# Patient Record
Sex: MALE | Race: WHITE | Employment: OTHER | URBAN - NONMETROPOLITAN AREA
[De-identification: names, ages, dates, MRNs, and addresses within clinical notes are randomized per-mention and may not be internally consistent; named-entity substitution may affect disease eponyms.]

---

## 2017-10-09 ENCOUNTER — HOSPITAL ENCOUNTER (OUTPATIENT)
Age: 62
Discharge: HOME | End: 2017-10-09
Payer: MEDICARE

## 2017-10-09 LAB
ALT SERPL-CCNC: 41 U/L (ref 11–66)
ANION GAP SERPL CALCULATED.3IONS-SCNC: 9 MEQ/L (ref 8–16)
AST SERPL-CCNC: 47 U/L (ref 5–40)
CHLORIDE BLD-SCNC: 97 MEQ/L (ref 98–111)
CO2: 31 MEQ/L (ref 23–33)
CREAT SERPL-MCNC: 0.7 MG/DL (ref 0.4–1.2)
GFR SERPL CREATININE-BSD FRML MDRD: > 90 ML/MIN/1.73M2
POTASSIUM SERPL-SCNC: 4.2 MEQ/L (ref 3.5–5.2)
SODIUM BLD-SCNC: 137 MEQ/L (ref 135–145)

## 2017-10-09 PROCEDURE — 84460 ALANINE AMINO (ALT) (SGPT): CPT

## 2017-10-09 PROCEDURE — 36415 COLL VENOUS BLD VENIPUNCTURE: CPT

## 2017-10-09 PROCEDURE — 82565 ASSAY OF CREATININE: CPT

## 2017-10-09 PROCEDURE — 84450 TRANSFERASE (AST) (SGOT): CPT

## 2017-10-09 PROCEDURE — 80051 ELECTROLYTE PANEL: CPT

## 2018-04-07 ENCOUNTER — HOSPITAL ENCOUNTER (OUTPATIENT)
Age: 63
Discharge: HOME | End: 2018-04-07
Payer: MEDICARE

## 2018-04-07 LAB
ALT SERPL-CCNC: 52 U/L (ref 11–66)
ANION GAP SERPL CALCULATED.3IONS-SCNC: 8 MEQ/L (ref 8–16)
AST SERPL-CCNC: 51 U/L (ref 5–40)
CHLORIDE BLD-SCNC: 97 MEQ/L (ref 98–111)
CO2: 31 MEQ/L (ref 23–33)
CREAT SERPL-MCNC: 0.7 MG/DL (ref 0.4–1.2)
GFR SERPL CREATININE-BSD FRML MDRD: > 90 ML/MIN/1.73M2
POTASSIUM SERPL-SCNC: 5.1 MEQ/L (ref 3.5–5.2)
SODIUM BLD-SCNC: 136 MEQ/L (ref 135–145)

## 2018-04-07 PROCEDURE — 36415 COLL VENOUS BLD VENIPUNCTURE: CPT

## 2018-04-07 PROCEDURE — 82565 ASSAY OF CREATININE: CPT

## 2018-04-07 PROCEDURE — 84450 TRANSFERASE (AST) (SGOT): CPT

## 2018-04-07 PROCEDURE — 80051 ELECTROLYTE PANEL: CPT

## 2018-04-07 PROCEDURE — 84460 ALANINE AMINO (ALT) (SGPT): CPT

## 2018-06-05 ENCOUNTER — HOSPITAL ENCOUNTER (EMERGENCY)
Age: 63
Discharge: HOME | End: 2018-06-06
Payer: MEDICARE

## 2018-06-05 ENCOUNTER — APPOINTMENT (OUTPATIENT)
Dept: GENERAL RADIOLOGY | Age: 63
End: 2018-06-05
Payer: MEDICARE

## 2018-06-05 VITALS
RESPIRATION RATE: 16 BRPM | HEIGHT: 68 IN | WEIGHT: 168 LBS | TEMPERATURE: 98.7 F | BODY MASS INDEX: 25.46 KG/M2 | SYSTOLIC BLOOD PRESSURE: 148 MMHG | HEART RATE: 92 BPM | OXYGEN SATURATION: 95 % | DIASTOLIC BLOOD PRESSURE: 86 MMHG

## 2018-06-05 DIAGNOSIS — S51.811A LACERATION OF RIGHT FOREARM, INITIAL ENCOUNTER: Primary | ICD-10-CM

## 2018-06-05 LAB — INR BLD: 1.95 (ref 0.85–1.13)

## 2018-06-05 PROCEDURE — 36415 COLL VENOUS BLD VENIPUNCTURE: CPT

## 2018-06-05 PROCEDURE — 12001 RPR S/N/AX/GEN/TRNK 2.5CM/<: CPT

## 2018-06-05 PROCEDURE — 85610 PROTHROMBIN TIME: CPT

## 2018-06-05 PROCEDURE — 73110 X-RAY EXAM OF WRIST: CPT

## 2018-06-05 PROCEDURE — 99283 EMERGENCY DEPT VISIT LOW MDM: CPT

## 2018-06-05 RX ORDER — LIDOCAINE HYDROCHLORIDE AND EPINEPHRINE 10; 10 MG/ML; UG/ML
20 INJECTION, SOLUTION INFILTRATION; PERINEURAL ONCE
Status: DISCONTINUED | OUTPATIENT
Start: 2018-06-05 | End: 2018-06-06 | Stop reason: HOSPADM

## 2018-06-05 ASSESSMENT — ENCOUNTER SYMPTOMS
GASTROINTESTINAL NEGATIVE: 1
SHORTNESS OF BREATH: 0
EYES NEGATIVE: 1
COUGH: 0
WHEEZING: 0

## 2018-06-06 RX ORDER — BACITRACIN, NEOMYCIN, POLYMYXIN B 400; 3.5; 5 [USP'U]/G; MG/G; [USP'U]/G
OINTMENT TOPICAL
Status: DISCONTINUED
Start: 2018-06-06 | End: 2018-06-06 | Stop reason: HOSPADM

## 2019-01-14 ENCOUNTER — HOSPITAL ENCOUNTER (OUTPATIENT)
Dept: AUDIOLOGY | Age: 64
Discharge: HOME | End: 2019-01-14
Payer: MEDICARE

## 2019-01-14 PROCEDURE — 92567 TYMPANOMETRY: CPT | Performed by: AUDIOLOGIST

## 2019-01-14 PROCEDURE — 92557 COMPREHENSIVE HEARING TEST: CPT | Performed by: AUDIOLOGIST

## 2021-12-25 ENCOUNTER — HOSPITAL ENCOUNTER (EMERGENCY)
Age: 66
Discharge: HOME | End: 2021-12-25
Payer: MEDICARE

## 2021-12-25 VITALS
SYSTOLIC BLOOD PRESSURE: 120 MMHG | DIASTOLIC BLOOD PRESSURE: 78 MMHG | HEART RATE: 88 BPM | OXYGEN SATURATION: 99 % | RESPIRATION RATE: 20 BRPM | TEMPERATURE: 97.9 F

## 2021-12-25 DIAGNOSIS — S51.812A LACERATION OF LEFT FOREARM, INITIAL ENCOUNTER: Primary | ICD-10-CM

## 2021-12-25 PROCEDURE — 99281 EMR DPT VST MAYX REQ PHY/QHP: CPT

## 2021-12-25 PROCEDURE — 12001 RPR S/N/AX/GEN/TRNK 2.5CM/<: CPT

## 2021-12-25 ASSESSMENT — ENCOUNTER SYMPTOMS
PHOTOPHOBIA: 0
VOMITING: 0
NAUSEA: 0
RHINORRHEA: 0
SHORTNESS OF BREATH: 0

## 2021-12-25 ASSESSMENT — PAIN SCALES - GENERAL: PAINLEVEL_OUTOF10: 3

## 2021-12-25 ASSESSMENT — PAIN DESCRIPTION - PAIN TYPE: TYPE: ACUTE PAIN

## 2021-12-25 ASSESSMENT — PAIN DESCRIPTION - LOCATION: LOCATION: ARM

## 2021-12-25 ASSESSMENT — PAIN DESCRIPTION - DESCRIPTORS: DESCRIPTORS: ACHING

## 2021-12-25 ASSESSMENT — PAIN DESCRIPTION - ORIENTATION: ORIENTATION: LEFT

## 2021-12-25 NOTE — ED NOTES
Patient accidentally stabbed left forearm with pocket knife. Patient states he takes coumadin.  Wound still oozing blood on arrival     Rhodia Lesches, RN  12/25/21 8002

## 2021-12-25 NOTE — ED PROVIDER NOTES
[facility] EMERGENCY DEPT      CHIEF COMPLAINT       Chief Complaint   Patient presents with    Laceration     left arm       Nurses Notes reviewed and I agree except as noted in the HPI. HISTORY OF PRESENT ILLNESS    Gilford Loft is a 73 y.o. male who presents for laceration. Patient was cutting a plastic strap off are present for his grandson when the knife poked his left forearm \"a little. \"  Patient's daughter was insistent that he come to the ER for evaluation due to his Coumadin use. Patient gets his INR checked every 8 weeks at it is well controlled. Patient declines having his INR checked today. Patient admits to pain in the area but denies other complaint. Patient is right-hand dominant. Tetanus was last updated in 2020. REVIEW OF SYSTEMS     Review of Systems   Constitutional: Negative for diaphoresis and fever. HENT: Negative for rhinorrhea. Eyes: Negative for photophobia. Respiratory: Negative for shortness of breath. Cardiovascular: Negative for chest pain. Gastrointestinal: Negative for nausea and vomiting. Musculoskeletal: Positive for myalgias. Negative for gait problem. Skin: Positive for wound. Neurological: Negative for weakness and numbness. Psychiatric/Behavioral: Negative for confusion. PAST MEDICAL HISTORY    has no past medical history on file. SURGICAL HISTORY      has no past surgical history on file. CURRENT MEDICATIONS       Previous Medications    No medications on file       ALLERGIES     has No Known Allergies. FAMILY HISTORY     has no family status information on file. family history is not on file. SOCIAL HISTORY        PHYSICAL EXAM     INITIAL VITALS:  oral temperature is 97.9 °F (36.6 °C). His blood pressure is 120/78 and his pulse is 88. His respiration is 20 and oxygen saturation is 99%. Physical Exam  Vitals and nursing note reviewed. Constitutional:       General: He is not in acute distress.      Appearance: He is well-developed. He is not toxic-appearing or diaphoretic. HENT:      Head: Normocephalic and atraumatic. Right Ear: Hearing normal.      Left Ear: Hearing normal.      Nose: Nose normal.   Eyes:      General: Lids are normal.      Conjunctiva/sclera: Conjunctivae normal.   Neck:      Trachea: No tracheal deviation. Cardiovascular:      Rate and Rhythm: Normal rate and regular rhythm. Pulses:           Radial pulses are 2+ on the right side and 2+ on the left side. Pulmonary:      Effort: Pulmonary effort is normal. No tachypnea or respiratory distress. Breath sounds: No stridor. Abdominal:      General: There is no distension. Palpations: Abdomen is soft. Musculoskeletal:      Left forearm: Laceration (1 cm, superficial) present. No tenderness or bony tenderness. Left wrist: Normal.      Left hand: Normal.        Arms:       Cervical back: No rigidity. Skin:     General: Skin is warm and dry. Coloration: Skin is not pale. Findings: No abrasion, ecchymosis or rash. Neurological:      Mental Status: He is alert and oriented to person, place, and time. GCS: GCS eye subscore is 4. GCS verbal subscore is 5. GCS motor subscore is 6. Sensory: No sensory deficit. Gait: Gait normal.   Psychiatric:         Speech: Speech normal.         Behavior: Behavior normal.         Thought Content: Thought content normal.           DIFFERENTIAL DIAGNOSIS:   Including but not limited to: Laceration, coagulopathy, no evidence for foreign body    DIAGNOSTIC RESULTS     EKG: All E[facility]'s are [facility] who either signs or Co-signs this chart in the absence of a cardiologist.  None    RADIOLOGY: non-plain film images(s) such as CT,Ultrasound and MRI are read by the radiologist.  Plain radiographic images are visualized and preliminarily interpreted by the emergency physician unless otherwise stated below.   No orders to display       LABS: Labs Reviewed - No data to display    EMERGENCY DEPARTMENT COURSE:   Vitals:    Vitals:    12/25/21 1455   BP: 120/78   Pulse: 88   Resp: 20   Temp: 97.9 °F (36.6 °C)   TempSrc: Oral   SpO2: 99%       Patient was seen in the emergency department during the global pandemic, when there was surge capacity and regional healthcare crisis. MDM:  The patient was seen and evaluated by me in the intake area. Vital signs were reviewed and noted stable. Physical exam revealed 1 cm nearly linear superficial laceration to the dorsum of the left forearm. There was minimal oozing. The patient was neurovascularly intact. No labs or imaging were deemed indicated at this time. Laceration was cleansed by me and closed using Dermabond. Patient tolerated procedure well. Patient declined having his INR checked. Discharge plan was discussed, and patient was comfortable with plan of care. I have given the patient strict written and verbal instructions about care at home, follow-up, and signs and symptoms of worsening of condition and they did verbalize understanding. CRITICAL CARE:   None    CONSULTS:  None    PROCEDURES:  Lac Repair    Date/Time: 12/25/2021 4:15 PM  Performed by: Alberto Lucio PA-C  Authorized by: Alberto Lucio PA-C     Consent:     Consent obtained:  Verbal    Consent given by:  Patient    Risks discussed:  Poor cosmetic result    Alternatives discussed:  No treatment  Anesthesia (see MAR for exact dosages):      Anesthesia method:  None  Laceration details:     Location:  Shoulder/arm    Shoulder/arm location:  L lower arm    Length (cm):  1  Repair type:     Repair type:  Simple  Exploration:     Hemostasis achieved with:  Direct pressure    Wound exploration: wound explored through full range of motion and entire depth of wound probed and visualized      Wound extent: no fascia violation noted, no foreign bodies/material noted, no muscle damage noted, no nerve damage noted, no tendon damage

## 2023-05-31 ENCOUNTER — HOSPITAL ENCOUNTER (OUTPATIENT)
Age: 68
Discharge: HOME | End: 2023-05-31
Payer: MEDICARE

## 2023-05-31 ENCOUNTER — HOSPITAL ENCOUNTER (OUTPATIENT)
Dept: GENERAL RADIOLOGY | Age: 68
Discharge: HOME | End: 2023-05-31
Payer: MEDICARE

## 2023-05-31 DIAGNOSIS — R10.9 ABDOMINAL PAIN, UNSPECIFIED ABDOMINAL LOCATION: ICD-10-CM

## 2023-05-31 PROCEDURE — 74018 RADEX ABDOMEN 1 VIEW: CPT

## 2023-06-09 ENCOUNTER — HOSPITAL ENCOUNTER (OUTPATIENT)
Dept: CT IMAGING | Age: 68
Discharge: HOME | End: 2023-06-09
Payer: MEDICARE

## 2023-06-09 DIAGNOSIS — R10.9 ABDOMINAL PAIN, UNSPECIFIED ABDOMINAL LOCATION: ICD-10-CM

## 2023-06-09 LAB
CREAT BLD-MCNC: 0.9 MG/DL (ref 0.5–1.2)
GFR SERPL CREATININE-BSD FRML MDRD: > 60 ML/MIN/1.73M2

## 2023-06-09 PROCEDURE — 74177 CT ABD & PELVIS W/CONTRAST: CPT

## 2023-06-09 PROCEDURE — 6360000004 HC RX CONTRAST MEDICATION: Performed by: INTERNAL MEDICINE

## 2023-06-09 PROCEDURE — 82565 ASSAY OF CREATININE: CPT

## 2023-06-09 RX ADMIN — IOPAMIDOL 85 ML: 755 INJECTION, SOLUTION INTRAVENOUS at 15:00

## 2023-07-11 DIAGNOSIS — E78.2 MIXED HYPERLIPIDEMIA: ICD-10-CM

## 2023-07-11 DIAGNOSIS — I10 ESSENTIAL HYPERTENSION: ICD-10-CM

## 2023-07-11 DIAGNOSIS — I48.91 ATRIAL FIBRILLATION, UNSPECIFIED TYPE (HCC): ICD-10-CM

## 2023-07-11 DIAGNOSIS — D37.4 NEOPLASM OF UNCERTAIN BEHAVIOR OF COLON: Primary | ICD-10-CM

## 2023-07-11 DIAGNOSIS — E79.0 HYPERURICEMIA: ICD-10-CM

## 2023-07-11 RX ORDER — ALLOPURINOL 100 MG/1
TABLET ORAL
Status: ON HOLD | COMMUNITY
Start: 2023-06-02

## 2023-07-11 RX ORDER — FAMOTIDINE 20 MG/1
TABLET, FILM COATED ORAL
Status: ON HOLD | COMMUNITY
Start: 2023-06-02

## 2023-07-11 RX ORDER — OMEPRAZOLE 40 MG/1
40 CAPSULE, DELAYED RELEASE ORAL DAILY
Status: ON HOLD | COMMUNITY
Start: 2023-05-23

## 2023-07-11 RX ORDER — ATORVASTATIN CALCIUM 20 MG/1
20 TABLET, FILM COATED ORAL DAILY
COMMUNITY

## 2023-07-11 RX ORDER — LISINOPRIL 20 MG/1
TABLET ORAL
Status: ON HOLD | COMMUNITY
Start: 2023-06-07

## 2023-07-11 RX ORDER — WARFARIN SODIUM 5 MG/1
5 TABLET ORAL DAILY
Status: ON HOLD | COMMUNITY
Start: 2023-06-07

## 2023-07-11 RX ORDER — METOPROLOL TARTRATE 50 MG/1
TABLET, FILM COATED ORAL
Status: ON HOLD | COMMUNITY

## 2023-07-11 RX ORDER — HYDROCHLOROTHIAZIDE 12.5 MG/1
12.5 CAPSULE, GELATIN COATED ORAL DAILY
Status: ON HOLD | COMMUNITY
Start: 2023-05-27

## 2023-07-12 ENCOUNTER — TELEPHONE (OUTPATIENT)
Dept: SURGERY | Age: 68
End: 2023-07-12

## 2023-07-12 ENCOUNTER — OFFICE VISIT (OUTPATIENT)
Dept: SURGERY | Age: 68
End: 2023-07-12
Payer: MEDICARE

## 2023-07-12 VITALS
OXYGEN SATURATION: 99 % | BODY MASS INDEX: 21.67 KG/M2 | WEIGHT: 143 LBS | SYSTOLIC BLOOD PRESSURE: 80 MMHG | RESPIRATION RATE: 18 BRPM | HEART RATE: 91 BPM | TEMPERATURE: 97 F | DIASTOLIC BLOOD PRESSURE: 50 MMHG | HEIGHT: 68 IN

## 2023-07-12 DIAGNOSIS — Z72.0 TOBACCO ABUSE: ICD-10-CM

## 2023-07-12 DIAGNOSIS — C18.9 ADENOCARCINOMA OF COLON (HCC): Primary | ICD-10-CM

## 2023-07-12 DIAGNOSIS — K76.9 LIVER LESION: ICD-10-CM

## 2023-07-12 PROCEDURE — 3078F DIAST BP <80 MM HG: CPT | Performed by: SURGERY

## 2023-07-12 PROCEDURE — 1123F ACP DISCUSS/DSCN MKR DOCD: CPT | Performed by: SURGERY

## 2023-07-12 PROCEDURE — 3074F SYST BP LT 130 MM HG: CPT | Performed by: SURGERY

## 2023-07-12 PROCEDURE — 99204 OFFICE O/P NEW MOD 45 MIN: CPT | Performed by: SURGERY

## 2023-07-12 RX ORDER — METRONIDAZOLE 500 MG/1
500 TABLET ORAL 3 TIMES DAILY
Qty: 3 TABLET | Refills: 0 | Status: CANCELLED | OUTPATIENT
Start: 2023-07-12 | End: 2023-07-13

## 2023-07-12 RX ORDER — SODIUM, POTASSIUM,MAG SULFATES 17.5-3.13G
1 SOLUTION, RECONSTITUTED, ORAL ORAL ONCE
Qty: 1 EACH | Refills: 0 | Status: CANCELLED | OUTPATIENT
Start: 2023-07-12 | End: 2023-07-12

## 2023-07-12 NOTE — TELEPHONE ENCOUNTER
Patient scheduled for surgery with Dr. Michelle Rice on 07- at 1:30pm with an arrival of 12:00pm.  Preop orders (CEA, CMP, Chest CT) and surgery instructions given to patient. Surgery consent signed. Antibacterial soap given. [facility]in clinic contacted. Cardiac clearance per Dr. Isaac Conte. Patient instructed to hold his Coumadin 5-days.

## 2023-07-12 NOTE — TELEPHONE ENCOUNTER
Robotic Surgery Scheduling Form   [address] [place], [address] [facility]    Phone * [phone]   Surgical Scheduling Direct line Phone * [phone]  Fax * [phone]      Davidson Liter      1955    male    [place]  [address] [address]   Marital Status:    Single     Home Phone: [phone]   Cell Phone:   Telephone Information:   Mobile [phone]              Surgeon: Dr. Kuldip Amezcua  Surgery Date:07-   Time:  Nura     Procedure: Robotic assisted extended right colon resection possible open  Inpatient       Diagnosis: Uncertain neoplasm colon    Important Medical History: In Epic    Special Inst/Equip:     CPT Codes: 30971    Latex Allergy:   yes,   Cardiac Device:  no    Anesthesia Type: General    Case Location:  Main OR     Preadmission Testing: Phone Call      PAT Date and Time: ________________________________    PAT Confirmation #: _________________________________    Post Op Visit:  ______________________________________    Need Preop Cardiac Clearance:   yes,     Does patient have Cardiologist/physician? Dr. Rola Goel #:  ______________________________________________    Magdalene Fitch: __________________________________ Date:____________________      RNFA (colon resection only):       Insurance Company Name:  [employer]

## 2023-07-14 ENCOUNTER — TELEPHONE (OUTPATIENT)
Dept: SURGERY | Age: 68
End: 2023-07-14

## 2023-07-14 DIAGNOSIS — C18.9 ADENOCARCINOMA OF COLON (HCC): Primary | ICD-10-CM

## 2023-07-17 ENCOUNTER — PREP FOR PROCEDURE (OUTPATIENT)
Dept: SURGERY | Age: 68
End: 2023-07-17

## 2023-07-17 RX ORDER — SODIUM CHLORIDE 9 MG/ML
INJECTION, SOLUTION INTRAVENOUS CONTINUOUS
Status: CANCELLED | OUTPATIENT
Start: 2023-07-17

## 2023-07-17 RX ORDER — SODIUM, POTASSIUM,MAG SULFATES 17.5-3.13G
1 SOLUTION, RECONSTITUTED, ORAL ORAL ONCE
Qty: 1 EACH | Refills: 0 | Status: SHIPPED | OUTPATIENT
Start: 2023-07-17 | End: 2023-07-17

## 2023-07-17 RX ORDER — METRONIDAZOLE 500 MG/1
500 TABLET ORAL 3 TIMES DAILY
Qty: 3 TABLET | Refills: 0 | Status: SHIPPED | OUTPATIENT
Start: 2023-07-17 | End: 2023-07-18

## 2023-07-17 RX ORDER — ALVIMOPAN 12 MG/1
12 CAPSULE ORAL ONCE
Status: CANCELLED | OUTPATIENT
Start: 2023-07-17 | End: 2023-07-17

## 2023-07-17 ASSESSMENT — ENCOUNTER SYMPTOMS
COLOR CHANGE: 0
ABDOMINAL DISTENTION: 0
EYE ITCHING: 0
EYE REDNESS: 0
SHORTNESS OF BREATH: 0
ANAL BLEEDING: 0
EYE PAIN: 0
BACK PAIN: 0
SINUS PRESSURE: 0
RECTAL PAIN: 0
CHEST TIGHTNESS: 0
BLOOD IN STOOL: 0
DIARRHEA: 0
STRIDOR: 0
RHINORRHEA: 0
ALLERGIC/IMMUNOLOGIC NEGATIVE: 1
EYE DISCHARGE: 0
TROUBLE SWALLOWING: 0
COUGH: 1
SORE THROAT: 0
NAUSEA: 0
CHOKING: 0
FACIAL SWELLING: 0
VOICE CHANGE: 0
VOMITING: 0
PHOTOPHOBIA: 0
ABDOMINAL PAIN: 1
WHEEZING: 0
APNEA: 0
CONSTIPATION: 0

## 2023-07-17 NOTE — PROGRESS NOTES
hypertension    Mixed hyperlipidemia    Hyperuricemia     CT ABDOMEN PELVIS W IV CONTRAST Additional Contrast? Radiologist Recommendation  Order: [id number]  Status: Final result     Visible to patient: No (not released)     Next appt: 07/18/2023 at 11:00 AM in Radiology ([facility] G)     Dx: Abdominal pain, unspecified abdominal...     0 Result Notes    1  Topic  Details    Reading Physician Reading Date Result Priority   Tristen Anderson MD  [phone] 6/9/2023      Narrative & Impression  PROCEDURE: CT ABDOMEN PELVIS W IV CONTRAST     CLINICAL INFORMATION: Abdominal pain, unspecified abdominal location     COMPARISON: No prior study. TECHNIQUE:   Helical CT acquisition of the abdomen and pelvis was performed with administration of intravenous contrast. Multiplanar reformats are provided. All CT scans at this facility use dose modulation, iterative reconstruction, and/or weight based dosing when appropriate to reduce the radiation dose to as low as reasonably achievable. CONTRAST: 80  cc of Isovue-370  intravenously        FINDINGS:               No significant lower thoracic findings. There is a 5.2 x 5.1 cm area of colonic dilatation and marked masslike colonic wall thickening in the proximal transverse colon at the hepatic flexure. This is consistent with a colonic malignancy. Colonic diverticulosis. A 2.2 x 1.5 cm hyperenhancing focus in segment 2 of the liver is an indeterminate abnormality at this time. A 3.1 cm cyst of the inferior pole of the left kidney is noted. No hydronephrosis. No obstructing ureteral calculus. Aortoiliac calcifications. The liver, gallbladder, biliary tree, pancreas, adrenal glands, spleen, and kidneys are unremarkable. No bowel obstruction or acute inflammatory bowel process. The appendix is unremarkable. The abdominal aorta is not aneurysmal.     No significantly enlarged lymph nodes are seen. The bladder is under distended.  The

## 2023-07-17 NOTE — H&P
Ben Garibay (:  1955)      ASSESSMENT:  1. Adenocarcinoma of colon (proximal transverse colon)  2. Liver lesion  3. Tobacco abuse  4. Hypotension     PLAN:  1. Schedule Chris for robotic possible open extended right colectomy. 2. He will undergo pre-operative clearance per anesthesia guidelines with risk factors listed under the past medical history diagnosis & problem list.  3. The risks, benefits and alternatives were discussed with Tabby Vance including non-operative management. The pros and cons of robotic, laparoscopic and open techniques were discussed. All questions answered. He understands and wishes to proceed with surgical intervention. 4. Restrictions discussed with Tabby Vance and he expresses understanding. 5. He is advised to call back directly if there are further questions/concerns, or if his symptoms worsen prior to surgery. 6.  CT chest prior to surgical intervention  7. Laboratory values including CEA level prior surgery  8. Await final results of pathology as well as colonoscopy report from GI service  9. Discussed with patient about MRI of liver. He states he is not able to proceed with MRI even if it is an open MRI. He wishes to forego this at this time and see direct visualization during surgery and then go from there. 10.  Discussed with patient about hypotension. Discussed with option of going to emergency department for hydration and further assessment. He is refusing at this time. Asymptomatic in the office. 11.  Encouraged tobacco cessation  12. Stool softeners as needed     SUBJECTIVE/OBJECTIVE:          Chief Complaint   Patient presents with    Surgical Consult       NP Dr. Trena Lamar is a 41-year-old male who presents for initial evaluation with family secondary to newly diagnosed colonic mass that is near obstructing. Discussed case with Dr. Sorin Rubio of the GI service. Not able to get a scope through the area of concern. Proximal transverse colon close to the hepatic flexure. Patient admits to abdominal discomfort in the right upper quadrant. Mild to moderate. No severe sharp pain. Admits to weight loss and fatigue. Decreased appetite. No significant nausea or vomiting. Still passing flatus and having bowel movements. No hematochezia. No change in urination. Headaches. No lightheadedness. Low blood pressure in office however patient seems to be asymptomatic. CT abdomen/pelvis demonstrated colonic mass proximal transverse colon close to the hepatic flexure. Currently do not have final results of pathology from biopsy with colonoscopy nor the colonoscopy report. Patient denies any family or personal history of colorectal disease that he is aware of. Small liver lesion identified on CT abdomen/pelvis. Patient states he cannot proceed with an MRI even if it is a open MRI due to anxiety. He is refusing to proceed with any further testing in regards to MRI. Admits to smoking cigarettes but has been decreasing in number. Less than half a pack a day. Review of Systems   Constitutional:  Positive for activity change, appetite change, fatigue and unexpected weight change. Negative for chills, diaphoresis and fever. HENT:  Positive for hearing loss and postnasal drip. Negative for congestion, dental problem, drooling, ear discharge, ear pain, facial swelling, mouth sores, nosebleeds, rhinorrhea, sinus pressure, sneezing, sore throat, tinnitus, trouble swallowing and voice change. Eyes:  Positive for visual disturbance. Negative for photophobia, pain, discharge, redness and itching. Respiratory:  Positive for cough. Negative for apnea, choking, chest tightness, shortness of breath, wheezing and stridor. Cardiovascular:  Negative for chest pain, palpitations and leg swelling. Gastrointestinal:  Positive for abdominal pain.  Negative for abdominal distention, anal bleeding, blood in stool, constipation,

## 2023-07-18 ENCOUNTER — HOSPITAL ENCOUNTER (OUTPATIENT)
Dept: CT IMAGING | Age: 68
Discharge: HOME | End: 2023-07-18
Attending: SURGERY
Payer: MEDICARE

## 2023-07-18 DIAGNOSIS — D37.4 NEOPLASM OF UNCERTAIN BEHAVIOR OF COLON: ICD-10-CM

## 2023-07-18 LAB
CREAT BLD-MCNC: 0.8 MG/DL (ref 0.5–1.2)
GFR SERPL CREATININE-BSD FRML MDRD: > 60 ML/MIN/1.73M2

## 2023-07-18 PROCEDURE — 6360000004 HC RX CONTRAST MEDICATION: Performed by: SURGERY

## 2023-07-18 PROCEDURE — 82565 ASSAY OF CREATININE: CPT

## 2023-07-18 PROCEDURE — 71260 CT THORAX DX C+: CPT

## 2023-07-18 RX ADMIN — IOPAMIDOL 100 ML: 755 INJECTION, SOLUTION INTRAVENOUS at 10:32

## 2023-07-19 ENCOUNTER — TELEPHONE (OUTPATIENT)
Dept: SURGERY | Age: 68
End: 2023-07-19

## 2023-07-20 ENCOUNTER — ANESTHESIA (OUTPATIENT)
Dept: OPERATING ROOM | Age: 68
End: 2023-07-20
Payer: MEDICARE

## 2023-07-20 ENCOUNTER — HOSPITAL ENCOUNTER (INPATIENT)
Age: 68
LOS: 7 days | Discharge: HOME | DRG: 827 | End: 2023-07-27
Attending: SURGERY | Admitting: SURGERY
Payer: MEDICARE

## 2023-07-20 ENCOUNTER — ANESTHESIA EVENT (OUTPATIENT)
Dept: OPERATING ROOM | Age: 68
End: 2023-07-20
Payer: MEDICARE

## 2023-07-20 DIAGNOSIS — C18.2 MALIGNANT NEOPLASM OF ASCENDING COLON (HCC): Primary | ICD-10-CM

## 2023-07-20 DIAGNOSIS — D49.0 NEOPLASM OF SIGMOID COLON: ICD-10-CM

## 2023-07-20 DIAGNOSIS — C18.9 ADENOCARCINOMA OF COLON (HCC): ICD-10-CM

## 2023-07-20 LAB
ABO GROUP BLD: NORMAL
ANTIBODY SCREEN: NORMAL
APTT PPP: 35.5 SECONDS (ref 22–38)
INR PPP: 1.37 (ref 0.85–1.13)
POTASSIUM SERPL-SCNC: 5.3 MEQ/L (ref 3.5–5.2)
RH BLD: NORMAL

## 2023-07-20 PROCEDURE — C1889 IMPLANT/INSERT DEVICE, NOC: HCPCS | Performed by: SURGERY

## 2023-07-20 PROCEDURE — 6370000000 HC RX 637 (ALT 250 FOR IP): Performed by: SURGERY

## 2023-07-20 PROCEDURE — 8E0W4CZ ROBOTIC ASSISTED PROCEDURE OF TRUNK REGION, PERCUTANEOUS ENDOSCOPIC APPROACH: ICD-10-PCS | Performed by: SURGERY

## 2023-07-20 PROCEDURE — 2500000003 HC RX 250 WO HCPCS: Performed by: STUDENT IN AN ORGANIZED HEALTH CARE EDUCATION/TRAINING PROGRAM

## 2023-07-20 PROCEDURE — 47562 LAPAROSCOPIC CHOLECYSTECTOMY: CPT | Performed by: SURGERY

## 2023-07-20 PROCEDURE — 86850 RBC ANTIBODY SCREEN: CPT

## 2023-07-20 PROCEDURE — 86901 BLOOD TYPING SEROLOGIC RH(D): CPT

## 2023-07-20 PROCEDURE — 7100000001 HC PACU RECOVERY - ADDTL 15 MIN: Performed by: SURGERY

## 2023-07-20 PROCEDURE — 85610 PROTHROMBIN TIME: CPT

## 2023-07-20 PROCEDURE — 36415 COLL VENOUS BLD VENIPUNCTURE: CPT

## 2023-07-20 PROCEDURE — 94761 N-INVAS EAR/PLS OXIMETRY MLT: CPT

## 2023-07-20 PROCEDURE — 6360000002 HC RX W HCPCS: Performed by: NURSE ANESTHETIST, CERTIFIED REGISTERED

## 2023-07-20 PROCEDURE — 88309 TISSUE EXAM BY PATHOLOGIST: CPT

## 2023-07-20 PROCEDURE — 0DTF0ZZ RESECTION OF RIGHT LARGE INTESTINE, OPEN APPROACH: ICD-10-PCS | Performed by: SURGERY

## 2023-07-20 PROCEDURE — 88341 IMHCHEM/IMCYTCHM EA ADD ANTB: CPT

## 2023-07-20 PROCEDURE — 2709999900 HC NON-CHARGEABLE SUPPLY: Performed by: SURGERY

## 2023-07-20 PROCEDURE — 88304 TISSUE EXAM BY PATHOLOGIST: CPT

## 2023-07-20 PROCEDURE — 2720000010 HC SURG SUPPLY STERILE: Performed by: SURGERY

## 2023-07-20 PROCEDURE — 2500000003 HC RX 250 WO HCPCS

## 2023-07-20 PROCEDURE — 3600000019 HC SURGERY ROBOT ADDTL 15MIN: Performed by: SURGERY

## 2023-07-20 PROCEDURE — 84132 ASSAY OF SERUM POTASSIUM: CPT

## 2023-07-20 PROCEDURE — 86900 BLOOD TYPING SEROLOGIC ABO: CPT

## 2023-07-20 PROCEDURE — 3700000001 HC ADD 15 MINUTES (ANESTHESIA): Performed by: SURGERY

## 2023-07-20 PROCEDURE — 6370000000 HC RX 637 (ALT 250 FOR IP): Performed by: ANESTHESIOLOGY

## 2023-07-20 PROCEDURE — 6360000002 HC RX W HCPCS: Performed by: SURGERY

## 2023-07-20 PROCEDURE — 2580000003 HC RX 258: Performed by: NURSE PRACTITIONER

## 2023-07-20 PROCEDURE — 6370000000 HC RX 637 (ALT 250 FOR IP): Performed by: NURSE PRACTITIONER

## 2023-07-20 PROCEDURE — 2500000003 HC RX 250 WO HCPCS: Performed by: NURSE ANESTHETIST, CERTIFIED REGISTERED

## 2023-07-20 PROCEDURE — 3700000000 HC ANESTHESIA ATTENDED CARE: Performed by: SURGERY

## 2023-07-20 PROCEDURE — 7100000000 HC PACU RECOVERY - FIRST 15 MIN: Performed by: SURGERY

## 2023-07-20 PROCEDURE — 6360000002 HC RX W HCPCS

## 2023-07-20 PROCEDURE — 6360000002 HC RX W HCPCS: Performed by: NURSE PRACTITIONER

## 2023-07-20 PROCEDURE — 6360000002 HC RX W HCPCS: Performed by: ANESTHESIOLOGY

## 2023-07-20 PROCEDURE — 85730 THROMBOPLASTIN TIME PARTIAL: CPT

## 2023-07-20 PROCEDURE — 0FT44ZZ RESECTION OF GALLBLADDER, PERCUTANEOUS ENDOSCOPIC APPROACH: ICD-10-PCS | Performed by: SURGERY

## 2023-07-20 PROCEDURE — 88342 IMHCHEM/IMCYTCHM 1ST ANTB: CPT

## 2023-07-20 PROCEDURE — 94640 AIRWAY INHALATION TREATMENT: CPT

## 2023-07-20 PROCEDURE — 44205 LAP COLECTOMY PART W/ILEUM: CPT | Performed by: SURGERY

## 2023-07-20 PROCEDURE — S2900 ROBOTIC SURGICAL SYSTEM: HCPCS | Performed by: SURGERY

## 2023-07-20 PROCEDURE — C9399 UNCLASSIFIED DRUGS OR BIOLOG: HCPCS | Performed by: NURSE ANESTHETIST, CERTIFIED REGISTERED

## 2023-07-20 PROCEDURE — 3600000009 HC SURGERY ROBOT BASE: Performed by: SURGERY

## 2023-07-20 PROCEDURE — 1200000000 HC SEMI PRIVATE

## 2023-07-20 DEVICE — CLIP INT L POLYMER LOK LIG HEM O LOK: Type: IMPLANTABLE DEVICE | Site: ABDOMEN | Status: FUNCTIONAL

## 2023-07-20 RX ORDER — SODIUM CHLORIDE 0.9 % (FLUSH) 0.9 %
5-40 SYRINGE (ML) INJECTION EVERY 12 HOURS SCHEDULED
Status: DISCONTINUED | OUTPATIENT
Start: 2023-07-20 | End: 2023-07-27 | Stop reason: HOSPADM

## 2023-07-20 RX ORDER — ONDANSETRON 4 MG/1
4 TABLET, ORALLY DISINTEGRATING ORAL EVERY 8 HOURS PRN
Status: DISCONTINUED | OUTPATIENT
Start: 2023-07-20 | End: 2023-07-27 | Stop reason: HOSPADM

## 2023-07-20 RX ORDER — FENTANYL CITRATE 50 UG/ML
INJECTION, SOLUTION INTRAMUSCULAR; INTRAVENOUS
Status: COMPLETED
Start: 2023-07-20 | End: 2023-07-20

## 2023-07-20 RX ORDER — DEXAMETHASONE SODIUM PHOSPHATE 10 MG/ML
INJECTION, EMULSION INTRAMUSCULAR; INTRAVENOUS PRN
Status: DISCONTINUED | OUTPATIENT
Start: 2023-07-20 | End: 2023-07-20 | Stop reason: SDUPTHER

## 2023-07-20 RX ORDER — ONDANSETRON 2 MG/ML
4 INJECTION INTRAMUSCULAR; INTRAVENOUS EVERY 6 HOURS PRN
Status: DISCONTINUED | OUTPATIENT
Start: 2023-07-20 | End: 2023-07-27 | Stop reason: HOSPADM

## 2023-07-20 RX ORDER — HYOSCYAMINE SULFATE 0.125 MG
125 TABLET,DISINTEGRATING ORAL EVERY 4 HOURS PRN
Status: DISCONTINUED | OUTPATIENT
Start: 2023-07-20 | End: 2023-07-27 | Stop reason: HOSPADM

## 2023-07-20 RX ORDER — LIDOCAINE HCL/PF 100 MG/5ML
SYRINGE (ML) INJECTION PRN
Status: DISCONTINUED | OUTPATIENT
Start: 2023-07-20 | End: 2023-07-20 | Stop reason: SDUPTHER

## 2023-07-20 RX ORDER — FENTANYL CITRATE 50 UG/ML
50 INJECTION, SOLUTION INTRAMUSCULAR; INTRAVENOUS EVERY 5 MIN PRN
Status: COMPLETED | OUTPATIENT
Start: 2023-07-20 | End: 2023-07-20

## 2023-07-20 RX ORDER — FAMOTIDINE 20 MG/1
20 TABLET, FILM COATED ORAL 2 TIMES DAILY
Status: DISCONTINUED | OUTPATIENT
Start: 2023-07-20 | End: 2023-07-27 | Stop reason: HOSPADM

## 2023-07-20 RX ORDER — IPRATROPIUM BROMIDE AND ALBUTEROL SULFATE 2.5; .5 MG/3ML; MG/3ML
1 SOLUTION RESPIRATORY (INHALATION)
Status: DISCONTINUED | OUTPATIENT
Start: 2023-07-20 | End: 2023-07-27 | Stop reason: HOSPADM

## 2023-07-20 RX ORDER — SODIUM CHLORIDE 9 MG/ML
INJECTION, SOLUTION INTRAVENOUS CONTINUOUS
Status: DISCONTINUED | OUTPATIENT
Start: 2023-07-20 | End: 2023-07-23

## 2023-07-20 RX ORDER — FENTANYL CITRATE 50 UG/ML
INJECTION, SOLUTION INTRAMUSCULAR; INTRAVENOUS PRN
Status: DISCONTINUED | OUTPATIENT
Start: 2023-07-20 | End: 2023-07-20 | Stop reason: SDUPTHER

## 2023-07-20 RX ORDER — BUPIVACAINE HYDROCHLORIDE 5 MG/ML
INJECTION, SOLUTION PERINEURAL PRN
Status: DISCONTINUED | OUTPATIENT
Start: 2023-07-20 | End: 2023-07-20 | Stop reason: ALTCHOICE

## 2023-07-20 RX ORDER — SODIUM CHLORIDE 0.9 % (FLUSH) 0.9 %
5-40 SYRINGE (ML) INJECTION EVERY 12 HOURS SCHEDULED
Status: DISCONTINUED | OUTPATIENT
Start: 2023-07-20 | End: 2023-07-20 | Stop reason: HOSPADM

## 2023-07-20 RX ORDER — SODIUM CHLORIDE 0.9 % (FLUSH) 0.9 %
5-40 SYRINGE (ML) INJECTION PRN
Status: DISCONTINUED | OUTPATIENT
Start: 2023-07-20 | End: 2023-07-27 | Stop reason: HOSPADM

## 2023-07-20 RX ORDER — ONDANSETRON 2 MG/ML
INJECTION INTRAMUSCULAR; INTRAVENOUS PRN
Status: DISCONTINUED | OUTPATIENT
Start: 2023-07-20 | End: 2023-07-20 | Stop reason: SDUPTHER

## 2023-07-20 RX ORDER — OXYCODONE HYDROCHLORIDE 5 MG/1
5 TABLET ORAL EVERY 4 HOURS PRN
Status: DISCONTINUED | OUTPATIENT
Start: 2023-07-20 | End: 2023-07-24

## 2023-07-20 RX ORDER — SODIUM CHLORIDE 9 MG/ML
INJECTION, SOLUTION INTRAVENOUS CONTINUOUS
Status: DISCONTINUED | OUTPATIENT
Start: 2023-07-20 | End: 2023-07-20 | Stop reason: HOSPADM

## 2023-07-20 RX ORDER — ROCURONIUM BROMIDE 10 MG/ML
INJECTION, SOLUTION INTRAVENOUS PRN
Status: DISCONTINUED | OUTPATIENT
Start: 2023-07-20 | End: 2023-07-20 | Stop reason: SDUPTHER

## 2023-07-20 RX ORDER — SODIUM CHLORIDE 9 MG/ML
INJECTION, SOLUTION INTRAVENOUS PRN
Status: DISCONTINUED | OUTPATIENT
Start: 2023-07-20 | End: 2023-07-27 | Stop reason: HOSPADM

## 2023-07-20 RX ORDER — PROPOFOL 10 MG/ML
INJECTION, EMULSION INTRAVENOUS PRN
Status: DISCONTINUED | OUTPATIENT
Start: 2023-07-20 | End: 2023-07-20 | Stop reason: SDUPTHER

## 2023-07-20 RX ORDER — ALVIMOPAN 12 MG/1
12 CAPSULE ORAL ONCE
Status: DISCONTINUED | OUTPATIENT
Start: 2023-07-20 | End: 2023-07-20 | Stop reason: SDUPTHER

## 2023-07-20 RX ORDER — HYDRALAZINE HYDROCHLORIDE 20 MG/ML
10 INJECTION INTRAMUSCULAR; INTRAVENOUS
Status: DISCONTINUED | OUTPATIENT
Start: 2023-07-20 | End: 2023-07-20 | Stop reason: HOSPADM

## 2023-07-20 RX ORDER — FENTANYL CITRATE 50 UG/ML
50 INJECTION, SOLUTION INTRAMUSCULAR; INTRAVENOUS EVERY 5 MIN PRN
Status: DISCONTINUED | OUTPATIENT
Start: 2023-07-20 | End: 2023-07-20 | Stop reason: HOSPADM

## 2023-07-20 RX ORDER — OXYCODONE HYDROCHLORIDE 5 MG/1
10 TABLET ORAL EVERY 4 HOURS PRN
Status: DISCONTINUED | OUTPATIENT
Start: 2023-07-20 | End: 2023-07-24

## 2023-07-20 RX ORDER — ENOXAPARIN SODIUM 100 MG/ML
40 INJECTION SUBCUTANEOUS DAILY
Status: DISCONTINUED | OUTPATIENT
Start: 2023-07-21 | End: 2023-07-23

## 2023-07-20 RX ORDER — SODIUM CHLORIDE 9 MG/ML
INJECTION, SOLUTION INTRAVENOUS PRN
Status: DISCONTINUED | OUTPATIENT
Start: 2023-07-20 | End: 2023-07-20 | Stop reason: HOSPADM

## 2023-07-20 RX ORDER — MORPHINE SULFATE 2 MG/ML
2 INJECTION, SOLUTION INTRAMUSCULAR; INTRAVENOUS EVERY 5 MIN PRN
Status: DISCONTINUED | OUTPATIENT
Start: 2023-07-20 | End: 2023-07-20 | Stop reason: HOSPADM

## 2023-07-20 RX ORDER — INDOCYANINE GREEN AND WATER 25 MG
KIT INJECTION PRN
Status: DISCONTINUED | OUTPATIENT
Start: 2023-07-20 | End: 2023-07-20 | Stop reason: SDUPTHER

## 2023-07-20 RX ORDER — SODIUM CHLORIDE 0.9 % (FLUSH) 0.9 %
5-40 SYRINGE (ML) INJECTION PRN
Status: DISCONTINUED | OUTPATIENT
Start: 2023-07-20 | End: 2023-07-20 | Stop reason: HOSPADM

## 2023-07-20 RX ORDER — METOPROLOL TARTRATE 50 MG/1
50 TABLET, FILM COATED ORAL 2 TIMES DAILY
Status: DISCONTINUED | OUTPATIENT
Start: 2023-07-20 | End: 2023-07-27 | Stop reason: HOSPADM

## 2023-07-20 RX ADMIN — HYOSCYAMINE SULFATE 125 MCG: 0.12 TABLET, ORALLY DISINTEGRATING ORAL at 21:25

## 2023-07-20 RX ADMIN — IPRATROPIUM BROMIDE AND ALBUTEROL SULFATE 1 DOSE: .5; 3 SOLUTION RESPIRATORY (INHALATION) at 21:37

## 2023-07-20 RX ADMIN — DEXAMETHASONE SODIUM PHOSPHATE 10 MG: 10 INJECTION, EMULSION INTRAMUSCULAR; INTRAVENOUS at 15:15

## 2023-07-20 RX ADMIN — OXYCODONE 10 MG: 5 TABLET ORAL at 18:54

## 2023-07-20 RX ADMIN — SODIUM CHLORIDE: 9 INJECTION, SOLUTION INTRAVENOUS at 17:24

## 2023-07-20 RX ADMIN — Medication 100 MG: at 15:08

## 2023-07-20 RX ADMIN — FENTANYL CITRATE 50 MCG: 50 INJECTION, SOLUTION INTRAMUSCULAR; INTRAVENOUS at 18:00

## 2023-07-20 RX ADMIN — IPRATROPIUM BROMIDE AND ALBUTEROL SULFATE 1 DOSE: .5; 3 SOLUTION RESPIRATORY (INHALATION) at 13:09

## 2023-07-20 RX ADMIN — HYDROMORPHONE HYDROCHLORIDE 0.5 MG: 1 INJECTION, SOLUTION INTRAMUSCULAR; INTRAVENOUS; SUBCUTANEOUS at 19:53

## 2023-07-20 RX ADMIN — NALOXEGOL OXALATE 25 MG: 25 TABLET, FILM COATED ORAL at 13:29

## 2023-07-20 RX ADMIN — SUGAMMADEX 200 MG: 100 INJECTION, SOLUTION INTRAVENOUS at 17:39

## 2023-07-20 RX ADMIN — INDOCYANINE GREEN 10 MG: 25 INJECTION, POWDER, LYOPHILIZED, FOR SOLUTION INTRAVENOUS at 17:10

## 2023-07-20 RX ADMIN — PROPOFOL 100 MG: 10 INJECTION, EMULSION INTRAVENOUS at 15:08

## 2023-07-20 RX ADMIN — HYDROMORPHONE HYDROCHLORIDE 0.5 MG: 1 INJECTION, SOLUTION INTRAMUSCULAR; INTRAVENOUS; SUBCUTANEOUS at 18:10

## 2023-07-20 RX ADMIN — OXYCODONE 10 MG: 5 TABLET ORAL at 23:00

## 2023-07-20 RX ADMIN — FENTANYL CITRATE 25 MCG: 50 INJECTION, SOLUTION INTRAMUSCULAR; INTRAVENOUS at 16:49

## 2023-07-20 RX ADMIN — FENTANYL CITRATE 50 MCG: 50 INJECTION, SOLUTION INTRAMUSCULAR; INTRAVENOUS at 18:05

## 2023-07-20 RX ADMIN — HYDROMORPHONE HYDROCHLORIDE 0.5 MG: 1 INJECTION, SOLUTION INTRAMUSCULAR; INTRAVENOUS; SUBCUTANEOUS at 18:15

## 2023-07-20 RX ADMIN — ROCURONIUM BROMIDE 20 MG: 10 INJECTION INTRAVENOUS at 16:49

## 2023-07-20 RX ADMIN — FENTANYL CITRATE 100 MCG: 50 INJECTION, SOLUTION INTRAMUSCULAR; INTRAVENOUS at 17:43

## 2023-07-20 RX ADMIN — FENTANYL CITRATE 25 MCG: 50 INJECTION, SOLUTION INTRAMUSCULAR; INTRAVENOUS at 16:29

## 2023-07-20 RX ADMIN — FENTANYL CITRATE 50 MCG: 50 INJECTION, SOLUTION INTRAMUSCULAR; INTRAVENOUS at 15:32

## 2023-07-20 RX ADMIN — CEFOXITIN 2000 MG: 2 INJECTION, POWDER, FOR SOLUTION INTRAVENOUS at 17:05

## 2023-07-20 RX ADMIN — ONDANSETRON 4 MG: 2 INJECTION INTRAMUSCULAR; INTRAVENOUS at 15:15

## 2023-07-20 RX ADMIN — ROCURONIUM BROMIDE 50 MG: 10 INJECTION INTRAVENOUS at 15:08

## 2023-07-20 RX ADMIN — FAMOTIDINE 20 MG: 20 TABLET, FILM COATED ORAL at 21:25

## 2023-07-20 RX ADMIN — SODIUM CHLORIDE: 9 INJECTION, SOLUTION INTRAVENOUS at 13:27

## 2023-07-20 RX ADMIN — CEFOXITIN 2000 MG: 2 INJECTION, POWDER, FOR SOLUTION INTRAVENOUS at 15:15

## 2023-07-20 ASSESSMENT — PAIN SCALES - GENERAL
PAINLEVEL_OUTOF10: 6
PAINLEVEL_OUTOF10: 0
PAINLEVEL_OUTOF10: 0
PAINLEVEL_OUTOF10: 10
PAINLEVEL_OUTOF10: 6
PAINLEVEL_OUTOF10: 9
PAINLEVEL_OUTOF10: 9
PAINLEVEL_OUTOF10: 7

## 2023-07-20 ASSESSMENT — PAIN DESCRIPTION - LOCATION
LOCATION: ABDOMEN

## 2023-07-20 ASSESSMENT — PAIN - FUNCTIONAL ASSESSMENT
PAIN_FUNCTIONAL_ASSESSMENT: PREVENTS OR INTERFERES SOME ACTIVE ACTIVITIES AND ADLS
PAIN_FUNCTIONAL_ASSESSMENT: NONE - DENIES PAIN

## 2023-07-20 ASSESSMENT — LIFESTYLE VARIABLES: SMOKING_STATUS: 1

## 2023-07-20 ASSESSMENT — PAIN DESCRIPTION - PAIN TYPE
TYPE: SURGICAL PAIN
TYPE: SURGICAL PAIN

## 2023-07-20 ASSESSMENT — PAIN DESCRIPTION - ORIENTATION: ORIENTATION: MID

## 2023-07-20 ASSESSMENT — PAIN DESCRIPTION - DESCRIPTORS
DESCRIPTORS: SHARP
DESCRIPTORS: ACHING;SHARP
DESCRIPTORS: ACHING

## 2023-07-20 NOTE — ANESTHESIA POSTPROCEDURE EVALUATION
Department of Anesthesiology  Postprocedure Note    Patient: Radha Kan  MRN: [id number]  YOB: 1955  Date of evaluation: 7/20/2023      Procedure Summary     Date: 07/20/23 Room / Location: [facility] Senait Ramosetelvina Argueta    Anesthesia Start: 1504 Anesthesia Stop: 1800    Procedure: ROBOTIC EXTENDED RIGHT COLON RESECTION, CHOLECYSTECTOMY (Right: Abdomen) Diagnosis: Neoplasm of sigmoid colon    Surgeons: Claire Mohan MD Responsible Provider: Suly Cleaning MD    Anesthesia Type: general ASA Status: 4          Anesthesia Type: No value filed.     Ria Phase I: Ria Score: 9    Ria Phase II:        Anesthesia Post Evaluation    Patient location during evaluation: PACU  Patient participation: complete - patient participated  Level of consciousness: awake and alert  Airway patency: patent  Nausea & Vomiting: no nausea  Complications: no  Cardiovascular status: blood pressure returned to baseline and hemodynamically stable  Respiratory status: acceptable and spontaneous ventilation  Hydration status: euvolemic

## 2023-07-20 NOTE — BRIEF OP NOTE
Brief Postoperative Note      Patient: Dontae Daomn  YOB: 1955  MRN: [id number]    Date of Procedure: 7/20/2023    Preoperative diagnosis: Proximal transverse colon/hepatic flexure adenocarcinoma    Post-Op Diagnosis: Same       Procedure(s):  ROBOTIC EXTENDED RIGHT COLON RESECTION, CHOLECYSTECTOMY    Surgeon(s):  Vinny Velasco MD    Assistant:  First Assistant: Juvenal Crigler, RN    Anesthesia: General/local    Estimated Blood Loss (mL): 56OL    Complications: None    Specimens:   ID Type Source Tests Collected by Time Destination   A : Gallbladder Tissue Gallbladder SURGICAL PATHOLOGY Vinny Velasco MD 7/20/2023 1732    B : Right Colon Tissue Colon SURGICAL PATHOLOGY Vinny Velasco MD 7/20/2023 1732        Implants:  Implant Name Type Inv.  Item Serial No.  Lot No. LRB No. Used Action   CLIP INT L POLYMER TANYA LIG HEM O TANYA - [id number]  CLIP INT L POLYMER TANYA LIG HEM O TANYA  [employer] [id number] 1 Implanted         Drains:   Urinary Catheter 07/20/23 Bonilla-Temperature (Active)       Findings: as above - see op note for details    Colon Resection  Operation performed with curative intent Yes   Tumor Location (select all that apply) Hepatic flexure   Extent of colon and vascular resection (select all that apply) Extended right hemicolectomy - ileocolic, right colic (if present), middle colic     Electronically signed by Vinny Velasco MD on 7/20/2023 at 5:40 PM

## 2023-07-20 NOTE — INTERVAL H&P NOTE
Update History & Physical    The patient's History and Physical was reviewed with the patient and I examined the patient. There was no change. The surgical site was confirmed by the patient and me. Plan: The risks, benefits, expected outcome, and alternative to the recommended procedure have been discussed with the patient. Patient understands and wants to proceed with the procedure.      Electronically signed by Norma Villalobos MD on 7/20/2023 at 2:52 PM

## 2023-07-20 NOTE — PROGRESS NOTES
1758  Awake and oriented on arrival to PACU , HOB elevated , pt moan and is restless c/o # 8- 9 ABD pain and benedict cath   1800 medicate with Fentanyl 50 mcg IV   1805 pt unchanged , medicated with Fentanyl 50 mcg IV   1810 pt less restless states no change in pain , medicated with Dilaudid 0.5 mg IV  1815 pain unchanged , medicated with Dilaudid 0.5 mg IV   1825 starting to rest on and off   1830 meets criteria for discharge , transported to [facility]

## 2023-07-21 LAB
ANION GAP SERPL CALC-SCNC: 7 MEQ/L (ref 8–16)
BUN SERPL-MCNC: 14 MG/DL (ref 7–22)
CALCIUM SERPL-MCNC: 8.1 MG/DL (ref 8.5–10.5)
CHLORIDE SERPL-SCNC: 105 MEQ/L (ref 98–111)
CO2 SERPL-SCNC: 24 MEQ/L (ref 23–33)
CREAT SERPL-MCNC: 1 MG/DL (ref 0.4–1.2)
DEPRECATED MEAN GLUCOSE BLD GHB EST-ACNC: 126 MG/DL (ref 70–126)
DEPRECATED RDW RBC AUTO: 49 FL (ref 35–45)
ERYTHROCYTE [DISTWIDTH] IN BLOOD BY AUTOMATED COUNT: 16 % (ref 11.5–14.5)
GFR SERPL CREATININE-BSD FRML MDRD: > 60 ML/MIN/1.73M2
GLUCOSE SERPL-MCNC: 240 MG/DL (ref 70–108)
HBA1C MFR BLD HPLC: 6.2 % (ref 4.4–6.4)
HCT VFR BLD AUTO: 29 % (ref 42–52)
HCT VFR BLD AUTO: 30.1 % (ref 42–52)
HGB BLD-MCNC: 8.9 GM/DL (ref 14–18)
HGB BLD-MCNC: 9.1 GM/DL (ref 14–18)
MCH RBC QN AUTO: 25.9 PG (ref 26–33)
MCHC RBC AUTO-ENTMCNC: 30.7 GM/DL (ref 32.2–35.5)
MCV RBC AUTO: 84.5 FL (ref 80–94)
PLATELET # BLD AUTO: 482 THOU/MM3 (ref 130–400)
PMV BLD AUTO: 10.3 FL (ref 9.4–12.4)
POTASSIUM SERPL-SCNC: 5.3 MEQ/L (ref 3.5–5.2)
POTASSIUM SERPL-SCNC: 5.4 MEQ/L (ref 3.5–5.2)
RBC # BLD AUTO: 3.43 MILL/MM3 (ref 4.7–6.1)
SODIUM SERPL-SCNC: 136 MEQ/L (ref 135–145)
WBC # BLD AUTO: 16.1 THOU/MM3 (ref 4.8–10.8)

## 2023-07-21 PROCEDURE — 6370000000 HC RX 637 (ALT 250 FOR IP): Performed by: SURGERY

## 2023-07-21 PROCEDURE — 83036 HEMOGLOBIN GLYCOSYLATED A1C: CPT

## 2023-07-21 PROCEDURE — 80048 BASIC METABOLIC PNL TOTAL CA: CPT

## 2023-07-21 PROCEDURE — 36415 COLL VENOUS BLD VENIPUNCTURE: CPT

## 2023-07-21 PROCEDURE — 84132 ASSAY OF SERUM POTASSIUM: CPT

## 2023-07-21 PROCEDURE — 94640 AIRWAY INHALATION TREATMENT: CPT

## 2023-07-21 PROCEDURE — 1200000003 HC TELEMETRY R&B

## 2023-07-21 PROCEDURE — 2580000003 HC RX 258: Performed by: SURGERY

## 2023-07-21 PROCEDURE — 6370000000 HC RX 637 (ALT 250 FOR IP): Performed by: NURSE PRACTITIONER

## 2023-07-21 PROCEDURE — 94760 N-INVAS EAR/PLS OXIMETRY 1: CPT

## 2023-07-21 PROCEDURE — 85018 HEMOGLOBIN: CPT

## 2023-07-21 PROCEDURE — 99024 POSTOP FOLLOW-UP VISIT: CPT | Performed by: NURSE PRACTITIONER

## 2023-07-21 PROCEDURE — 94761 N-INVAS EAR/PLS OXIMETRY MLT: CPT

## 2023-07-21 PROCEDURE — APPSS45 APP SPLIT SHARED TIME 31-45 MINUTES: Performed by: NURSE PRACTITIONER

## 2023-07-21 PROCEDURE — 85027 COMPLETE CBC AUTOMATED: CPT

## 2023-07-21 PROCEDURE — 85014 HEMATOCRIT: CPT

## 2023-07-21 PROCEDURE — 6360000002 HC RX W HCPCS: Performed by: SURGERY

## 2023-07-21 RX ORDER — NICOTINE 21 MG/24HR
1 PATCH, TRANSDERMAL 24 HOURS TRANSDERMAL DAILY
Status: DISCONTINUED | OUTPATIENT
Start: 2023-07-21 | End: 2023-07-23

## 2023-07-21 RX ORDER — HYDROCHLOROTHIAZIDE 12.5 MG/1
12.5 CAPSULE, GELATIN COATED ORAL DAILY
Status: DISCONTINUED | OUTPATIENT
Start: 2023-07-21 | End: 2023-07-27 | Stop reason: HOSPADM

## 2023-07-21 RX ORDER — ALLOPURINOL 100 MG/1
100 TABLET ORAL 3 TIMES DAILY
Status: DISCONTINUED | OUTPATIENT
Start: 2023-07-21 | End: 2023-07-27 | Stop reason: HOSPADM

## 2023-07-21 RX ADMIN — HYDROCHLOROTHIAZIDE 12.5 MG: 12.5 CAPSULE ORAL at 16:14

## 2023-07-21 RX ADMIN — HYDROMORPHONE HYDROCHLORIDE 0.5 MG: 1 INJECTION, SOLUTION INTRAMUSCULAR; INTRAVENOUS; SUBCUTANEOUS at 04:52

## 2023-07-21 RX ADMIN — METOPROLOL TARTRATE 50 MG: 50 TABLET, FILM COATED ORAL at 22:28

## 2023-07-21 RX ADMIN — IPRATROPIUM BROMIDE AND ALBUTEROL SULFATE 1 DOSE: .5; 3 SOLUTION RESPIRATORY (INHALATION) at 08:57

## 2023-07-21 RX ADMIN — NALOXEGOL OXALATE 12.5 MG: 12.5 TABLET, FILM COATED ORAL at 09:21

## 2023-07-21 RX ADMIN — SODIUM CHLORIDE: 9 INJECTION, SOLUTION INTRAVENOUS at 09:33

## 2023-07-21 RX ADMIN — ENOXAPARIN SODIUM 40 MG: 100 INJECTION SUBCUTANEOUS at 09:21

## 2023-07-21 RX ADMIN — IPRATROPIUM BROMIDE AND ALBUTEROL SULFATE 1 DOSE: .5; 3 SOLUTION RESPIRATORY (INHALATION) at 17:29

## 2023-07-21 RX ADMIN — OXYCODONE 10 MG: 5 TABLET ORAL at 16:14

## 2023-07-21 RX ADMIN — OXYCODONE 5 MG: 5 TABLET ORAL at 11:27

## 2023-07-21 RX ADMIN — OXYCODONE 10 MG: 5 TABLET ORAL at 06:51

## 2023-07-21 RX ADMIN — OXYCODONE 10 MG: 5 TABLET ORAL at 22:44

## 2023-07-21 RX ADMIN — ALLOPURINOL 100 MG: 100 TABLET ORAL at 22:28

## 2023-07-21 RX ADMIN — METOPROLOL TARTRATE 50 MG: 50 TABLET, FILM COATED ORAL at 09:41

## 2023-07-21 RX ADMIN — IPRATROPIUM BROMIDE AND ALBUTEROL SULFATE 1 DOSE: .5; 3 SOLUTION RESPIRATORY (INHALATION) at 21:49

## 2023-07-21 RX ADMIN — ALLOPURINOL 100 MG: 100 TABLET ORAL at 16:14

## 2023-07-21 RX ADMIN — FAMOTIDINE 20 MG: 20 TABLET, FILM COATED ORAL at 22:28

## 2023-07-21 RX ADMIN — FAMOTIDINE 20 MG: 20 TABLET, FILM COATED ORAL at 09:21

## 2023-07-21 RX ADMIN — IPRATROPIUM BROMIDE AND ALBUTEROL SULFATE 1 DOSE: .5; 3 SOLUTION RESPIRATORY (INHALATION) at 13:19

## 2023-07-21 ASSESSMENT — PAIN DESCRIPTION - LOCATION
LOCATION: ABDOMEN

## 2023-07-21 ASSESSMENT — PAIN SCALES - GENERAL
PAINLEVEL_OUTOF10: 7
PAINLEVEL_OUTOF10: 0
PAINLEVEL_OUTOF10: 2
PAINLEVEL_OUTOF10: 6
PAINLEVEL_OUTOF10: 9
PAINLEVEL_OUTOF10: 7
PAINLEVEL_OUTOF10: 6

## 2023-07-21 ASSESSMENT — PAIN DESCRIPTION - DESCRIPTORS
DESCRIPTORS: ACHING
DESCRIPTORS: ACHING

## 2023-07-21 ASSESSMENT — PAIN DESCRIPTION - ORIENTATION
ORIENTATION: MID
ORIENTATION: MID

## 2023-07-21 NOTE — PROGRESS NOTES
Patient weaned to RA. Patient educated on IS and instructed to use 10x an hour while awake. Patient demonstrates understanding. Goal is 1250 and patient is achieving. Patient verbalizes understanding to bathe with CHG soap daily around incision. CHG soap placed in bathroom for morning. Set goal with patient to ambulate around room in morning then remove catheter. Patient educated on importance of ambulating post op.

## 2023-07-21 NOTE — PROGRESS NOTES
Physician Progress Note      PATIENT:               Denise Castellanos  CSN #:                  [id number]  :                       1955  ADMIT DATE:       2023 12:19 PM  [address] [facility] DATE:  RESPONDING  PROVIDER #:        Lesly Montoya MD          QUERY TEXT:    Patient admitted with Adenocarcinoma of colon, noted to have atrial   fibrillation. If possible, please document in progress notes and discharge   summary further specificity regarding the type of atrial fibrillation: The medical record reflects the following:  Risk Factors: open extended right colectomy. Clinical Indicators: H&P notes -Past Medical History:-Atrial fibrillation,   Lab values PROTHROMBIN TIME, INR 1.37. Treatment: Warfarin. Chronic: nonspecific term that could be referring to paroxysmal, persistent,   or permanent  Longstanding persistent: persistent and continuous, lasting > 1 year. Paroxysmal - self-terminating or intermittent; resolves with or without   intervention within 7 days of onset; may recur with various frequency. Persistent - Fails to terminate within 7 days; Often requires meds or   cardioversion to restore to NSR. Permanent - longstanding & persistent; Medication has been ineffective in   restoring NSR &/or cardioversion is contraindicated    Definitions per MS-DRG Training Guide and Quick Reference Guide, [facility] 5   Diseases and Disorders of the Circulatory System; 2019; . Software content   from the [employer]?  Advanced CDI Transformation Program    Thank You,  Lisa Hoyt RN, CDS  Options provided:  -- Paroxysmal Atrial Fibrillation  -- Longstanding Persistent Atrial Fibrillation  -- Permanent Atrial Fibrillation  -- Persistent Atrial Fibrillation  -- Chronic Atrial Fibrillation, unspecified  -- Other - I will add my own diagnosis  -- Disagree - Not applicable / Not valid  -- Disagree - Clinically unable to determine / Unknown  -- Refer to Clinical Documentation Reviewer    PROVIDER RESPONSE TEXT:    This

## 2023-07-21 NOTE — PLAN OF CARE
Problem: Discharge Planning  Goal: Discharge to home or other facility with appropriate resources  Outcome: Progressing  Flowsheets (Taken 7/21/2023 1541)  Discharge to home or other facility with appropriate resources:   Identify barriers to discharge with patient and caregiver   Arrange for needed discharge resources and transportation as appropriate   Identify discharge learning needs (meds, wound care, etc)   Refer to discharge planning if patient needs post-hospital services based on physician order or complex needs related to functional status, cognitive ability or social support system  Note: Patient planning to go home tomorrow with wife. Problem: Safety - Adult  Goal: Free from fall injury  Outcome: Progressing  Flowsheets (Taken 7/21/2023 1541)  Free From Fall Injury:   Instruct family/caregiver on patient safety   Based on caregiver fall risk screen, instruct family/caregiver to ask for assistance with transferring infant if caregiver noted to have fall risk factors  Note: Patient has remained free from falls at this time. Problem: Pain  Goal: Verbalizes/displays adequate comfort level or baseline comfort level  Outcome: Progressing  Flowsheets (Taken 7/21/2023 1541)  Verbalizes/displays adequate comfort level or baseline comfort level:   Encourage patient to monitor pain and request assistance   Assess pain using appropriate pain scale   Administer analgesics based on type and severity of pain and evaluate response   Notify Licensed Independent Practitioner if interventions unsuccessful or patient reports new pain  Note: Patients pain is controlled with the current pain regimen.      Problem: ABCDS Injury Assessment  Goal: Absence of physical injury  Outcome: Progressing  Flowsheets (Taken 7/21/2023 1541)  Absence of Physical Injury: Implement safety measures based on patient assessment     Problem: Respiratory - Adult  Goal: Achieves optimal ventilation and oxygenation  7/21/2023 1541 by Nitin Montano

## 2023-07-21 NOTE — CARE COORDINATION
Case Management Assessment  Initial Evaluation    Date/Time of Evaluation: 7/21/2023 10:08 AM  Assessment Completed by: Hannah Chowdhury RN    If patient is discharged prior to next notation, then this note serves as note for discharge by case management. Patient Name: Merlin Guaman                   YOB: 1955  Diagnosis: Neoplasm of sigmoid colon [D49.0]  Colon cancer [facility]) [C18.9]                   Date / Time: 7/20/2023 12:19 PM  Location: [facility]21/021     Patient Admission Status: Inpatient   Readmission Risk Low 0-14, Mod 15-19), High > 20: Readmission Risk Score: 11.7    Current PCP: Dexter Zurita MD  PCP verified by CM? Yes    Chart Reviewed: Yes      History Provided by: Patient  Patient Orientation: Alert and Oriented    Patient Cognition: Alert    Hospitalization in the last 30 days (Readmission):  No    If yes, Readmission Assessment in CM Navigator will be completed. Advance Directives:      Code Status: Full Code   Patient's Primary Decision Maker is: Patient Declined (Legal Next of Kin Remains as Decision Maker)      Discharge Planning:    Patient lives with: Spouse/Significant Other Type of Home: House  Primary Care Giver: Self  Patient Support Systems include: Spouse/Significant Other   Current Financial resources: Medicare  Current community resources: None  Current services prior to admission: None            Current DME:              Type of Home Care services:  None    ADLS  Prior functional level: Independent in ADLs/IADLs  Current functional level: Independent in ADLs/IADLs    Family can provide assistance at DC: Yes  Would you like Case Management to discuss the discharge plan with any other family members/significant others, and if so, who?  No  Plans to Return to Present Housing: Yes  Other Identified Issues/Barriers to RETURNING to current housing: n/a  Potential Assistance needed at discharge: N/A            Potential DME:    Patient expects to

## 2023-07-21 NOTE — PROGRESS NOTES
Patient's daughter, Janae Foreman, called and provided HIPPA code. Updated on patient condition and care plan. All questions answered at this time.

## 2023-07-21 NOTE — OP NOTE
cartridge. Duodenum identified  and this was protected throughout the operation. The superior aspect of  the duodenum, however, was fairly challenging to get off, due to the  mass more superior. However, there was never any obvious spread into  the duodenum. Lateral hepatic flexure region was then mobilized. At  that point, I then went proximal and middle colic was taken along with  the transverse colon then in that region with an [place] MARY-60 green  load. Omentum was divided. I did take the right colic and all of its  mesentery along with viviane tissue, heading then back towards the right. Again the duodenum was then identified and spared. Lot of inflammatory  changes and induration in this region as we got closer to the mass. Once we got up to the mass itself on the superior margin, it was really  difficult to separate this from the gallbladder. At first, I felt this  was just indurated and inflamed tissue, but once this was actually  , I did feel like there was some direct extension of the tumor  at the fundus of the gallbladder. I finished the mobilization and  overall I think the duodenum was spared. There was still concerns for  some gross extension into the gallbladder. Specimen was left in its  natural position and then the gallbladder was removed in a standard  fashion. Infundibulum was grasped as well as the fundus and this was  elevated. Blunt dissection around the infundibulum with all the chronic  inflamed tissues to where the cystic duct and artery were each  identified. They were each then circumferentially dissected out until  the critical view was obtained. At that time, they were each then  doubly clipped and divided close to the gallbladder. Gallbladder was  then taken off the liver bed using electrocautery. This was then placed  into an endoscopic retrieval bag.   I did feel it was appropriate to  leave the gallbladder in as I was a little concerned with some

## 2023-07-21 NOTE — PLAN OF CARE
Problem: Respiratory - Adult  Goal: Achieves optimal ventilation and oxygenation  7/21/2023 1052 by Lauren Hernandez RCP  Outcome: Progressing   Patient is receiving ipratropium-albuterol to promote and preserve a clear, open airway. Patient breath sounds improved post-treatment and will continue to improve with further therapy. Patient mutually agreed on goals.

## 2023-07-22 LAB
ANION GAP SERPL CALC-SCNC: 7 MEQ/L (ref 8–16)
BUN SERPL-MCNC: 12 MG/DL (ref 7–22)
CA-I BLD ISE-SCNC: 1.16 MMOL/L (ref 1.12–1.32)
CALCIUM SERPL-MCNC: 8.2 MG/DL (ref 8.5–10.5)
CHLORIDE SERPL-SCNC: 105 MEQ/L (ref 98–111)
CO2 SERPL-SCNC: 26 MEQ/L (ref 23–33)
CREAT SERPL-MCNC: 0.8 MG/DL (ref 0.4–1.2)
DEPRECATED RDW RBC AUTO: 49 FL (ref 35–45)
ERYTHROCYTE [DISTWIDTH] IN BLOOD BY AUTOMATED COUNT: 16 % (ref 11.5–14.5)
GFR SERPL CREATININE-BSD FRML MDRD: > 60 ML/MIN/1.73M2
GLUCOSE BLD STRIP.AUTO-MCNC: 148 MG/DL (ref 70–108)
GLUCOSE BLD STRIP.AUTO-MCNC: 151 MG/DL (ref 70–108)
GLUCOSE BLD STRIP.AUTO-MCNC: 151 MG/DL (ref 70–108)
GLUCOSE SERPL-MCNC: 135 MG/DL (ref 70–108)
HCT VFR BLD AUTO: 26.7 % (ref 42–52)
HCT VFR BLD AUTO: 29.5 % (ref 42–52)
HGB BLD-MCNC: 8.2 GM/DL (ref 14–18)
HGB BLD-MCNC: 9 GM/DL (ref 14–18)
INR PPP: 1.57 (ref 0.85–1.13)
MAGNESIUM SERPL-MCNC: 2.1 MG/DL (ref 1.6–2.4)
MCH RBC QN AUTO: 26 PG (ref 26–33)
MCHC RBC AUTO-ENTMCNC: 30.7 GM/DL (ref 32.2–35.5)
MCV RBC AUTO: 84.8 FL (ref 80–94)
PHOSPHATE SERPL-MCNC: 2.4 MG/DL (ref 2.4–4.7)
PLATELET # BLD AUTO: 425 THOU/MM3 (ref 130–400)
PMV BLD AUTO: 10.4 FL (ref 9.4–12.4)
POTASSIUM SERPL-SCNC: 4.7 MEQ/L (ref 3.5–5.2)
POTASSIUM SERPL-SCNC: 5.5 MEQ/L (ref 3.5–5.2)
RBC # BLD AUTO: 3.15 MILL/MM3 (ref 4.7–6.1)
SODIUM SERPL-SCNC: 138 MEQ/L (ref 135–145)
WBC # BLD AUTO: 14.6 THOU/MM3 (ref 4.8–10.8)

## 2023-07-22 PROCEDURE — 80048 BASIC METABOLIC PNL TOTAL CA: CPT

## 2023-07-22 PROCEDURE — 85014 HEMATOCRIT: CPT

## 2023-07-22 PROCEDURE — 6370000000 HC RX 637 (ALT 250 FOR IP): Performed by: SURGERY

## 2023-07-22 PROCEDURE — 85027 COMPLETE CBC AUTOMATED: CPT

## 2023-07-22 PROCEDURE — 6360000002 HC RX W HCPCS: Performed by: SURGERY

## 2023-07-22 PROCEDURE — 99024 POSTOP FOLLOW-UP VISIT: CPT | Performed by: NURSE PRACTITIONER

## 2023-07-22 PROCEDURE — 82948 REAGENT STRIP/BLOOD GLUCOSE: CPT

## 2023-07-22 PROCEDURE — 85018 HEMOGLOBIN: CPT

## 2023-07-22 PROCEDURE — 83735 ASSAY OF MAGNESIUM: CPT

## 2023-07-22 PROCEDURE — 6370000000 HC RX 637 (ALT 250 FOR IP): Performed by: NURSE PRACTITIONER

## 2023-07-22 PROCEDURE — 84100 ASSAY OF PHOSPHORUS: CPT

## 2023-07-22 PROCEDURE — APPSS45 APP SPLIT SHARED TIME 31-45 MINUTES: Performed by: NURSE PRACTITIONER

## 2023-07-22 PROCEDURE — 1200000003 HC TELEMETRY R&B

## 2023-07-22 PROCEDURE — 93005 ELECTROCARDIOGRAM TRACING: CPT | Performed by: NURSE PRACTITIONER

## 2023-07-22 PROCEDURE — 94640 AIRWAY INHALATION TREATMENT: CPT

## 2023-07-22 PROCEDURE — 84132 ASSAY OF SERUM POTASSIUM: CPT

## 2023-07-22 PROCEDURE — 82330 ASSAY OF CALCIUM: CPT

## 2023-07-22 PROCEDURE — 94760 N-INVAS EAR/PLS OXIMETRY 1: CPT

## 2023-07-22 PROCEDURE — 85610 PROTHROMBIN TIME: CPT

## 2023-07-22 PROCEDURE — 36415 COLL VENOUS BLD VENIPUNCTURE: CPT

## 2023-07-22 RX ORDER — DEXTROSE MONOHYDRATE 100 MG/ML
INJECTION, SOLUTION INTRAVENOUS CONTINUOUS PRN
Status: DISCONTINUED | OUTPATIENT
Start: 2023-07-22 | End: 2023-07-27 | Stop reason: HOSPADM

## 2023-07-22 RX ORDER — INSULIN LISPRO 100 [IU]/ML
0-4 INJECTION, SOLUTION INTRAVENOUS; SUBCUTANEOUS
Status: DISCONTINUED | OUTPATIENT
Start: 2023-07-22 | End: 2023-07-27 | Stop reason: HOSPADM

## 2023-07-22 RX ORDER — INSULIN LISPRO 100 [IU]/ML
0-4 INJECTION, SOLUTION INTRAVENOUS; SUBCUTANEOUS NIGHTLY
Status: DISCONTINUED | OUTPATIENT
Start: 2023-07-22 | End: 2023-07-27 | Stop reason: HOSPADM

## 2023-07-22 RX ADMIN — ALLOPURINOL 100 MG: 100 TABLET ORAL at 15:35

## 2023-07-22 RX ADMIN — IPRATROPIUM BROMIDE AND ALBUTEROL SULFATE 1 DOSE: .5; 3 SOLUTION RESPIRATORY (INHALATION) at 07:53

## 2023-07-22 RX ADMIN — METOPROLOL TARTRATE 50 MG: 50 TABLET, FILM COATED ORAL at 19:45

## 2023-07-22 RX ADMIN — FAMOTIDINE 20 MG: 20 TABLET, FILM COATED ORAL at 09:19

## 2023-07-22 RX ADMIN — ALLOPURINOL 100 MG: 100 TABLET ORAL at 09:19

## 2023-07-22 RX ADMIN — IPRATROPIUM BROMIDE AND ALBUTEROL SULFATE 1 DOSE: .5; 3 SOLUTION RESPIRATORY (INHALATION) at 15:41

## 2023-07-22 RX ADMIN — HYDROCHLOROTHIAZIDE 12.5 MG: 12.5 CAPSULE ORAL at 09:19

## 2023-07-22 RX ADMIN — IPRATROPIUM BROMIDE AND ALBUTEROL SULFATE 1 DOSE: .5; 3 SOLUTION RESPIRATORY (INHALATION) at 21:15

## 2023-07-22 RX ADMIN — OXYCODONE 5 MG: 5 TABLET ORAL at 06:06

## 2023-07-22 RX ADMIN — ALLOPURINOL 100 MG: 100 TABLET ORAL at 19:45

## 2023-07-22 RX ADMIN — ENOXAPARIN SODIUM 40 MG: 100 INJECTION SUBCUTANEOUS at 09:19

## 2023-07-22 RX ADMIN — FAMOTIDINE 20 MG: 20 TABLET, FILM COATED ORAL at 19:45

## 2023-07-22 RX ADMIN — METOPROLOL TARTRATE 50 MG: 50 TABLET, FILM COATED ORAL at 09:19

## 2023-07-22 RX ADMIN — OXYCODONE 5 MG: 5 TABLET ORAL at 11:53

## 2023-07-22 RX ADMIN — NALOXEGOL OXALATE 12.5 MG: 12.5 TABLET, FILM COATED ORAL at 06:06

## 2023-07-22 RX ADMIN — IPRATROPIUM BROMIDE AND ALBUTEROL SULFATE 1 DOSE: .5; 3 SOLUTION RESPIRATORY (INHALATION) at 12:54

## 2023-07-22 RX ADMIN — OXYCODONE 10 MG: 5 TABLET ORAL at 19:45

## 2023-07-22 ASSESSMENT — PAIN SCALES - GENERAL
PAINLEVEL_OUTOF10: 6
PAINLEVEL_OUTOF10: 6
PAINLEVEL_OUTOF10: 7
PAINLEVEL_OUTOF10: 3
PAINLEVEL_OUTOF10: 4
PAINLEVEL_OUTOF10: 6

## 2023-07-22 ASSESSMENT — PAIN DESCRIPTION - LOCATION
LOCATION: ABDOMEN

## 2023-07-22 ASSESSMENT — PAIN - FUNCTIONAL ASSESSMENT: PAIN_FUNCTIONAL_ASSESSMENT: ACTIVITIES ARE NOT PREVENTED

## 2023-07-22 ASSESSMENT — PAIN DESCRIPTION - DESCRIPTORS
DESCRIPTORS: ACHING;SHARP
DESCRIPTORS: DULL

## 2023-07-22 ASSESSMENT — PAIN DESCRIPTION - ORIENTATION
ORIENTATION: MID
ORIENTATION: RIGHT;LEFT;MID

## 2023-07-22 NOTE — PLAN OF CARE
Problem: Respiratory - Adult  Goal: Achieves optimal ventilation and oxygenation  7/21/2023 2154 by Irais Rojas RCP  Outcome: Progressing     Continue breathing treatments to improve aeration of lungs. Patient mutually agreed on goals.

## 2023-07-22 NOTE — CONSULTS
Hospitalist Consult Note        Patient:  Yeyo Alves  YOB: 1955  Date of Service: 2023  MRN: [id number]   Acct:  [de-identified]   Primary Care Physician: Rene Morel MD    Chief Complaint:  POD # 2 Status post robotic extended right colectomy and robotic cholecystectomy secondary to adenocarcinoma   Reason for consult:  hyperkalemia, borderline glucose    Date of Service: Pt seen/examined in consultation on 2023     History Of Present Illness:    West[place] Damaris y.o. male who we are asked to see/evaluate by Yoav Chow MD for medical management of hyperkalemia. Patient has a PMHx that includes: CAD, A-fib on Coumadin, carotid artery stenosis, hypertension, hyperlipidemia, pulmonary nodules, Proximal transverse colon/hepatic flexure adenocarcinoma, liver lesion. Patient is alert, oriented x 3. Denies c/o chest pain, cough, shortness of breath, palpitations, nausea, dysuria, or diarrhea. Denies muscle weakness or numbness/tingling of extremities. Denies h/o of issue with potassium or blood sugar. Reports incisional abdominal pain well controlled. Denies BM post operative but has had urge. Assessment and Plan:  Status post robotic extended right colectomy and robotic cholecystectomy 23 secondary to adenocarcinoma, Dr Kuldip Amezcua   -hemal operative management per surgery team including pain control, anticoagulation, therapy restrictions, and antibiotics    Hyperkalemia  -sCr at baseline  -no exogenous sources of potassium noted  -no lokelma per surgery  -check EKG  -recheck K+ this afternoon, if still climbing, will plan to increase IVF and given Lasix IV x one. ADDENDUM: 4:52 PM repeat potassium 4.7 without intervention. Continue current IVF, repeat BMP in am    Leukocytosis  -downtrending, likely reactive    Normocytic anemia  -Hgb 8.9>9.1>8.2  - procedural EBL 30mL  -suspect due to IVF, phlebotomy, surgery. Will trend.  Transfuse for Hgb < 7 or

## 2023-07-22 NOTE — PLAN OF CARE
Problem: Discharge Planning  Goal: Discharge to home or other facility with appropriate resources  Outcome: Progressing  Flowsheets (Taken 7/22/2023 1611)  Discharge to home or other facility with appropriate resources:   Arrange for needed discharge resources and transportation as appropriate   Identify barriers to discharge with patient and caregiver   Identify discharge learning needs (meds, wound care, etc)   Arrange for interpreters to assist at discharge as needed   Refer to discharge planning if patient needs post-hospital services based on physician order or complex needs related to functional status, cognitive ability or social support system     Problem: Safety - Adult  Goal: Free from fall injury  Outcome: Progressing  Flowsheets (Taken 7/22/2023 1611)  Free From Fall Injury:   Instruct family/caregiver on patient safety   Based on caregiver fall risk screen, instruct family/caregiver to ask for assistance with transferring infant if caregiver noted to have fall risk factors     Problem: Pain  Goal: Verbalizes/displays adequate comfort level or baseline comfort level  Outcome: Progressing  Flowsheets (Taken 7/22/2023 1611)  Verbalizes/displays adequate comfort level or baseline comfort level:   Encourage patient to monitor pain and request assistance   Assess pain using appropriate pain scale   Administer analgesics based on type and severity of pain and evaluate response   Implement non-pharmacological measures as appropriate and evaluate response   Consider cultural and social influences on pain and pain management     Problem: ABCDS Injury Assessment  Goal: Absence of physical injury  Outcome: Progressing  Flowsheets (Taken 7/22/2023 1611)  Absence of Physical Injury: Implement safety measures based on patient assessment     Problem: Respiratory - Adult  Goal: Achieves optimal ventilation and oxygenation  7/22/2023 1611 by Chai Prado RN  Outcome: Progressing  Flowsheets (Taken 7/22/2023

## 2023-07-22 NOTE — PLAN OF CARE
Problem: Respiratory - Adult  Goal: Clear lung sounds  Outcome: Progressing     Problem: Respiratory - Adult  Goal: Achieves optimal ventilation and oxygenation  7/22/2023 0756 by Radha Villegas RCP  Outcome: Progressing

## 2023-07-23 LAB
ANION GAP SERPL CALC-SCNC: 7 MEQ/L (ref 8–16)
BUN SERPL-MCNC: 9 MG/DL (ref 7–22)
CALCIUM SERPL-MCNC: 8.2 MG/DL (ref 8.5–10.5)
CHLORIDE SERPL-SCNC: 102 MEQ/L (ref 98–111)
CO2 SERPL-SCNC: 26 MEQ/L (ref 23–33)
CREAT SERPL-MCNC: 0.7 MG/DL (ref 0.4–1.2)
DEPRECATED RDW RBC AUTO: 49.8 FL (ref 35–45)
ERYTHROCYTE [DISTWIDTH] IN BLOOD BY AUTOMATED COUNT: 16.2 % (ref 11.5–14.5)
GFR SERPL CREATININE-BSD FRML MDRD: > 60 ML/MIN/1.73M2
GLUCOSE BLD STRIP.AUTO-MCNC: 119 MG/DL (ref 70–108)
GLUCOSE BLD STRIP.AUTO-MCNC: 137 MG/DL (ref 70–108)
GLUCOSE BLD STRIP.AUTO-MCNC: 143 MG/DL (ref 70–108)
GLUCOSE BLD STRIP.AUTO-MCNC: 144 MG/DL (ref 70–108)
GLUCOSE SERPL-MCNC: 111 MG/DL (ref 70–108)
HCT VFR BLD AUTO: 30 % (ref 42–52)
HGB BLD-MCNC: 9 GM/DL (ref 14–18)
INR PPP: 1.31 (ref 0.85–1.13)
MCH RBC QN AUTO: 25.6 PG (ref 26–33)
MCHC RBC AUTO-ENTMCNC: 30 GM/DL (ref 32.2–35.5)
MCV RBC AUTO: 85.2 FL (ref 80–94)
PLATELET # BLD AUTO: 471 THOU/MM3 (ref 130–400)
PMV BLD AUTO: 10.4 FL (ref 9.4–12.4)
POTASSIUM SERPL-SCNC: 5 MEQ/L (ref 3.5–5.2)
RBC # BLD AUTO: 3.52 MILL/MM3 (ref 4.7–6.1)
SODIUM SERPL-SCNC: 135 MEQ/L (ref 135–145)
WBC # BLD AUTO: 13.6 THOU/MM3 (ref 4.8–10.8)

## 2023-07-23 PROCEDURE — 1200000003 HC TELEMETRY R&B

## 2023-07-23 PROCEDURE — 6370000000 HC RX 637 (ALT 250 FOR IP): Performed by: NURSE PRACTITIONER

## 2023-07-23 PROCEDURE — 6370000000 HC RX 637 (ALT 250 FOR IP): Performed by: SURGERY

## 2023-07-23 PROCEDURE — 99024 POSTOP FOLLOW-UP VISIT: CPT | Performed by: NURSE PRACTITIONER

## 2023-07-23 PROCEDURE — 94760 N-INVAS EAR/PLS OXIMETRY 1: CPT

## 2023-07-23 PROCEDURE — A4216 STERILE WATER/SALINE, 10 ML: HCPCS | Performed by: SURGERY

## 2023-07-23 PROCEDURE — 2580000003 HC RX 258: Performed by: SURGERY

## 2023-07-23 PROCEDURE — APPSS45 APP SPLIT SHARED TIME 31-45 MINUTES: Performed by: NURSE PRACTITIONER

## 2023-07-23 PROCEDURE — 85610 PROTHROMBIN TIME: CPT

## 2023-07-23 PROCEDURE — 6360000002 HC RX W HCPCS: Performed by: NURSE PRACTITIONER

## 2023-07-23 PROCEDURE — 82948 REAGENT STRIP/BLOOD GLUCOSE: CPT

## 2023-07-23 PROCEDURE — 80048 BASIC METABOLIC PNL TOTAL CA: CPT

## 2023-07-23 PROCEDURE — 93010 ELECTROCARDIOGRAM REPORT: CPT | Performed by: INTERNAL MEDICINE

## 2023-07-23 PROCEDURE — 6360000002 HC RX W HCPCS: Performed by: SURGERY

## 2023-07-23 PROCEDURE — 36415 COLL VENOUS BLD VENIPUNCTURE: CPT

## 2023-07-23 PROCEDURE — 2580000003 HC RX 258: Performed by: NURSE PRACTITIONER

## 2023-07-23 PROCEDURE — 2500000003 HC RX 250 WO HCPCS: Performed by: SURGERY

## 2023-07-23 PROCEDURE — 85027 COMPLETE CBC AUTOMATED: CPT

## 2023-07-23 PROCEDURE — 94640 AIRWAY INHALATION TREATMENT: CPT

## 2023-07-23 RX ORDER — DOCUSATE SODIUM 100 MG/1
100 CAPSULE, LIQUID FILLED ORAL 2 TIMES DAILY
Status: DISCONTINUED | OUTPATIENT
Start: 2023-07-23 | End: 2023-07-27 | Stop reason: HOSPADM

## 2023-07-23 RX ORDER — NICOTINE 21 MG/24HR
1 PATCH, TRANSDERMAL 24 HOURS TRANSDERMAL EVERY 24 HOURS
Status: DISCONTINUED | OUTPATIENT
Start: 2023-07-23 | End: 2023-07-27 | Stop reason: HOSPADM

## 2023-07-23 RX ORDER — ATORVASTATIN CALCIUM 20 MG/1
20 TABLET, FILM COATED ORAL DAILY
Status: DISCONTINUED | OUTPATIENT
Start: 2023-07-23 | End: 2023-07-27 | Stop reason: HOSPADM

## 2023-07-23 RX ORDER — POLYETHYLENE GLYCOL 3350 17 G/17G
17 POWDER, FOR SOLUTION ORAL DAILY PRN
Status: DISCONTINUED | OUTPATIENT
Start: 2023-07-23 | End: 2023-07-27 | Stop reason: HOSPADM

## 2023-07-23 RX ORDER — WARFARIN SODIUM 7.5 MG/1
7.5 TABLET ORAL
Status: COMPLETED | OUTPATIENT
Start: 2023-07-23 | End: 2023-07-23

## 2023-07-23 RX ORDER — ENOXAPARIN SODIUM 100 MG/ML
1 INJECTION SUBCUTANEOUS 2 TIMES DAILY
Status: DISCONTINUED | OUTPATIENT
Start: 2023-07-23 | End: 2023-07-25

## 2023-07-23 RX ORDER — LISINOPRIL 20 MG/1
20 TABLET ORAL DAILY
Status: DISCONTINUED | OUTPATIENT
Start: 2023-07-23 | End: 2023-07-27

## 2023-07-23 RX ADMIN — POLYETHYLENE GLYCOL 3350 17 G: 17 POWDER, FOR SOLUTION ORAL at 19:46

## 2023-07-23 RX ADMIN — OXYCODONE 10 MG: 5 TABLET ORAL at 08:03

## 2023-07-23 RX ADMIN — OXYCODONE 5 MG: 5 TABLET ORAL at 14:11

## 2023-07-23 RX ADMIN — ALLOPURINOL 100 MG: 100 TABLET ORAL at 14:13

## 2023-07-23 RX ADMIN — SODIUM CHLORIDE: 9 INJECTION, SOLUTION INTRAVENOUS at 02:27

## 2023-07-23 RX ADMIN — SODIUM CHLORIDE, PRESERVATIVE FREE 10 ML: 5 INJECTION INTRAVENOUS at 19:46

## 2023-07-23 RX ADMIN — DOCUSATE SODIUM 100 MG: 100 CAPSULE, LIQUID FILLED ORAL at 19:46

## 2023-07-23 RX ADMIN — OXYCODONE 10 MG: 5 TABLET ORAL at 19:45

## 2023-07-23 RX ADMIN — IPRATROPIUM BROMIDE AND ALBUTEROL SULFATE 1 DOSE: .5; 3 SOLUTION RESPIRATORY (INHALATION) at 07:36

## 2023-07-23 RX ADMIN — ALLOPURINOL 100 MG: 100 TABLET ORAL at 19:46

## 2023-07-23 RX ADMIN — OXYCODONE 10 MG: 5 TABLET ORAL at 23:54

## 2023-07-23 RX ADMIN — HYDROCHLOROTHIAZIDE 12.5 MG: 12.5 CAPSULE ORAL at 09:15

## 2023-07-23 RX ADMIN — IPRATROPIUM BROMIDE AND ALBUTEROL SULFATE 1 DOSE: .5; 3 SOLUTION RESPIRATORY (INHALATION) at 20:12

## 2023-07-23 RX ADMIN — METOPROLOL TARTRATE 50 MG: 50 TABLET, FILM COATED ORAL at 09:15

## 2023-07-23 RX ADMIN — ENOXAPARIN SODIUM 60 MG: 100 INJECTION SUBCUTANEOUS at 19:46

## 2023-07-23 RX ADMIN — ATORVASTATIN CALCIUM 20 MG: 20 TABLET, FILM COATED ORAL at 19:46

## 2023-07-23 RX ADMIN — OXYCODONE 5 MG: 5 TABLET ORAL at 02:28

## 2023-07-23 RX ADMIN — FAMOTIDINE 20 MG: 10 INJECTION, SOLUTION INTRAVENOUS at 09:16

## 2023-07-23 RX ADMIN — WARFARIN SODIUM 7.5 MG: 7.5 TABLET ORAL at 17:27

## 2023-07-23 RX ADMIN — DOCUSATE SODIUM 100 MG: 100 CAPSULE, LIQUID FILLED ORAL at 10:36

## 2023-07-23 RX ADMIN — ALLOPURINOL 100 MG: 100 TABLET ORAL at 09:15

## 2023-07-23 RX ADMIN — IPRATROPIUM BROMIDE AND ALBUTEROL SULFATE 1 DOSE: .5; 3 SOLUTION RESPIRATORY (INHALATION) at 12:13

## 2023-07-23 RX ADMIN — LISINOPRIL 20 MG: 20 TABLET ORAL at 10:36

## 2023-07-23 RX ADMIN — FAMOTIDINE 20 MG: 20 TABLET, FILM COATED ORAL at 19:46

## 2023-07-23 RX ADMIN — ENOXAPARIN SODIUM 60 MG: 100 INJECTION SUBCUTANEOUS at 10:36

## 2023-07-23 RX ADMIN — NALOXEGOL OXALATE 12.5 MG: 12.5 TABLET, FILM COATED ORAL at 05:11

## 2023-07-23 RX ADMIN — HYDROMORPHONE HYDROCHLORIDE 0.5 MG: 1 INJECTION, SOLUTION INTRAMUSCULAR; INTRAVENOUS; SUBCUTANEOUS at 17:30

## 2023-07-23 ASSESSMENT — PAIN - FUNCTIONAL ASSESSMENT
PAIN_FUNCTIONAL_ASSESSMENT: ACTIVITIES ARE NOT PREVENTED
PAIN_FUNCTIONAL_ASSESSMENT: PREVENTS OR INTERFERES SOME ACTIVE ACTIVITIES AND ADLS
PAIN_FUNCTIONAL_ASSESSMENT: ACTIVITIES ARE NOT PREVENTED
PAIN_FUNCTIONAL_ASSESSMENT: PREVENTS OR INTERFERES SOME ACTIVE ACTIVITIES AND ADLS

## 2023-07-23 ASSESSMENT — PAIN DESCRIPTION - DESCRIPTORS
DESCRIPTORS: SHARP
DESCRIPTORS: ACHING
DESCRIPTORS: SHARP
DESCRIPTORS: SHARP

## 2023-07-23 ASSESSMENT — PAIN DESCRIPTION - LOCATION
LOCATION: ABDOMEN

## 2023-07-23 ASSESSMENT — PAIN DESCRIPTION - ORIENTATION
ORIENTATION: MID

## 2023-07-23 ASSESSMENT — PAIN SCALES - GENERAL
PAINLEVEL_OUTOF10: 7
PAINLEVEL_OUTOF10: 0
PAINLEVEL_OUTOF10: 8
PAINLEVEL_OUTOF10: 7
PAINLEVEL_OUTOF10: 3
PAINLEVEL_OUTOF10: 6
PAINLEVEL_OUTOF10: 6
PAINLEVEL_OUTOF10: 8
PAINLEVEL_OUTOF10: 5

## 2023-07-23 NOTE — PLAN OF CARE
Patient doing well postoperatively per general surgery note. Hospitalist following for hypokalemia and hyperglycemia. Hypokalemia is better, noted at 5.0 today. We will repeat BMP in the morning. Hgb A1c 6.2 %, sugars have been stable in 120-150 range. We will continue to follow.     Electronically signed by Daina Markham PA-C on 7/23/2023 at 4:01 PM

## 2023-07-23 NOTE — PLAN OF CARE
Problem: Respiratory - Adult  Goal: Achieves optimal ventilation and oxygenation  7/22/2023 2118 by Matt Harrison RCP  Outcome: Progressing     Problem: Respiratory - Adult  Goal: Clear lung sounds  7/22/2023 2118 by Matt Harrison RCP  Outcome: Progressing     Continue breathing treatments to improve aeration of lungs. Patient mutually agreed on goals.

## 2023-07-23 NOTE — PLAN OF CARE
Problem: Discharge Planning  Goal: Discharge to home or other facility with appropriate resources  Outcome: Progressing  Discharge plan is in process. Plan discharge home with family. Problem: Safety - Adult  Goal: Free from fall injury  Outcome: Progressing  Fall assessment completed. Patient using call light appropriately to call for assistance with ambulation to bathroom. Personal items within reach. Patient is also compliant with use of non-skid slippers. Problem: Pain  Goal: Verbalizes/displays adequate comfort level or baseline comfort level  Outcome: Progressing  Patient states pain relief from PRN pain medications. Pain reassessed one hour post PRN pain medication given. Patient rates pain 6-9 on CHAYO 0-10 scale. Problem: ABCDS Injury Assessment  Goal: Absence of physical injury  Outcome: Progressing     No falls noted this shift. Patient ambulates with x1 staff assistance without difficulty. Family member at bedside, spent the day. Bed kept in low position. Safe environment maintained. Bedside table & call light in reach. Uses call light appropriately when needing assistance. Problem: Skin/Tissue Integrity - Adult  Goal: Incisions, wounds, or drain sites healing without S/S of infection  Outcome: Progressing     No skin breakdown this shift. Patient being assisted with turning. Patients states understanding of repositioning every two hours. Problem: Gastrointestinal - Adult  Goal: Maintains or returns to baseline bowel function  Outcome: Progressing  Patient bowel sounds active. Passing flatus. Pt taking prescribed medication to assist with BM. Problem: Gastrointestinal - Adult  Goal: Maintains adequate nutritional intake  Outcome: Progressing  Patients appetite good. Continuing to encourage fluids. Problem: Infection - Adult  Goal: Absence of infection at discharge  Outcome: Progressing  No s/s infection for shift. VS-WNL.      Problem: Hematologic - Adult  Goal:

## 2023-07-23 NOTE — PROGRESS NOTES
Patient educated and instructed to use incentive spirometry every hour, CHG soap daily, ambulating in hallways to prevent post-op complications. Patient verbalized understanding.

## 2023-07-23 NOTE — PROGRESS NOTES
[facility], [facility] for Dr. Malini Hays Surgery Postoperative Progress Note    Pt Name: Darrick Lucio [address] Record Number: [id number]  Date of Birth 1955   Today's Date: 7/23/2023    ASSESSMENT   POD # 3 Status post robotic extended right colectomy and robotic cholecystectomy secondary to adenocarcinoma   Paroxysmal atrial fibrillation  HTN  Tobacco abuse  Anemia   Hyperkalemia - improved  Thrombocytosis   Leukocytosis - improving  Borderline diabetes    has a past medical history of Atrial fibrillation ([address]), Carotid artery stenosis, GERD (gastroesophageal reflux disease), Hyperlipidemia, Hypertension, and Hyperuricemia. PLAN   Tolerating fulls liquids. Okay for soft for lunch/supper. Start colace  IV to INT  Pain & nausea control  Up as tolerated  Hospitalist consulted for medical management. Appreciate assistance. Incisional care  Resume remaining BP meds from home  Duonebs and pulmonary toileting  Pharm to dose coumadin. Therapeutic Lovenox until INR 2-3. Pathology pending  Labs reviewed. K+ better. Hem stable. No signs of infection. Continue to monitor platelets 509. Looks pretty good. Appreciate assistance hospitalist assistance. SUBJECTIVE   Patient was stable overnight. Vs stable. Chart reviewed. No fevers. Feels pretty good. Slept well. Denies chest discomfort or dyspnea. No N/V; (+) belching, Some flatus, but no BM. Tolerating full liquids. Abdomen soft. Pain controlled with analgesia. Up with assistance. Incisions look good.    CURRENT MEDICATIONS   Scheduled Meds:   enoxaparin  1 mg/kg SubCUTAneous BID    docusate sodium  100 mg Oral BID    atorvastatin  20 mg Oral Daily    lisinopril  20 mg Oral Daily    warfarin placeholder: dosing by pharmacy   Other RX Placeholder    warfarin  7.5 mg Oral Once    insulin lispro  0-4 Units SubCUTAneous TID     insulin lispro  0-4 Units SubCUTAneous Nightly    naloxegol  12.5 mg Oral QAM AC 07/22/23  1357 07/22/23  1613 07/23/23  0422   WBC 16.1*  --  14.6*  --   --  13.6*   HGB 8.9*   < > 8.2* 9.0*  --  9.0*   HCT 29.0*   < > 26.7* 29.5*  --  30.0*   *  --  425*  --   --  471*     --  138  --   --  135   K 5.3*   < > 5.5*  --  4.7 5.0     --  105  --   --  102   CO2 24  --  26  --   --  26   BUN 14  --  12  --   --  9   CREATININE 1.0  --  0.8  --   --  0.7   MG  --   --  2.1  --   --   --    PHOS  --   --  2.4  --   --   --    CALCIUM 8.1*  --  8.2*  --   --  8.2*    < > = values in this interval not displayed. Recent Labs     07/20/23  1252 07/22/23  0434 07/23/23  0422   INR 1.37* 1.57* 1.31*       No results for input(s): AST, ALT, BILITOT, BILIDIR, AMYLASE, LIPASE, LDH, LACTA in the last 72 hours. No results for input(s): TROPONINT in the last 72 hours.     Surgical pathology pending  RADIOLOGY   No new imaging    Electronically signed by RICHELLE Hernández CNP on 7/23/2023 at 10:17 AM

## 2023-07-23 NOTE — PLAN OF CARE
Problem: Discharge Planning  Goal: Discharge to home or other facility with appropriate resources  7/22/2023 2205 by Home Londono RN  Outcome: Progressing  Flowsheets (Taken 7/22/2023 2205)  Discharge to home or other facility with appropriate resources:   Identify barriers to discharge with patient and caregiver   Arrange for needed discharge resources and transportation as appropriate  7/22/2023 1611 by Triston Santiago RN  Outcome: Progressing  Flowsheets (Taken 7/22/2023 1611)  Discharge to home or other facility with appropriate resources:   Arrange for needed discharge resources and transportation as appropriate   Identify barriers to discharge with patient and caregiver   Identify discharge learning needs (meds, wound care, etc)   Arrange for interpreters to assist at discharge as needed   Refer to discharge planning if patient needs post-hospital services based on physician order or complex needs related to functional status, cognitive ability or social support system     Problem: Safety - Adult  Goal: Free from fall injury  7/22/2023 2205 by Home Londono RN  Outcome: Progressing  Flowsheets (Taken 7/22/2023 2205)  Free From Fall Injury: Instruct family/caregiver on patient safety  7/22/2023 1611 by Triston Santiago RN  Outcome: Progressing  Flowsheets (Taken 7/22/2023 1611)  Free From Fall Injury:   Instruct family/caregiver on patient safety   Based on caregiver fall risk screen, instruct family/caregiver to ask for assistance with transferring infant if caregiver noted to have fall risk factors     Problem: Pain  Goal: Verbalizes/displays adequate comfort level or baseline comfort level  7/22/2023 2205 by Home Londono RN  Outcome: Progressing  Flowsheets (Taken 7/22/2023 2205)  Verbalizes/displays adequate comfort level or baseline comfort level:   Encourage patient to monitor pain and request assistance   Assess pain using appropriate pain scale   Administer analgesics based on type and severity of pain RN  Outcome: Progressing  Flowsheets  Taken 7/22/2023 2205  Incisions, Wounds, or Drain Sites Healing Without Sign and Symptoms of Infection: TWICE DAILY: Assess and document skin integrity  Taken 7/22/2023 2204  Incisions, Wounds, or Drain Sites Healing Without Sign and Symptoms of Infection: TWICE DAILY: Assess and document skin integrity  7/22/2023 1611 by Samy Haas RN  Outcome: Progressing  Flowsheets (Taken 7/22/2023 1611)  Incisions, Wounds, or Drain Sites Healing Without Sign and Symptoms of Infection:   ADMISSION and DAILY: Assess and document risk factors for pressure ulcer development   TWICE DAILY: Assess and document skin integrity   TWICE DAILY: Assess and document dressing/incision, wound bed, drain sites and surrounding tissue     Problem: Gastrointestinal - Adult  Goal: Maintains or returns to baseline bowel function  7/22/2023 2205 by Amie Greene RN  Outcome: Progressing  Flowsheets (Taken 7/22/2023 2205)  Maintains or returns to baseline bowel function:   Assess bowel function   Encourage oral fluids to ensure adequate hydration   Administer IV fluids as ordered to ensure adequate hydration   Administer ordered medications as needed   Encourage mobilization and activity  7/22/2023 1611 by Samy Haas RN  Outcome: Progressing  Flowsheets (Taken 7/22/2023 1611)  Maintains or returns to baseline bowel function:   Assess bowel function   Encourage oral fluids to ensure adequate hydration   Administer IV fluids as ordered to ensure adequate hydration   Administer ordered medications as needed  Goal: Maintains adequate nutritional intake  7/22/2023 2205 by Amie Greene RN  Outcome: Progressing  Flowsheets (Taken 7/22/2023 2205)  Maintains adequate nutritional intake:   Monitor percentage of each meal consumed   Monitor intake and output, weight and lab values  7/22/2023 1611 by Samy Haas RN  Outcome: Progressing  Flowsheets (Taken 7/22/2023 1611)  Maintains adequate nutritional intake:

## 2023-07-23 NOTE — PROGRESS NOTES
Warfarin Pharmacy Consult Note    Dontae Damon is a 80 y.o. male for whom pharmacy has been asked to manage warfarin therapy. Consulting Provider: Eloisa Bagley CNP  Warfarin Indication: A fib/flutter  Target INR range: 2.0-3.0   Warfarin dose prior to admission: 5 mg SuTh, 2.5 mg all other days  Outpatient warfarin provider: [id number]     07/20/23  1252 07/22/23  0434 07/23/23  0422   INR 1.37* 1.57* 1.31*     Recent Labs     07/21/23  0440 07/21/23  1221 07/22/23  0434 07/22/23  1357 07/23/23  0422   HGB 8.9*   < > 8.2* 9.0* 9.0*   *  --  425*  --  471*    < > = values in this interval not displayed. Concurrent anticoagulants/antiplatelets: Lovenox 1 mg/kg twice daily  Significant warfarin drug-drug interactions: none    Date INR Warfarin Dose   7/23/23 1.31 7.5 mg                                   INR will be monitored routinely until therapeutic INR is achieved. Pharmacy will continue to follow.  Thank you for the consult,   Dayna NdiayeD, BCPS 7/23/2023 9:15 AM

## 2023-07-23 NOTE — PLAN OF CARE
Problem: Respiratory - Adult  Goal: Achieves optimal ventilation and oxygenation  7/23/2023 0737 by Pao Johnson RCP  Outcome: Progressing     Problem: Respiratory - Adult  Goal: Clear lung sounds  7/23/2023 0737 by Pao Johnson RCP  Outcome: Progressing

## 2023-07-24 LAB
ANION GAP SERPL CALC-SCNC: 10 MEQ/L (ref 8–16)
BUN SERPL-MCNC: 7 MG/DL (ref 7–22)
CALCIUM SERPL-MCNC: 8.2 MG/DL (ref 8.5–10.5)
CHLORIDE SERPL-SCNC: 99 MEQ/L (ref 98–111)
CO2 SERPL-SCNC: 27 MEQ/L (ref 23–33)
CREAT SERPL-MCNC: 0.6 MG/DL (ref 0.4–1.2)
DEPRECATED RDW RBC AUTO: 48.2 FL (ref 35–45)
EKG Q-T INTERVAL: 360 MS
EKG QRS DURATION: 78 MS
EKG QTC CALCULATION (BAZETT): 435 MS
EKG R AXIS: 27 DEGREES
EKG T AXIS: -3 DEGREES
EKG VENTRICULAR RATE: 88 BPM
ERYTHROCYTE [DISTWIDTH] IN BLOOD BY AUTOMATED COUNT: 16 % (ref 11.5–14.5)
GFR SERPL CREATININE-BSD FRML MDRD: > 60 ML/MIN/1.73M2
GLUCOSE BLD STRIP.AUTO-MCNC: 150 MG/DL (ref 70–108)
GLUCOSE BLD STRIP.AUTO-MCNC: 161 MG/DL (ref 70–108)
GLUCOSE BLD STRIP.AUTO-MCNC: 168 MG/DL (ref 70–108)
GLUCOSE BLD STRIP.AUTO-MCNC: 173 MG/DL (ref 70–108)
GLUCOSE SERPL-MCNC: 116 MG/DL (ref 70–108)
HCT VFR BLD AUTO: 29.5 % (ref 42–52)
HGB BLD-MCNC: 9.1 GM/DL (ref 14–18)
INR PPP: 1.27 (ref 0.85–1.13)
MCH RBC QN AUTO: 25.6 PG (ref 26–33)
MCHC RBC AUTO-ENTMCNC: 30.8 GM/DL (ref 32.2–35.5)
MCV RBC AUTO: 83.1 FL (ref 80–94)
PLATELET # BLD AUTO: 477 THOU/MM3 (ref 130–400)
PMV BLD AUTO: 10.5 FL (ref 9.4–12.4)
POTASSIUM SERPL-SCNC: 4.4 MEQ/L (ref 3.5–5.2)
RBC # BLD AUTO: 3.55 MILL/MM3 (ref 4.7–6.1)
SODIUM SERPL-SCNC: 136 MEQ/L (ref 135–145)
WBC # BLD AUTO: 10.4 THOU/MM3 (ref 4.8–10.8)

## 2023-07-24 PROCEDURE — 6360000002 HC RX W HCPCS: Performed by: NURSE PRACTITIONER

## 2023-07-24 PROCEDURE — 99024 POSTOP FOLLOW-UP VISIT: CPT | Performed by: NURSE PRACTITIONER

## 2023-07-24 PROCEDURE — 6370000000 HC RX 637 (ALT 250 FOR IP): Performed by: SURGERY

## 2023-07-24 PROCEDURE — 80048 BASIC METABOLIC PNL TOTAL CA: CPT

## 2023-07-24 PROCEDURE — 94640 AIRWAY INHALATION TREATMENT: CPT

## 2023-07-24 PROCEDURE — 1200000003 HC TELEMETRY R&B

## 2023-07-24 PROCEDURE — 36415 COLL VENOUS BLD VENIPUNCTURE: CPT

## 2023-07-24 PROCEDURE — 2580000003 HC RX 258: Performed by: SURGERY

## 2023-07-24 PROCEDURE — 82948 REAGENT STRIP/BLOOD GLUCOSE: CPT

## 2023-07-24 PROCEDURE — 85610 PROTHROMBIN TIME: CPT

## 2023-07-24 PROCEDURE — 6370000000 HC RX 637 (ALT 250 FOR IP): Performed by: NURSE PRACTITIONER

## 2023-07-24 PROCEDURE — 85027 COMPLETE CBC AUTOMATED: CPT

## 2023-07-24 PROCEDURE — APPSS30 APP SPLIT SHARED TIME 16-30 MINUTES: Performed by: NURSE PRACTITIONER

## 2023-07-24 RX ORDER — DIAZEPAM 5 MG/1
5 TABLET ORAL EVERY 8 HOURS PRN
Status: DISCONTINUED | OUTPATIENT
Start: 2023-07-24 | End: 2023-07-27 | Stop reason: HOSPADM

## 2023-07-24 RX ORDER — BISACODYL 10 MG
10 SUPPOSITORY, RECTAL RECTAL DAILY PRN
Status: DISCONTINUED | OUTPATIENT
Start: 2023-07-24 | End: 2023-07-25

## 2023-07-24 RX ORDER — POLYETHYLENE GLYCOL 3350 17 G/17G
17 POWDER, FOR SOLUTION ORAL DAILY
Status: DISCONTINUED | OUTPATIENT
Start: 2023-07-24 | End: 2023-07-27 | Stop reason: HOSPADM

## 2023-07-24 RX ORDER — OXYCODONE HYDROCHLORIDE AND ACETAMINOPHEN 5; 325 MG/1; MG/1
2 TABLET ORAL EVERY 4 HOURS PRN
Status: DISCONTINUED | OUTPATIENT
Start: 2023-07-24 | End: 2023-07-27 | Stop reason: HOSPADM

## 2023-07-24 RX ORDER — OXYCODONE HYDROCHLORIDE AND ACETAMINOPHEN 5; 325 MG/1; MG/1
1 TABLET ORAL EVERY 4 HOURS PRN
Status: DISCONTINUED | OUTPATIENT
Start: 2023-07-24 | End: 2023-07-27 | Stop reason: HOSPADM

## 2023-07-24 RX ORDER — WARFARIN SODIUM 7.5 MG/1
7.5 TABLET ORAL
Status: COMPLETED | OUTPATIENT
Start: 2023-07-24 | End: 2023-07-24

## 2023-07-24 RX ADMIN — DIAZEPAM 5 MG: 5 TABLET ORAL at 20:31

## 2023-07-24 RX ADMIN — ALLOPURINOL 100 MG: 100 TABLET ORAL at 15:29

## 2023-07-24 RX ADMIN — DIAZEPAM 5 MG: 5 TABLET ORAL at 11:47

## 2023-07-24 RX ADMIN — IPRATROPIUM BROMIDE AND ALBUTEROL SULFATE 1 DOSE: .5; 3 SOLUTION RESPIRATORY (INHALATION) at 17:39

## 2023-07-24 RX ADMIN — FAMOTIDINE 20 MG: 20 TABLET, FILM COATED ORAL at 21:11

## 2023-07-24 RX ADMIN — OXYCODONE AND ACETAMINOPHEN 2 TABLET: 5; 325 TABLET ORAL at 20:31

## 2023-07-24 RX ADMIN — POLYETHYLENE GLYCOL 3350 17 G: 17 POWDER, FOR SOLUTION ORAL at 08:58

## 2023-07-24 RX ADMIN — NALOXEGOL OXALATE 12.5 MG: 12.5 TABLET, FILM COATED ORAL at 06:07

## 2023-07-24 RX ADMIN — ATORVASTATIN CALCIUM 20 MG: 20 TABLET, FILM COATED ORAL at 21:11

## 2023-07-24 RX ADMIN — ENOXAPARIN SODIUM 60 MG: 100 INJECTION SUBCUTANEOUS at 08:59

## 2023-07-24 RX ADMIN — ENOXAPARIN SODIUM 60 MG: 100 INJECTION SUBCUTANEOUS at 21:11

## 2023-07-24 RX ADMIN — IPRATROPIUM BROMIDE AND ALBUTEROL SULFATE 1 DOSE: .5; 3 SOLUTION RESPIRATORY (INHALATION) at 10:05

## 2023-07-24 RX ADMIN — DOCUSATE SODIUM 100 MG: 100 CAPSULE, LIQUID FILLED ORAL at 21:11

## 2023-07-24 RX ADMIN — METOPROLOL TARTRATE 50 MG: 50 TABLET, FILM COATED ORAL at 21:11

## 2023-07-24 RX ADMIN — IPRATROPIUM BROMIDE AND ALBUTEROL SULFATE 1 DOSE: .5; 3 SOLUTION RESPIRATORY (INHALATION) at 22:05

## 2023-07-24 RX ADMIN — HYDROCHLOROTHIAZIDE 12.5 MG: 12.5 CAPSULE ORAL at 08:58

## 2023-07-24 RX ADMIN — SODIUM CHLORIDE, PRESERVATIVE FREE 10 ML: 5 INJECTION INTRAVENOUS at 08:59

## 2023-07-24 RX ADMIN — METOPROLOL TARTRATE 50 MG: 50 TABLET, FILM COATED ORAL at 08:58

## 2023-07-24 RX ADMIN — FAMOTIDINE 20 MG: 20 TABLET, FILM COATED ORAL at 08:58

## 2023-07-24 RX ADMIN — ALLOPURINOL 100 MG: 100 TABLET ORAL at 21:11

## 2023-07-24 RX ADMIN — LISINOPRIL 20 MG: 20 TABLET ORAL at 08:58

## 2023-07-24 RX ADMIN — IPRATROPIUM BROMIDE AND ALBUTEROL SULFATE 1 DOSE: .5; 3 SOLUTION RESPIRATORY (INHALATION) at 14:03

## 2023-07-24 RX ADMIN — SODIUM CHLORIDE, PRESERVATIVE FREE 10 ML: 5 INJECTION INTRAVENOUS at 21:12

## 2023-07-24 RX ADMIN — ALLOPURINOL 100 MG: 100 TABLET ORAL at 08:58

## 2023-07-24 RX ADMIN — OXYCODONE AND ACETAMINOPHEN 1 TABLET: 5; 325 TABLET ORAL at 15:29

## 2023-07-24 RX ADMIN — WARFARIN SODIUM 7.5 MG: 7.5 TABLET ORAL at 17:04

## 2023-07-24 RX ADMIN — OXYCODONE 10 MG: 5 TABLET ORAL at 06:06

## 2023-07-24 RX ADMIN — DOCUSATE SODIUM 100 MG: 100 CAPSULE, LIQUID FILLED ORAL at 08:58

## 2023-07-24 ASSESSMENT — PAIN SCALES - GENERAL
PAINLEVEL_OUTOF10: 8
PAINLEVEL_OUTOF10: 9
PAINLEVEL_OUTOF10: 5
PAINLEVEL_OUTOF10: 6
PAINLEVEL_OUTOF10: 7

## 2023-07-24 ASSESSMENT — PAIN DESCRIPTION - DESCRIPTORS
DESCRIPTORS: ACHING
DESCRIPTORS: ACHING

## 2023-07-24 ASSESSMENT — PAIN DESCRIPTION - LOCATION
LOCATION: ABDOMEN
LOCATION: ABDOMEN

## 2023-07-24 ASSESSMENT — PAIN DESCRIPTION - ORIENTATION: ORIENTATION: MID

## 2023-07-24 ASSESSMENT — PAIN - FUNCTIONAL ASSESSMENT: PAIN_FUNCTIONAL_ASSESSMENT: ACTIVITIES ARE NOT PREVENTED

## 2023-07-24 NOTE — PLAN OF CARE
Problem: Discharge Planning  Goal: Discharge to home or other facility with appropriate resources  7/24/2023 0507 by Liz Bowles RN  Outcome: Progressing  Flowsheets (Taken 7/24/2023 0507)  Discharge to home or other facility with appropriate resources:   Identify barriers to discharge with patient and caregiver   Arrange for needed discharge resources and transportation as appropriate   Identify discharge learning needs (meds, wound care, etc)     Problem: Safety - Adult  Goal: Free from fall injury  7/24/2023 0507 by Liz Bowles RN  Outcome: Progressing  Flowsheets (Taken 7/24/2023 0507)  Free From Fall Injury:   Instruct family/caregiver on patient safety   Based on caregiver fall risk screen, instruct family/caregiver to ask for assistance with transferring infant if caregiver noted to have fall risk factors     Problem: Pain  Goal: Verbalizes/displays adequate comfort level or baseline comfort level  7/24/2023 0507 by Liz Bowles RN  Outcome: Progressing  Flowsheets (Taken 7/24/2023 0507)  Verbalizes/displays adequate comfort level or baseline comfort level:   Encourage patient to monitor pain and request assistance   Assess pain using appropriate pain scale   Administer analgesics based on type and severity of pain and evaluate response     Problem: ABCDS Injury Assessment  Goal: Absence of physical injury  7/24/2023 0507 by Liz Bowles RN  Outcome: Progressing  Flowsheets (Taken 7/24/2023 0507)  Absence of Physical Injury: Implement safety measures based on patient assessment     Problem: Skin/Tissue Integrity - Adult  Goal: Incisions, wounds, or drain sites healing without S/S of infection  7/24/2023 0507 by Liz Bowles RN  Outcome: Progressing  Flowsheets (Taken 7/24/2023 0507)  Incisions, Wounds, or Drain Sites Healing Without Sign and Symptoms of Infection:   ADMISSION and DAILY: Assess and document risk factors for pressure ulcer development   Implement wound care per orders

## 2023-07-24 NOTE — PROGRESS NOTES
[facility], [facility] for Dr. Mis Mena Surgery Postoperative Progress Note    Pt Name: Darrick Lucio [address] Record Number: [id number]  Date of Birth 1955   Today's Date: 7/24/2023    ASSESSMENT   POD # 4 Status post robotic extended right colectomy and robotic cholecystectomy secondary to adenocarcinoma   Paroxysmal atrial fibrillation  HTN  Tobacco abuse  Anemia   Hyperkalemia - improved  Thrombocytosis   Leukocytosis - improved  Borderline diabetes    has a past medical history of Atrial fibrillation ([address]), Carotid artery stenosis, GERD (gastroesophageal reflux disease), Hyperlipidemia, Hypertension, and Hyperuricemia. PLAN   Tolerating soft diet. No bowel function yet. Colace BID. Start Miralax today. Valium for pain control   Pain & nausea control  Up as tolerated  Hospitalist consulted for medical management. Appreciate assistance. Incisional care  Duonebs and pulmonary toileting  Pharm to dose coumadin. Therapeutic Lovenox until INR 2-3. INR 1.27. Pathology pending  Labs reviewed. WBC and electrolytes normalized. Platelets 690. Clinically, patient still looks good. Still waiting on bowel function. SUBJECTIVE   Patient was stable overnight. VS reviewed. Patient in A fib. Tachycardia intermittently. Chart reviewed. No fevers. Still feels pretty good but no bowel function. Denies chest discomfort or dyspnea. No N/V; (+) belching, no flatus or BM. Tolerating soft diet. Abdomen soft. Pain marginally controlled with analgesia. Up with assistance. Incisions look good.    CURRENT MEDICATIONS   Scheduled Meds:   warfarin  7.5 mg Oral Once    polyethylene glycol  17 g Oral Daily    enoxaparin  1 mg/kg SubCUTAneous BID    docusate sodium  100 mg Oral BID    atorvastatin  20 mg Oral Daily    lisinopril  20 mg Oral Daily    warfarin placeholder: dosing by pharmacy   Other RX Placeholder    nicotine  1 patch TransDERmal Q24H    insulin lispro  0-4 Units

## 2023-07-24 NOTE — PLAN OF CARE
Problem: Respiratory - Adult  Goal: Clear lung sounds  7/23/2023 2014 by Mariya Morley RCP  Outcome: Progressing  Note:  Patient lung sounds are considered normal for their current lung condition. No signs of distress noted. Current treatment regimen appropriate   Patient mutually agreed on goals.

## 2023-07-24 NOTE — PROGRESS NOTES
Warfarin Pharmacy Consult Note    Warfarin Indication: A fib/flutter  Target INR: 2.0-3.0  Dose prior to admission:  5 mg SuTh, 2.5 mg all other days    Recent Labs     07/22/23  0434 07/23/23  0422 07/24/23  0407   INR 1.57* 1.31* 1.27*     Recent Labs     07/22/23  0434 07/22/23  1357 07/23/23  0422 07/24/23  0407   HGB 8.2* 9.0* 9.0* 9.1*   *  --  471* 477*       Concurrent anticoagulants/antiplatelets: Lovenox 1 mg/kg twice daily  Significant warfarin drug-drug interactions: none    Date INR Warfarin Dose   7/23/23 1.31 7.5 mg   7/24/23 1.27 7.5 mg                              INR will be monitored routinely until therapeutic INR is achieved. Pharmacy will continue to follow. Thank you for the consult,   David Julian [facility] 7/24/2023 8:12 AM

## 2023-07-24 NOTE — PROGRESS NOTES
"Requested Prescriptions   Pending Prescriptions Disp Refills     losartan-hydrochlorothiazide (HYZAAR) 50-12.5 MG per tablet [Pharmacy Med Name: LOSARTAN POTASSIUM-HCT 50-12.5 TABS] 90 tablet 1        Last Written Prescription Date:  1.29.18  Last Fill Quantity: 90,  # refills: 1   Last Office Visit: 9/8/2017   Future Office Visit:      Sig: TAKE ONE TABLET BY MOUTH EVERY DAY    Angiotensin-II Receptors Passed    8/12/2018  2:05 PM       Passed - Blood pressure under 140/90 in past 12 months    BP Readings from Last 3 Encounters:   09/08/17 132/76   08/25/17 132/86   08/17/17 (!) 177/98                Passed - Recent (12 mo) or future (30 days) visit within the authorizing provider's specialty    Patient had office visit in the last 12 months or has a visit in the next 30 days with authorizing provider or within the authorizing provider's specialty.  See \"Patient Info\" tab in inbasket, or \"Choose Columns\" in Meds & Orders section of the refill encounter.           Passed - Patient is age 18 or older       Passed - Normal serum creatinine on file in past 12 months    Recent Labs   Lab Test  08/16/17   1320   CR  0.93            Passed - Normal serum potassium on file in past 12 months    Recent Labs   Lab Test  08/16/17   1320   POTASSIUM  3.8                      " Hospitalist Progress Note      Patient:  Joyce Simeon    Unit/Bed:5K-21/021-A  YOB: 1955  MRN: [id number]   Acct: [de-identified]   PCP: Kashmir Logan MD  Date of Admission: 7/20/2023    Assessment/Plan:    New colonic mass (adenocarcinoma) s/p robotic extended R colectomy and robotic cholecystectomy 7/20/23: Gen Surg primary and managing. [place] [facility] [place] controlled with Metoprolol. On coumadin therapy, daily INR. Currently on Lovenox 1 mg/kg for bridge therapy. Add Tele x 24 hours. Hyperkalemia, resolved  Hyperglycemia, no hx DM II: A1c 6.2%. continue with ACCU, SSI. Hypoglycemia protocol. Have been in 120-150s range and stable, f/u with PCP as outpatient recommended. Normocytic anemia: unable to determine chronicity, last value was from 2014. Stable in the 9 range. No gross bleeding, continue to monitor and transfuse for Hgb < 7  Essential HTN: continue with home meds and monitor BP closely     Chief Complaint: hyperkalemia, borderline glucose    Initial H and P:-    \"Chris Hansen.[age] y.o. male who we are asked to see/evaluate by Syed Ramon MD for medical management of hyperkalemia. Patient has a PMHx that includes: CAD, A-fib on Coumadin, carotid artery stenosis, hypertension, hyperlipidemia, pulmonary nodules, Proximal transverse colon/hepatic flexure adenocarcinoma, liver lesion. Patient is alert, oriented x 3. Denies c/o chest pain, cough, shortness of breath, palpitations, nausea, dysuria, or diarrhea. Denies muscle weakness or numbness/tingling of extremities. Denies h/o of issue with potassium or blood sugar. Reports incisional abdominal pain well controlled. Denies BM post operative but has had urge. \"    Subjective (past 24 hours):   7/24: pt doing alright, reports some nausea without emesis and incisional tenderness today. Denies a BM, is passing minimal flatus. No fever/chills.  No none

## 2023-07-24 NOTE — PLAN OF CARE
Problem: Respiratory - Adult  Goal: Clear lung sounds  7/24/2023 1008 by Funmilayo Cordoba RCP  Outcome: Progressing   Continue aerosols as ordered to improve breath sounds.   Pt agrees with plan of care

## 2023-07-24 NOTE — PLAN OF CARE
Problem: Discharge Planning  Goal: Discharge to home or other facility with appropriate resources  7/24/2023 1255 by Ketan Thrasher RN  Outcome: Progressing  Flowsheets (Taken 7/24/2023 1255)  Discharge to home or other facility with appropriate resources: Identify barriers to discharge with patient and caregiver     Problem: Safety - Adult  Goal: Free from fall injury  7/24/2023 1255 by Ketan Thrasher RN  Outcome: Progressing  Flowsheets (Taken 7/24/2023 1255)  Free From Fall Injury: Instruct family/caregiver on patient safety     Problem: Pain  Goal: Verbalizes/displays adequate comfort level or baseline comfort level  7/24/2023 1255 by Ketan Thrasher RN  Outcome: Progressing  Flowsheets (Taken 7/24/2023 1255)  Verbalizes/displays adequate comfort level or baseline comfort level:   Encourage patient to monitor pain and request assistance   Implement non-pharmacological measures as appropriate and evaluate response     Problem: ABCDS Injury Assessment  Goal: Absence of physical injury  7/24/2023 1255 by Ketan Thrasher RN  Outcome: Progressing  [address] (Taken 7/24/2023 0507 by Marco Antonio Freeman RN)  Absence of Physical Injury: Implement safety measures based on patient assessment     Problem: Skin/Tissue Integrity - Adult  Goal: Incisions, wounds, or drain sites healing without S/S of infection  7/24/2023 1255 by Ketan Thrasher RN  Outcome: Progressing  Flowsheets (Taken 7/24/2023 1255)  Incisions, Wounds, or Drain Sites Healing Without Sign and Symptoms of Infection: Implement wound care per orders     Problem: Gastrointestinal - Adult  Goal: Maintains or returns to baseline bowel function  7/24/2023 1255 by Ketan Thrasher RN  Outcome: Progressing  Flowsheets (Taken 7/24/2023 1255)  Maintains or returns to baseline bowel function: Encourage oral fluids to ensure adequate hydration     Problem: Gastrointestinal - Adult  Goal: Maintains adequate nutritional intake  7/24/2023 1255 by

## 2023-07-25 ENCOUNTER — APPOINTMENT (OUTPATIENT)
Dept: GENERAL RADIOLOGY | Age: 68
DRG: 827 | End: 2023-07-25
Attending: SURGERY
Payer: MEDICARE

## 2023-07-25 DIAGNOSIS — C7A.8 NEUROENDOCRINE CARCINOMA (HCC): Primary | ICD-10-CM

## 2023-07-25 LAB
ANION GAP SERPL CALC-SCNC: 8 MEQ/L (ref 8–16)
BUN SERPL-MCNC: 13 MG/DL (ref 7–22)
CALCIUM SERPL-MCNC: 8.2 MG/DL (ref 8.5–10.5)
CHLORIDE SERPL-SCNC: 99 MEQ/L (ref 98–111)
CO2 SERPL-SCNC: 29 MEQ/L (ref 23–33)
CREAT SERPL-MCNC: 0.6 MG/DL (ref 0.4–1.2)
DEPRECATED RDW RBC AUTO: 48.3 FL (ref 35–45)
EKG Q-T INTERVAL: 302 MS
EKG Q-T INTERVAL: 334 MS
EKG QRS DURATION: 72 MS
EKG QRS DURATION: 72 MS
EKG QTC CALCULATION (BAZETT): 424 MS
EKG QTC CALCULATION (BAZETT): 443 MS
EKG R AXIS: 42 DEGREES
EKG R AXIS: 77 DEGREES
EKG T AXIS: -139 DEGREES
EKG T AXIS: -142 DEGREES
EKG VENTRICULAR RATE: 106 BPM
EKG VENTRICULAR RATE: 119 BPM
ERYTHROCYTE [DISTWIDTH] IN BLOOD BY AUTOMATED COUNT: 16.2 % (ref 11.5–14.5)
GFR SERPL CREATININE-BSD FRML MDRD: > 60 ML/MIN/1.73M2
GLUCOSE BLD STRIP.AUTO-MCNC: 126 MG/DL (ref 70–108)
GLUCOSE BLD STRIP.AUTO-MCNC: 131 MG/DL (ref 70–108)
GLUCOSE BLD STRIP.AUTO-MCNC: 135 MG/DL (ref 70–108)
GLUCOSE BLD STRIP.AUTO-MCNC: 137 MG/DL (ref 70–108)
GLUCOSE BLD STRIP.AUTO-MCNC: 137 MG/DL (ref 70–108)
GLUCOSE SERPL-MCNC: 115 MG/DL (ref 70–108)
HCT VFR BLD AUTO: 28.2 % (ref 42–52)
HCT VFR BLD AUTO: 28.5 % (ref 42–52)
HCT VFR BLD AUTO: 28.7 % (ref 42–52)
HGB BLD-MCNC: 8.8 GM/DL (ref 14–18)
HGB BLD-MCNC: 8.9 GM/DL (ref 14–18)
HGB BLD-MCNC: 8.9 GM/DL (ref 14–18)
INR PPP: 2.24 (ref 0.85–1.13)
MCH RBC QN AUTO: 25.6 PG (ref 26–33)
MCHC RBC AUTO-ENTMCNC: 31 GM/DL (ref 32.2–35.5)
MCV RBC AUTO: 82.7 FL (ref 80–94)
PLATELET # BLD AUTO: 457 THOU/MM3 (ref 130–400)
PMV BLD AUTO: 10.5 FL (ref 9.4–12.4)
POTASSIUM SERPL-SCNC: 4 MEQ/L (ref 3.5–5.2)
RBC # BLD AUTO: 3.47 MILL/MM3 (ref 4.7–6.1)
SODIUM SERPL-SCNC: 136 MEQ/L (ref 135–145)
WBC # BLD AUTO: 7.7 THOU/MM3 (ref 4.8–10.8)

## 2023-07-25 PROCEDURE — 99024 POSTOP FOLLOW-UP VISIT: CPT | Performed by: NURSE PRACTITIONER

## 2023-07-25 PROCEDURE — 74018 RADEX ABDOMEN 1 VIEW: CPT

## 2023-07-25 PROCEDURE — 85014 HEMATOCRIT: CPT

## 2023-07-25 PROCEDURE — 6370000000 HC RX 637 (ALT 250 FOR IP): Performed by: SURGERY

## 2023-07-25 PROCEDURE — 2580000003 HC RX 258: Performed by: SURGERY

## 2023-07-25 PROCEDURE — 85610 PROTHROMBIN TIME: CPT

## 2023-07-25 PROCEDURE — 2500000003 HC RX 250 WO HCPCS: Performed by: PHYSICIAN ASSISTANT

## 2023-07-25 PROCEDURE — 85027 COMPLETE CBC AUTOMATED: CPT

## 2023-07-25 PROCEDURE — 1200000003 HC TELEMETRY R&B

## 2023-07-25 PROCEDURE — 36415 COLL VENOUS BLD VENIPUNCTURE: CPT

## 2023-07-25 PROCEDURE — 82948 REAGENT STRIP/BLOOD GLUCOSE: CPT

## 2023-07-25 PROCEDURE — 2580000003 HC RX 258: Performed by: PHYSICIAN ASSISTANT

## 2023-07-25 PROCEDURE — APPSS45 APP SPLIT SHARED TIME 31-45 MINUTES: Performed by: NURSE PRACTITIONER

## 2023-07-25 PROCEDURE — 2580000003 HC RX 258: Performed by: NURSE PRACTITIONER

## 2023-07-25 PROCEDURE — 93010 ELECTROCARDIOGRAM REPORT: CPT | Performed by: INTERNAL MEDICINE

## 2023-07-25 PROCEDURE — 80048 BASIC METABOLIC PNL TOTAL CA: CPT

## 2023-07-25 PROCEDURE — 93005 ELECTROCARDIOGRAM TRACING: CPT | Performed by: PHYSICIAN ASSISTANT

## 2023-07-25 PROCEDURE — 94640 AIRWAY INHALATION TREATMENT: CPT

## 2023-07-25 PROCEDURE — 94760 N-INVAS EAR/PLS OXIMETRY 1: CPT

## 2023-07-25 PROCEDURE — 85018 HEMOGLOBIN: CPT

## 2023-07-25 PROCEDURE — 6370000000 HC RX 637 (ALT 250 FOR IP): Performed by: NURSE PRACTITIONER

## 2023-07-25 PROCEDURE — 2700000000 HC OXYGEN THERAPY PER DAY

## 2023-07-25 RX ORDER — 0.9 % SODIUM CHLORIDE 0.9 %
1000 INTRAVENOUS SOLUTION INTRAVENOUS ONCE
Status: COMPLETED | OUTPATIENT
Start: 2023-07-25 | End: 2023-07-25

## 2023-07-25 RX ORDER — SODIUM CHLORIDE 9 MG/ML
INJECTION, SOLUTION INTRAVENOUS CONTINUOUS
Status: DISCONTINUED | OUTPATIENT
Start: 2023-07-25 | End: 2023-07-27 | Stop reason: HOSPADM

## 2023-07-25 RX ORDER — SODIUM CHLORIDE, SODIUM LACTATE, POTASSIUM CHLORIDE, AND CALCIUM CHLORIDE .6; .31; .03; .02 G/100ML; G/100ML; G/100ML; G/100ML
500 INJECTION, SOLUTION INTRAVENOUS ONCE
Status: COMPLETED | OUTPATIENT
Start: 2023-07-25 | End: 2023-07-25

## 2023-07-25 RX ORDER — 0.9 % SODIUM CHLORIDE 0.9 %
500 INTRAVENOUS SOLUTION INTRAVENOUS ONCE
Status: COMPLETED | OUTPATIENT
Start: 2023-07-25 | End: 2023-07-25

## 2023-07-25 RX ORDER — BISACODYL 10 MG
10 SUPPOSITORY, RECTAL RECTAL DAILY
Status: DISCONTINUED | OUTPATIENT
Start: 2023-07-25 | End: 2023-07-27 | Stop reason: HOSPADM

## 2023-07-25 RX ORDER — METOPROLOL TARTRATE 5 MG/5ML
2.5 INJECTION INTRAVENOUS ONCE
Status: COMPLETED | OUTPATIENT
Start: 2023-07-25 | End: 2023-07-25

## 2023-07-25 RX ORDER — WARFARIN SODIUM 1 MG/1
0.5 TABLET ORAL ONCE
Status: COMPLETED | OUTPATIENT
Start: 2023-07-25 | End: 2023-07-25

## 2023-07-25 RX ADMIN — METOPROLOL TARTRATE 2.5 MG: 5 INJECTION, SOLUTION INTRAVENOUS at 15:08

## 2023-07-25 RX ADMIN — IPRATROPIUM BROMIDE AND ALBUTEROL SULFATE 1 DOSE: .5; 3 SOLUTION RESPIRATORY (INHALATION) at 16:28

## 2023-07-25 RX ADMIN — DOCUSATE SODIUM 100 MG: 100 CAPSULE, LIQUID FILLED ORAL at 09:22

## 2023-07-25 RX ADMIN — IPRATROPIUM BROMIDE AND ALBUTEROL SULFATE 1 DOSE: .5; 3 SOLUTION RESPIRATORY (INHALATION) at 20:41

## 2023-07-25 RX ADMIN — SODIUM CHLORIDE 500 ML: 9 INJECTION, SOLUTION INTRAVENOUS at 10:35

## 2023-07-25 RX ADMIN — ALLOPURINOL 100 MG: 100 TABLET ORAL at 09:22

## 2023-07-25 RX ADMIN — NALOXEGOL OXALATE 12.5 MG: 12.5 TABLET, FILM COATED ORAL at 05:02

## 2023-07-25 RX ADMIN — FAMOTIDINE 20 MG: 20 TABLET, FILM COATED ORAL at 09:22

## 2023-07-25 RX ADMIN — POLYETHYLENE GLYCOL 3350 17 G: 17 POWDER, FOR SOLUTION ORAL at 09:22

## 2023-07-25 RX ADMIN — ATORVASTATIN CALCIUM 20 MG: 20 TABLET, FILM COATED ORAL at 22:00

## 2023-07-25 RX ADMIN — SODIUM CHLORIDE: 9 INJECTION, SOLUTION INTRAVENOUS at 19:55

## 2023-07-25 RX ADMIN — SODIUM CHLORIDE, POTASSIUM CHLORIDE, SODIUM LACTATE AND CALCIUM CHLORIDE 500 ML: 600; 310; 30; 20 INJECTION, SOLUTION INTRAVENOUS at 21:28

## 2023-07-25 RX ADMIN — SODIUM CHLORIDE 1000 ML: 9 INJECTION, SOLUTION INTRAVENOUS at 17:47

## 2023-07-25 RX ADMIN — DOCUSATE SODIUM 100 MG: 100 CAPSULE, LIQUID FILLED ORAL at 21:56

## 2023-07-25 RX ADMIN — OXYCODONE AND ACETAMINOPHEN 2 TABLET: 5; 325 TABLET ORAL at 15:07

## 2023-07-25 RX ADMIN — ALLOPURINOL 100 MG: 100 TABLET ORAL at 15:13

## 2023-07-25 RX ADMIN — ALLOPURINOL 100 MG: 100 TABLET ORAL at 21:56

## 2023-07-25 RX ADMIN — SODIUM CHLORIDE, PRESERVATIVE FREE 10 ML: 5 INJECTION INTRAVENOUS at 09:22

## 2023-07-25 RX ADMIN — WARFARIN SODIUM 0.5 MG: 1 TABLET ORAL at 17:49

## 2023-07-25 RX ADMIN — SODIUM CHLORIDE, PRESERVATIVE FREE 10 ML: 5 INJECTION INTRAVENOUS at 21:55

## 2023-07-25 RX ADMIN — FAMOTIDINE 20 MG: 20 TABLET, FILM COATED ORAL at 21:56

## 2023-07-25 RX ADMIN — IPRATROPIUM BROMIDE AND ALBUTEROL SULFATE 1 DOSE: .5; 3 SOLUTION RESPIRATORY (INHALATION) at 09:20

## 2023-07-25 RX ADMIN — IPRATROPIUM BROMIDE AND ALBUTEROL SULFATE 1 DOSE: .5; 3 SOLUTION RESPIRATORY (INHALATION) at 13:09

## 2023-07-25 RX ADMIN — METOPROLOL TARTRATE 50 MG: 50 TABLET, FILM COATED ORAL at 09:22

## 2023-07-25 RX ADMIN — BISACODYL 10 MG: 10 SUPPOSITORY RECTAL at 15:08

## 2023-07-25 RX ADMIN — HYDROCHLOROTHIAZIDE 12.5 MG: 12.5 CAPSULE ORAL at 09:22

## 2023-07-25 ASSESSMENT — PAIN DESCRIPTION - DESCRIPTORS
DESCRIPTORS: DISCOMFORT
DESCRIPTORS: ACHING

## 2023-07-25 ASSESSMENT — PAIN SCALES - GENERAL
PAINLEVEL_OUTOF10: 9
PAINLEVEL_OUTOF10: 5

## 2023-07-25 ASSESSMENT — PAIN DESCRIPTION - LOCATION
LOCATION: ABDOMEN
LOCATION: ABDOMEN

## 2023-07-25 ASSESSMENT — PAIN DESCRIPTION - ORIENTATION
ORIENTATION: LOWER
ORIENTATION: LOWER

## 2023-07-25 NOTE — PROGRESS NOTES
Warfarin Pharmacy Consult Note    Warfarin Indication: A fib/flutter  Target INR: 2.0-3.0  Dose prior to admission:  5 mg SuTh, 2.5 mg all other days    Recent Labs     07/23/23  0422 07/24/23  0407 07/25/23  0411   INR 1.31* 1.27* 2.24*     Recent Labs     07/23/23  0422 07/24/23  0407 07/25/23  0411   HGB 9.0* 9.1* 8.9*   * 477* 457*     Concurrent anticoagulants/antiplatelets: none  Significant warfarin drug-drug interactions: none    Date INR Warfarin Dose   7/23/2023 1.31 7.5 mg   7/24/2023 1.27 7.5 mg   7/25/2023 2.24 0.5 mg                         INR will be monitored routinely until therapeutic INR is achieved.   Ramesh Broderick, PharmD 7/25/2023 11:05 AM

## 2023-07-25 NOTE — PLAN OF CARE
Problem: Respiratory - Adult  Goal: Clear lung sounds  7/25/2023 0923 by Poa Johnson, JOE  Outcome: Progressing

## 2023-07-25 NOTE — PLAN OF CARE
Problem: Pain  Goal: Verbalizes/displays adequate comfort level or baseline comfort level  Outcome: Progressing  Flowsheets (Taken 7/24/2023 2031)  Verbalizes/displays adequate comfort level or baseline comfort level:   Encourage patient to monitor pain and request assistance   Assess pain using appropriate pain scale   Administer analgesics based on type and severity of pain and evaluate response   Implement non-pharmacological measures as appropriate and evaluate response     Problem: Safety - Adult  Goal: Free from fall injury  Fall assessment completed. Patient using call light appropriately to call for assistance with ambulation to bathroom. Personal items within reach. Patient is also compliant with use of non-skid slippers. Outcome: Progressing     Problem: Skin/Tissue Integrity - Adult  Goal: Incisions, wounds, or drain sites healing without S/S of infection  Outcome: Progressing  Flowsheets (Taken 7/24/2023 2031)  Incisions, Wounds, or Drain Sites Healing Without Sign and Symptoms of Infection:   ADMISSION and DAILY: Assess and document risk factors for pressure ulcer development   TWICE DAILY: Assess and document skin integrity   TWICE DAILY: Assess and document dressing/incision, wound bed, drain sites and surrounding tissue     Problem: Gastrointestinal - Adult  Goal: Maintains or returns to baseline bowel function  Outcome: Progressing  Flowsheets (Taken 7/24/2023 2031)  Maintains or returns to baseline bowel function:   Assess bowel function   Encourage oral fluids to ensure adequate hydration   Administer ordered medications as needed     Problem: Discharge Planning  Goal: Discharge to home or other facility with appropriate resources  Outcome: Progressing  Flowsheets (Taken 7/24/2023 2031)  Discharge to home or other facility with appropriate resources: Identify barriers to discharge with patient and caregiver    Care plan reviewed with patient and family.   Patient and family verbalize

## 2023-07-25 NOTE — PLAN OF CARE
Problem: Discharge Planning  Goal: Discharge to home or other facility with appropriate resources  Outcome: Progressing  Flowsheets (Taken 7/25/2023 1852)  Discharge to home or other facility with appropriate resources: Identify barriers to discharge with patient and caregiver     Problem: Safety - Adult  Goal: Free from fall injury  Outcome: Progressing  Flowsheets (Taken 7/25/2023 1852)  Free From Fall Injury: Instruct family/caregiver on patient safety     Problem: Pain  Goal: Verbalizes/displays adequate comfort level or baseline comfort level  Outcome: Progressing  Flowsheets (Taken 7/25/2023 1852)  Verbalizes/displays adequate comfort level or baseline comfort level: Encourage patient to monitor pain and request assistance     Problem: ABCDS Injury Assessment  Goal: Absence of physical injury  Outcome: Progressing  Flowsheets (Taken 7/25/2023 1852)  Absence of Physical Injury: Implement safety measures based on patient assessment     Problem: Skin/Tissue Integrity - Adult  Goal: Incisions, wounds, or drain sites healing without S/S of infection  Outcome: Progressing     Problem: Gastrointestinal - Adult  Goal: Maintains or returns to baseline bowel function  Outcome: Progressing  Flowsheets (Taken 7/25/2023 1852)  Maintains or returns to baseline bowel function: Assess bowel function     Problem: Gastrointestinal - Adult  Goal: Maintains adequate nutritional intake  Outcome: Progressing     Problem: Infection - Adult  Goal: Absence of infection at discharge  Outcome: Progressing  Flowsheets (Taken 7/25/2023 1852)  Absence of infection at discharge: Assess and monitor for signs and symptoms of infection     Problem: Hematologic - Adult  Goal: Maintains hematologic stability  Outcome: Progressing  Flowsheets (Taken 7/25/2023 1852)  Maintains hematologic stability: Assess for signs and symptoms of bleeding or hemorrhage    Care plan reviewed with patient.   Patient verbalized understanding of the plan of care and contribute to goal setting.

## 2023-07-25 NOTE — PLAN OF CARE
Problem: Respiratory - Adult  Goal: Clear lung sounds  7/24/2023 2206 by Lino Mott RCP  Outcome: Progressing   Continue breathing txz in improve aeration and lung sounds.

## 2023-07-25 NOTE — PLAN OF CARE
Noted EKG showed afib with RVR. 500 mL bolus and followed by Lopressor 2.5 mg IV, with initial improvement however did not convert to NSR. Repeat EKG still with Afib with RVR. Will need to consider repeat Lopressor dosing vs Cardizem gtt vs cardioversion pending hemodynamic stability. Additional 1 L bolus and Lopressor IV 5 mg - if no response will initiate Cardizem gtt and transfer.

## 2023-07-25 NOTE — CARE COORDINATION
7/25/23, 8:54 AM EDT    DISCHARGE ON GOING EVALUATION    Geni Edmonds day: 5  Location: -21/021-A Reason for admit: Neoplasm of sigmoid colon [D49.0]  Colon cancer [facility]) [C18.9]   Procedure:   7/20 ROBOTIC EXTENDED RIGHT COLON RESECTION, CHOLECYSTECTOMY  Barriers to Discharge: Hospitalist following, KUB pending, awaiting return of bowel function. DM management, Colace, Movantik, and Glycolax scheduled, Nicotine patch, Coumadin per pharmacy dosing, daily INR, soft diet, up as tolerated. PCP: Lawyer Benja MD  Readmission Risk Score: 11.5%  Patient Goals/Plan/Treatment Preferences: Urszula Cabrera is from home with his S/O. He denies needs at discharge.

## 2023-07-25 NOTE — PROGRESS NOTES
[facility], APRN-CNP for Dr. Yohana Tovar Surgery Postoperative Progress Note    Pt Name: Darrick Lucio [address] Record Number: [id number]  Date of Birth 1955   Today's Date: 7/25/2023    ASSESSMENT   POD # 5 Status post robotic extended right colectomy and robotic cholecystectomy secondary to adenocarcinoma   Poorly differentiated large cell neuroendocrine carcinoma  Postoperative adynamic ileus  Paroxysmal atrial fibrillation  HTN  Tobacco abuse  Anemia   Thrombocytosis   Leukocytosis - improved  Borderline diabetes    has a past medical history of Atrial fibrillation ([address]), Carotid artery stenosis, GERD (gastroesophageal reflux disease), Hyperlipidemia, Hypertension, and Hyperuricemia. PLAN   KUB this morning for nausea/no bowel function. Demonstrates several large and small bowel loops seen overlying the abdomen and pelvis. Ileus vs partial obstruction. Suppository today. Continue stool softeners/laxatives as tolerated. Back to full liquids and await bowel function   Pain & nausea control  Up as tolerated  Hospitalist consulted for medical management. Appreciate assistance. Incisional care  Duonebs and pulmonary toileting  Pharm to dose coumadin. INR 2.13 today. Stop Lovenox. Pathology reviewed. Discussed results with patient and daughter. Questions answered. Referral to oncology in process. Labs reviewed. All labs still look good. Continue to monitor closely. Patient still with no bowel function. Labs are reassuring. Appreciate hospitalist assistance with A fib and tachycardia. Continue to monitor KUB and labs. Supportive care. Abdomen fairly benign today. SUBJECTIVE   Patient was stable overnight. VS reviewed. Patient in A fib. Tachycardic today. Chart reviewed. No fevers. Feels more \"Blah\" today. Frustrated with no bowel function. No flatus or BM. More nausea and slightly bloating today. Denies chest discomfort or dyspnea. Appetite decreased.

## 2023-07-26 ENCOUNTER — APPOINTMENT (OUTPATIENT)
Dept: GENERAL RADIOLOGY | Age: 68
DRG: 827 | End: 2023-07-26
Attending: SURGERY
Payer: MEDICARE

## 2023-07-26 LAB
ANION GAP SERPL CALC-SCNC: 6 MEQ/L (ref 8–16)
ANION GAP SERPL CALC-SCNC: 8 MEQ/L (ref 8–16)
BUN SERPL-MCNC: 11 MG/DL (ref 7–22)
BUN SERPL-MCNC: 12 MG/DL (ref 7–22)
CALCIUM SERPL-MCNC: 7.2 MG/DL (ref 8.5–10.5)
CALCIUM SERPL-MCNC: 7.6 MG/DL (ref 8.5–10.5)
CHLORIDE SERPL-SCNC: 101 MEQ/L (ref 98–111)
CHLORIDE SERPL-SCNC: 102 MEQ/L (ref 98–111)
CO2 SERPL-SCNC: 25 MEQ/L (ref 23–33)
CO2 SERPL-SCNC: 27 MEQ/L (ref 23–33)
CREAT SERPL-MCNC: 0.4 MG/DL (ref 0.4–1.2)
CREAT SERPL-MCNC: 0.4 MG/DL (ref 0.4–1.2)
DEPRECATED RDW RBC AUTO: 47.8 FL (ref 35–45)
ERYTHROCYTE [DISTWIDTH] IN BLOOD BY AUTOMATED COUNT: 16.1 % (ref 11.5–14.5)
GFR SERPL CREATININE-BSD FRML MDRD: > 60 ML/MIN/1.73M2
GFR SERPL CREATININE-BSD FRML MDRD: > 60 ML/MIN/1.73M2
GLUCOSE BLD STRIP.AUTO-MCNC: 113 MG/DL (ref 70–108)
GLUCOSE BLD STRIP.AUTO-MCNC: 119 MG/DL (ref 70–108)
GLUCOSE BLD STRIP.AUTO-MCNC: 121 MG/DL (ref 70–108)
GLUCOSE BLD STRIP.AUTO-MCNC: 127 MG/DL (ref 70–108)
GLUCOSE SERPL-MCNC: 105 MG/DL (ref 70–108)
GLUCOSE SERPL-MCNC: 106 MG/DL (ref 70–108)
HCT VFR BLD AUTO: 26.9 % (ref 42–52)
HCT VFR BLD AUTO: 27.2 % (ref 42–52)
HGB BLD-MCNC: 8.2 GM/DL (ref 14–18)
HGB BLD-MCNC: 8.5 GM/DL (ref 14–18)
INR PPP: 4.42 (ref 0.85–1.13)
LACTATE SERPL-SCNC: 0.9 MMOL/L (ref 0.5–2)
MAGNESIUM SERPL-MCNC: 1.8 MG/DL (ref 1.6–2.4)
MCH RBC QN AUTO: 25.7 PG (ref 26–33)
MCHC RBC AUTO-ENTMCNC: 31.3 GM/DL (ref 32.2–35.5)
MCV RBC AUTO: 82.2 FL (ref 80–94)
PHOSPHATE SERPL-MCNC: 2.7 MG/DL (ref 2.4–4.7)
PLATELET # BLD AUTO: 452 THOU/MM3 (ref 130–400)
PMV BLD AUTO: 10.1 FL (ref 9.4–12.4)
POTASSIUM SERPL-SCNC: 4 MEQ/L (ref 3.5–5.2)
POTASSIUM SERPL-SCNC: 4.1 MEQ/L (ref 3.5–5.2)
RBC # BLD AUTO: 3.31 MILL/MM3 (ref 4.7–6.1)
SODIUM SERPL-SCNC: 133 MEQ/L (ref 135–145)
SODIUM SERPL-SCNC: 136 MEQ/L (ref 135–145)
WBC # BLD AUTO: 6.3 THOU/MM3 (ref 4.8–10.8)

## 2023-07-26 PROCEDURE — 85014 HEMATOCRIT: CPT

## 2023-07-26 PROCEDURE — 99233 SBSQ HOSP IP/OBS HIGH 50: CPT | Performed by: PHYSICIAN ASSISTANT

## 2023-07-26 PROCEDURE — 80048 BASIC METABOLIC PNL TOTAL CA: CPT

## 2023-07-26 PROCEDURE — 6370000000 HC RX 637 (ALT 250 FOR IP): Performed by: PHYSICIAN ASSISTANT

## 2023-07-26 PROCEDURE — 6370000000 HC RX 637 (ALT 250 FOR IP): Performed by: SURGERY

## 2023-07-26 PROCEDURE — 2580000003 HC RX 258: Performed by: SURGERY

## 2023-07-26 PROCEDURE — 2580000003 HC RX 258: Performed by: PHYSICIAN ASSISTANT

## 2023-07-26 PROCEDURE — 82948 REAGENT STRIP/BLOOD GLUCOSE: CPT

## 2023-07-26 PROCEDURE — 2580000003 HC RX 258: Performed by: NURSE PRACTITIONER

## 2023-07-26 PROCEDURE — 84100 ASSAY OF PHOSPHORUS: CPT

## 2023-07-26 PROCEDURE — 85027 COMPLETE CBC AUTOMATED: CPT

## 2023-07-26 PROCEDURE — 94760 N-INVAS EAR/PLS OXIMETRY 1: CPT

## 2023-07-26 PROCEDURE — A4216 STERILE WATER/SALINE, 10 ML: HCPCS | Performed by: SURGERY

## 2023-07-26 PROCEDURE — 83735 ASSAY OF MAGNESIUM: CPT

## 2023-07-26 PROCEDURE — 85018 HEMOGLOBIN: CPT

## 2023-07-26 PROCEDURE — 94640 AIRWAY INHALATION TREATMENT: CPT

## 2023-07-26 PROCEDURE — 74018 RADEX ABDOMEN 1 VIEW: CPT

## 2023-07-26 PROCEDURE — 6370000000 HC RX 637 (ALT 250 FOR IP): Performed by: NURSE PRACTITIONER

## 2023-07-26 PROCEDURE — 36415 COLL VENOUS BLD VENIPUNCTURE: CPT

## 2023-07-26 PROCEDURE — 2500000003 HC RX 250 WO HCPCS: Performed by: SURGERY

## 2023-07-26 PROCEDURE — 83605 ASSAY OF LACTIC ACID: CPT

## 2023-07-26 PROCEDURE — 85610 PROTHROMBIN TIME: CPT

## 2023-07-26 PROCEDURE — 99024 POSTOP FOLLOW-UP VISIT: CPT | Performed by: NURSE PRACTITIONER

## 2023-07-26 PROCEDURE — APPSS30 APP SPLIT SHARED TIME 16-30 MINUTES: Performed by: NURSE PRACTITIONER

## 2023-07-26 PROCEDURE — 1200000003 HC TELEMETRY R&B

## 2023-07-26 RX ORDER — 0.9 % SODIUM CHLORIDE 0.9 %
1000 INTRAVENOUS SOLUTION INTRAVENOUS ONCE
Status: COMPLETED | OUTPATIENT
Start: 2023-07-26 | End: 2023-07-26

## 2023-07-26 RX ORDER — ACETAMINOPHEN 325 MG/1
650 TABLET ORAL EVERY 4 HOURS PRN
Status: DISCONTINUED | OUTPATIENT
Start: 2023-07-26 | End: 2023-07-27 | Stop reason: HOSPADM

## 2023-07-26 RX ADMIN — SODIUM CHLORIDE, PRESERVATIVE FREE 10 ML: 5 INJECTION INTRAVENOUS at 10:02

## 2023-07-26 RX ADMIN — SODIUM CHLORIDE 1000 ML: 9 INJECTION, SOLUTION INTRAVENOUS at 16:29

## 2023-07-26 RX ADMIN — IPRATROPIUM BROMIDE AND ALBUTEROL SULFATE 1 DOSE: .5; 3 SOLUTION RESPIRATORY (INHALATION) at 21:05

## 2023-07-26 RX ADMIN — NALOXEGOL OXALATE 12.5 MG: 12.5 TABLET, FILM COATED ORAL at 05:00

## 2023-07-26 RX ADMIN — SODIUM CHLORIDE, PRESERVATIVE FREE 10 ML: 5 INJECTION INTRAVENOUS at 21:34

## 2023-07-26 RX ADMIN — IPRATROPIUM BROMIDE AND ALBUTEROL SULFATE 1 DOSE: .5; 3 SOLUTION RESPIRATORY (INHALATION) at 07:36

## 2023-07-26 RX ADMIN — ALLOPURINOL 100 MG: 100 TABLET ORAL at 10:00

## 2023-07-26 RX ADMIN — ONDANSETRON 4 MG: 4 TABLET, ORALLY DISINTEGRATING ORAL at 12:30

## 2023-07-26 RX ADMIN — FAMOTIDINE 20 MG: 10 INJECTION, SOLUTION INTRAVENOUS at 10:07

## 2023-07-26 RX ADMIN — ACETAMINOPHEN 650 MG: 325 TABLET ORAL at 19:17

## 2023-07-26 RX ADMIN — ATORVASTATIN CALCIUM 20 MG: 20 TABLET, FILM COATED ORAL at 21:34

## 2023-07-26 RX ADMIN — METOPROLOL TARTRATE 50 MG: 50 TABLET, FILM COATED ORAL at 11:35

## 2023-07-26 RX ADMIN — FAMOTIDINE 20 MG: 20 TABLET, FILM COATED ORAL at 21:34

## 2023-07-26 RX ADMIN — OXYCODONE AND ACETAMINOPHEN 2 TABLET: 5; 325 TABLET ORAL at 08:12

## 2023-07-26 RX ADMIN — SODIUM CHLORIDE: 9 INJECTION, SOLUTION INTRAVENOUS at 23:33

## 2023-07-26 RX ADMIN — BISACODYL 10 MG: 10 SUPPOSITORY RECTAL at 10:00

## 2023-07-26 RX ADMIN — DOCUSATE SODIUM 100 MG: 100 CAPSULE, LIQUID FILLED ORAL at 10:00

## 2023-07-26 RX ADMIN — POLYETHYLENE GLYCOL 3350 17 G: 17 POWDER, FOR SOLUTION ORAL at 10:00

## 2023-07-26 RX ADMIN — SODIUM CHLORIDE: 9 INJECTION, SOLUTION INTRAVENOUS at 06:10

## 2023-07-26 RX ADMIN — ALLOPURINOL 100 MG: 100 TABLET ORAL at 16:31

## 2023-07-26 RX ADMIN — IPRATROPIUM BROMIDE AND ALBUTEROL SULFATE 1 DOSE: .5; 3 SOLUTION RESPIRATORY (INHALATION) at 12:19

## 2023-07-26 RX ADMIN — ALLOPURINOL 100 MG: 100 TABLET ORAL at 21:34

## 2023-07-26 RX ADMIN — IPRATROPIUM BROMIDE AND ALBUTEROL SULFATE 1 DOSE: .5; 3 SOLUTION RESPIRATORY (INHALATION) at 15:37

## 2023-07-26 ASSESSMENT — PAIN DESCRIPTION - PAIN TYPE
TYPE: ACUTE PAIN
TYPE: ACUTE PAIN

## 2023-07-26 ASSESSMENT — PAIN - FUNCTIONAL ASSESSMENT: PAIN_FUNCTIONAL_ASSESSMENT: ACTIVITIES ARE NOT PREVENTED

## 2023-07-26 ASSESSMENT — PAIN DESCRIPTION - LOCATION
LOCATION: ABDOMEN

## 2023-07-26 ASSESSMENT — PAIN DESCRIPTION - DESCRIPTORS
DESCRIPTORS: ACHING;DISCOMFORT
DESCRIPTORS: ACHING
DESCRIPTORS: ACHING;DISCOMFORT
DESCRIPTORS: ACHING;DISCOMFORT

## 2023-07-26 ASSESSMENT — PAIN DESCRIPTION - ORIENTATION
ORIENTATION: MID

## 2023-07-26 ASSESSMENT — PAIN SCALES - GENERAL
PAINLEVEL_OUTOF10: 3
PAINLEVEL_OUTOF10: 6
PAINLEVEL_OUTOF10: 7
PAINLEVEL_OUTOF10: 6
PAINLEVEL_OUTOF10: 7
PAINLEVEL_OUTOF10: 6
PAINLEVEL_OUTOF10: 0

## 2023-07-26 ASSESSMENT — PAIN DESCRIPTION - FREQUENCY
FREQUENCY: CONTINUOUS
FREQUENCY: INTERMITTENT

## 2023-07-26 ASSESSMENT — PAIN DESCRIPTION - ONSET
ONSET: ON-GOING
ONSET: ON-GOING

## 2023-07-26 NOTE — PROGRESS NOTES
Warfarin Pharmacy Consult Note    Warfarin Indication: A fib/flutter  [facility] INR: 2.0-3.0  Dose prior to admission: 5 mg SuTh and 2.5 mg [id number]    Recent Labs     07/24/23  0407 07/25/23  0411 07/26/23  0347   INR 1.27* 2.24* 4.42*     Recent Labs     07/24/23  0407 07/25/23  0411 07/25/23  1110 07/25/23  2121 07/26/23  0347   HGB 9.1* 8.9* 8.9* 8.8* 8.5*   * 457*  --   --  452*     Concurrent anticoagulants/antiplatelets: none  Significant warfarin drug-drug interactions: allopurinol     Date INR Warfarin Dose   7/23/2023 1.31 7.5 mg   7/24/2023 1.27 7.5 mg   7/25/2023 2.24 0.5 mg    7/26/2023 4.42   No Coumadin                              Monitoring:                   INR will be monitored daily until therapeutic INR is achieved.     **Please contact pharmacy for discharge instructions when indicated**    Servando Vigil, PharmD, BCPS  7/26/2023  12:20 PM

## 2023-07-26 NOTE — PLAN OF CARE
Problem: Pain  Goal: Verbalizes/displays adequate comfort level or baseline comfort level  7/26/2023 0154 by Peyton Barboza RN  Outcome: Progressing  Flowsheets (Taken 7/25/2023 1852)  Verbalizes/displays adequate comfort level or baseline comfort level: Encourage patient to monitor pain and request assistance    Problem: Skin/Tissue Integrity - Adult  Goal: Incisions, wounds, or drain sites healing without S/S of infection  7/26/2023 0154 by Peyton Barboza RN  Outcome: Progressing  Flowsheets (Taken 7/25/2023 2000)  Incisions, Wounds, or Drain Sites Healing Without Sign and Symptoms of Infection:   ADMISSION and DAILY: Assess and document risk factors for pressure ulcer development   TWICE DAILY: Assess and document skin integrity    Problem: Gastrointestinal - Adult  Goal: Maintains or returns to baseline bowel function  7/26/2023 0154 by Peyton Barboza RN  Outcome: Progressing  Flowsheets (Taken 7/25/2023 2000)  Maintains or returns to baseline bowel function:   Assess bowel function   Encourage oral fluids to ensure adequate hydration   Administer ordered medications as needed      Problem: Discharge Planning  Goal: Discharge to home or other facility with appropriate resources  7/26/2023 0154 by Peyton Barboza RN  Outcome: Progressing  Flowsheets (Taken 7/25/2023 2000)  Discharge to home or other facility with appropriate resources: Identify barriers to discharge with patient and caregiver    Problem: Safety - Adult  Goal: Free from fall injury  7/26/2023 0154 by Peyton Barboza RN  Outcome: Progressing  Free From Fall Injury: Instruct family/caregiver on patient safety  Fall assessment completed. Patient using call light appropriately to call for assistance with ambulation to bathroom. Personal items within reach. Patient is also compliant with use of non-skid slippers. Care plan reviewed with patient and family.   Patient and family verbalize understanding of the plan of care and contribute to goal

## 2023-07-26 NOTE — PROGRESS NOTES
Perfect Serve to [facility] regarding patient's low BP after bolus. Patient asymptomatic but requesting something for pain that won't drop BP. Tylenol ordered and lab work. IV fluids increased.

## 2023-07-26 NOTE — PROGRESS NOTES
is an intact gallbladder specimen with a pale, slightly   thickened serosa. The specimen measures 6.7 x 4 x 2.5 cm. The cystic   duct area is clipped. The specimen is opened longitudinally revealing   green-black bile fluid. The wall at the fundal tip is thickened   measuring 1 cm in greatest thickness. The wall has a velvety green   mucosa and no calculi are appreciated. Representative sections taken   through the wall and duct are submitted in one cassette. [id number]     B - The container is labeled Andres Romero, right colon. Received   in formalin is a segmental resection of large bowel including terminal   ileum, cecum, and right colon. The specimen measures 32 cm in length   including 4 cm of terminal ileum. There is a thin appendix measuring 6   cm in length x 0.2 cm in average diameter. There is [place] Ink   tattooing on the surface. There is an attached sheet of omental fat. The specimen is opened longitudinally revealing pink-tan mucosal   surface. There is a large ulcerated mass involving the entire luminal   circumference measuring about 6 x 5 cm. The mass is 3 cm from the   distal resection line and about 12 cm from the mesenteric margin. The   omental fat measures about 17 x 13 cm. The omentum is adherent to the   bowel. There is a perforation at the mass site. The surface is inked   blue. Sections through the lesion reveal extension of neoplasm through   the muscularis into the surrounding soft tissues. The lesion is   perforated. The surface of the serosa is necrotic. No additional   lesions are identified. Sections through the surrounding adipose   tissue reveal several tentatively identified lymph nodes. Representative sections are submitted.   Cassette #1 - proximal and   distal resection line; cassette #2 - mesenteric margin; cassette #3 -   appendix; cassettes #4 and #5 - lesion with perforation; cassettes #6   through #8 - lesion; cassette #9 - uninvolved right 7/26/2023 at 4:16 PM

## 2023-07-26 NOTE — PLAN OF CARE
Problem: Discharge Planning  Goal: Discharge to home or other facility with appropriate resources  7/26/2023 0811 by Jocelyne Benitez RN  Outcome: Progressing  7/26/2023 0154 by Mathilda Saint, RN  Outcome: Progressing  Flowsheets (Taken 7/25/2023 2000)  Discharge to home or other facility with appropriate resources: Identify barriers to discharge with patient and caregiver  7/25/2023 1852 by Du Mederos RN  Outcome: Progressing  Flowsheets (Taken 7/25/2023 1852)  Discharge to home or other facility with appropriate resources: Identify barriers to discharge with patient and caregiver     Problem: Safety - Adult  Goal: Free from fall injury  7/26/2023 0811 by Jocelyne Benitez RN  Outcome: Progressing  7/26/2023 0154 by Mathilda Saint, RN  Outcome: Progressing  7/25/2023 1852 by Du Mederos RN  Outcome: Progressing  Flowsheets (Taken 7/25/2023 1852)  Free From Fall Injury: Instruct family/caregiver on patient safety     Problem: Pain  Goal: Verbalizes/displays adequate comfort level or baseline comfort level  7/26/2023 0811 by Jocelyne Benitez RN  Outcome: Progressing  7/26/2023 0154 by Mathilda Saint, RN  Outcome: Progressing  7/25/2023 1852 by Du Mederos RN  Outcome: Progressing  Flowsheets (Taken 7/25/2023 1852)  Verbalizes/displays adequate comfort level or baseline comfort level: Encourage patient to monitor pain and request assistance     Problem: ABCDS Injury Assessment  Goal: Absence of physical injury  7/26/2023 0811 by Jocelyne Benitez RN  Outcome: Progressing  7/26/2023 0154 by Mathilda Saint, RN  Outcome: Progressing  7/25/2023 1852 by Du Mederos RN  Outcome: Progressing  Flowsheets (Taken 7/25/2023 1852)  Absence of Physical Injury: Implement safety measures based on patient assessment     Problem: Respiratory - Adult  Goal: Clear lung sounds  7/26/2023 0737 by Lewis Phelan RCP  Outcome: Progressing     Problem: Skin/Tissue Integrity - Adult  Goal: Incisions, wounds, or

## 2023-07-26 NOTE — PROGRESS NOTES
Hospitalist Progress Note    Patient:  Nate Officer    YOB: 1955  Unit/Bed:5K-21/021-A  Date of Admission: 7/20/2023        Assessment/Plan:    Adenocarcinoma of colon: S/p robotic extended right colectomy and robotic cholecystectomy 7/20/2023 with Dr. Curly Batista. Management per primary. Chronic AFib: rate controlled on metoprolol. Anticoagulated on coumadin, pharm to dose. - INR supra therapeutic, no signs of acute bleeding. Monitor. Essential HTN: on metoprolol, lisinopril, hctz. Now hypotensive. Parameters added to medications. IVF bolus ordered. Monitor closely. Hyperkalemia, resolved    Hyperglycemia: A1c 6.2%. continue with ACCU, SSI. Hypoglycemia protocol. Have been in 120-150s range and stable, f/u with PCP as outpatient recommended. Improving. Normocytic anemia: unable to determine chronicity, last value was from 2014. Stable in the 9 range. No gross bleeding, continue to monitor and transfuse for Hgb < 7    Thrombocytosis: likely reactive       Chief Complaint: hyperkalemia, borderline glucose    HPI / Hospital Course: Initial consult note: Shelbie Wills y.o. male who we are asked to see/evaluate by Luis Carlos Bernal MD for medical management of hyperkalemia. Patient has a PMHx that includes: CAD, A-fib on Coumadin, carotid artery stenosis, hypertension, hyperlipidemia, pulmonary nodules, Proximal transverse colon/hepatic flexure adenocarcinoma, liver lesion. Patient is alert, oriented x 3. Denies c/o chest pain, cough, shortness of breath, palpitations, nausea, dysuria, or diarrhea. Denies muscle weakness or numbness/tingling of extremities. Denies h/o of issue with potassium or blood sugar. Reports incisional abdominal pain well controlled. Denies BM post operative but has had urge. \"    Subjective (past 24 hours): Patient is doing well. He reports nausea when he stands, states he has no vomiting. Reports 2 loose bowel movements this morning.

## 2023-07-27 VITALS
OXYGEN SATURATION: 98 % | RESPIRATION RATE: 16 BRPM | SYSTOLIC BLOOD PRESSURE: 106 MMHG | TEMPERATURE: 99.2 F | HEIGHT: 68 IN | WEIGHT: 137 LBS | DIASTOLIC BLOOD PRESSURE: 66 MMHG | BODY MASS INDEX: 20.76 KG/M2 | HEART RATE: 88 BPM

## 2023-07-27 LAB
ANION GAP SERPL CALC-SCNC: 8 MEQ/L (ref 8–16)
BUN SERPL-MCNC: 11 MG/DL (ref 7–22)
CA-I BLD ISE-SCNC: 1.16 MMOL/L (ref 1.12–1.32)
CALCIUM SERPL-MCNC: 7.6 MG/DL (ref 8.5–10.5)
CHLORIDE SERPL-SCNC: 103 MEQ/L (ref 98–111)
CO2 SERPL-SCNC: 24 MEQ/L (ref 23–33)
CREAT SERPL-MCNC: 0.6 MG/DL (ref 0.4–1.2)
DEPRECATED RDW RBC AUTO: 49.1 FL (ref 35–45)
ERYTHROCYTE [DISTWIDTH] IN BLOOD BY AUTOMATED COUNT: 16.4 % (ref 11.5–14.5)
GFR SERPL CREATININE-BSD FRML MDRD: > 60 ML/MIN/1.73M2
GLUCOSE BLD STRIP.AUTO-MCNC: 133 MG/DL (ref 70–108)
GLUCOSE BLD STRIP.AUTO-MCNC: 151 MG/DL (ref 70–108)
GLUCOSE BLD STRIP.AUTO-MCNC: 87 MG/DL (ref 70–108)
GLUCOSE SERPL-MCNC: 93 MG/DL (ref 70–108)
HCT VFR BLD AUTO: 28 % (ref 42–52)
HGB BLD-MCNC: 8.6 GM/DL (ref 14–18)
INR PPP: 5.48 (ref 0.85–1.13)
MCH RBC QN AUTO: 25.8 PG (ref 26–33)
MCHC RBC AUTO-ENTMCNC: 30.7 GM/DL (ref 32.2–35.5)
MCV RBC AUTO: 84.1 FL (ref 80–94)
PLATELET # BLD AUTO: 418 THOU/MM3 (ref 130–400)
PMV BLD AUTO: 10 FL (ref 9.4–12.4)
POTASSIUM SERPL-SCNC: 4.3 MEQ/L (ref 3.5–5.2)
RBC # BLD AUTO: 3.33 MILL/MM3 (ref 4.7–6.1)
SODIUM SERPL-SCNC: 135 MEQ/L (ref 135–145)
WBC # BLD AUTO: 5.4 THOU/MM3 (ref 4.8–10.8)

## 2023-07-27 PROCEDURE — 36415 COLL VENOUS BLD VENIPUNCTURE: CPT

## 2023-07-27 PROCEDURE — 2580000003 HC RX 258: Performed by: SURGERY

## 2023-07-27 PROCEDURE — 2580000003 HC RX 258: Performed by: PHYSICIAN ASSISTANT

## 2023-07-27 PROCEDURE — 85027 COMPLETE CBC AUTOMATED: CPT

## 2023-07-27 PROCEDURE — 99232 SBSQ HOSP IP/OBS MODERATE 35: CPT | Performed by: PHYSICIAN ASSISTANT

## 2023-07-27 PROCEDURE — 82330 ASSAY OF CALCIUM: CPT

## 2023-07-27 PROCEDURE — 85610 PROTHROMBIN TIME: CPT

## 2023-07-27 PROCEDURE — 6370000000 HC RX 637 (ALT 250 FOR IP): Performed by: SURGERY

## 2023-07-27 PROCEDURE — 80048 BASIC METABOLIC PNL TOTAL CA: CPT

## 2023-07-27 PROCEDURE — 6370000000 HC RX 637 (ALT 250 FOR IP): Performed by: PHYSICIAN ASSISTANT

## 2023-07-27 PROCEDURE — 82948 REAGENT STRIP/BLOOD GLUCOSE: CPT

## 2023-07-27 PROCEDURE — 94640 AIRWAY INHALATION TREATMENT: CPT

## 2023-07-27 PROCEDURE — 6370000000 HC RX 637 (ALT 250 FOR IP): Performed by: NURSE PRACTITIONER

## 2023-07-27 RX ORDER — LISINOPRIL 5 MG/1
5 TABLET ORAL DAILY
Qty: 30 TABLET | Refills: 0 | Status: SHIPPED | OUTPATIENT
Start: 2023-07-27

## 2023-07-27 RX ORDER — LISINOPRIL 5 MG/1
5 TABLET ORAL DAILY
Status: DISCONTINUED | OUTPATIENT
Start: 2023-07-27 | End: 2023-07-27 | Stop reason: HOSPADM

## 2023-07-27 RX ORDER — OXYCODONE HYDROCHLORIDE AND ACETAMINOPHEN 5; 325 MG/1; MG/1
1 TABLET ORAL EVERY 4 HOURS PRN
Qty: 18 TABLET | Refills: 0 | Status: SHIPPED | OUTPATIENT
Start: 2023-07-27 | End: 2023-07-31 | Stop reason: SDUPTHER

## 2023-07-27 RX ADMIN — OXYCODONE AND ACETAMINOPHEN 1 TABLET: 5; 325 TABLET ORAL at 12:50

## 2023-07-27 RX ADMIN — METOPROLOL TARTRATE 50 MG: 50 TABLET, FILM COATED ORAL at 10:51

## 2023-07-27 RX ADMIN — SODIUM CHLORIDE, PRESERVATIVE FREE 10 ML: 5 INJECTION INTRAVENOUS at 10:50

## 2023-07-27 RX ADMIN — ALLOPURINOL 100 MG: 100 TABLET ORAL at 10:51

## 2023-07-27 RX ADMIN — SODIUM CHLORIDE: 9 INJECTION, SOLUTION INTRAVENOUS at 14:32

## 2023-07-27 RX ADMIN — LISINOPRIL 5 MG: 5 TABLET ORAL at 11:14

## 2023-07-27 RX ADMIN — FAMOTIDINE 20 MG: 20 TABLET, FILM COATED ORAL at 12:57

## 2023-07-27 RX ADMIN — NALOXEGOL OXALATE 12.5 MG: 12.5 TABLET, FILM COATED ORAL at 06:02

## 2023-07-27 RX ADMIN — ACETAMINOPHEN 650 MG: 325 TABLET ORAL at 17:27

## 2023-07-27 RX ADMIN — SODIUM CHLORIDE: 9 INJECTION, SOLUTION INTRAVENOUS at 06:14

## 2023-07-27 RX ADMIN — IPRATROPIUM BROMIDE AND ALBUTEROL SULFATE 1 DOSE: .5; 3 SOLUTION RESPIRATORY (INHALATION) at 12:07

## 2023-07-27 RX ADMIN — IPRATROPIUM BROMIDE AND ALBUTEROL SULFATE 1 DOSE: .5; 3 SOLUTION RESPIRATORY (INHALATION) at 16:45

## 2023-07-27 RX ADMIN — IPRATROPIUM BROMIDE AND ALBUTEROL SULFATE 1 DOSE: .5; 3 SOLUTION RESPIRATORY (INHALATION) at 07:58

## 2023-07-27 RX ADMIN — ALLOPURINOL 100 MG: 100 TABLET ORAL at 14:35

## 2023-07-27 ASSESSMENT — PAIN DESCRIPTION - ONSET: ONSET: GRADUAL

## 2023-07-27 ASSESSMENT — PAIN SCALES - GENERAL
PAINLEVEL_OUTOF10: 6
PAINLEVEL_OUTOF10: 5
PAINLEVEL_OUTOF10: 3
PAINLEVEL_OUTOF10: 0

## 2023-07-27 ASSESSMENT — PAIN DESCRIPTION - DESCRIPTORS
DESCRIPTORS: ACHING;DISCOMFORT
DESCRIPTORS: ACHING;DISCOMFORT

## 2023-07-27 ASSESSMENT — PAIN DESCRIPTION - ORIENTATION
ORIENTATION: MID
ORIENTATION: MID

## 2023-07-27 ASSESSMENT — PAIN DESCRIPTION - FREQUENCY: FREQUENCY: INTERMITTENT

## 2023-07-27 ASSESSMENT — PAIN DESCRIPTION - PAIN TYPE: TYPE: SURGICAL PAIN

## 2023-07-27 ASSESSMENT — PAIN DESCRIPTION - LOCATION
LOCATION: ABDOMEN
LOCATION: ABDOMEN

## 2023-07-27 NOTE — PLAN OF CARE
Problem: Pain  Goal: Verbalizes/displays adequate comfort level or baseline comfort level  Outcome: Progressing  Flowsheets (Taken 7/26/2023 1930)  Verbalizes/displays adequate comfort level or baseline comfort level:   Encourage patient to monitor pain and request assistance   Assess pain using appropriate pain scale   Administer analgesics based on type and severity of pain and evaluate response   Implement non-pharmacological measures as appropriate and evaluate response     Problem: Safety - Adult  Goal: Free from fall injury  Fall assessment completed. Patient using call light appropriately to call for assistance with ambulation to bathroom. Personal items within reach. Patient is also compliant with use of non-skid slippers. Outcome: Progressing     Problem: Skin/Tissue Integrity - Adult  Goal: Incisions, wounds, or drain sites healing without S/S of infection  Outcome: Progressing  Incisions, Wounds, or Drain Sites Healing Without Sign and Symptoms of Infection:   ADMISSION and DAILY: Assess and document risk factors for pressure ulcer development   TWICE DAILY: Assess and document skin integrity     Problem: Gastrointestinal - Adult  Goal: Maintains or returns to baseline bowel function  Outcome: Progressing  Maintains or returns to baseline bowel function:   Assess bowel function   Encourage oral fluids to ensure adequate hydration   Administer IV fluids as ordered to ensure adequate hydration   Administer ordered medications as needed      Problem: Discharge Planning  Goal: Discharge to home or other facility with appropriate resources  Outcome: Progressing  Discharge to home or other facility with appropriate resources: Identify barriers to discharge with patient and caregiver    Care plan reviewed with patient and family. Patient and family verbalize understanding of the plan of care and contribute to goal setting.

## 2023-07-27 NOTE — FLOWSHEET NOTE
07/27/23 0448   Treatment Team Notification   Reason for Communication Critical results   Type of Critical Result Laboratory   Critical Lab Information INR 5.48   Person Result Received From [address] [place] [address] Lab Result Type Coagulation  (INR)   Name of Team Member Notified [address] [facility] [place] Team Role Physician Assistant   Method of Communication Secure Message   Response Waiting for response   Notification Time 8060 1896- Rn notified LOYD Salmeron regarding lab calling critical INR 5.48    Electronically signed by Hamida Suarez RN on 7/27/2023 at 6:49 AM

## 2023-07-27 NOTE — PROGRESS NOTES
Warfarin Discharge Pharmacy Note    Warfarin Indication: A fib/flutter  [facility] INR: 2.0-3.0  Dose prior to admission: 5mg SuTh, 2.5mg all other days    Plan: No Coumadin until seen by Carroll Lucio         Please follow up with Carroll Lucio 7/28/23

## 2023-07-27 NOTE — PLAN OF CARE
Problem: Respiratory - Adult  Goal: Clear lung sounds  7/26/2023 2109 by Hua Fishman RCP  Outcome: Progressing     Continue DuoNeb txs to improve aeration of lungs. Patient mutually agreed on goals.

## 2023-07-27 NOTE — DISCHARGE INSTRUCTIONS
DR. Hany Pedraza DISCHARGE INSTRUCTIONS    Pt Name: Darrick Lucio [address] Record Number: [id number]  Today's Date: 7/27/2023  GENERAL ANESTHESIA OR SEDATION   1. Do not drive or operate hazardous machinery for 24 hours. 2. Do not make important business or personal decisions for 24 hours. 3. Do not drink alcoholic beverages or use tobacco for 24 hours. ACTIVITY INSTRUCTIONS   Rest today. Resume light to normal activity tomorrow. You may resume normal activity tomorrow. Do not engage in strenuous activity that may place stress on your incision. Do not drive for 3-5 days and avoid heavy lifting, tugging, pullings greater than 10-20 lbs until seen in the office. DIET INSTRUCTIONS   Begin with clear liquids. If not nauseated, may increase to a low-fat diet when you desire. Greasy and spicy foods are not advised. Regular diet as tolerated. MEDICATIONS   Prescription sent with you for Percocet, Take as directed. Do not drink alcohol or drive while taking pain medications. You may experience dizziness or drowsiness with these medications. You may also experience constipation which can be relieved with stool softners or laxatives. You may resume your daily prescription medication schedule unless otherwise specified. Do not take 325mg Aspirin or other blood thinners such as Coumadin or Plavix for 5 days. WOUND & DRESSING INSTRUCTIONS   Always ensure you and your care giver clean hands before and after caring for the wound. Keep dressing clean and dry for 48 hours. Change when soiled or wet. Allow steri-strips to fall off on their own. Ice operative site for 20 minutes 4 times a day. May wash over incision in shower in 48 hours, but do not soak in a bath. Keep the abdominal binder in place during the day. May remove to shower and at night. Remove packing from wound in 24 hours and replace with AMD dressings daily. ABDOMINAL & LAPAROSCOPIC PROCEDURES   1.  You are encouraged to get up and move

## 2023-07-27 NOTE — PLAN OF CARE
Problem: Respiratory - Adult  Goal: Clear lung sounds  7/27/2023 0800 by Ana Forbes RCP  Outcome: Progressing   Patient agreed on goals

## 2023-07-27 NOTE — PROGRESS NOTES
Discharge paperwork reviewed with patient and wife at bedside. No questions verbalized. Discharge summary page signed by patient. IV was removed. All belongings packed and taken with wife. Transport to assist to family vehicle. No other concerns.

## 2023-07-27 NOTE — PROGRESS NOTES
Warfarin Pharmacy Consult Note    Warfarin Indication: A fib/flutter  [facility] INR: 2.0-3.0  Dose prior to admission: 5mg SuTh, 2.5mg all other days    Recent Labs     07/25/23  0411 07/26/23  0347 07/27/23  0357   INR 2.24* 4.42* 5.48*     Recent Labs     07/25/23  0411 07/25/23  1110 07/26/23  0347 07/26/23  1942 07/27/23  0357   HGB 8.9*   < > 8.5* 8.2* 8.6*   *  --  452*  --  418*    < > = values in this interval not displayed. Concurrent anticoagulants/antiplatelets: none  Significant warfarin drug-drug interactions: allopurinol     Date INR Warfarin Dose   7/23/2023 1.31 7.5 mg   7/24/2023 1.27 7.5 mg   7/25/2023 2.24 0.5 mg    7/26/2023 4.42   No Coumadin   7/27/2023  5.48  No Coumadin                           Monitoring:                   INR will be monitored daily until therapeutic INR is achieved.     **Please contact pharmacy for discharge instructions when indicated**    Al [address]  7/27/2023  9:56 AM

## 2023-07-28 NOTE — PROGRESS NOTES
[place] [address]  Dept: [phone]  Loc: [phone]   Hematology/Oncology Consult (Clinic)        07/28/23       Nate Officer   1955     Jola Cooks, MD       Reason:   Chief Complaint   Patient presents with    New Patient     Neuroendocrine carcinoma           HPI:   26-year-old male who initially presented to GI and surgery for evaluation  secondary to newly diagnosed colonic mass that is near obstructing. He had colonoscopy with Dr. Marcie Rosas and they were Not able to get a scope through the area of concern. Proximal transverse colon close to the hepatic flexure. Patient admits to abdominal discomfort in the right upper quadrant. Mild to moderate. No severe sharp pain. Admits to weight loss and fatigue. Decreased appetite. No significant nausea or vomiting. Still passing flatus and having bowel movements. No hematochezia. No change in urination. Headaches. No lightheadedness. CT abdomen/pelvis demonstrated colonic mass proximal transverse colon close to the hepatic flexure. Patient denies any family or personal history of colorectal disease that he is aware of. Small liver lesion identified on CT abdomen/pelvis. Patient states he cannot proceed with an MRI even if it is a open MRI due to anxiety. He is refusing to proceed with any further testing in regards to MRI. Admits to smoking cigarettes but has been decreasing in number. Less than half a pack a day. Patient ended up having surgery right hemicolectomy with Dr. 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 Patient is here today with his brother and significant other. He is in a wheelchair today but said usually walks.  Has been tired after the surgery, he did have some blood draining from the

## 2023-07-28 NOTE — CARE COORDINATION
7/28/23, 8:29 AM EDT    Patient goals/plan/ treatment preferences discussed by  and . Patient goals/plan/ treatment preferences reviewed with patient/ family. Patient/ family verbalize understanding of discharge plan and are in agreement with goal/plan/treatment preferences. Understanding was demonstrated using the teach back method. AVS provided by RN at time of discharge, which includes all necessary medical information pertaining to the patients current course of illness, treatment, post-discharge goals of care, and treatment preferences.      Services At/After Discharge: None       IMM Letter  IMM Letter given to Patient/Family/Significant other/Guardian/POA/by[de-identified] Richard Gan RN CM  IMM Letter date given[de-identified] 07/27/23  IMM Letter time given[de-identified] 6996

## 2023-07-29 ENCOUNTER — HOSPITAL ENCOUNTER (EMERGENCY)
Age: 68
Discharge: HOME | End: 2023-07-29
Attending: EMERGENCY MEDICINE
Payer: MEDICARE

## 2023-07-29 ENCOUNTER — APPOINTMENT (OUTPATIENT)
Dept: CT IMAGING | Age: 68
End: 2023-07-29
Payer: MEDICARE

## 2023-07-29 VITALS
OXYGEN SATURATION: 100 % | HEART RATE: 104 BPM | RESPIRATION RATE: 16 BRPM | BODY MASS INDEX: 21.98 KG/M2 | WEIGHT: 145 LBS | SYSTOLIC BLOOD PRESSURE: 129 MMHG | HEIGHT: 68 IN | TEMPERATURE: 97.6 F | DIASTOLIC BLOOD PRESSURE: 69 MMHG

## 2023-07-29 DIAGNOSIS — Z48.89 ENCOUNTER FOR POST SURGICAL WOUND CHECK: Primary | ICD-10-CM

## 2023-07-29 LAB
ANION GAP SERPL CALC-SCNC: 9 MEQ/L (ref 8–16)
BASOPHILS ABSOLUTE: 0 THOU/MM3 (ref 0–0.1)
BASOPHILS NFR BLD AUTO: 0.4 %
BUN SERPL-MCNC: 9 MG/DL (ref 7–22)
CALCIUM SERPL-MCNC: 7.8 MG/DL (ref 8.5–10.5)
CHLORIDE SERPL-SCNC: 99 MEQ/L (ref 98–111)
CO2 SERPL-SCNC: 26 MEQ/L (ref 23–33)
CREAT SERPL-MCNC: 0.5 MG/DL (ref 0.4–1.2)
D DIMER PPP IA.FEU-MCNC: 3846 NG/ML FEU (ref 0–500)
DEPRECATED RDW RBC AUTO: 49.5 FL (ref 35–45)
EKG Q-T INTERVAL: 318 MS
EKG QRS DURATION: 76 MS
EKG QTC CALCULATION (BAZETT): 439 MS
EKG R AXIS: 51 DEGREES
EKG T AXIS: -126 DEGREES
EKG VENTRICULAR RATE: 115 BPM
EOSINOPHIL NFR BLD AUTO: 2.1 %
EOSINOPHILS ABSOLUTE: 0.2 THOU/MM3 (ref 0–0.4)
ERYTHROCYTE [DISTWIDTH] IN BLOOD BY AUTOMATED COUNT: 16.8 % (ref 11.5–14.5)
GFR SERPL CREATININE-BSD FRML MDRD: > 60 ML/MIN/1.73M2
GLUCOSE SERPL-MCNC: 124 MG/DL (ref 70–108)
HCT VFR BLD AUTO: 32.5 % (ref 42–52)
HGB BLD-MCNC: 9.9 GM/DL (ref 14–18)
IMM GRANULOCYTES # BLD AUTO: 0.02 THOU/MM3 (ref 0–0.07)
IMM GRANULOCYTES NFR BLD AUTO: 0.2 %
INR PPP: 4.54 (ref 0.85–1.13)
LYMPHOCYTES ABSOLUTE: 1.6 THOU/MM3 (ref 1–4.8)
LYMPHOCYTES NFR BLD AUTO: 17.4 %
MCH RBC QN AUTO: 25.3 PG (ref 26–33)
MCHC RBC AUTO-ENTMCNC: 30.5 GM/DL (ref 32.2–35.5)
MCV RBC AUTO: 83.1 FL (ref 80–94)
MONOCYTES ABSOLUTE: 0.7 THOU/MM3 (ref 0.4–1.3)
MONOCYTES NFR BLD AUTO: 7.9 %
NEUTROPHILS NFR BLD AUTO: 72 %
NRBC BLD AUTO-RTO: 0 /100 WBC
OSMOLALITY SERPL CALC.SUM OF ELEC: 268.3 MOSMOL/KG (ref 275–300)
PLATELET # BLD AUTO: 500 THOU/MM3 (ref 130–400)
PMV BLD AUTO: 10.2 FL (ref 9.4–12.4)
POTASSIUM SERPL-SCNC: 3.3 MEQ/L (ref 3.5–5.2)
RBC # BLD AUTO: 3.91 MILL/MM3 (ref 4.7–6.1)
SEGMENTED NEUTROPHILS ABSOLUTE COUNT: 6.6 THOU/MM3 (ref 1.8–7.7)
SODIUM SERPL-SCNC: 134 MEQ/L (ref 135–145)
WBC # BLD AUTO: 9.2 THOU/MM3 (ref 4.8–10.8)

## 2023-07-29 PROCEDURE — 6360000004 HC RX CONTRAST MEDICATION: Performed by: STUDENT IN AN ORGANIZED HEALTH CARE EDUCATION/TRAINING PROGRAM

## 2023-07-29 PROCEDURE — 85379 FIBRIN DEGRADATION QUANT: CPT

## 2023-07-29 PROCEDURE — 93010 ELECTROCARDIOGRAM REPORT: CPT | Performed by: INTERNAL MEDICINE

## 2023-07-29 PROCEDURE — 2580000003 HC RX 258: Performed by: STUDENT IN AN ORGANIZED HEALTH CARE EDUCATION/TRAINING PROGRAM

## 2023-07-29 PROCEDURE — 96360 HYDRATION IV INFUSION INIT: CPT

## 2023-07-29 PROCEDURE — 85610 PROTHROMBIN TIME: CPT

## 2023-07-29 PROCEDURE — 85025 COMPLETE CBC W/AUTO DIFF WBC: CPT

## 2023-07-29 PROCEDURE — 36415 COLL VENOUS BLD VENIPUNCTURE: CPT

## 2023-07-29 PROCEDURE — 6370000000 HC RX 637 (ALT 250 FOR IP): Performed by: STUDENT IN AN ORGANIZED HEALTH CARE EDUCATION/TRAINING PROGRAM

## 2023-07-29 PROCEDURE — 96361 HYDRATE IV INFUSION ADD-ON: CPT

## 2023-07-29 PROCEDURE — 99285 EMERGENCY DEPT VISIT HI MDM: CPT

## 2023-07-29 PROCEDURE — 71275 CT ANGIOGRAPHY CHEST: CPT

## 2023-07-29 PROCEDURE — 80048 BASIC METABOLIC PNL TOTAL CA: CPT

## 2023-07-29 PROCEDURE — 93005 ELECTROCARDIOGRAM TRACING: CPT | Performed by: STUDENT IN AN ORGANIZED HEALTH CARE EDUCATION/TRAINING PROGRAM

## 2023-07-29 RX ORDER — METOPROLOL TARTRATE 50 MG/1
50 TABLET, FILM COATED ORAL ONCE
Status: COMPLETED | OUTPATIENT
Start: 2023-07-29 | End: 2023-07-29

## 2023-07-29 RX ORDER — 0.9 % SODIUM CHLORIDE 0.9 %
500 INTRAVENOUS SOLUTION INTRAVENOUS ONCE
Status: COMPLETED | OUTPATIENT
Start: 2023-07-29 | End: 2023-07-29

## 2023-07-29 RX ADMIN — IOPAMIDOL 80 ML: 755 INJECTION, SOLUTION INTRAVENOUS at 10:09

## 2023-07-29 RX ADMIN — METOPROLOL TARTRATE 50 MG: 50 TABLET, FILM COATED ORAL at 11:56

## 2023-07-29 RX ADMIN — SODIUM CHLORIDE 500 ML: 9 INJECTION, SOLUTION INTRAVENOUS at 09:24

## 2023-07-29 ASSESSMENT — ENCOUNTER SYMPTOMS
COUGH: 0
ABDOMINAL PAIN: 0
ALLERGIC/IMMUNOLOGIC NEGATIVE: 1
EYES NEGATIVE: 1
SHORTNESS OF BREATH: 0
SORE THROAT: 0
RHINORRHEA: 0

## 2023-07-29 ASSESSMENT — PAIN - FUNCTIONAL ASSESSMENT
PAIN_FUNCTIONAL_ASSESSMENT: NONE - DENIES PAIN

## 2023-07-29 NOTE — DISCHARGE INSTRUCTIONS
Thank you for visiting [facility] emergency department. You can make a follow-up appointment with your general surgeon as needed    Should you have any questions regarding your care or further treatment, please call us at [facility] emergency department. Please return to the emergency department if bleeding worsens or if you begin experiencing shortness of breath or severe chest pain lightheadedness or dizziness.

## 2023-07-29 NOTE — ED PROVIDER NOTES

## 2023-07-29 NOTE — ED TRIAGE NOTES
Pt presents to the ED for bleeding from his abdominal incision site that started about 15mins prior to arrival. Pt had his gallbladder and part of his colon removed on 7/20 for colon cancer. Pt had his INR checked yesterday and it was 5 and was told to hold his coumadin until Sunday. Pt denies pain.

## 2023-07-29 NOTE — ED PROVIDER NOTES
[facility] ENCOUNTER          Pt Name: Arielle Eddy  MRN: [id number]  [address] [facility] 1955  Date of evaluation: 7/29/2023  Emergency Physician: Arjun Arzate MD    CHIEF COMPLAINT       Chief Complaint   Patient presents with    Post-op Problem     Incision bleeding      History obtained from the patient. HISTORY OF PRESENT ILLNESS    HPI  Arielle Eddy is a 80 y.o. male with past medical history of colon cancer s/p R colon resection and cholecystectomy on 7/20, Afib last dose coumadin 10 days ago, HTN, and HLD who presents to the emergency department for evaluation of postoperative bleeding. Patient noticed minor bleeding through his shirt 20 minutes prior to admission. He has not had to change his steri-strips as of yet and they have not completely saturated through with blood. He had been getting up and down from seated position at the time. Bleeding was not associated with any pain. He had visited coumadin clinic yesterday where he was told his \"coumadin level\" was high and therefore was concerned and came to ED. He has had no changes in bowel movements and continues to eat/drink well. Patient denies any fever/chills, lightheadedness, dizziness, chest pain, cough, shortness of breath, abdominal pain, numbness or weakness. Patient has an appointment with his surgeon Dr. Moose Field scheduled on the 31st.    The patient has no other acute complaints at this time. REVIEW OF SYSTEMS   Review of Systems   Constitutional:  Negative for chills and fever. HENT:  Negative for rhinorrhea and sore throat. Eyes: Negative. Respiratory:  Negative for cough and shortness of breath. Cardiovascular:  Negative for chest pain. Gastrointestinal:  Negative for abdominal pain. Endocrine: Negative. Genitourinary: Negative. Musculoskeletal: Negative. Allergic/Immunologic: Negative.     Neurological:  Negative for dizziness,

## 2023-07-31 ENCOUNTER — CLINICAL DOCUMENTATION (OUTPATIENT)
Dept: CASE MANAGEMENT | Age: 68
End: 2023-07-31

## 2023-07-31 ENCOUNTER — HOSPITAL ENCOUNTER (OUTPATIENT)
Dept: INFUSION THERAPY | Age: 68
Discharge: HOME | End: 2023-07-31
Payer: MEDICARE

## 2023-07-31 ENCOUNTER — TELEPHONE (OUTPATIENT)
Dept: SURGERY | Age: 68
End: 2023-07-31

## 2023-07-31 ENCOUNTER — OFFICE VISIT (OUTPATIENT)
Dept: ONCOLOGY | Age: 68
End: 2023-07-31
Payer: MEDICARE

## 2023-07-31 VITALS
TEMPERATURE: 98 F | RESPIRATION RATE: 16 BRPM | HEART RATE: 104 BPM | OXYGEN SATURATION: 97 % | SYSTOLIC BLOOD PRESSURE: 113 MMHG | DIASTOLIC BLOOD PRESSURE: 61 MMHG

## 2023-07-31 VITALS
RESPIRATION RATE: 16 BRPM | WEIGHT: 150 LBS | HEART RATE: 104 BPM | DIASTOLIC BLOOD PRESSURE: 61 MMHG | BODY MASS INDEX: 22.73 KG/M2 | SYSTOLIC BLOOD PRESSURE: 113 MMHG | OXYGEN SATURATION: 97 % | TEMPERATURE: 98 F | HEIGHT: 68 IN

## 2023-07-31 DIAGNOSIS — D3A.8 NEUROENDOCRINE TUMOR: ICD-10-CM

## 2023-07-31 DIAGNOSIS — C7A.1 HIGH GRADE NEUROENDOCRINE CARCINOMA (HCC): ICD-10-CM

## 2023-07-31 DIAGNOSIS — C18.2 MALIGNANT NEOPLASM OF ASCENDING COLON (HCC): ICD-10-CM

## 2023-07-31 DIAGNOSIS — D3A.8 NEUROENDOCRINE TUMOR: Primary | ICD-10-CM

## 2023-07-31 LAB
ALBUMIN SERPL BCG-MCNC: 2.5 G/DL (ref 3.5–5.1)
ALP SERPL-CCNC: 143 U/L (ref 38–126)
ALT SERPL W/O P-5'-P-CCNC: 9 U/L (ref 11–66)
ANION GAP SERPL CALC-SCNC: 11 MEQ/L (ref 8–16)
AST SERPL-CCNC: 21 U/L (ref 5–40)
BILIRUB SERPL-MCNC: 0.3 MG/DL (ref 0.3–1.2)
BUN SERPL-MCNC: 10 MG/DL (ref 7–22)
CALCIUM SERPL-MCNC: 8.3 MG/DL (ref 8.5–10.5)
CHLORIDE SERPL-SCNC: 103 MEQ/L (ref 98–111)
CO2 SERPL-SCNC: 27 MEQ/L (ref 23–33)
CREAT SERPL-MCNC: 0.5 MG/DL (ref 0.4–1.2)
FERRITIN SERPL IA-MCNC: 103 NG/ML (ref 22–322)
FOLATE SERPL-MCNC: 4.6 NG/ML (ref 4.8–24.2)
GFR SERPL CREATININE-BSD FRML MDRD: > 60 ML/MIN/1.73M2
GLUCOSE SERPL-MCNC: 132 MG/DL (ref 70–108)
HGB RETIC QN AUTO: 23.9 PG (ref 28.2–35.7)
IMM RETICS NFR: 20.4 % (ref 2.3–13.4)
IRON SATN MFR SERPL: 7 % (ref 20–50)
IRON SERPL-MCNC: 14 UG/DL (ref 65–195)
POTASSIUM SERPL-SCNC: 3.6 MEQ/L (ref 3.5–5.2)
PROT SERPL-MCNC: 6.4 G/DL (ref 6.1–8)
RETICS # AUTO: 65 THOU/MM3 (ref 20–115)
RETICS/RBC NFR AUTO: 1.8 % (ref 0.5–2)
SODIUM SERPL-SCNC: 141 MEQ/L (ref 135–145)
TIBC SERPL-MCNC: 195 UG/DL (ref 171–450)
VIT B12 SERPL-MCNC: 815 PG/ML (ref 211–911)

## 2023-07-31 PROCEDURE — 1124F ACP DISCUSS-NO DSCNMKR DOCD: CPT | Performed by: INTERNAL MEDICINE

## 2023-07-31 PROCEDURE — 83540 ASSAY OF IRON: CPT

## 2023-07-31 PROCEDURE — 82607 VITAMIN B-12: CPT

## 2023-07-31 PROCEDURE — 85046 RETICYTE/HGB CONCENTRATE: CPT

## 2023-07-31 PROCEDURE — 82728 ASSAY OF FERRITIN: CPT

## 2023-07-31 PROCEDURE — 3078F DIAST BP <80 MM HG: CPT | Performed by: INTERNAL MEDICINE

## 2023-07-31 PROCEDURE — 80053 COMPREHEN METABOLIC PANEL: CPT

## 2023-07-31 PROCEDURE — 83550 IRON BINDING TEST: CPT

## 2023-07-31 PROCEDURE — 3074F SYST BP LT 130 MM HG: CPT | Performed by: INTERNAL MEDICINE

## 2023-07-31 PROCEDURE — 36415 COLL VENOUS BLD VENIPUNCTURE: CPT

## 2023-07-31 PROCEDURE — 82746 ASSAY OF FOLIC ACID SERUM: CPT

## 2023-07-31 PROCEDURE — 99211 OFF/OP EST MAY X REQ PHY/QHP: CPT

## 2023-07-31 PROCEDURE — 99203 OFFICE O/P NEW LOW 30 MIN: CPT | Performed by: INTERNAL MEDICINE

## 2023-07-31 RX ORDER — OXYCODONE HYDROCHLORIDE AND ACETAMINOPHEN 5; 325 MG/1; MG/1
1 TABLET ORAL EVERY 6 HOURS PRN
Qty: 20 TABLET | Refills: 0 | Status: SHIPPED | OUTPATIENT
Start: 2023-07-31 | End: 2023-08-04 | Stop reason: SDUPTHER

## 2023-07-31 ASSESSMENT — ENCOUNTER SYMPTOMS
NAUSEA: 0
APNEA: 0
BLOOD IN STOOL: 0
CHEST TIGHTNESS: 0
EYE DISCHARGE: 0

## 2023-07-31 NOTE — PATIENT INSTRUCTIONS
Please do the PET scan of the whole body as soon as possible. Please do the CT scan of the head ordered. Do the blood tests ordered. Check foundation one test on the colon mass from surgical pathology on 7/20/2023. Check ki67 on the colon mass from surgical pathology on 7/20/2023. Follow with general surgery for port placement.    Follow up with our clinic in 2 weeks  Follow up with your heart doctor

## 2023-07-31 NOTE — PROGRESS NOTES
Name: Lilian Zayas  : 1955  MRN: [id number]    Oncology Navigation- Initial Note:    Intake-  Contact Type: Medical Oncology    Diagnosis:  Neuroendocrine Colon    Home Disposition: Lives with other who is able to assist    Patient needs and barriers to care: Coordination of Care, Knowledge deficit, and Symptom Management     Referral Source: Outpatient    Receptive to Advanced Care Planning/ Palliative Care:  deferred    Interventions-   General Interventions: Navigation program discussed; welcome folder reviewed, including contact information. ONC POC:  -Discussed pathology and need for further testing  -Pet scan   Ct Brain - unable to have MRI due to claustrophobia  -[facility] and Ki67 on colon mass  - labs ordered for today  -Surgical consult for mediport placement  -follow up with heart MD regarding leg swelling/diuretic  - dietary referral palced   -Return  for review and discuss plan            Continuum of Care: Diagnosis/Active Treatment    Notes: Met with Susan Duran and life partner Rosa Hauser and brother Destini Palacios during consultation with Dr. Henrry Gamez lives with brother along with life partner. Patti Bloch his life partner assists with daily ADL's. Destini Palacios his brother assists with transportation and meals. Uses a cane for ambulation- today wheelchair needed due to swelling. Currently smokes 1/4 pack of cigarettes daily- refuse counseling. Wants to wean per self. Navigation program  discussed; welcome folder reviewed, including contact information- know they can call with any questions or concerns.        Electronically signed by Alexander Daniel RN on 2023 at 4:07 PM

## 2023-07-31 NOTE — TELEPHONE ENCOUNTER
I called and spoke w/daughter. She states pt just saw [facility] today and his INR was 1.9. They have pt back on coumadin and pt will follow w/them again next week.

## 2023-08-01 ENCOUNTER — SOCIAL WORK (OUTPATIENT)
Dept: INFUSION THERAPY | Age: 68
End: 2023-08-01

## 2023-08-01 NOTE — PROGRESS NOTES
Oncology Social Work    Date: 8/1/2023  Time: 2:15 PM  Name: Carlos Perez  MRN: [id number]     Contact Type: Follow-up    Note:   Situation: This staff called Carlos Perez via phone support to introduce myself as his Oncology Social Worker. Background:  Jesse Bell had completed a Distress Thermometer at his initial appointment at the [facility]. This staff was calling to review the results of his distress levels. Assessment: Although there were no reflected elevated concerns indicated on the Distress Thermometer, there was also no opportunity to discuss directly with Jesse Bell since the attempt to contact him went to a voicemail identified as [employer]. No message was left at this time. - Additionally, there was no education regarding the services provided by the SW relayed to him at this time. Recommendation: Follow-up will be initiated by Jesse Bell should he choose to return dial the number that would have shown up on his missed calls history.  will remain available for support.         MATTHEW Lemos, BISHOP, ROMANA  Oncology Social Worker      Electronically signed by MATTHEW Lemos LSW, ACHP-SW on 8/1/2023 at 2:15 PM

## 2023-08-02 NOTE — TELEPHONE ENCOUNTER
I called and informed daughter of this. She states his bowel function is back to normal.  She will make sure he starts drinking it in small sips and also make sure he is also drinking adequate water.

## 2023-08-03 ENCOUNTER — HOSPITAL ENCOUNTER (OUTPATIENT)
Dept: PET IMAGING | Age: 68
Discharge: HOME | End: 2023-08-03
Payer: MEDICARE

## 2023-08-03 ENCOUNTER — HOSPITAL ENCOUNTER (OUTPATIENT)
Age: 68
Discharge: HOME | End: 2023-08-03
Payer: MEDICARE

## 2023-08-03 ENCOUNTER — HOSPITAL ENCOUNTER (OUTPATIENT)
Dept: GENERAL RADIOLOGY | Age: 68
Discharge: HOME | End: 2023-08-03
Payer: MEDICARE

## 2023-08-03 DIAGNOSIS — I50.9 HEART FAILURE, UNSPECIFIED HF CHRONICITY, UNSPECIFIED HEART FAILURE TYPE (HCC): ICD-10-CM

## 2023-08-03 DIAGNOSIS — C7A.1 HIGH GRADE NEUROENDOCRINE CARCINOMA (HCC): ICD-10-CM

## 2023-08-03 LAB
ANION GAP SERPL CALC-SCNC: 7 MEQ/L (ref 8–16)
CHLORIDE SERPL-SCNC: 102 MEQ/L (ref 98–111)
CO2 SERPL-SCNC: 29 MEQ/L (ref 23–33)
CREAT SERPL-MCNC: 0.5 MG/DL (ref 0.4–1.2)
GFR SERPL CREATININE-BSD FRML MDRD: > 60 ML/MIN/1.73M2
NT-PROBNP SERPL IA-MCNC: 3143 PG/ML (ref 0–124)
POTASSIUM SERPL-SCNC: 4.3 MEQ/L (ref 3.5–5.2)
SODIUM SERPL-SCNC: 138 MEQ/L (ref 135–145)

## 2023-08-03 PROCEDURE — 71046 X-RAY EXAM CHEST 2 VIEWS: CPT

## 2023-08-03 PROCEDURE — A9552 F18 FDG: HCPCS | Performed by: INTERNAL MEDICINE

## 2023-08-03 PROCEDURE — 80051 ELECTROLYTE PANEL: CPT

## 2023-08-03 PROCEDURE — 82565 ASSAY OF CREATININE: CPT

## 2023-08-03 PROCEDURE — 36415 COLL VENOUS BLD VENIPUNCTURE: CPT

## 2023-08-03 PROCEDURE — 78815 PET IMAGE W/CT SKULL-THIGH: CPT

## 2023-08-03 PROCEDURE — 3430000000 HC RX DIAGNOSTIC RADIOPHARMACEUTICAL: Performed by: INTERNAL MEDICINE

## 2023-08-03 PROCEDURE — 83880 ASSAY OF NATRIURETIC PEPTIDE: CPT

## 2023-08-03 RX ORDER — FLUDEOXYGLUCOSE F 18 200 MCI/ML
13 INJECTION, SOLUTION INTRAVENOUS
Status: COMPLETED | OUTPATIENT
Start: 2023-08-03 | End: 2023-08-03

## 2023-08-03 RX ADMIN — FLUDEOXYGLUCOSE F 18 13 MILLICURIE: 200 INJECTION, SOLUTION INTRAVENOUS at 10:51

## 2023-08-04 ENCOUNTER — CLINICAL DOCUMENTATION (OUTPATIENT)
Dept: NUTRITION | Age: 68
End: 2023-08-04

## 2023-08-04 ENCOUNTER — TELEPHONE (OUTPATIENT)
Dept: SURGERY | Age: 68
End: 2023-08-04

## 2023-08-04 DIAGNOSIS — C18.2 MALIGNANT NEOPLASM OF ASCENDING COLON (HCC): ICD-10-CM

## 2023-08-04 LAB
EKG Q-T INTERVAL: 318 MS
EKG QRS DURATION: 76 MS
EKG QTC CALCULATION (BAZETT): 439 MS
EKG R AXIS: 51 DEGREES
EKG T AXIS: -126 DEGREES
EKG VENTRICULAR RATE: 115 BPM

## 2023-08-04 RX ORDER — OXYCODONE HYDROCHLORIDE AND ACETAMINOPHEN 5; 325 MG/1; MG/1
1 TABLET ORAL EVERY 6 HOURS PRN
Qty: 28 TABLET | Refills: 0 | Status: SHIPPED | OUTPATIENT
Start: 2023-08-04 | End: 2023-08-11

## 2023-08-04 NOTE — PROGRESS NOTES
Oncology Nutrition    Contact Type: phone  Reached patient today via phone. Call answered by patient's life partner (Karen Shaver). Kiarra Putnam reports that they have a very busy schedule so would prefer to talk over the phone and she requests a time next week. Telephone visit set up for 8/9/23 at 0900. Verified date and time with Sherlyn.      Contact Reason: referral from Dr. Fabián Nair regarding \"help with nutrition\"    Follow-up: call will be placed as scheduled on 8/9/23 at 0900    Electronically signed by Remigio Jc RD, LD on 8/4/2023 at 1:11 PM  [phone]

## 2023-08-04 NOTE — TELEPHONE ENCOUNTER
I called and informed Soco of the medication refill. I also informed her that we were waiting for oncology appt before scheduling Mediport placement. She understood.

## 2023-08-04 NOTE — TELEPHONE ENCOUNTER
Francis Urbina called and states pt needs refill on his pain medications. He is taking it about every 6 hours. He is trying to back off and extend the time in between taking it. He has started chemo also. Will you fill this to [facility]?

## 2023-08-05 ENCOUNTER — HOSPITAL ENCOUNTER (OUTPATIENT)
Dept: CT IMAGING | Age: 68
Discharge: HOME | End: 2023-08-05
Attending: INTERNAL MEDICINE
Payer: MEDICARE

## 2023-08-05 DIAGNOSIS — C7A.1 HIGH GRADE NEUROENDOCRINE CARCINOMA (HCC): ICD-10-CM

## 2023-08-05 PROCEDURE — 6360000004 HC RX CONTRAST MEDICATION: Performed by: INTERNAL MEDICINE

## 2023-08-05 PROCEDURE — 70470 CT HEAD/BRAIN W/O & W/DYE: CPT

## 2023-08-05 RX ADMIN — IOPAMIDOL 80 ML: 755 INJECTION, SOLUTION INTRAVENOUS at 08:54

## 2023-08-07 NOTE — PROGRESS NOTES
[facility]  [address] [place] [address]  Dept: [phone]  Loc: [phone]   Hematology/Oncology Consult (Clinic)        08/07/23       Lois Morelos   1955     No ref. provider found   Lucas Cole MD       Reason:   Chief Complaint   Patient presents with    Follow-up     Neuroendocrine tumor          Interval hx:   Today 8/8/2023:  He is feeling well today, his breathing is better, he restarted lasix. The swelling is improving. Is accompanied by his brother and significant other. He had the PET scan done. HPI:   72-year-old male who initially presented to GI and surgery for evaluation  secondary to newly diagnosed colonic mass that is near obstructing. He had colonoscopy with Dr. Vanessa Valentine and they were Not able to get a scope through the area of concern. Proximal transverse colon close to the hepatic flexure. Patient admits to abdominal discomfort in the right upper quadrant. Mild to moderate. No severe sharp pain. Admits to weight loss and fatigue. Decreased appetite. No significant nausea or vomiting. Still passing flatus and having bowel movements. No hematochezia. No change in urination. Headaches. No lightheadedness. CT abdomen/pelvis demonstrated colonic mass proximal transverse colon close to the hepatic flexure. Patient denies any family or personal history of colorectal disease that he is aware of. Small liver lesion identified on CT abdomen/pelvis. Patient states he cannot proceed with an MRI even if it is a open MRI due to anxiety. He is refusing to proceed with any further testing in regards to MRI. Admits to smoking cigarettes but has been decreasing in number. Less than half a pack a day.     Patient ended up having surgery right hemicolectomy with Dr. 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

## 2023-08-08 ENCOUNTER — HOSPITAL ENCOUNTER (OUTPATIENT)
Dept: INFUSION THERAPY | Age: 68
Discharge: HOME | End: 2023-08-08
Payer: MEDICARE

## 2023-08-08 ENCOUNTER — OFFICE VISIT (OUTPATIENT)
Dept: ONCOLOGY | Age: 68
End: 2023-08-08
Payer: MEDICARE

## 2023-08-08 ENCOUNTER — CLINICAL DOCUMENTATION (OUTPATIENT)
Dept: CASE MANAGEMENT | Age: 68
End: 2023-08-08

## 2023-08-08 VITALS
OXYGEN SATURATION: 99 % | HEART RATE: 82 BPM | SYSTOLIC BLOOD PRESSURE: 103 MMHG | TEMPERATURE: 97.7 F | DIASTOLIC BLOOD PRESSURE: 57 MMHG | BODY MASS INDEX: 21.86 KG/M2 | WEIGHT: 143.8 LBS

## 2023-08-08 VITALS
OXYGEN SATURATION: 99 % | SYSTOLIC BLOOD PRESSURE: 103 MMHG | HEART RATE: 82 BPM | DIASTOLIC BLOOD PRESSURE: 57 MMHG | TEMPERATURE: 97.7 F

## 2023-08-08 DIAGNOSIS — C7A.022 MALIGNANT CARCINOID TUMOR OF THE ASCENDING COLON (HCC): ICD-10-CM

## 2023-08-08 DIAGNOSIS — D50.0 IRON DEFICIENCY ANEMIA DUE TO CHRONIC BLOOD LOSS: ICD-10-CM

## 2023-08-08 DIAGNOSIS — C79.9 METASTATIC MALIGNANT NEOPLASM, UNSPECIFIED SITE (HCC): Primary | ICD-10-CM

## 2023-08-08 DIAGNOSIS — C7A.8 LARGE CELL NEUROENDOCRINE CARCINOMA (HCC): ICD-10-CM

## 2023-08-08 PROBLEM — C7A.1 LARGE CELL NEUROENDOCRINE CARCINOMA (HCC): Status: ACTIVE | Noted: 2023-08-08

## 2023-08-08 PROBLEM — D50.9 IRON DEFICIENCY ANEMIA: Status: ACTIVE | Noted: 2023-08-08

## 2023-08-08 PROCEDURE — 99211 OFF/OP EST MAY X REQ PHY/QHP: CPT

## 2023-08-08 PROCEDURE — 3074F SYST BP LT 130 MM HG: CPT | Performed by: INTERNAL MEDICINE

## 2023-08-08 PROCEDURE — 1124F ACP DISCUSS-NO DSCNMKR DOCD: CPT | Performed by: INTERNAL MEDICINE

## 2023-08-08 PROCEDURE — 3078F DIAST BP <80 MM HG: CPT | Performed by: INTERNAL MEDICINE

## 2023-08-08 PROCEDURE — 99213 OFFICE O/P EST LOW 20 MIN: CPT | Performed by: INTERNAL MEDICINE

## 2023-08-08 RX ORDER — DIPHENHYDRAMINE HYDROCHLORIDE 50 MG/ML
50 INJECTION INTRAMUSCULAR; INTRAVENOUS
OUTPATIENT
Start: 2023-08-30

## 2023-08-08 RX ORDER — SODIUM CHLORIDE 9 MG/ML
5-250 INJECTION, SOLUTION INTRAVENOUS PRN
OUTPATIENT
Start: 2023-08-30

## 2023-08-08 RX ORDER — ONDANSETRON 2 MG/ML
8 INJECTION INTRAMUSCULAR; INTRAVENOUS
OUTPATIENT
Start: 2023-08-29

## 2023-08-08 RX ORDER — HEPARIN SODIUM (PORCINE) LOCK FLUSH IV SOLN 100 UNIT/ML 100 UNIT/ML
500 SOLUTION INTRAVENOUS PRN
OUTPATIENT
Start: 2023-08-28

## 2023-08-08 RX ORDER — EPINEPHRINE 1 MG/ML
0.3 INJECTION, SOLUTION, CONCENTRATE INTRAVENOUS PRN
OUTPATIENT
Start: 2023-08-30

## 2023-08-08 RX ORDER — FAMOTIDINE 10 MG/ML
20 INJECTION, SOLUTION INTRAVENOUS
OUTPATIENT
Start: 2023-08-30

## 2023-08-08 RX ORDER — SODIUM CHLORIDE 9 MG/ML
INJECTION, SOLUTION INTRAVENOUS CONTINUOUS
OUTPATIENT
Start: 2023-08-08

## 2023-08-08 RX ORDER — SODIUM CHLORIDE 9 MG/ML
5-250 INJECTION, SOLUTION INTRAVENOUS PRN
OUTPATIENT
Start: 2023-08-28

## 2023-08-08 RX ORDER — HEPARIN SODIUM (PORCINE) LOCK FLUSH IV SOLN 100 UNIT/ML 100 UNIT/ML
500 SOLUTION INTRAVENOUS PRN
OUTPATIENT
Start: 2023-08-29

## 2023-08-08 RX ORDER — SODIUM CHLORIDE 0.9 % (FLUSH) 0.9 %
5-40 SYRINGE (ML) INJECTION PRN
OUTPATIENT
Start: 2023-08-08

## 2023-08-08 RX ORDER — SODIUM CHLORIDE 9 MG/ML
INJECTION, SOLUTION INTRAVENOUS CONTINUOUS
OUTPATIENT
Start: 2023-08-30

## 2023-08-08 RX ORDER — DIPHENHYDRAMINE HYDROCHLORIDE 50 MG/ML
50 INJECTION INTRAMUSCULAR; INTRAVENOUS
OUTPATIENT
Start: 2023-08-08

## 2023-08-08 RX ORDER — PALONOSETRON 0.05 MG/ML
0.25 INJECTION, SOLUTION INTRAVENOUS ONCE
OUTPATIENT
Start: 2023-08-28 | End: 2023-08-28

## 2023-08-08 RX ORDER — SODIUM CHLORIDE 0.9 % (FLUSH) 0.9 %
5-40 SYRINGE (ML) INJECTION PRN
OUTPATIENT
Start: 2023-08-30

## 2023-08-08 RX ORDER — EPINEPHRINE 1 MG/ML
0.3 INJECTION, SOLUTION, CONCENTRATE INTRAVENOUS PRN
OUTPATIENT
Start: 2023-08-28

## 2023-08-08 RX ORDER — ALBUTEROL SULFATE 90 UG/1
4 AEROSOL, METERED RESPIRATORY (INHALATION) PRN
OUTPATIENT
Start: 2023-08-28

## 2023-08-08 RX ORDER — SODIUM CHLORIDE 0.9 % (FLUSH) 0.9 %
5-40 SYRINGE (ML) INJECTION PRN
OUTPATIENT
Start: 2023-08-29

## 2023-08-08 RX ORDER — FAMOTIDINE 10 MG/ML
20 INJECTION, SOLUTION INTRAVENOUS
OUTPATIENT
Start: 2023-08-28

## 2023-08-08 RX ORDER — HEPARIN SODIUM (PORCINE) LOCK FLUSH IV SOLN 100 UNIT/ML 100 UNIT/ML
500 SOLUTION INTRAVENOUS PRN
OUTPATIENT
Start: 2023-08-08

## 2023-08-08 RX ORDER — ACETAMINOPHEN 325 MG/1
650 TABLET ORAL
OUTPATIENT
Start: 2023-08-08

## 2023-08-08 RX ORDER — MEPERIDINE HYDROCHLORIDE 50 MG/ML
12.5 INJECTION INTRAMUSCULAR; INTRAVENOUS; SUBCUTANEOUS PRN
OUTPATIENT
Start: 2023-08-30

## 2023-08-08 RX ORDER — SODIUM CHLORIDE 0.9 % (FLUSH) 0.9 %
5-40 SYRINGE (ML) INJECTION PRN
OUTPATIENT
Start: 2023-08-28

## 2023-08-08 RX ORDER — SODIUM CHLORIDE 9 MG/ML
INJECTION, SOLUTION INTRAVENOUS CONTINUOUS
OUTPATIENT
Start: 2023-08-29

## 2023-08-08 RX ORDER — ACETAMINOPHEN 325 MG/1
650 TABLET ORAL
OUTPATIENT
Start: 2023-08-28

## 2023-08-08 RX ORDER — ONDANSETRON 4 MG/1
4 TABLET, FILM COATED ORAL EVERY 8 HOURS PRN
Qty: 30 TABLET | Refills: 1 | Status: SHIPPED | OUTPATIENT
Start: 2023-08-08

## 2023-08-08 RX ORDER — DIPHENHYDRAMINE HYDROCHLORIDE 50 MG/ML
50 INJECTION INTRAMUSCULAR; INTRAVENOUS
OUTPATIENT
Start: 2023-08-28

## 2023-08-08 RX ORDER — ALBUTEROL SULFATE 90 UG/1
4 AEROSOL, METERED RESPIRATORY (INHALATION) PRN
OUTPATIENT
Start: 2023-08-29

## 2023-08-08 RX ORDER — FAMOTIDINE 10 MG/ML
20 INJECTION, SOLUTION INTRAVENOUS
OUTPATIENT
Start: 2023-08-29

## 2023-08-08 RX ORDER — ALBUTEROL SULFATE 90 UG/1
4 AEROSOL, METERED RESPIRATORY (INHALATION) PRN
OUTPATIENT
Start: 2023-08-08

## 2023-08-08 RX ORDER — ACETAMINOPHEN 325 MG/1
650 TABLET ORAL
OUTPATIENT
Start: 2023-08-30

## 2023-08-08 RX ORDER — SODIUM CHLORIDE 9 MG/ML
5-250 INJECTION, SOLUTION INTRAVENOUS PRN
OUTPATIENT
Start: 2023-08-08

## 2023-08-08 RX ORDER — MEPERIDINE HYDROCHLORIDE 50 MG/ML
12.5 INJECTION INTRAMUSCULAR; INTRAVENOUS; SUBCUTANEOUS PRN
OUTPATIENT
Start: 2023-08-29

## 2023-08-08 RX ORDER — ONDANSETRON 2 MG/ML
8 INJECTION INTRAMUSCULAR; INTRAVENOUS
OUTPATIENT
Start: 2023-08-08

## 2023-08-08 RX ORDER — SODIUM CHLORIDE 9 MG/ML
INJECTION, SOLUTION INTRAVENOUS CONTINUOUS
OUTPATIENT
Start: 2023-08-28

## 2023-08-08 RX ORDER — SODIUM CHLORIDE 9 MG/ML
5-250 INJECTION, SOLUTION INTRAVENOUS PRN
OUTPATIENT
Start: 2023-08-29

## 2023-08-08 RX ORDER — MEPERIDINE HYDROCHLORIDE 50 MG/ML
12.5 INJECTION INTRAMUSCULAR; INTRAVENOUS; SUBCUTANEOUS PRN
OUTPATIENT
Start: 2023-08-28

## 2023-08-08 RX ORDER — FAMOTIDINE 10 MG/ML
20 INJECTION, SOLUTION INTRAVENOUS
OUTPATIENT
Start: 2023-08-08

## 2023-08-08 RX ORDER — HEPARIN SODIUM (PORCINE) LOCK FLUSH IV SOLN 100 UNIT/ML 100 UNIT/ML
500 SOLUTION INTRAVENOUS PRN
OUTPATIENT
Start: 2023-08-30

## 2023-08-08 RX ORDER — EPINEPHRINE 1 MG/ML
0.3 INJECTION, SOLUTION, CONCENTRATE INTRAVENOUS PRN
OUTPATIENT
Start: 2023-08-08

## 2023-08-08 RX ORDER — ONDANSETRON 2 MG/ML
8 INJECTION INTRAMUSCULAR; INTRAVENOUS
OUTPATIENT
Start: 2023-08-28

## 2023-08-08 RX ORDER — EPINEPHRINE 1 MG/ML
0.3 INJECTION, SOLUTION, CONCENTRATE INTRAVENOUS PRN
OUTPATIENT
Start: 2023-08-29

## 2023-08-08 RX ORDER — ONDANSETRON 2 MG/ML
8 INJECTION INTRAMUSCULAR; INTRAVENOUS
OUTPATIENT
Start: 2023-08-30

## 2023-08-08 RX ORDER — ACETAMINOPHEN 325 MG/1
650 TABLET ORAL
OUTPATIENT
Start: 2023-08-29

## 2023-08-08 RX ORDER — DIPHENHYDRAMINE HYDROCHLORIDE 50 MG/ML
50 INJECTION INTRAMUSCULAR; INTRAVENOUS
OUTPATIENT
Start: 2023-08-29

## 2023-08-08 RX ORDER — ALBUTEROL SULFATE 90 UG/1
4 AEROSOL, METERED RESPIRATORY (INHALATION) PRN
OUTPATIENT
Start: 2023-08-30

## 2023-08-08 NOTE — PROGRESS NOTES
Name: Thang Cochran  : 1955  MRN: [id number]    Oncology Navigation-   Contact Type: Medical Oncology    Diagnosis:  neuroendocrine  Surgery: Rt hemicolectomy 23- Dr Amarilys Catherine    Pathology reports:  Poorly differentiated large cell neuroendocrine carcinoma. Lymph nodes (12): Negative for malignancy. Margins negative for carcinoma. Pathologic stage: pT4a, pN0. Appendix: fibrous obliteration of lumen. See synoptic report below. Addendum Comment (23): At the request of the patient's oncologist, a Ki-67 immunostain was performed. This stain shows the tumor cells have a ki-67 proliferative index of 90%. PET 8/3/2023:  IMPRESSION:  1. There are FDG avid pulmonary nodules in the left upper and right lower lobes concerning for metastatic disease. 2. There is a hypermetabolic lymph node in the anterior abdomen adjacent to the surgical changes from right hemicolectomy concerning for metastasis. 3. Small bilateral pleural effusions. ONC POC:  -ONC reviewed imaging studies & path KI 67 stain results  -Onc discussed chemotherapy: Carbo/-16 every 21 days  -Plan chemo start on   -Notify Dr Radha Uriarte of chemo start for port placement  -Chemo teaching  -RX; zofran  -FE infusion weekly in preparation of chemo start.  - [facility] to continue coumadin therapy, with [facility] coumadin clinic managing for now; may need alternate anticoagulant pending pt response to chemotherapy.   [facility]  [facility] & chemo start via port. Notes: Pt & family require repeat of information. Pt quiet in appt today. Will likely need much supportive care throughout cancer tx.     Electronically signed by Joni Botello RN on 2023 at 2:07 PM

## 2023-08-08 NOTE — TELEPHONE ENCOUNTER
Spoke w/ pt's partner Trisha Quinones - Reminded them of his appointment on 8/16 with Juan Zavala at 10:30 am, informed her I would schedule the Mediport insertion on 8/22 and we would go over preop instructions and have him sign consent on 8/16 when he comes into the office. Called Coumadin Clinic @ [facility] and spoke w/ Haile Gonzales her Pratima Carpenter is scheduled for Mediport on 8/22 and would need to hold Coumadin 5 days prior.   She states she will contact Dr. Trent Delgado to get approval to hold Coumadin

## 2023-08-08 NOTE — PATIENT INSTRUCTIONS
Please follow up for iron infusion as soon as it is approved. Nursing education for chemotherapy ( Carboplatin/etoposide) , tentative start date of 8/28/2023  Follow with surgery for port placement.    Please add to tumor board discussion on 8/22  Follow up back with our clinic in 3 weeks

## 2023-08-09 ENCOUNTER — CLINICAL DOCUMENTATION (OUTPATIENT)
Dept: NUTRITION | Age: 68
End: 2023-08-09

## 2023-08-09 NOTE — PROGRESS NOTES
Nutrition Assessment    Reason for Call:  8/9/23 - referral from Dr. Mis Reeder, patient with decreased intake and weight loss   (Metastatic stage IV disease of poorly differentiated, large cell neuroendocrine tumor (colon))    Nutrition Recommendations:   ONS daily: choose \"plus\" version  Eat often: meals and snacks as tolerated  Adequate hydration    Malnutrition Assessment: (8/9/23)  Malnutrition Status: unable to fully assess due to telephone visit  Context:  Findings of the 6 clinical characteristics of malnutrition (minimum of 2 out of 6 clinical characteristics is required to make the dx of moderate or severe Protein Calorie Malnutrition based on AND/ASPEN Guidelines):   1. Energy Intake: decreased from normal per conversation   2. Weight Loss: per EHR patient lost 4.6% of body weight in 1 week   3. Fat Loss: unable to assess (phone conversation)   4. Muscle Loss: unable to assess (phone conversation)   5. Fluid Accumulation: not assessed   6.  Strength: not measured    Nutrition Diagnosis:   Problem: suspect malnutrition  Etiology: catabolic illness, questionable adequate intake  Signs and Symptoms: significant weight loss, decreased intake/appetite    Nutrition Assessment:   Subjective:   8/9/23 - Spoke to patient's significant other on phone (Juan F Hylton). Lul Navarro reports that patient has somewhat of an appetite but not eating as well. Patient is able to tolerance most types of foods but in smaller amounts. Lul Navarro feels that patient is drinking adequate water. Patient does drink ensure orginal (1/day) which she buys at [employer] is typically 2 eggs and meat (baez) and toast with butter and jelly. Snack: peanut butter and jelly sandwich. Lunch: can of soup with crackers. PM snack: peanut butter and jelly sandwich. Dinner chili. Evening snack: peanut butter and jelly sandwich. Drinks 1 bottle of Big Red per day. Lul Navarro reports that patient has a daily BM.  Lul Navarro also mentions

## 2023-08-10 NOTE — PROGRESS NOTES
New chemotherapy validation note:    Diagnosis for chemotherapy: Metastatic Neuroendocrine Carcinoma of colon    Regimen ordered: Carbo/Etoposide       Reference or literature used for validation: NCCN      Date literature or guideline last updated 8/2023     Deviation from literature or guideline used: N/A    Summary of any verbal or telephone information obtained: N/A    Oma Uribe [facility], PharmD, [facility] Specialist  8/10/2023 11:36 AM

## 2023-08-14 ENCOUNTER — TELEPHONE (OUTPATIENT)
Dept: SURGERY | Age: 68
End: 2023-08-14

## 2023-08-14 ENCOUNTER — HOSPITAL ENCOUNTER (OUTPATIENT)
Dept: INFUSION THERAPY | Age: 68
Discharge: HOME | End: 2023-08-14
Payer: MEDICARE

## 2023-08-14 ENCOUNTER — PREP FOR PROCEDURE (OUTPATIENT)
Dept: SURGERY | Age: 68
End: 2023-08-14

## 2023-08-14 VITALS
DIASTOLIC BLOOD PRESSURE: 54 MMHG | WEIGHT: 142.8 LBS | BODY MASS INDEX: 21.64 KG/M2 | RESPIRATION RATE: 16 BRPM | SYSTOLIC BLOOD PRESSURE: 114 MMHG | OXYGEN SATURATION: 98 % | TEMPERATURE: 97.9 F | HEIGHT: 68 IN | HEART RATE: 65 BPM

## 2023-08-14 DIAGNOSIS — D50.0 IRON DEFICIENCY ANEMIA DUE TO CHRONIC BLOOD LOSS: Primary | ICD-10-CM

## 2023-08-14 DIAGNOSIS — C18.2 MALIGNANT NEOPLASM OF ASCENDING COLON (HCC): ICD-10-CM

## 2023-08-14 PROCEDURE — 96366 THER/PROPH/DIAG IV INF ADDON: CPT

## 2023-08-14 PROCEDURE — 2580000003 HC RX 258: Performed by: INTERNAL MEDICINE

## 2023-08-14 PROCEDURE — 96365 THER/PROPH/DIAG IV INF INIT: CPT

## 2023-08-14 PROCEDURE — 6360000002 HC RX W HCPCS: Performed by: INTERNAL MEDICINE

## 2023-08-14 RX ORDER — SODIUM CHLORIDE 9 MG/ML
INJECTION, SOLUTION INTRAVENOUS CONTINUOUS
OUTPATIENT
Start: 2023-08-28

## 2023-08-14 RX ORDER — OXYCODONE HYDROCHLORIDE AND ACETAMINOPHEN 5; 325 MG/1; MG/1
1 TABLET ORAL EVERY 6 HOURS PRN
Qty: 28 TABLET | Refills: 0 | Status: SHIPPED | OUTPATIENT
Start: 2023-08-14 | End: 2023-08-21

## 2023-08-14 RX ORDER — SODIUM CHLORIDE 0.9 % (FLUSH) 0.9 %
5-40 SYRINGE (ML) INJECTION PRN
OUTPATIENT
Start: 2023-08-28

## 2023-08-14 RX ORDER — SODIUM CHLORIDE 9 MG/ML
INJECTION, SOLUTION INTRAVENOUS CONTINUOUS
Status: CANCELLED | OUTPATIENT
Start: 2023-08-14

## 2023-08-14 RX ORDER — ACETAMINOPHEN 325 MG/1
650 TABLET ORAL
OUTPATIENT
Start: 2023-08-28

## 2023-08-14 RX ORDER — SODIUM CHLORIDE 9 MG/ML
5-250 INJECTION, SOLUTION INTRAVENOUS PRN
Status: CANCELLED | OUTPATIENT
Start: 2023-08-28

## 2023-08-14 RX ORDER — SODIUM CHLORIDE 9 MG/ML
5-250 INJECTION, SOLUTION INTRAVENOUS PRN
Status: DISCONTINUED | OUTPATIENT
Start: 2023-08-14 | End: 2023-08-15 | Stop reason: HOSPADM

## 2023-08-14 RX ORDER — EPINEPHRINE 1 MG/ML
0.3 INJECTION, SOLUTION INTRAMUSCULAR; SUBCUTANEOUS PRN
OUTPATIENT
Start: 2023-08-28

## 2023-08-14 RX ORDER — HEPARIN 100 UNIT/ML
500 SYRINGE INTRAVENOUS PRN
OUTPATIENT
Start: 2023-08-28

## 2023-08-14 RX ORDER — SODIUM CHLORIDE 9 MG/ML
5-250 INJECTION, SOLUTION INTRAVENOUS PRN
OUTPATIENT
Start: 2023-08-28

## 2023-08-14 RX ORDER — ALBUTEROL SULFATE 90 UG/1
4 AEROSOL, METERED RESPIRATORY (INHALATION) PRN
OUTPATIENT
Start: 2023-08-28

## 2023-08-14 RX ORDER — DIPHENHYDRAMINE HYDROCHLORIDE 50 MG/ML
50 INJECTION INTRAMUSCULAR; INTRAVENOUS
OUTPATIENT
Start: 2023-08-28

## 2023-08-14 RX ORDER — ONDANSETRON 2 MG/ML
8 INJECTION INTRAMUSCULAR; INTRAVENOUS
OUTPATIENT
Start: 2023-08-28

## 2023-08-14 RX ADMIN — SODIUM CHLORIDE 20 ML/HR: 9 INJECTION, SOLUTION INTRAVENOUS at 10:10

## 2023-08-14 RX ADMIN — IRON SUCROSE 300 MG: 20 INJECTION, SOLUTION INTRAVENOUS at 10:13

## 2023-08-14 NOTE — PLAN OF CARE
Problem: Safety - Adult  Goal: Free from fall injury  Outcome: Adequate for Discharge  Flowsheets (Taken 8/14/2023 1034)  Free From Fall Injury: Instruct family/caregiver on patient safety  Note: Free from falls while in O.P. Oncology. Discussed the need to use the call light for assistance when getting up to ambulate. Problem: Discharge Planning  Goal: Discharge to home or other facility with appropriate resources  Outcome: Adequate for Discharge  Flowsheets (Taken 8/14/2023 1034)  Discharge to home or other facility with appropriate resources: Identify barriers to discharge with patient and caregiver  Note: Verbalize understanding of discharge instructions, follow up appointments, and when to call Physician. Problem: Chronic Conditions and Co-morbidities  Goal: Patient's chronic conditions and co-morbidity symptoms are monitored and maintained or improved  Outcome: Adequate for Discharge  Flowsheets (Taken 8/14/2023 1034)  Care Plan - Patient's Chronic Conditions and Co-Morbidity Symptoms are Monitored and Maintained or Improved: Monitor and assess patient's chronic conditions and comorbid symptoms for stability, deterioration, or improvement  Note: Patient verbalizes understanding to verbal information given on venofer, action and possible side effects. Aware to call MD if develop complications. Care plan reviewed with patient. Patient verbalizes understanding of the plan of care and contribute to goal setting.

## 2023-08-14 NOTE — DISCHARGE INSTRUCTIONS
Please contact your Oncologist if you have any questions regarding the iron Venofer that you received today. Patient instructed if experience any of the symptoms following today's iron tretament to notify MD immediately or go to emergency department.     * dizziness/lightheadedness  *acute nausea/vomiting - not relieved with medication  *headache - not relieved from Tylenol/pain medication  *chest pain/pressure  *rash/itching  *shortness of breath        Drink fluids - 48oz fluids daily  Call if develop fever/ chills/ signs or symptoms of infection

## 2023-08-15 NOTE — PROGRESS NOTES
[address] [place] [id number]  Dept: [phone]  Dept Fax: [phone]  Loc: [phone]    Visit Date: 8/16/2023    Gloria Grigsby is a 80 y.o. male who presents today for:  Chief Complaint   Patient presents with    Post-Op Check     S/P 1. Robotic extended right colectomy. 2.  Robotic cholecystectomy 7/20/23. Mediport scheduled 8/22/23       HPI:     EDA Herrera is a 71-year old male patient who presents for follow up status post robotic extended right colectomy, robotic cholecystectomy 4 weeks ago with Dr. Nithya Whitt. Presents with brother and wife. 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 Gallbladder and appendix pathology both benign findings. He was scheduled to have mediport placed last week and he did not show up. He is now here for his post op check up and also scheduled for mediport on the 22nd with Dr. Nithya Whitt. He is doing fairly well. Looks chronically ill. He is still smoking cigarettes, although he has cut back. He is taking oxy for pain control. Usually takes 1-3 a day for abdominal pain. States pain is improving. Appetite is starting to come back. He lost about 10# lbs but has gained a few back over the last couple of weeks. Denies any nausea or vomiting. Abdominal incisions are healing without any signs of infection or breakdown. Steri-strips all removed. Patient denies any drainage or increasing redness. Bowel function doing pretty well. He is going daily. Stools are soft. No stool softeners needed. No melena or hematochezia. No fevers or chills. No urinary complaints. Chronic SOB. No chest pain. No lightheadedness or dizziness. No calf pain or swelling. He does have lower extremity swelling to ankles and feet. Improving per patient and family.  He saw PCP and they placed him on lasix which he is taking as

## 2023-08-16 ENCOUNTER — OFFICE VISIT (OUTPATIENT)
Dept: SURGERY | Age: 68
End: 2023-08-16

## 2023-08-16 VITALS
SYSTOLIC BLOOD PRESSURE: 108 MMHG | HEIGHT: 68 IN | BODY MASS INDEX: 21.4 KG/M2 | OXYGEN SATURATION: 97 % | DIASTOLIC BLOOD PRESSURE: 62 MMHG | TEMPERATURE: 97 F | HEART RATE: 74 BPM | WEIGHT: 141.2 LBS

## 2023-08-16 DIAGNOSIS — Z90.49 S/P RIGHT COLECTOMY: ICD-10-CM

## 2023-08-16 DIAGNOSIS — C18.9 ADENOCARCINOMA OF COLON (HCC): Primary | ICD-10-CM

## 2023-08-16 RX ORDER — POTASSIUM CHLORIDE 20 MEQ/1
20 TABLET, EXTENDED RELEASE ORAL DAILY
COMMUNITY
Start: 2023-08-03

## 2023-08-16 RX ORDER — FUROSEMIDE 20 MG/1
20 TABLET ORAL DAILY
COMMUNITY
Start: 2023-08-03

## 2023-08-17 ENCOUNTER — OFFICE VISIT (OUTPATIENT)
Dept: INTERNAL MEDICINE CLINIC | Age: 68
End: 2023-08-17
Payer: MEDICARE

## 2023-08-17 VITALS
WEIGHT: 142 LBS | TEMPERATURE: 98 F | SYSTOLIC BLOOD PRESSURE: 88 MMHG | HEART RATE: 74 BPM | BODY MASS INDEX: 21.59 KG/M2 | RESPIRATION RATE: 20 BRPM | OXYGEN SATURATION: 98 % | DIASTOLIC BLOOD PRESSURE: 52 MMHG

## 2023-08-17 DIAGNOSIS — E88.09 HYPOALBUMINEMIA: ICD-10-CM

## 2023-08-17 DIAGNOSIS — R63.4 WEIGHT LOSS: Primary | ICD-10-CM

## 2023-08-17 DIAGNOSIS — C7A.8 LARGE CELL NEUROENDOCRINE CARCINOMA (HCC): ICD-10-CM

## 2023-08-17 PROCEDURE — 3074F SYST BP LT 130 MM HG: CPT

## 2023-08-17 PROCEDURE — 3078F DIAST BP <80 MM HG: CPT

## 2023-08-17 PROCEDURE — 1124F ACP DISCUSS-NO DSCNMKR DOCD: CPT

## 2023-08-17 PROCEDURE — 99202 OFFICE O/P NEW SF 15 MIN: CPT

## 2023-08-17 SDOH — ECONOMIC STABILITY: HOUSING INSECURITY
IN THE LAST 12 MONTHS, WAS THERE A TIME WHEN YOU DID NOT HAVE A STEADY PLACE TO SLEEP OR SLEPT IN A SHELTER (INCLUDING NOW)?: NO

## 2023-08-17 SDOH — ECONOMIC STABILITY: FOOD INSECURITY: WITHIN THE PAST 12 MONTHS, YOU WORRIED THAT YOUR FOOD WOULD RUN OUT BEFORE YOU GOT MONEY TO BUY MORE.: NEVER TRUE

## 2023-08-17 SDOH — ECONOMIC STABILITY: FOOD INSECURITY: WITHIN THE PAST 12 MONTHS, THE FOOD YOU BOUGHT JUST DIDN'T LAST AND YOU DIDN'T HAVE MONEY TO GET MORE.: NEVER TRUE

## 2023-08-17 SDOH — ECONOMIC STABILITY: INCOME INSECURITY: HOW HARD IS IT FOR YOU TO PAY FOR THE VERY BASICS LIKE FOOD, HOUSING, MEDICAL CARE, AND HEATING?: NOT HARD AT ALL

## 2023-08-17 ASSESSMENT — PATIENT HEALTH QUESTIONNAIRE - PHQ9
1. LITTLE INTEREST OR PLEASURE IN DOING THINGS: 0
SUM OF ALL RESPONSES TO PHQ QUESTIONS 1-9: 0
2. FEELING DOWN, DEPRESSED OR HOPELESS: 0
SUM OF ALL RESPONSES TO PHQ9 QUESTIONS 1 & 2: 0

## 2023-08-17 NOTE — PROGRESS NOTES
1955    Chief Complaint   Patient presents with    New Patient    Other     Plan of Care- appointment with Arcadio Kaplan 9/28 -neuroendocrine tumor     Savannah Dawson is a 81-year-old male who presents to [facility] internal medicine residency clinic for clinic care/appointment for weight loss and nutrition prior to seeing the dietitian here at clinic. Patient does follow with Dr. Elier Roca as his PCP. 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 Patient states since surgery on 7/20/2023 his appetite has improved. Patient was previously eating only 1 meal per day and he is currently having 3 meals. Patient states his diet at this time consist of peanut butter and jelly sandwiches, bologna cheese sandwiches, hotdogs, chili, pancakes, eggs as well as patient tries to have 1-2 ensures per day. Patient did present with family who states that previously his weight was 155 to 160 pounds. Patient is following with oncology, and his oncologist is Dr. Amy Guerrero MD. patient is planned for iron infusion on 8/21/2023 followed by port placement by Dr. Sosa Berumen on 8/22/2023. Patient may plan for chemotherapy in the next several weeks. Patient states that with chemotherapy and following surgery he is interested in which food and nutrition he should be eating. Initially patient previously was having constipation but now is having soft stools. Patient denies any recent constipation even with being on Percocet for pain control. Patient denies pain with bowel movements. No black tarry stools or bloody stools. Patient states he is having regular bowel movements and just reports they are soft. Denies diarrhea.   Patient was seen evaluated this afternoon and denies fever, chills, dizziness, lightheadedness, shortness of breath,

## 2023-08-18 ASSESSMENT — ENCOUNTER SYMPTOMS
BLOOD IN STOOL: 0
SORE THROAT: 0
ANAL BLEEDING: 0
ABDOMINAL DISTENTION: 0
ALLERGIC/IMMUNOLOGIC NEGATIVE: 1
APNEA: 0
COUGH: 0
ABDOMINAL PAIN: 1
SHORTNESS OF BREATH: 1
COLOR CHANGE: 0
RHINORRHEA: 0
CHOKING: 0
WHEEZING: 0
DIARRHEA: 0
CONSTIPATION: 0
CHEST TIGHTNESS: 0
EYE ITCHING: 0
SINUS PRESSURE: 0
PHOTOPHOBIA: 0
EYE PAIN: 0
VOMITING: 0
BACK PAIN: 0
EYE DISCHARGE: 0
NAUSEA: 0

## 2023-08-18 NOTE — H&P
normal.            Lab Results   Component Value Date     WBC 9.2 07/29/2023     HGB 9.9 (L) 07/29/2023     HCT 32.5 (L) 07/29/2023     MCV 83.1 07/29/2023      (H) 07/29/2023            Lab Results   Component Value Date/Time      08/03/2023 03:20 PM     K 4.3 08/03/2023 03:20 PM     K 4.0 07/25/2023 04:11 AM      08/03/2023 03:20 PM     CO2 29 08/03/2023 03:20 PM     BUN 10 07/31/2023 02:49 PM     CREATININE 0.5 08/03/2023 03:20 PM     GLUCOSE 132 07/31/2023 02:49 PM     CALCIUM 8.3 07/31/2023 02:49 PM          PATHOLOGY REPORT     FINAL DIAGNOSIS:   Gallbladder, cholecystectomy:    Chronic cholecystitis. B. Right colon, hemicolectomy:    Poorly differentiated large cell neuroendocrine carcinoma. Lymph nodes (12): Negative for malignancy. Margins negative for carcinoma. Pathologic stage: pT4a, pN0. Appendix: fibrous obliteration of lumen. See synoptic report below. Addendum Comment (8/2/23): At the request of the patient's oncologist,   a Ki-67 immunostain was performed. This stain shows the tumor cells   have a ki-67 proliferative index of 90%. Specimen:   A) GALLBLADDER   B) RIGHT COLON       Gross Examination:   The container is labeled Lennox Sniff, gallbladder. Received in   formalin is an intact gallbladder specimen with a pale, slightly   thickened serosa. The specimen measures 6.7 x 4 x 2.5 cm. The cystic   duct area is clipped. The specimen is opened longitudinally revealing   green-black bile fluid. The wall at the fundal tip is thickened   measuring 1 cm in greatest thickness. The wall has a velvety green   mucosa and no calculi are appreciated. Representative sections taken   through the wall and duct are submitted in one cassette. [id number]     B - The container is labeled Lennox Sniff, right colon. Received   in formalin is a segmental resection of large bowel including terminal   ileum, cecum, and right colon.   The specimen measures Having iron transfusion Monday 8/21. Appointment tomorrow for dietary teaching  Schedule patient for tunneled subclavian vein single-lumen SmartPort insertion. He will undergo pre-operative clearance per anesthesia guidelines with risk factors listed under the past medical history diagnosis & problem list. The risks, benefits and alternatives were discussed with patient including non-operative management. All questions answered. Scheduled for August 22, 23. Smoking cessation discussed again  Appetite doing well. Continue diet as tolerated. High protein supplements encouraged. Bowel function doing well. Stool softeners as needed. Wear abdominal binder for comfort as needed  Wean narcotics as able. Tylenol and/or Motrin as needed for discomfort. Lifting/activity restrictions discussed with patient. Questions answered. Follow up in 2 weeks. Signs and symptoms reviewed with patient that would be concerning and need him to return to office for re-evaluation. Patient states he will call if he has questions or concerns. Follow up with PCP as directed. Continue lasix as prescribed. Encouraged keeping legs elevated during the day and compression VALENCIA hose. Electronically signed by RICHELLE Sinclair CNP on 8/18/2023 at 8:51 AM    ADDENDUM:  1. Schedule Chris for insertion tunneled right possible left ovarian vein single-lumen port. 2. He will undergo pre-operative clearance per anesthesia guidelines with risk factors listed under the past medical history diagnosis & problem list.  3. The risks, benefits and alternatives were discussed with Marta Perez including non-operative management. All questions answered. He understands and wishes to proceed with surgical intervention. 4. Restrictions discussed with Marta Perez and he expresses understanding. 5. He is advised to call back directly if there are further questions/concerns, or if his symptoms worsen prior to surgery.     Electronically signed by Chris Nevarez

## 2023-08-21 ENCOUNTER — HOSPITAL ENCOUNTER (OUTPATIENT)
Dept: INFUSION THERAPY | Age: 68
Discharge: HOME | End: 2023-08-21

## 2023-08-21 ENCOUNTER — HOSPITAL ENCOUNTER (OUTPATIENT)
Dept: INFUSION THERAPY | Age: 68
Discharge: HOME | End: 2023-08-21
Payer: MEDICARE

## 2023-08-21 VITALS
OXYGEN SATURATION: 100 % | TEMPERATURE: 98.9 F | HEART RATE: 62 BPM | SYSTOLIC BLOOD PRESSURE: 101 MMHG | DIASTOLIC BLOOD PRESSURE: 59 MMHG | RESPIRATION RATE: 18 BRPM | BODY MASS INDEX: 21.95 KG/M2 | WEIGHT: 144.8 LBS | HEIGHT: 68 IN

## 2023-08-21 DIAGNOSIS — D50.0 IRON DEFICIENCY ANEMIA DUE TO CHRONIC BLOOD LOSS: Primary | ICD-10-CM

## 2023-08-21 PROCEDURE — 6360000002 HC RX W HCPCS: Performed by: INTERNAL MEDICINE

## 2023-08-21 PROCEDURE — 99212 OFFICE O/P EST SF 10 MIN: CPT

## 2023-08-21 PROCEDURE — 2580000003 HC RX 258: Performed by: INTERNAL MEDICINE

## 2023-08-21 PROCEDURE — 96365 THER/PROPH/DIAG IV INF INIT: CPT

## 2023-08-21 PROCEDURE — 96366 THER/PROPH/DIAG IV INF ADDON: CPT

## 2023-08-21 RX ORDER — HEPARIN 100 UNIT/ML
500 SYRINGE INTRAVENOUS PRN
OUTPATIENT
Start: 2023-08-28

## 2023-08-21 RX ORDER — SODIUM CHLORIDE 9 MG/ML
5-250 INJECTION, SOLUTION INTRAVENOUS PRN
OUTPATIENT
Start: 2023-08-28

## 2023-08-21 RX ORDER — ONDANSETRON 2 MG/ML
8 INJECTION INTRAMUSCULAR; INTRAVENOUS
OUTPATIENT
Start: 2023-08-28

## 2023-08-21 RX ORDER — ACETAMINOPHEN 325 MG/1
650 TABLET ORAL
OUTPATIENT
Start: 2023-08-28

## 2023-08-21 RX ORDER — DIPHENHYDRAMINE HYDROCHLORIDE 50 MG/ML
50 INJECTION INTRAMUSCULAR; INTRAVENOUS
OUTPATIENT
Start: 2023-08-28

## 2023-08-21 RX ORDER — SODIUM CHLORIDE 9 MG/ML
INJECTION, SOLUTION INTRAVENOUS CONTINUOUS
OUTPATIENT
Start: 2023-08-28

## 2023-08-21 RX ORDER — SODIUM CHLORIDE 0.9 % (FLUSH) 0.9 %
5-40 SYRINGE (ML) INJECTION PRN
OUTPATIENT
Start: 2023-08-28

## 2023-08-21 RX ORDER — ALBUTEROL SULFATE 90 UG/1
4 AEROSOL, METERED RESPIRATORY (INHALATION) PRN
OUTPATIENT
Start: 2023-08-28

## 2023-08-21 RX ORDER — EPINEPHRINE 1 MG/ML
0.3 INJECTION, SOLUTION INTRAMUSCULAR; SUBCUTANEOUS PRN
OUTPATIENT
Start: 2023-08-28

## 2023-08-21 RX ORDER — SODIUM CHLORIDE 9 MG/ML
5-250 INJECTION, SOLUTION INTRAVENOUS PRN
Status: DISCONTINUED | OUTPATIENT
Start: 2023-08-21 | End: 2023-08-22 | Stop reason: HOSPADM

## 2023-08-21 RX ADMIN — IRON SUCROSE 300 MG: 20 INJECTION, SOLUTION INTRAVENOUS at 10:53

## 2023-08-21 RX ADMIN — SODIUM CHLORIDE 20 ML/HR: 9 INJECTION, SOLUTION INTRAVENOUS at 10:52

## 2023-08-21 NOTE — PLAN OF CARE
Problem: Safety - Adult  Goal: Free from fall injury  Outcome: Adequate for Discharge  Flowsheets (Taken 8/21/2023 1359)  Free From Fall Injury:   Instruct family/caregiver on patient safety   Based on caregiver fall risk screen, instruct family/caregiver to ask for assistance with transferring infant if caregiver noted to have fall risk factors  Note: Free from falls while in O.P. Oncology. Problem: Discharge Planning  Goal: Discharge to home or other facility with appropriate resources  Flowsheets (Taken 8/21/2023 1355)  Discharge to home or other facility with appropriate resources:   Identify barriers to discharge with patient and caregiver   Arrange for needed discharge resources and transportation as appropriate   Identify discharge learning needs (meds, wound care, etc)  Note: Verbalize understanding of discharge instructions, follow up appointments, and when to call Physician. Problem: Chronic Conditions and Co-morbidities  Goal: Patient's chronic conditions and co-morbidity symptoms are monitored and maintained or improved  Flowsheets (Taken 8/21/2023 1359)  Care Plan - Patient's Chronic Conditions and Co-Morbidity Symptoms are Monitored and Maintained or Improved:   Monitor and assess patient's chronic conditions and comorbid symptoms for stability, deterioration, or improvement   Collaborate with multidisciplinary team to address chronic and comorbid conditions and prevent exacerbation or deterioration   Update acute care plan with appropriate goals if chronic or comorbid symptoms are exacerbated and prevent overall improvement and discharge  Note: Patient verbalizes understanding to verbal information given on venofer,action and possible side effects. Aware to call MD if develop complications. Care plan reviewed with patient and family member. Patient and family member verbalize understanding of the plan of care and contribute to goal setting.

## 2023-08-21 NOTE — PROGRESS NOTES
Chemotherapy/Immunotherapy Teaching Checklist    Treatment Plan: Carboplatin and Etopside  Frequency: every 4 weeks  Patient accompanied by  family member      Day of chemo instructions:  [x] Must have    [x] Eat light breakfast  [x] Bring pain medications/other routine medications scheduled during appointment time  [] Hold blood pressure medications morning of treatment    Treatment process:  [x] Flow of appointment  [x] IV access Peripheral start  or  PORT access process [x] EMLA cream  [x] Premedication/ Hydration  [x] Length of treatment  [x] Tour of clinic    Diet and hydration:  [x] Discuss [place] diet    [x] Eating Hints book provided  [x] Importance of hydration 48- 64 ounces daily   [x] Hydration handout provided     Side Effects:  [x] Chemotherapy and you book provided  [x] Side effects and management discussed   [x] Diarrhea[x]Nausea/Vomiting []home antiemetic [x]hair loss[x]Neuropathy[x] Constipation[x] Fatigue[x]Mouth sores[x] Dehydration[x] Skin/Nail Changes []Pneumonitis []Colitis [] Hepatitis []Thyroid changes  []Fertility issues (birth control, sperm/egg banking issues)    Labs:  [x]BMP- renal function and electrolytes discussed  [x] CBC- RBC, WBC with ANC, platelet counts discussed  [x]Understanding your Blood hand out given   [x]Neutropenia handout/Reduce infection handout [x]Thrombocytopenia handout   [x]Fred discussed    Complications that require immediate attention from physician:  [x]Fever 100.3 [x]signs of infection- chills, fever, burning with urination, worsening cough   [x]Uncontrolled vomiting [x]Uncontrolled diarrhea/constipation   [x]Unable to drink 48 ounces [x]Uncontrolled pain  [x] When to notify provider handout given  [x] After hours contact process discussed    Home instructions:  [x] Instruct to shut the lid and double flush toilet for 48 hours after chemotherapy  [x] Instruct family members to wear gloves when will be handling  body fluids    Support Services:  []

## 2023-08-21 NOTE — DISCHARGE INSTRUCTIONS
Please contact your Oncologist if you have any questions regarding the infusion of venofer that you received today. Patient instructed if experience any of the symptoms following today's chemotherapy / to notify MD immediately or go to emergency department.     * dizziness/lightheadedness  *acute nausea/vomiting - not relieved with medication  *headache - not relieved from Tylenol/pain medication  *chest pain/pressure  *rash/itching  *shortness of breath        Drink fluids - 48oz fluids daily  Call if develop fever/ chills/ signs or symptoms of infection

## 2023-08-22 ENCOUNTER — APPOINTMENT (OUTPATIENT)
Dept: GENERAL RADIOLOGY | Age: 68
End: 2023-08-22
Attending: SURGERY
Payer: MEDICARE

## 2023-08-22 ENCOUNTER — ANESTHESIA (OUTPATIENT)
Dept: OPERATING ROOM | Age: 68
End: 2023-08-22
Payer: MEDICARE

## 2023-08-22 ENCOUNTER — HOSPITAL ENCOUNTER (OUTPATIENT)
Age: 68
Setting detail: OUTPATIENT SURGERY
Discharge: HOME | End: 2023-08-22
Attending: SURGERY | Admitting: SURGERY
Payer: MEDICARE

## 2023-08-22 ENCOUNTER — ANESTHESIA EVENT (OUTPATIENT)
Dept: OPERATING ROOM | Age: 68
End: 2023-08-22
Payer: MEDICARE

## 2023-08-22 VITALS
HEART RATE: 70 BPM | TEMPERATURE: 97 F | OXYGEN SATURATION: 98 % | WEIGHT: 143 LBS | RESPIRATION RATE: 16 BRPM | DIASTOLIC BLOOD PRESSURE: 58 MMHG | HEIGHT: 68 IN | BODY MASS INDEX: 21.67 KG/M2 | SYSTOLIC BLOOD PRESSURE: 109 MMHG

## 2023-08-22 DIAGNOSIS — C7A.8 LARGE CELL NEUROENDOCRINE CARCINOMA (HCC): Primary | ICD-10-CM

## 2023-08-22 LAB
APTT PPP: 36.8 SECONDS (ref 22–38)
INR PPP: 1 (ref 0.85–1.13)
POTASSIUM SERPL-SCNC: 4.7 MEQ/L (ref 3.5–5.2)

## 2023-08-22 PROCEDURE — 36415 COLL VENOUS BLD VENIPUNCTURE: CPT

## 2023-08-22 PROCEDURE — 7100000010 HC PHASE II RECOVERY - FIRST 15 MIN: Performed by: SURGERY

## 2023-08-22 PROCEDURE — 3700000001 HC ADD 15 MINUTES (ANESTHESIA): Performed by: SURGERY

## 2023-08-22 PROCEDURE — 2709999900 HC NON-CHARGEABLE SUPPLY: Performed by: SURGERY

## 2023-08-22 PROCEDURE — 7100000011 HC PHASE II RECOVERY - ADDTL 15 MIN: Performed by: SURGERY

## 2023-08-22 PROCEDURE — 85610 PROTHROMBIN TIME: CPT

## 2023-08-22 PROCEDURE — 36561 INSERT TUNNELED CV CATH: CPT | Performed by: SURGERY

## 2023-08-22 PROCEDURE — 6360000002 HC RX W HCPCS: Performed by: NURSE ANESTHETIST, CERTIFIED REGISTERED

## 2023-08-22 PROCEDURE — 3600000002 HC SURGERY LEVEL 2 BASE: Performed by: SURGERY

## 2023-08-22 PROCEDURE — 3600000012 HC SURGERY LEVEL 2 ADDTL 15MIN: Performed by: SURGERY

## 2023-08-22 PROCEDURE — 77001 FLUOROGUIDE FOR VEIN DEVICE: CPT

## 2023-08-22 PROCEDURE — 84132 ASSAY OF SERUM POTASSIUM: CPT

## 2023-08-22 PROCEDURE — 6360000002 HC RX W HCPCS: Performed by: NURSE PRACTITIONER

## 2023-08-22 PROCEDURE — 2580000003 HC RX 258: Performed by: NURSE PRACTITIONER

## 2023-08-22 PROCEDURE — 2500000003 HC RX 250 WO HCPCS: Performed by: NURSE ANESTHETIST, CERTIFIED REGISTERED

## 2023-08-22 PROCEDURE — 71045 X-RAY EXAM CHEST 1 VIEW: CPT

## 2023-08-22 PROCEDURE — 3700000000 HC ANESTHESIA ATTENDED CARE: Performed by: SURGERY

## 2023-08-22 PROCEDURE — C1788 PORT, INDWELLING, IMP: HCPCS | Performed by: SURGERY

## 2023-08-22 PROCEDURE — 6370000000 HC RX 637 (ALT 250 FOR IP): Performed by: SURGERY

## 2023-08-22 PROCEDURE — 2500000003 HC RX 250 WO HCPCS: Performed by: SURGERY

## 2023-08-22 PROCEDURE — 6360000002 HC RX W HCPCS: Performed by: SURGERY

## 2023-08-22 PROCEDURE — 85730 THROMBOPLASTIN TIME PARTIAL: CPT

## 2023-08-22 DEVICE — PORT INFUS SGL LUMN ATTCH POLYUR OPN END CATH 8FR POWERPRT: Type: IMPLANTABLE DEVICE | Site: CHEST | Status: FUNCTIONAL

## 2023-08-22 RX ORDER — SODIUM CHLORIDE 0.9 % (FLUSH) 0.9 %
5-40 SYRINGE (ML) INJECTION EVERY 12 HOURS SCHEDULED
Status: DISCONTINUED | OUTPATIENT
Start: 2023-08-22 | End: 2023-08-22 | Stop reason: HOSPADM

## 2023-08-22 RX ORDER — LIDOCAINE HYDROCHLORIDE 10 MG/ML
INJECTION, SOLUTION EPIDURAL; INFILTRATION; INTRACAUDAL; PERINEURAL PRN
Status: DISCONTINUED | OUTPATIENT
Start: 2023-08-22 | End: 2023-08-22 | Stop reason: ALTCHOICE

## 2023-08-22 RX ORDER — ONDANSETRON 2 MG/ML
4 INJECTION INTRAMUSCULAR; INTRAVENOUS EVERY 6 HOURS PRN
Status: DISCONTINUED | OUTPATIENT
Start: 2023-08-22 | End: 2023-08-22 | Stop reason: HOSPADM

## 2023-08-22 RX ORDER — PROPOFOL 10 MG/ML
INJECTION, EMULSION INTRAVENOUS PRN
Status: DISCONTINUED | OUTPATIENT
Start: 2023-08-22 | End: 2023-08-22 | Stop reason: SDUPTHER

## 2023-08-22 RX ORDER — SODIUM CHLORIDE 9 MG/ML
INJECTION, SOLUTION INTRAVENOUS PRN
Status: DISCONTINUED | OUTPATIENT
Start: 2023-08-22 | End: 2023-08-22 | Stop reason: HOSPADM

## 2023-08-22 RX ORDER — LIDOCAINE HYDROCHLORIDE 20 MG/ML
INJECTION, SOLUTION EPIDURAL; INFILTRATION; INTRACAUDAL; PERINEURAL PRN
Status: DISCONTINUED | OUTPATIENT
Start: 2023-08-22 | End: 2023-08-22 | Stop reason: SDUPTHER

## 2023-08-22 RX ORDER — OXYCODONE HYDROCHLORIDE 5 MG/1
10 TABLET ORAL EVERY 4 HOURS PRN
Status: DISCONTINUED | OUTPATIENT
Start: 2023-08-22 | End: 2023-08-22 | Stop reason: HOSPADM

## 2023-08-22 RX ORDER — FAMOTIDINE 20 MG/1
20 TABLET, FILM COATED ORAL 2 TIMES DAILY
Status: DISCONTINUED | OUTPATIENT
Start: 2023-08-22 | End: 2023-08-22 | Stop reason: HOSPADM

## 2023-08-22 RX ORDER — MIDAZOLAM HYDROCHLORIDE 1 MG/ML
INJECTION INTRAMUSCULAR; INTRAVENOUS PRN
Status: DISCONTINUED | OUTPATIENT
Start: 2023-08-22 | End: 2023-08-22 | Stop reason: SDUPTHER

## 2023-08-22 RX ORDER — HYDROCODONE BITARTRATE AND ACETAMINOPHEN 5; 325 MG/1; MG/1
1-2 TABLET ORAL EVERY 6 HOURS PRN
Qty: 20 TABLET | Refills: 0 | Status: SHIPPED | OUTPATIENT
Start: 2023-08-22 | End: 2023-08-28

## 2023-08-22 RX ORDER — PHENYLEPHRINE HCL IN 0.9% NACL 1 MG/10 ML
SYRINGE (ML) INTRAVENOUS PRN
Status: DISCONTINUED | OUTPATIENT
Start: 2023-08-22 | End: 2023-08-22 | Stop reason: SDUPTHER

## 2023-08-22 RX ORDER — FENTANYL CITRATE 50 UG/ML
INJECTION, SOLUTION INTRAMUSCULAR; INTRAVENOUS PRN
Status: DISCONTINUED | OUTPATIENT
Start: 2023-08-22 | End: 2023-08-22 | Stop reason: SDUPTHER

## 2023-08-22 RX ORDER — OXYCODONE HYDROCHLORIDE 5 MG/1
5 TABLET ORAL EVERY 4 HOURS PRN
Status: DISCONTINUED | OUTPATIENT
Start: 2023-08-22 | End: 2023-08-22 | Stop reason: HOSPADM

## 2023-08-22 RX ORDER — HEPARIN 100 UNIT/ML
SYRINGE INTRAVENOUS PRN
Status: DISCONTINUED | OUTPATIENT
Start: 2023-08-22 | End: 2023-08-22 | Stop reason: ALTCHOICE

## 2023-08-22 RX ORDER — MORPHINE SULFATE 4 MG/ML
4 INJECTION, SOLUTION INTRAMUSCULAR; INTRAVENOUS
Status: DISCONTINUED | OUTPATIENT
Start: 2023-08-22 | End: 2023-08-22 | Stop reason: HOSPADM

## 2023-08-22 RX ORDER — SODIUM CHLORIDE 9 MG/ML
INJECTION, SOLUTION INTRAVENOUS CONTINUOUS
Status: DISCONTINUED | OUTPATIENT
Start: 2023-08-22 | End: 2023-08-22 | Stop reason: HOSPADM

## 2023-08-22 RX ORDER — MORPHINE SULFATE 2 MG/ML
2 INJECTION, SOLUTION INTRAMUSCULAR; INTRAVENOUS
Status: DISCONTINUED | OUTPATIENT
Start: 2023-08-22 | End: 2023-08-22 | Stop reason: HOSPADM

## 2023-08-22 RX ORDER — ONDANSETRON 4 MG/1
4 TABLET, ORALLY DISINTEGRATING ORAL EVERY 8 HOURS PRN
Status: DISCONTINUED | OUTPATIENT
Start: 2023-08-22 | End: 2023-08-22 | Stop reason: HOSPADM

## 2023-08-22 RX ORDER — ACETAMINOPHEN 325 MG/1
650 TABLET ORAL EVERY 4 HOURS PRN
Status: DISCONTINUED | OUTPATIENT
Start: 2023-08-22 | End: 2023-08-22 | Stop reason: HOSPADM

## 2023-08-22 RX ORDER — SODIUM CHLORIDE 0.9 % (FLUSH) 0.9 %
5-40 SYRINGE (ML) INJECTION PRN
Status: DISCONTINUED | OUTPATIENT
Start: 2023-08-22 | End: 2023-08-22 | Stop reason: HOSPADM

## 2023-08-22 RX ADMIN — FENTANYL CITRATE 50 MCG: 50 INJECTION, SOLUTION INTRAMUSCULAR; INTRAVENOUS at 07:14

## 2023-08-22 RX ADMIN — PROPOFOL 50 MG: 10 INJECTION, EMULSION INTRAVENOUS at 07:14

## 2023-08-22 RX ADMIN — Medication 100 MCG: at 07:23

## 2023-08-22 RX ADMIN — Medication 100 MCG: at 07:22

## 2023-08-22 RX ADMIN — Medication 200 MCG: at 07:36

## 2023-08-22 RX ADMIN — LIDOCAINE HYDROCHLORIDE 50 MG: 20 INJECTION, SOLUTION EPIDURAL; INFILTRATION; INTRACAUDAL; PERINEURAL at 07:14

## 2023-08-22 RX ADMIN — MIDAZOLAM 2 MG: 1 INJECTION INTRAMUSCULAR; INTRAVENOUS at 07:10

## 2023-08-22 RX ADMIN — OXYCODONE HYDROCHLORIDE 5 MG: 5 TABLET ORAL at 08:18

## 2023-08-22 RX ADMIN — Medication 2000 MG: at 07:18

## 2023-08-22 RX ADMIN — SODIUM CHLORIDE: 9 INJECTION, SOLUTION INTRAVENOUS at 06:43

## 2023-08-22 ASSESSMENT — PAIN DESCRIPTION - LOCATION: LOCATION: CHEST

## 2023-08-22 ASSESSMENT — PAIN SCALES - GENERAL
PAINLEVEL_OUTOF10: 5
PAINLEVEL_OUTOF10: 5
PAINLEVEL_OUTOF10: 3
PAINLEVEL_OUTOF10: 5

## 2023-08-22 ASSESSMENT — PAIN - FUNCTIONAL ASSESSMENT: PAIN_FUNCTIONAL_ASSESSMENT: 0-10

## 2023-08-22 ASSESSMENT — PAIN DESCRIPTION - DESCRIPTORS
DESCRIPTORS: ACHING;DISCOMFORT
DESCRIPTORS: ACHING

## 2023-08-22 ASSESSMENT — LIFESTYLE VARIABLES: SMOKING_STATUS: 1

## 2023-08-22 NOTE — PROGRESS NOTES
Patient return to [facility] room 7. VS stable. Pain level assessed. Updated patient and family on discharge planning. Patient has snack and drink. Patient up to the bathroom to urinate. No further questions or concerns voiced at this time. Side rails up times 2. Call light in reach.

## 2023-08-22 NOTE — DISCHARGE INSTRUCTIONS
DR. High Read DISCHARGE INSTRUCTIONS    Pt Name: Darrick Serrano [address] Record Number: [id number]  Today's Date: 8/22/2023    GENERAL ANESTHESIA OR SEDATION  1. Do not drive or operate hazardous machinery for 24 hours. 2. Do not make important business or personal decisions for 24 hours. 3. Do not drink alcoholic beverages or use tobacco for 24 hours. ACTIVITY INSTRUCTIONS:  [] Rest today. Resume light to normal activity tomorrow. [x] You may resume normal activity tomorrow. Do not engage in strenuous activity that may place stress on your incision. [x] Do not drive for 3-5 days and avoid heavy lifting, tugging, pullings greater than 10-20 lbs until seen in the office. DIET INSTRUCTIONS:  [x]Begin with clear liquids. If not nauseated, may increase to a low-fat diet when you desire. Greasy and spicy foods are not advised. [x]Regular diet as tolerated. []Other:     MEDICATIONS  [x]Prescription sent with you to be used as directed. []Valium   [x]Norco   []Percocet   []Toradol   []Oxycontin     Do not drink alcohol or drive while taking these medications. You may experience dizziness or drowsiness with these medications. You may also experience constipation which can be relieved with stool softners or laxatives. - Take Colace 100 mg 1-2 tabs daily as needed for constipation  - Take MiraLax 1-2 cap fulls daily as needed for constipation    [x]You may resume your daily prescription medication schedule unless otherwise specified. []Do not take 325mg Aspirin or other blood thinners such as Coumadin or Plavix for 5 days. **Pain medication at discharge - use only as prescribed- refills may be available to you at your follow up appointments if needed and warranted.   Narcotics should be used for only short term and we highly encouraged our patients to wean off appropriately and use other means for pain such as non pharmacologic measures and over the counter tylenol or ibuprofen if no restrictions

## 2023-08-22 NOTE — BRIEF OP NOTE
Brief Postoperative Note      Patient: Joyce Simeon  YOB: 1955  MRN: [id number]    Date of Procedure: 8/22/2023    Pre-Op Diagnosis Codes:     * Large cell neuroendocrine carcinoma ([address]) [C7A.8]    Post-Op Diagnosis: Same       Procedure(s):  Single Lumen Smartport Insertion  Intraoperative fluoroscopy    Surgeon(s):  Syed Ramon MD    Assistant:  First Assistant: José Miguel Morin RN    Anesthesia: Monitor Anesthesia Care/Local    Estimated Blood Loss (mL): 04BY    Complications: None    Specimens:   * No specimens in log *    Implants:  Implant Name Type Inv.  Item Serial No.  Lot No. LRB No. Used Action   PORT INFUS SGL LUMN ATTCH POLYUR OPN END CATH 8FR POWERPRT - [id number]  PORT INFUS SGL LUMN ATTCH POLYUR OPN END CATH 8FR POWERPRT  [id number] 1 Implanted         Drains:   [REMOVED] Urinary Catheter 07/20/23 Bonilla-Temperature (Removed)   $ Urethral catheter insertion Inserted for procedure 07/20/23 1836   Catheter Indications Perioperative use for selected surgical procedures 07/21/23 0800   Site Assessment [place] 07/21/23 0800   Urine Appearance Clear 07/20/23 1836   Collection Container Standard 07/20/23 1836   Catheter Best Practices  Bag below bladder;Bag not on floor 07/20/23 1836   Status Draining;Patent 07/20/23 1836   Output (mL) 350 mL 07/21/23 7208       Findings: as above - see op note for details      Electronically signed by Syed Ramon MD on 8/22/2023 at 7:52 AM

## 2023-08-22 NOTE — INTERVAL H&P NOTE
Update History & Physical    The patient's History and Physical was reviewed with the patient and I examined the patient. There was no change. The surgical site was confirmed by the patient and me. Plan: The risks, benefits, expected outcome, and alternative to the recommended procedure have been discussed with the patient. Patient understands and wants to proceed with the procedure.      Electronically signed by Jaz Keane MD on 8/22/2023 at 5:58 AM

## 2023-08-22 NOTE — PROGRESS NOTES
Pt has met discharge criteria and states he is ready for discharge to home. IV removed, gauze and tape applied. Dressed in own clothes and personal belongings gathered. Discharge instructions (with opioid medication education information) given to pt and family; pt and family verbalized understanding of discharge instructions, prescriptions and follow up appointments. Pt transported to discharge lobby by Khadijah Tavera [facility] staff.

## 2023-08-22 NOTE — OP NOTE
[place] [address]                                OPERATIVE REPORT    PATIENT NAME: Dylon Vee                 :        1955  MED REC NO:   [id number]                           ROOM:  ACCOUNT NO:   [de-identified]                           ADMIT DATE: 2023  PROVIDER:     Rosa Geiger M.D.    Kenna Bennett OF PROCEDURE:  2023    PREOPERATIVE DIAGNOSES:  1.  Neuroendocrine tumor. 2.  Status post extended right colectomy and cholecystectomy. POSTOPERATIVE DIAGNOSES:  1.  Neuroendocrine tumor. 2.  Status post extended right colectomy and cholecystectomy. PROCEDURES:  1. Insertion of tunneled right subclavian vein single-lumen port. 2.  Intraoperative fluoroscopy. SURGEON:  Rosa Geiger MD    ASSISTANT:  AMBAR Morrison    ANESTHESIA:  IV sedation/local.    ESTIMATED BLOOD LOSS:  10 mL. DRAINS:  None. COMPLICATIONS:  None. DISPOSITION:  Stable to the recovery room. INDICATIONS FOR PROCEDURE:  The patient is a 71-year-old male who  underwent a robotic extended right colectomy with cholecystectomy  secondary to a large hepatic flexure tumor, found to have poorly  differentiated neuroendocrine mass. He was in need of chemotherapy. Both operative and nonoperative intervention plans were discussed. Risks of surgery were further discussed for port insertion. Some of the  risks included but were not limited to bleeding, infection, the need for  reoperation, severe chronic postoperative pain or numbness, major  vascular or nerve injury, cardiopulmonary complications, anesthetic  complications, seroma-hematoma formation, wound breakdown, air embolism,  pneumothorax, DVT, port dislodgement/malfunction, chronic pain and  death.   After all of the questions were answered in their entirety and  the patient was completely aware of the current situation, he elected to  proceed with the procedure. DESCRIPTION OF PROCEDURE:  After informed consent was signed and placed  on the chart, the patient was taken back to the operating room and  placed supine on the operating room table. IV sedation was  administered. He tolerated this well throughout the case. All pressure  points were padded. He was on preoperative antibiotics. His neck and  chest was prepped and draped in usual sterile standard fashion. A  time-out occurred prior to the operation, which not only identified the  patient but also the planned procedure to be performed. At the end of  the time-out, there were no questions or concerns. I began the  operation by first placing the patient in Trendelenburg. The catheter  and port was heparinized flush. Once everything was in order, we then  accessed the right subclavian vein on the first attempt after local  anesthetic was given. He tolerated local anesthetic well. Wire was  placed using intraoperative fluoroscopy. No ectopy. Small transverse  incision was then made further down on the chest wall with a 15-blade  scalpel. Pocket was formed in the subcutaneous tissue plane more  inferiorly. Hemostasis adequate. Skin nick next to the wire. Catheter  was then tunneled down to the pocket. Dilator and sheath was placed  over the wire using intraoperative fluoroscopy. The patient tolerated  it well. Dilator and wire removed. Catheter placed. Sheath removed. This was all done under intraoperative fluoroscopy. Catheter was then  withdrawn to the desired depth. Distal end of the catheter was then cut  and assembled to the port. Port secured into the pocket with 3-0 Vicryl  suture x2. Port was accessed and it was withdrawn from and then flushed  with heparinized saline without difficulty. Deep dermal tissues  reapproximated with interrupted Vicryl suture. Skin reapproximated with  a running 3-0 Vicryl in subcuticular fashion.   Closed incision was then  clean, dry and

## 2023-08-23 ENCOUNTER — TELEPHONE (OUTPATIENT)
Dept: SURGERY | Age: 68
End: 2023-08-23

## 2023-08-23 ENCOUNTER — TELEPHONE (OUTPATIENT)
Dept: WOUND CARE | Age: 68
End: 2023-08-23

## 2023-08-23 NOTE — TELEPHONE ENCOUNTER
Pt had port placement on 8/22/23. Javier Field wants to know when pt can start back on his coumadin?

## 2023-08-23 NOTE — TELEPHONE ENCOUNTER
Wound clinic referral received for feet swelling. Called referring office to see if patient has wounds. Gave lymphedema clinic phone number and fax if no wounds and lymphedema. They are going to call back.

## 2023-08-25 NOTE — TELEPHONE ENCOUNTER
I called and informed Soco of this information. She states pt has appt at [facility] today.

## 2023-08-27 NOTE — PROGRESS NOTES
03:20 PM    BUN 10 07/31/2023 02:49 PM    LABALBU 2.5 07/31/2023 02:49 PM    CREATININE 0.5 08/03/2023 03:20 PM    CALCIUM 8.3 07/31/2023 02:49 PM    LABGLOM >60 08/03/2023 03:20 PM    GLUCOSE 132 07/31/2023 02:49 PM       Magnesium:    Lab Results   Component Value Date/Time    MG 1.8 07/26/2023 03:47 AM     PT/INR:    Lab Results   Component Value Date/Time    INR 1.00 08/22/2023 06:26 AM     TSH:  No results found for: TSH  VITAMIN B12: No components found for: B12  FOLATE:    Lab Results   Component Value Date/Time    FOLATE 4.6 07/31/2023 02:49 PM     IRON:    Lab Results   Component Value Date/Time    IRON 14 07/31/2023 02:49 PM     Iron Saturation:  No components found for: PERCENTFE  TIBC:    Lab Results   Component Value Date/Time    TIBC 195 07/31/2023 02:49 PM     FERRITIN:    Lab Results   Component Value Date/Time    FERRITIN 103 07/31/2023 02:49 PM         IMAGING:    XR ABDOMEN (KUB) (SINGLE AP VIEW)    Result Date: 7/26/2023  1. Pneumoperitoneum is present. 2. Dilated small bowel and colonic loops are seen. Findings can be seen with postoperative ileus in the right clinical setting. Continue surveillance to evaluate for small bowel obstruction. 3. Left retrocardiac opacity is seen that can relate to atelectasis and/or infiltrate. 4. Otherwise, there has been no other significant interval change in the radiographic appearance of the abdomen from 7/25/2023 at 0809 hours. **This report has been created using voice recognition software. It may contain minor errors which are inherent in voice recognition technology. ** Final report electronically signed by Dr Cisco Jackson on 7/26/2023 5:23 PM    XR ABDOMEN (KUB) (SINGLE AP VIEW)    Result Date: 7/25/2023  1. There is gaseous distention of several large and small bowel loops seen overlying the abdomen and pelvis. Findings maybe related to diffuse ileus versus possible early or partial distal large bowel obstruction.  2. There is gas attenuation overlying

## 2023-08-28 ENCOUNTER — HOSPITAL ENCOUNTER (OUTPATIENT)
Dept: INFUSION THERAPY | Age: 68
Discharge: HOME | End: 2023-08-28
Payer: MEDICARE

## 2023-08-28 ENCOUNTER — OFFICE VISIT (OUTPATIENT)
Dept: ONCOLOGY | Age: 68
End: 2023-08-28

## 2023-08-28 VITALS
BODY MASS INDEX: 21.95 KG/M2 | HEART RATE: 56 BPM | RESPIRATION RATE: 16 BRPM | WEIGHT: 144.8 LBS | TEMPERATURE: 97.8 F | DIASTOLIC BLOOD PRESSURE: 54 MMHG | OXYGEN SATURATION: 97 % | SYSTOLIC BLOOD PRESSURE: 107 MMHG | HEIGHT: 68 IN

## 2023-08-28 VITALS
WEIGHT: 144.8 LBS | OXYGEN SATURATION: 97 % | HEIGHT: 68 IN | SYSTOLIC BLOOD PRESSURE: 100 MMHG | RESPIRATION RATE: 16 BRPM | TEMPERATURE: 97.6 F | HEART RATE: 60 BPM | DIASTOLIC BLOOD PRESSURE: 52 MMHG | BODY MASS INDEX: 21.95 KG/M2

## 2023-08-28 DIAGNOSIS — C7A.022 MALIGNANT CARCINOID TUMOR OF THE ASCENDING COLON (HCC): ICD-10-CM

## 2023-08-28 DIAGNOSIS — C79.9 METASTATIC MALIGNANT NEOPLASM, UNSPECIFIED SITE (HCC): Primary | ICD-10-CM

## 2023-08-28 DIAGNOSIS — C7A.8 LARGE CELL NEUROENDOCRINE CARCINOMA (HCC): ICD-10-CM

## 2023-08-28 DIAGNOSIS — C7A.8 LARGE CELL NEUROENDOCRINE CARCINOMA (HCC): Primary | ICD-10-CM

## 2023-08-28 LAB
ABSOLUTE IMMATURE GRANULOCYTE: 0 THOU/MM3 (ref 0–0.07)
ALBUMIN SERPL BCG-MCNC: 3.7 G/DL (ref 3.5–5.1)
ALP SERPL-CCNC: 122 U/L (ref 38–126)
ALT SERPL W/O P-5'-P-CCNC: 15 U/L (ref 11–66)
AST SERPL-CCNC: 25 U/L (ref 5–40)
BASOPHILS ABSOLUTE: 0.1 THOU/MM3 (ref 0–0.1)
BASOPHILS NFR BLD AUTO: 1 % (ref 0–3)
BILIRUB CONJ SERPL-MCNC: < 0.2 MG/DL (ref 0–0.3)
BILIRUB SERPL-MCNC: 0.5 MG/DL (ref 0.3–1.2)
BUN BLDP-MCNC: 15 MG/DL (ref 8–26)
CEA SERPL-MCNC: 2.9 NG/ML (ref 0–5)
CHLORIDE BLD-SCNC: 104 MEQ/L (ref 98–109)
CREAT BLD-MCNC: 0.6 MG/DL (ref 0.5–1.2)
EOSINOPHIL NFR BLD AUTO: 1 % (ref 0–4)
EOSINOPHILS ABSOLUTE: 0.1 THOU/MM3 (ref 0–0.4)
ERYTHROCYTE [DISTWIDTH] IN BLOOD BY AUTOMATED COUNT: 23.1 % (ref 11.5–14.5)
GFR SERPL CREATININE-BSD FRML MDRD: > 60 ML/MIN/1.73M2
GLUCOSE BLD-MCNC: 124 MG/DL (ref 70–108)
HBV SURFACE AB SER QL IA: NEGATIVE
HBV SURFACE AG SERPL QL IA: NEGATIVE
HCT VFR BLD AUTO: 32.4 % (ref 42–52)
HGB BLD-MCNC: 9.6 GM/DL (ref 14–18)
IMMATURE GRANULOCYTES: 0 %
IONIZED CALCIUM, WHOLE BLOOD: 1.14 MMOL/L (ref 1.12–1.32)
LYMPHOCYTES ABSOLUTE: 1.5 THOU/MM3 (ref 1–4.8)
LYMPHOCYTES NFR BLD AUTO: 21 % (ref 15–47)
MCH RBC QN AUTO: 25.9 PG (ref 26–33)
MCHC RBC AUTO-ENTMCNC: 29.6 GM/DL (ref 32.2–35.5)
MCV RBC AUTO: 87 FL (ref 80–94)
MONOCYTES ABSOLUTE: 0.5 THOU/MM3 (ref 0.4–1.3)
MONOCYTES NFR BLD AUTO: 8 % (ref 0–12)
NEUTROPHILS NFR BLD AUTO: 70 % (ref 43–75)
PLATELET # BLD AUTO: 242 THOU/MM3 (ref 130–400)
PMV BLD AUTO: 10.8 FL (ref 9.4–12.4)
POTASSIUM BLD-SCNC: 4.3 MEQ/L (ref 3.5–4.9)
PROT SERPL-MCNC: 7.8 G/DL (ref 6.1–8)
RBC # BLD AUTO: 3.71 MILL/MM3 (ref 4.7–6.1)
SEGMENTED NEUTROPHILS ABSOLUTE COUNT: 4.8 THOU/MM3 (ref 1.8–7.7)
SODIUM BLD-SCNC: 137 MEQ/L (ref 138–146)
TOTAL CO2, WHOLE BLOOD: 25 MEQ/L (ref 23–33)
WBC # BLD AUTO: 6.9 THOU/MM3 (ref 4.8–10.8)

## 2023-08-28 PROCEDURE — 80076 HEPATIC FUNCTION PANEL: CPT

## 2023-08-28 PROCEDURE — 83497 ASSAY OF 5-HIAA: CPT

## 2023-08-28 PROCEDURE — 86316 IMMUNOASSAY TUMOR OTHER: CPT

## 2023-08-28 PROCEDURE — 96367 TX/PROPH/DG ADDL SEQ IV INF: CPT

## 2023-08-28 PROCEDURE — 96413 CHEMO IV INFUSION 1 HR: CPT

## 2023-08-28 PROCEDURE — 82378 CARCINOEMBRYONIC ANTIGEN: CPT

## 2023-08-28 PROCEDURE — 87340 HEPATITIS B SURFACE AG IA: CPT

## 2023-08-28 PROCEDURE — 36591 DRAW BLOOD OFF VENOUS DEVICE: CPT

## 2023-08-28 PROCEDURE — 86706 HEP B SURFACE ANTIBODY: CPT

## 2023-08-28 PROCEDURE — 82941 ASSAY OF GASTRIN: CPT

## 2023-08-28 PROCEDURE — 2580000003 HC RX 258: Performed by: INTERNAL MEDICINE

## 2023-08-28 PROCEDURE — 6360000002 HC RX W HCPCS: Performed by: INTERNAL MEDICINE

## 2023-08-28 PROCEDURE — 85025 COMPLETE CBC W/AUTO DIFF WBC: CPT

## 2023-08-28 PROCEDURE — 86704 HEP B CORE ANTIBODY TOTAL: CPT

## 2023-08-28 PROCEDURE — 96375 TX/PRO/DX INJ NEW DRUG ADDON: CPT

## 2023-08-28 PROCEDURE — 99212 OFFICE O/P EST SF 10 MIN: CPT

## 2023-08-28 PROCEDURE — 96417 CHEMO IV INFUS EACH ADDL SEQ: CPT

## 2023-08-28 PROCEDURE — 80047 BASIC METABLC PNL IONIZED CA: CPT

## 2023-08-28 RX ORDER — SODIUM CHLORIDE 0.9 % (FLUSH) 0.9 %
5-40 SYRINGE (ML) INJECTION PRN
Status: DISCONTINUED | OUTPATIENT
Start: 2023-08-28 | End: 2023-08-29 | Stop reason: HOSPADM

## 2023-08-28 RX ORDER — SODIUM CHLORIDE 0.9 % (FLUSH) 0.9 %
5-40 SYRINGE (ML) INJECTION PRN
Status: CANCELLED | OUTPATIENT
Start: 2023-08-28

## 2023-08-28 RX ORDER — SODIUM CHLORIDE 9 MG/ML
5-250 INJECTION, SOLUTION INTRAVENOUS PRN
Status: DISCONTINUED | OUTPATIENT
Start: 2023-08-28 | End: 2023-08-29 | Stop reason: HOSPADM

## 2023-08-28 RX ORDER — OXYCODONE HYDROCHLORIDE 5 MG/1
5 TABLET ORAL EVERY 4 HOURS PRN
Qty: 42 TABLET | Refills: 0 | Status: SHIPPED | OUTPATIENT
Start: 2023-08-28 | End: 2023-09-04

## 2023-08-28 RX ORDER — PALONOSETRON 0.05 MG/ML
0.25 INJECTION, SOLUTION INTRAVENOUS ONCE
Status: COMPLETED | OUTPATIENT
Start: 2023-08-28 | End: 2023-08-28

## 2023-08-28 RX ORDER — SODIUM CHLORIDE 9 MG/ML
25 INJECTION, SOLUTION INTRAVENOUS PRN
Status: CANCELLED | OUTPATIENT
Start: 2023-08-28

## 2023-08-28 RX ORDER — HEPARIN 100 UNIT/ML
500 SYRINGE INTRAVENOUS PRN
Status: CANCELLED | OUTPATIENT
Start: 2023-08-28

## 2023-08-28 RX ORDER — HEPARIN 100 UNIT/ML
500 SYRINGE INTRAVENOUS PRN
Status: DISCONTINUED | OUTPATIENT
Start: 2023-08-28 | End: 2023-08-29 | Stop reason: HOSPADM

## 2023-08-28 RX ORDER — SENNA AND DOCUSATE SODIUM 50; 8.6 MG/1; MG/1
1 TABLET, FILM COATED ORAL DAILY
Qty: 30 TABLET | Refills: 0 | Status: SHIPPED | OUTPATIENT
Start: 2023-08-28

## 2023-08-28 RX ADMIN — CARBOPLATIN 601 MG: 10 INJECTION, SOLUTION INTRAVENOUS at 11:25

## 2023-08-28 RX ADMIN — SODIUM CHLORIDE, PRESERVATIVE FREE 10 ML: 5 INJECTION INTRAVENOUS at 10:19

## 2023-08-28 RX ADMIN — SODIUM CHLORIDE, PRESERVATIVE FREE 10 ML: 5 INJECTION INTRAVENOUS at 13:55

## 2023-08-28 RX ADMIN — SODIUM CHLORIDE, PRESERVATIVE FREE 20 ML: 5 INJECTION INTRAVENOUS at 09:38

## 2023-08-28 RX ADMIN — SODIUM CHLORIDE, PRESERVATIVE FREE 10 ML: 5 INJECTION INTRAVENOUS at 09:37

## 2023-08-28 RX ADMIN — DEXAMETHASONE SODIUM PHOSPHATE 12 MG: 4 INJECTION, SOLUTION INTRA-ARTICULAR; INTRALESIONAL; INTRAMUSCULAR; INTRAVENOUS; SOFT TISSUE at 10:22

## 2023-08-28 RX ADMIN — PALONOSETRON 0.25 MG: 0.05 INJECTION, SOLUTION INTRAVENOUS at 10:22

## 2023-08-28 RX ADMIN — HEPARIN 500 UNITS: 100 SYRINGE at 13:56

## 2023-08-28 RX ADMIN — FOSAPREPITANT DIMEGLUMINE 150 MG: 150 INJECTION, POWDER, LYOPHILIZED, FOR SOLUTION INTRAVENOUS at 10:42

## 2023-08-28 RX ADMIN — SODIUM CHLORIDE 20 ML/HR: 9 INJECTION, SOLUTION INTRAVENOUS at 10:20

## 2023-08-28 RX ADMIN — ETOPOSIDE 178 MG: 20 INJECTION INTRAVENOUS at 12:44

## 2023-08-28 NOTE — PATIENT INSTRUCTIONS
Follow up with clinic as instructed for chemotherapy  Please follow up back with me in 10 days.    Please arrange for IV iron to be done that day he sees me on 9/6 instead of tomorrow

## 2023-08-28 NOTE — PLAN OF CARE
Problem: Safety - Adult  Goal: Free from fall injury  Outcome: Adequate for Discharge  Flowsheets (Taken 8/28/2023 1028)  Free From Fall Injury: Instruct family/caregiver on patient safety  Note: Free from falls while in O.P. Oncology. Discussed the need to use the call light for assistance when getting up to ambulate. Problem: Discharge Planning  Goal: Discharge to home or other facility with appropriate resources  Outcome: Adequate for Discharge  Flowsheets (Taken 8/28/2023 1028)  Discharge to home or other facility with appropriate resources: Identify barriers to discharge with patient and caregiver  Note: Verbalize understanding of discharge instructions, follow up appointments, and when to call Physician. Problem: Chronic Conditions and Co-morbidities  Goal: Patient's chronic conditions and co-morbidity symptoms are monitored and maintained or improved  Outcome: Adequate for Discharge  Flowsheets (Taken 8/28/2023 1028)  Care Plan - Patient's Chronic Conditions and Co-Morbidity Symptoms are Monitored and Maintained or Improved: Monitor and assess patient's chronic conditions and comorbid symptoms for stability, deterioration, or improvement  Note: Chemotherapy Teaching     What is Chemotherapy   Drug action [x]   Method of Administration [x]   Handouts given []     Side Effects  Nausea/vomiting [x]   Diarrhea [x]   Fatigue [x]   Signs / Symptoms of infection [x]   Neutropenia [x]   Thrombocytopenia [x]   Alopecia [x]   neuropathy [x]   [place] diet &  the importance of fluids [x]       Micellaneous  Importance of nutrition [x]   Importance of oral hygiene [x]   When to call the MD [x]   Monitoring labs [x]   Use of supportive services []     Explanation of Drug Regimen / Frequency  Carboplatin and etoposide     Comments  Verbalized understanding to drug,action,side effects and when to call MD         Care plan reviewed with patient.   Patient verbalizes understanding of the plan of care and contribute

## 2023-08-28 NOTE — DISCHARGE INSTRUCTIONS
Please contact your Oncologist if you have any questions regarding the chemotherapy Carboplatin/Etoposide that you received today. Patient instructed if experience any of the symptoms following today's chemotherapy treatment to notify MD immediately or go to emergency department. * dizziness/lightheadedness  *acute nausea/vomiting - not relieved with medication  *headache - not relieved from Tylenol/pain medication  *chest pain/pressure  *rash/itching  *shortness of breath        Drink fluids - 48oz fluids daily  Call if develop fever/ chills/ signs or symptoms of infection     Physician's instructions: Follow up with clinic as instructed for chemotherapy  Please follow up back with me in 10 days.    Please arrange for IV iron to be done that day he sees me on 9/6 instead of tomorrow

## 2023-08-29 ENCOUNTER — TELEPHONE (OUTPATIENT)
Dept: WOUND CARE | Age: 68
End: 2023-08-29

## 2023-08-29 ENCOUNTER — HOSPITAL ENCOUNTER (OUTPATIENT)
Dept: INFUSION THERAPY | Age: 68
Discharge: HOME | End: 2023-08-29
Payer: MEDICARE

## 2023-08-29 VITALS
SYSTOLIC BLOOD PRESSURE: 115 MMHG | RESPIRATION RATE: 16 BRPM | TEMPERATURE: 97.9 F | OXYGEN SATURATION: 99 % | DIASTOLIC BLOOD PRESSURE: 54 MMHG | HEART RATE: 56 BPM

## 2023-08-29 DIAGNOSIS — C79.9 METASTATIC MALIGNANT NEOPLASM, UNSPECIFIED SITE (HCC): ICD-10-CM

## 2023-08-29 DIAGNOSIS — C7A.8 LARGE CELL NEUROENDOCRINE CARCINOMA (HCC): Primary | ICD-10-CM

## 2023-08-29 LAB — HBV CORE IGG+IGM SERPL QL IA: NONREACTIVE

## 2023-08-29 PROCEDURE — 96375 TX/PRO/DX INJ NEW DRUG ADDON: CPT

## 2023-08-29 PROCEDURE — 2580000003 HC RX 258: Performed by: INTERNAL MEDICINE

## 2023-08-29 PROCEDURE — 96413 CHEMO IV INFUSION 1 HR: CPT

## 2023-08-29 PROCEDURE — 6360000002 HC RX W HCPCS: Performed by: INTERNAL MEDICINE

## 2023-08-29 RX ORDER — DEXAMETHASONE SODIUM PHOSPHATE 4 MG/ML
8 INJECTION, SOLUTION INTRA-ARTICULAR; INTRALESIONAL; INTRAMUSCULAR; INTRAVENOUS; SOFT TISSUE ONCE
Status: COMPLETED | OUTPATIENT
Start: 2023-08-29 | End: 2023-08-29

## 2023-08-29 RX ORDER — SODIUM CHLORIDE 0.9 % (FLUSH) 0.9 %
5-40 SYRINGE (ML) INJECTION PRN
Status: CANCELLED | OUTPATIENT
Start: 2023-08-29

## 2023-08-29 RX ORDER — HEPARIN 100 UNIT/ML
500 SYRINGE INTRAVENOUS PRN
Status: DISCONTINUED | OUTPATIENT
Start: 2023-08-29 | End: 2023-08-30 | Stop reason: HOSPADM

## 2023-08-29 RX ORDER — HEPARIN 100 UNIT/ML
500 SYRINGE INTRAVENOUS PRN
Status: CANCELLED | OUTPATIENT
Start: 2023-08-29

## 2023-08-29 RX ORDER — SODIUM CHLORIDE 0.9 % (FLUSH) 0.9 %
5-40 SYRINGE (ML) INJECTION PRN
Status: DISCONTINUED | OUTPATIENT
Start: 2023-08-29 | End: 2023-08-30 | Stop reason: HOSPADM

## 2023-08-29 RX ORDER — SODIUM CHLORIDE 9 MG/ML
5-250 INJECTION, SOLUTION INTRAVENOUS PRN
Status: DISCONTINUED | OUTPATIENT
Start: 2023-08-29 | End: 2023-08-30 | Stop reason: HOSPADM

## 2023-08-29 RX ORDER — SODIUM CHLORIDE 9 MG/ML
25 INJECTION, SOLUTION INTRAVENOUS PRN
Status: CANCELLED | OUTPATIENT
Start: 2023-08-29

## 2023-08-29 RX ADMIN — SODIUM CHLORIDE 20 ML/HR: 9 INJECTION, SOLUTION INTRAVENOUS at 12:12

## 2023-08-29 RX ADMIN — ETOPOSIDE 178 MG: 20 INJECTION INTRAVENOUS at 12:49

## 2023-08-29 RX ADMIN — HEPARIN 500 UNITS: 100 SYRINGE at 13:58

## 2023-08-29 RX ADMIN — SODIUM CHLORIDE, PRESERVATIVE FREE 10 ML: 5 INJECTION INTRAVENOUS at 13:58

## 2023-08-29 RX ADMIN — SODIUM CHLORIDE, PRESERVATIVE FREE 20 ML: 5 INJECTION INTRAVENOUS at 12:12

## 2023-08-29 RX ADMIN — DEXAMETHASONE SODIUM PHOSPHATE 8 MG: 4 INJECTION, SOLUTION INTRAMUSCULAR; INTRAVENOUS at 12:45

## 2023-08-29 NOTE — TELEPHONE ENCOUNTER
Called referring office for wound care referral that was received for patient for feet swelling. Asked for clarification as it does not appear that patient has any wounds. Spoke with staff to clarify with provider. Advised that is patient does not have any wounds lymphedema clinic would be a better referral. Okay to discard wound care referral and they will send to lymphedema clinic instead.

## 2023-08-29 NOTE — PLAN OF CARE
Problem: Safety - Adult  Goal: Free from fall injury  Outcome: Adequate for Discharge  Flowsheets (Taken 8/29/2023 1525)  Free From Fall Injury: Instruct family/caregiver on patient safety  Note: Free from falls while in O.P. Oncology. Discussed the need to use the call light for assistance when getting up to ambulate. Problem: Discharge Planning  Goal: Discharge to home or other facility with appropriate resources  Flowsheets (Taken 8/29/2023 1525)  Discharge to home or other facility with appropriate resources: Identify barriers to discharge with patient and caregiver  Note: Verbalize understanding of discharge instructions, follow up appointments, and when to call Physician. Problem: Chronic Conditions and Co-morbidities  Goal: Patient's chronic conditions and co-morbidity symptoms are monitored and maintained or improved  Outcome: Adequate for Discharge  Flowsheets (Taken 8/29/2023 1525)  Care Plan - Patient's Chronic Conditions and Co-Morbidity Symptoms are Monitored and Maintained or Improved: Monitor and assess patient's chronic conditions and comorbid symptoms for stability, deterioration, or improvement  Note: Chemotherapy Teaching     What is Chemotherapy   Drug action [x]   Method of Administration [x]   Handouts given []     Side Effects  Nausea/vomiting [x]   Diarrhea [x]   Fatigue [x]   Signs / Symptoms of infection [x]   Neutropenia [x]   Thrombocytopenia [x]   Alopecia [x]   neuropathy [x]   [place] diet &  the importance of fluids [x]       Micellaneous  Importance of nutrition [x]   Importance of oral hygiene [x]   When to call the MD [x]   Monitoring labs [x]   Use of supportive services []     Explanation of Drug Regimen / Frequency  Etoposide     Comments  Verbalized understanding to drug,action,side effects and when to call MD         Care plan reviewed with patient. Patient verbalizes understanding of the plan of care and contribute to goal setting.

## 2023-08-30 ENCOUNTER — HOSPITAL ENCOUNTER (OUTPATIENT)
Dept: INFUSION THERAPY | Age: 68
Discharge: HOME | End: 2023-08-30
Payer: MEDICARE

## 2023-08-30 VITALS
HEART RATE: 81 BPM | SYSTOLIC BLOOD PRESSURE: 174 MMHG | OXYGEN SATURATION: 95 % | TEMPERATURE: 98.3 F | DIASTOLIC BLOOD PRESSURE: 90 MMHG | RESPIRATION RATE: 16 BRPM

## 2023-08-30 DIAGNOSIS — C7A.8 LARGE CELL NEUROENDOCRINE CARCINOMA (HCC): Primary | ICD-10-CM

## 2023-08-30 DIAGNOSIS — C79.9 METASTATIC MALIGNANT NEOPLASM, UNSPECIFIED SITE (HCC): ICD-10-CM

## 2023-08-30 LAB
ABSOLUTE IMMATURE GRANULOCYTE: 0.02 THOU/MM3 (ref 0–0.07)
BASOPHILS ABSOLUTE: 0 THOU/MM3 (ref 0–0.1)
BASOPHILS NFR BLD AUTO: 0 % (ref 0–3)
BUN BLDP-MCNC: 26 MG/DL (ref 8–26)
CGA SERPL-MCNC: 245 NG/ML (ref 0–103)
CHLORIDE BLD-SCNC: 108 MEQ/L (ref 98–109)
CREAT BLD-MCNC: 0.6 MG/DL (ref 0.5–1.2)
EOSINOPHIL NFR BLD AUTO: 0 % (ref 0–4)
EOSINOPHILS ABSOLUTE: 0 THOU/MM3 (ref 0–0.4)
ERYTHROCYTE [DISTWIDTH] IN BLOOD BY AUTOMATED COUNT: 23.4 % (ref 11.5–14.5)
GASTRIN SERPL-MCNC: 25 PG/ML (ref 0–100)
GFR SERPL CREATININE-BSD FRML MDRD: > 60 ML/MIN/1.73M2
GLUCOSE BLD-MCNC: 147 MG/DL (ref 70–108)
HCT VFR BLD AUTO: 32.5 % (ref 42–52)
HGB BLD-MCNC: 9.6 GM/DL (ref 14–18)
IMMATURE GRANULOCYTES: 0 %
IONIZED CALCIUM, WHOLE BLOOD: 1.24 MMOL/L (ref 1.12–1.32)
LYMPHOCYTES ABSOLUTE: 0.6 THOU/MM3 (ref 1–4.8)
LYMPHOCYTES NFR BLD AUTO: 4 % (ref 15–47)
MAGNESIUM SERPL-MCNC: 2.3 MG/DL (ref 1.6–2.4)
MCH RBC QN AUTO: 25.9 PG (ref 26–33)
MCHC RBC AUTO-ENTMCNC: 29.5 GM/DL (ref 32.2–35.5)
MCV RBC AUTO: 88 FL (ref 80–94)
MONOCYTES ABSOLUTE: 0.6 THOU/MM3 (ref 0.4–1.3)
MONOCYTES NFR BLD AUTO: 4 % (ref 0–12)
NEUTROPHILS NFR BLD AUTO: 91 % (ref 43–75)
PLATELET # BLD AUTO: 235 THOU/MM3 (ref 130–400)
PMV BLD AUTO: 11.8 FL (ref 9.4–12.4)
POTASSIUM BLD-SCNC: 4.2 MEQ/L (ref 3.5–4.9)
RBC # BLD AUTO: 3.71 MILL/MM3 (ref 4.7–6.1)
SEGMENTED NEUTROPHILS ABSOLUTE COUNT: 12.4 THOU/MM3 (ref 1.8–7.7)
SODIUM BLD-SCNC: 139 MEQ/L (ref 138–146)
TOTAL CO2, WHOLE BLOOD: 22 MEQ/L (ref 23–33)
WBC # BLD AUTO: 13.7 THOU/MM3 (ref 4.8–10.8)

## 2023-08-30 PROCEDURE — 96377 APPLICATON ON-BODY INJECTOR: CPT

## 2023-08-30 PROCEDURE — 83735 ASSAY OF MAGNESIUM: CPT

## 2023-08-30 PROCEDURE — 96413 CHEMO IV INFUSION 1 HR: CPT

## 2023-08-30 PROCEDURE — 99211 OFF/OP EST MAY X REQ PHY/QHP: CPT

## 2023-08-30 PROCEDURE — 80047 BASIC METABLC PNL IONIZED CA: CPT

## 2023-08-30 PROCEDURE — 36591 DRAW BLOOD OFF VENOUS DEVICE: CPT

## 2023-08-30 PROCEDURE — 2580000003 HC RX 258: Performed by: INTERNAL MEDICINE

## 2023-08-30 PROCEDURE — 96375 TX/PRO/DX INJ NEW DRUG ADDON: CPT

## 2023-08-30 PROCEDURE — 85025 COMPLETE CBC W/AUTO DIFF WBC: CPT

## 2023-08-30 PROCEDURE — 6360000002 HC RX W HCPCS: Performed by: INTERNAL MEDICINE

## 2023-08-30 RX ORDER — HEPARIN 100 UNIT/ML
500 SYRINGE INTRAVENOUS PRN
Status: CANCELLED | OUTPATIENT
Start: 2023-08-30

## 2023-08-30 RX ORDER — SODIUM CHLORIDE 0.9 % (FLUSH) 0.9 %
5-40 SYRINGE (ML) INJECTION PRN
Status: DISCONTINUED | OUTPATIENT
Start: 2023-08-30 | End: 2023-08-31 | Stop reason: HOSPADM

## 2023-08-30 RX ORDER — DEXAMETHASONE SODIUM PHOSPHATE 4 MG/ML
8 INJECTION, SOLUTION INTRA-ARTICULAR; INTRALESIONAL; INTRAMUSCULAR; INTRAVENOUS; SOFT TISSUE ONCE
Status: COMPLETED | OUTPATIENT
Start: 2023-08-30 | End: 2023-08-30

## 2023-08-30 RX ORDER — HEPARIN 100 UNIT/ML
500 SYRINGE INTRAVENOUS PRN
Status: DISCONTINUED | OUTPATIENT
Start: 2023-08-30 | End: 2023-08-31 | Stop reason: HOSPADM

## 2023-08-30 RX ORDER — SODIUM CHLORIDE 9 MG/ML
25 INJECTION, SOLUTION INTRAVENOUS PRN
Status: CANCELLED | OUTPATIENT
Start: 2023-08-30

## 2023-08-30 RX ORDER — MORPHINE SULFATE 4 MG/ML
2 INJECTION, SOLUTION INTRAMUSCULAR; INTRAVENOUS ONCE
Status: COMPLETED | OUTPATIENT
Start: 2023-08-30 | End: 2023-08-30

## 2023-08-30 RX ORDER — SODIUM CHLORIDE 0.9 % (FLUSH) 0.9 %
5-40 SYRINGE (ML) INJECTION PRN
Status: CANCELLED | OUTPATIENT
Start: 2023-08-30

## 2023-08-30 RX ORDER — SODIUM CHLORIDE 9 MG/ML
5-250 INJECTION, SOLUTION INTRAVENOUS PRN
Status: DISCONTINUED | OUTPATIENT
Start: 2023-08-30 | End: 2023-08-31 | Stop reason: HOSPADM

## 2023-08-30 RX ADMIN — ETOPOSIDE 178 MG: 20 INJECTION INTRAVENOUS at 14:16

## 2023-08-30 RX ADMIN — SODIUM CHLORIDE, PRESERVATIVE FREE 10 ML: 5 INJECTION INTRAVENOUS at 15:34

## 2023-08-30 RX ADMIN — DEXAMETHASONE SODIUM PHOSPHATE 8 MG: 4 INJECTION, SOLUTION INTRAMUSCULAR; INTRAVENOUS at 14:11

## 2023-08-30 RX ADMIN — SODIUM CHLORIDE, PRESERVATIVE FREE 10 ML: 5 INJECTION INTRAVENOUS at 13:21

## 2023-08-30 RX ADMIN — HEPARIN 500 UNITS: 100 SYRINGE at 15:34

## 2023-08-30 RX ADMIN — SODIUM CHLORIDE, PRESERVATIVE FREE 20 ML: 5 INJECTION INTRAVENOUS at 12:20

## 2023-08-30 RX ADMIN — SODIUM CHLORIDE 20 ML/HR: 9 INJECTION, SOLUTION INTRAVENOUS at 13:22

## 2023-08-30 RX ADMIN — MORPHINE SULFATE 2 MG: 4 INJECTION, SOLUTION INTRAMUSCULAR; INTRAVENOUS at 12:25

## 2023-08-30 RX ADMIN — SODIUM CHLORIDE, PRESERVATIVE FREE 10 ML: 5 INJECTION INTRAVENOUS at 12:19

## 2023-08-30 RX ADMIN — PEGFILGRASTIM 6 MG: KIT SUBCUTANEOUS at 15:31

## 2023-08-30 ASSESSMENT — PAIN DESCRIPTION - LOCATION: LOCATION: ABDOMEN

## 2023-08-30 ASSESSMENT — PAIN DESCRIPTION - ORIENTATION: ORIENTATION: RIGHT

## 2023-08-30 ASSESSMENT — PAIN SCALES - GENERAL: PAINLEVEL_OUTOF10: 8

## 2023-08-30 ASSESSMENT — PAIN DESCRIPTION - DESCRIPTORS: DESCRIPTORS: SHARP

## 2023-08-30 NOTE — DISCHARGE INSTRUCTIONS
Neulasta on-body placed on left arm, Patent sent home with instructions re: care and removal of On-body injector. Please contact your Oncologist if you have any questions regarding the etoposide that you received today. You are instructed to call the office or go to the Emergency Dept. If you experience any of the following symptoms:    Dizziness/lightheadedness   Acute nausea or vomiting-not relieved by medications  Headaches-not relieved by medications  New chest pain or pressure  New rash /itching  New shortness of breath  Fever,chills or signs or symptoms of infection    Make sure you are drinking 48 to 64 ounces of water daily-if you are unable to drink fluids let us know right away.

## 2023-08-30 NOTE — PLAN OF CARE
Problem: Discharge Planning  Goal: Discharge to home or other facility with appropriate resources  Outcome: Adequate for Discharge  Flowsheets (Taken 8/30/2023 1632)  Discharge to home or other facility with appropriate resources: Identify barriers to discharge with patient and caregiver  Note: Verbalize understanding of discharge instructions, follow up appointments, and when to call Physician. Problem: Chronic Conditions and Co-morbidities  Goal: Patient's chronic conditions and co-morbidity symptoms are monitored and maintained or improved  Outcome: Adequate for Discharge  Flowsheets (Taken 8/30/2023 1632)  Care Plan - Patient's Chronic Conditions and Co-Morbidity Symptoms are Monitored and Maintained or Improved: Monitor and assess patient's chronic conditions and comorbid symptoms for stability, deterioration, or improvement  Note: Chemotherapy Teaching     What is Chemotherapy   Drug action [x]   Method of Administration [x]   Handouts given []     Side Effects  Nausea/vomiting [x]   Diarrhea [x]   Fatigue [x]   Signs / Symptoms of infection [x]   Neutropenia [x]   Thrombocytopenia [x]   Alopecia [x]   neuropathy [x]   [place] diet &  the importance of fluids [x]       Micellaneous  Importance of nutrition [x]   Importance of oral hygiene [x]   When to call the MD [x]   Monitoring labs [x]   Use of supportive services []     Explanation of Drug Regimen / Frequency  Etoposide     Comments  Verbalized understanding to drug,action,side effects and when to call MD         Problem: Pain  Goal: Verbalizes/displays adequate comfort level or baseline comfort level  Outcome: Adequate for Discharge  Flowsheets (Taken 8/30/2023 1632)  Verbalizes/displays adequate comfort level or baseline comfort level: Encourage patient to monitor pain and request assistance  Note: Patient encouraged to take prescribed pain medications and call Physician if pain is not controlled. Care plan reviewed with patient.   Patient

## 2023-09-01 ENCOUNTER — HOSPITAL ENCOUNTER (OUTPATIENT)
Dept: INFUSION THERAPY | Age: 68
Discharge: HOME | End: 2023-09-01
Payer: MEDICARE

## 2023-09-01 VITALS
HEART RATE: 87 BPM | RESPIRATION RATE: 20 BRPM | DIASTOLIC BLOOD PRESSURE: 54 MMHG | OXYGEN SATURATION: 98 % | TEMPERATURE: 97.8 F | SYSTOLIC BLOOD PRESSURE: 104 MMHG

## 2023-09-01 DIAGNOSIS — C7A.8 LARGE CELL NEUROENDOCRINE CARCINOMA (HCC): Primary | ICD-10-CM

## 2023-09-01 LAB
ABSOLUTE IMMATURE GRANULOCYTE: 1.33 THOU/MM3 (ref 0–0.07)
BASOPHILS ABSOLUTE: 0 THOU/MM3 (ref 0–0.1)
BASOPHILS NFR BLD AUTO: 0 % (ref 0–3)
BUN BLDP-MCNC: 21 MG/DL (ref 8–26)
C DIFF GDH STL QL: NEGATIVE
CHLORIDE BLD-SCNC: 103 MEQ/L (ref 98–109)
CREAT BLD-MCNC: 0.4 MG/DL (ref 0.5–1.2)
EOSINOPHIL NFR BLD AUTO: 0 % (ref 0–4)
EOSINOPHILS ABSOLUTE: 0 THOU/MM3 (ref 0–0.4)
ERYTHROCYTE [DISTWIDTH] IN BLOOD BY AUTOMATED COUNT: 23.1 % (ref 11.5–14.5)
GFR SERPL CREATININE-BSD FRML MDRD: > 60 ML/MIN/1.73M2
GLUCOSE BLD-MCNC: 98 MG/DL (ref 70–108)
HCT VFR BLD AUTO: 34.1 % (ref 42–52)
HGB BLD-MCNC: 10.5 GM/DL (ref 14–18)
IMMATURE GRANULOCYTES: 4 %
IONIZED CALCIUM, WHOLE BLOOD: 1.19 MMOL/L (ref 1.12–1.32)
LYMPHOCYTES ABSOLUTE: 1.5 THOU/MM3 (ref 1–4.8)
LYMPHOCYTES NFR BLD AUTO: 4 % (ref 15–47)
MCH RBC QN AUTO: 26.6 PG (ref 26–33)
MCHC RBC AUTO-ENTMCNC: 30.8 GM/DL (ref 32.2–35.5)
MCV RBC AUTO: 86 FL (ref 80–94)
MONOCYTES ABSOLUTE: 0 THOU/MM3 (ref 0.4–1.3)
MONOCYTES NFR BLD AUTO: 0 % (ref 0–12)
NEUTROPHILS NFR BLD AUTO: 92 % (ref 43–75)
PLATELET # BLD AUTO: 215 THOU/MM3 (ref 130–400)
PMV BLD AUTO: 11.4 FL (ref 9.4–12.4)
POTASSIUM BLD-SCNC: 4.1 MEQ/L (ref 3.5–4.9)
RBC # BLD AUTO: 3.95 MILL/MM3 (ref 4.7–6.1)
SEGMENTED NEUTROPHILS ABSOLUTE COUNT: 31.1 THOU/MM3 (ref 1.8–7.7)
SEROTONIN BLOOD: NORMAL
SODIUM BLD-SCNC: 135 MEQ/L (ref 138–146)
TOTAL CO2, WHOLE BLOOD: 25 MEQ/L (ref 23–33)
WBC # BLD AUTO: 34 THOU/MM3 (ref 4.8–10.8)

## 2023-09-01 PROCEDURE — 87449 NOS EACH ORGANISM AG IA: CPT

## 2023-09-01 PROCEDURE — 85025 COMPLETE CBC W/AUTO DIFF WBC: CPT

## 2023-09-01 PROCEDURE — 96361 HYDRATE IV INFUSION ADD-ON: CPT

## 2023-09-01 PROCEDURE — 99212 OFFICE O/P EST SF 10 MIN: CPT

## 2023-09-01 PROCEDURE — 2580000003 HC RX 258: Performed by: INTERNAL MEDICINE

## 2023-09-01 PROCEDURE — 80047 BASIC METABLC PNL IONIZED CA: CPT

## 2023-09-01 PROCEDURE — 36591 DRAW BLOOD OFF VENOUS DEVICE: CPT

## 2023-09-01 PROCEDURE — 6360000002 HC RX W HCPCS: Performed by: INTERNAL MEDICINE

## 2023-09-01 PROCEDURE — 96360 HYDRATION IV INFUSION INIT: CPT

## 2023-09-01 RX ORDER — HEPARIN 100 UNIT/ML
500 SYRINGE INTRAVENOUS PRN
Status: DISCONTINUED | OUTPATIENT
Start: 2023-09-01 | End: 2023-09-02 | Stop reason: HOSPADM

## 2023-09-01 RX ORDER — SODIUM CHLORIDE 0.9 % (FLUSH) 0.9 %
5-40 SYRINGE (ML) INJECTION PRN
Status: CANCELLED | OUTPATIENT
Start: 2023-09-01

## 2023-09-01 RX ORDER — 0.9 % SODIUM CHLORIDE 0.9 %
500 INTRAVENOUS SOLUTION INTRAVENOUS ONCE
Status: COMPLETED | OUTPATIENT
Start: 2023-09-01 | End: 2023-09-01

## 2023-09-01 RX ORDER — SODIUM CHLORIDE 0.9 % (FLUSH) 0.9 %
5-40 SYRINGE (ML) INJECTION PRN
Status: DISCONTINUED | OUTPATIENT
Start: 2023-09-01 | End: 2023-09-02 | Stop reason: HOSPADM

## 2023-09-01 RX ORDER — SODIUM CHLORIDE 9 MG/ML
25 INJECTION, SOLUTION INTRAVENOUS PRN
Status: CANCELLED | OUTPATIENT
Start: 2023-09-01

## 2023-09-01 RX ORDER — LOPERAMIDE HYDROCHLORIDE 2 MG/1
2 CAPSULE ORAL 4 TIMES DAILY PRN
COMMUNITY

## 2023-09-01 RX ORDER — HEPARIN 100 UNIT/ML
500 SYRINGE INTRAVENOUS PRN
Status: CANCELLED | OUTPATIENT
Start: 2023-09-01

## 2023-09-01 RX ADMIN — SODIUM CHLORIDE 500 ML: 9 INJECTION, SOLUTION INTRAVENOUS at 12:43

## 2023-09-01 RX ADMIN — SODIUM CHLORIDE 500 ML: 9 INJECTION, SOLUTION INTRAVENOUS at 11:41

## 2023-09-01 RX ADMIN — SODIUM CHLORIDE, PRESERVATIVE FREE 10 ML: 5 INJECTION INTRAVENOUS at 11:05

## 2023-09-01 RX ADMIN — SODIUM CHLORIDE, PRESERVATIVE FREE 10 ML: 5 INJECTION INTRAVENOUS at 11:41

## 2023-09-01 RX ADMIN — SODIUM CHLORIDE, PRESERVATIVE FREE 20 ML: 5 INJECTION INTRAVENOUS at 11:06

## 2023-09-01 RX ADMIN — SODIUM CHLORIDE, PRESERVATIVE FREE 10 ML: 5 INJECTION INTRAVENOUS at 13:43

## 2023-09-01 RX ADMIN — Medication 500 UNITS: at 13:43

## 2023-09-01 ASSESSMENT — PAIN DESCRIPTION - LOCATION: LOCATION: ABDOMEN

## 2023-09-01 ASSESSMENT — PAIN DESCRIPTION - ORIENTATION: ORIENTATION: MID;LEFT;RIGHT

## 2023-09-01 ASSESSMENT — PAIN SCALES - GENERAL: PAINLEVEL_OUTOF10: 7

## 2023-09-01 NOTE — PROGRESS NOTES
Dr Manjit Whitley in to see patient states to order an additional 500ml . 9ns over 1 hour and send stool for c diff.

## 2023-09-01 NOTE — PLAN OF CARE
Problem: Discharge Planning  Goal: Discharge to home or other facility with appropriate resources  Outcome: Adequate for Discharge  Flowsheets (Taken 9/1/2023 1542)  Discharge to home or other facility with appropriate resources: Identify barriers to discharge with patient and caregiver  Note: Verbalized understanding of discharge instructions, follow-up appointments, and when to call the physician. Problem: Safety - Adult  Goal: Free from fall injury  Outcome: Adequate for Discharge  Flowsheets (Taken 9/1/2023 1542)  Free From Fall Injury: Instruct family/caregiver on patient safety  Note: Patient verbalizes understanding of fall precautions. Patient free from falls this visit. Problem: Infection - Adult  Goal: Absence of infection at discharge  Outcome: Adequate for Discharge  Flowsheets (Taken 9/1/2023 1542)  Absence of infection at discharge:   Assess and monitor for signs and symptoms of infection   Monitor all insertion sites i.e., indwelling lines, tubes and drains  Note: Mediport site with no redness or warmth. Skin over port intact with no signs of breakdown noted. Patient verbalizes signs/symptoms of port infection and when to notify the physician. Care plan reviewed with patient and family. Patient and family verbalize understanding of the plan of care and contribute to goal setting.

## 2023-09-01 NOTE — DISCHARGE INSTRUCTIONS
Please contact your Oncologist if you have any questions regarding the IV fluids that you received today. Patient instructed if experience any of the symptoms following today's fluids / to notify MD immediately or go to emergency department.     * dizziness/lightheadedness  *acute nausea/vomiting - not relieved with medication  *headache - not relieved from Tylenol/pain medication  *chest pain/pressure  *rash/itching  *shortness of breath        Drink fluids - 48oz fluids daily  Call if develop fever/ chills/ signs or symptoms of infection

## 2023-09-06 DIAGNOSIS — C7A.8 LARGE CELL NEUROENDOCRINE CARCINOMA (HCC): Primary | ICD-10-CM

## 2023-09-07 ENCOUNTER — HOSPITAL ENCOUNTER (OUTPATIENT)
Dept: INFUSION THERAPY | Age: 68
Discharge: HOME | End: 2023-09-07
Payer: MEDICARE

## 2023-09-07 ENCOUNTER — OFFICE VISIT (OUTPATIENT)
Dept: ONCOLOGY | Age: 68
End: 2023-09-07

## 2023-09-07 VITALS
OXYGEN SATURATION: 99 % | BODY MASS INDEX: 20.73 KG/M2 | SYSTOLIC BLOOD PRESSURE: 113 MMHG | HEART RATE: 63 BPM | TEMPERATURE: 98 F | DIASTOLIC BLOOD PRESSURE: 53 MMHG | HEIGHT: 68 IN | WEIGHT: 136.8 LBS | RESPIRATION RATE: 18 BRPM

## 2023-09-07 VITALS
DIASTOLIC BLOOD PRESSURE: 67 MMHG | OXYGEN SATURATION: 99 % | HEIGHT: 68 IN | RESPIRATION RATE: 20 BRPM | WEIGHT: 136.8 LBS | BODY MASS INDEX: 20.73 KG/M2 | HEART RATE: 72 BPM | TEMPERATURE: 98.3 F | SYSTOLIC BLOOD PRESSURE: 124 MMHG

## 2023-09-07 DIAGNOSIS — C7A.8 LARGE CELL NEUROENDOCRINE CARCINOMA (HCC): ICD-10-CM

## 2023-09-07 DIAGNOSIS — C7A.8 LARGE CELL NEUROENDOCRINE CARCINOMA (HCC): Primary | ICD-10-CM

## 2023-09-07 DIAGNOSIS — D50.0 IRON DEFICIENCY ANEMIA DUE TO CHRONIC BLOOD LOSS: ICD-10-CM

## 2023-09-07 LAB
ALBUMIN SERPL BCG-MCNC: 3.7 G/DL (ref 3.5–5.1)
ALP SERPL-CCNC: 158 U/L (ref 38–126)
ALT SERPL W/O P-5'-P-CCNC: 14 U/L (ref 11–66)
ANISOCYTOSIS BLD QL SMEAR: PRESENT
AST SERPL-CCNC: 19 U/L (ref 5–40)
BASOPHILS ABSOLUTE: 0 THOU/MM3 (ref 0–0.1)
BASOPHILS NFR BLD AUTO: 1.6 %
BILIRUB CONJ SERPL-MCNC: < 0.2 MG/DL (ref 0–0.3)
BILIRUB SERPL-MCNC: 0.3 MG/DL (ref 0.3–1.2)
BUN BLDP-MCNC: 8 MG/DL (ref 8–26)
CHLORIDE BLD-SCNC: 101 MEQ/L (ref 98–109)
CREAT BLD-MCNC: 0.5 MG/DL (ref 0.5–1.2)
DEPRECATED RDW RBC AUTO: 69.9 FL (ref 35–45)
DOHLE BOD BLD QL SMEAR: PRESENT
EOSINOPHIL NFR BLD AUTO: 0.4 %
EOSINOPHILS ABSOLUTE: 0 THOU/MM3 (ref 0–0.4)
ERYTHROCYTE [DISTWIDTH] IN BLOOD BY AUTOMATED COUNT: 22.6 % (ref 11.5–14.5)
GFR SERPL CREATININE-BSD FRML MDRD: > 60 ML/MIN/1.73M2
GLUCOSE BLD-MCNC: 129 MG/DL (ref 70–108)
HCT VFR BLD AUTO: 30.8 % (ref 42–52)
HGB BLD-MCNC: 9.4 GM/DL (ref 14–18)
IMM GRANULOCYTES # BLD AUTO: 0.03 THOU/MM3 (ref 0–0.07)
IMM GRANULOCYTES NFR BLD AUTO: 1.2 %
IONIZED CALCIUM, WHOLE BLOOD: 1.16 MMOL/L (ref 1.12–1.32)
LYMPHOCYTES ABSOLUTE: 1.1 THOU/MM3 (ref 1–4.8)
LYMPHOCYTES NFR BLD AUTO: 42.6 %
MCH RBC QN AUTO: 26.6 PG (ref 26–33)
MCHC RBC AUTO-ENTMCNC: 30.5 GM/DL (ref 32.2–35.5)
MCV RBC AUTO: 87.3 FL (ref 80–94)
MONOCYTES ABSOLUTE: 0.5 THOU/MM3 (ref 0.4–1.3)
MONOCYTES NFR BLD AUTO: 18 %
NEUTROPHILS NFR BLD AUTO: 36.2 %
NRBC BLD AUTO-RTO: 0 /100 WBC
PLATELET # BLD AUTO: 107 THOU/MM3 (ref 130–400)
PLATELET BLD QL SMEAR: ADEQUATE
PMV BLD AUTO: 12 FL (ref 9.4–12.4)
POLYCHROMASIA BLD QL SMEAR: ABNORMAL
POTASSIUM BLD-SCNC: 3.4 MEQ/L (ref 3.5–4.9)
PROT SERPL-MCNC: 6.8 G/DL (ref 6.1–8)
RBC # BLD AUTO: 3.53 MILL/MM3 (ref 4.7–6.1)
SCAN OF BLOOD SMEAR: NORMAL
SEGMENTED NEUTROPHILS ABSOLUTE COUNT: 0.9 THOU/MM3 (ref 1.8–7.7)
SODIUM BLD-SCNC: 135 MEQ/L (ref 138–146)
TOTAL CO2, WHOLE BLOOD: 26 MEQ/L (ref 23–33)
TOXIC GRANULES BLD QL SMEAR: PRESENT
WBC # BLD AUTO: 2.6 THOU/MM3 (ref 4.8–10.8)

## 2023-09-07 PROCEDURE — 99211 OFF/OP EST MAY X REQ PHY/QHP: CPT

## 2023-09-07 PROCEDURE — 96365 THER/PROPH/DIAG IV INF INIT: CPT

## 2023-09-07 PROCEDURE — 80076 HEPATIC FUNCTION PANEL: CPT

## 2023-09-07 PROCEDURE — 85025 COMPLETE CBC W/AUTO DIFF WBC: CPT

## 2023-09-07 PROCEDURE — 80047 BASIC METABLC PNL IONIZED CA: CPT

## 2023-09-07 PROCEDURE — 36591 DRAW BLOOD OFF VENOUS DEVICE: CPT

## 2023-09-07 PROCEDURE — 2580000003 HC RX 258: Performed by: INTERNAL MEDICINE

## 2023-09-07 PROCEDURE — 96366 THER/PROPH/DIAG IV INF ADDON: CPT

## 2023-09-07 PROCEDURE — 6360000002 HC RX W HCPCS: Performed by: INTERNAL MEDICINE

## 2023-09-07 RX ORDER — SODIUM CHLORIDE 0.9 % (FLUSH) 0.9 %
5-40 SYRINGE (ML) INJECTION PRN
OUTPATIENT
Start: 2023-09-07

## 2023-09-07 RX ORDER — SODIUM CHLORIDE 9 MG/ML
INJECTION, SOLUTION INTRAVENOUS CONTINUOUS
OUTPATIENT
Start: 2023-09-07

## 2023-09-07 RX ORDER — SODIUM CHLORIDE 0.9 % (FLUSH) 0.9 %
5-40 SYRINGE (ML) INJECTION PRN
Status: DISCONTINUED | OUTPATIENT
Start: 2023-09-07 | End: 2023-09-08 | Stop reason: HOSPADM

## 2023-09-07 RX ORDER — SODIUM CHLORIDE 9 MG/ML
5-250 INJECTION, SOLUTION INTRAVENOUS PRN
OUTPATIENT
Start: 2023-09-07

## 2023-09-07 RX ORDER — ALBUTEROL SULFATE 90 UG/1
4 AEROSOL, METERED RESPIRATORY (INHALATION) PRN
OUTPATIENT
Start: 2023-09-07

## 2023-09-07 RX ORDER — ONDANSETRON 2 MG/ML
8 INJECTION INTRAMUSCULAR; INTRAVENOUS
OUTPATIENT
Start: 2023-09-07

## 2023-09-07 RX ORDER — HEPARIN 100 UNIT/ML
500 SYRINGE INTRAVENOUS PRN
Status: DISCONTINUED | OUTPATIENT
Start: 2023-09-07 | End: 2023-09-08 | Stop reason: HOSPADM

## 2023-09-07 RX ORDER — ACETAMINOPHEN 325 MG/1
650 TABLET ORAL
OUTPATIENT
Start: 2023-09-07

## 2023-09-07 RX ORDER — EPINEPHRINE 1 MG/ML
0.3 INJECTION, SOLUTION INTRAMUSCULAR; SUBCUTANEOUS PRN
OUTPATIENT
Start: 2023-09-07

## 2023-09-07 RX ORDER — SODIUM CHLORIDE 9 MG/ML
25 INJECTION, SOLUTION INTRAVENOUS PRN
OUTPATIENT
Start: 2023-09-07

## 2023-09-07 RX ORDER — OXYCODONE HYDROCHLORIDE 5 MG/1
5 TABLET ORAL EVERY 4 HOURS PRN
Qty: 42 TABLET | Refills: 0 | Status: SHIPPED | OUTPATIENT
Start: 2023-09-07 | End: 2023-09-14

## 2023-09-07 RX ORDER — SODIUM CHLORIDE 9 MG/ML
5-250 INJECTION, SOLUTION INTRAVENOUS PRN
Status: DISCONTINUED | OUTPATIENT
Start: 2023-09-07 | End: 2023-09-08 | Stop reason: HOSPADM

## 2023-09-07 RX ORDER — DIPHENHYDRAMINE HYDROCHLORIDE 50 MG/ML
50 INJECTION INTRAMUSCULAR; INTRAVENOUS
OUTPATIENT
Start: 2023-09-07

## 2023-09-07 RX ORDER — HEPARIN 100 UNIT/ML
500 SYRINGE INTRAVENOUS PRN
OUTPATIENT
Start: 2023-09-07

## 2023-09-07 RX ADMIN — Medication 500 UNITS: at 11:18

## 2023-09-07 RX ADMIN — SODIUM CHLORIDE 20 ML/HR: 9 INJECTION, SOLUTION INTRAVENOUS at 09:22

## 2023-09-07 RX ADMIN — SODIUM CHLORIDE, PRESERVATIVE FREE 10 ML: 5 INJECTION INTRAVENOUS at 11:17

## 2023-09-07 RX ADMIN — SODIUM CHLORIDE, PRESERVATIVE FREE 10 ML: 5 INJECTION INTRAVENOUS at 09:19

## 2023-09-07 RX ADMIN — SODIUM CHLORIDE, PRESERVATIVE FREE 20 ML: 5 INJECTION INTRAVENOUS at 08:22

## 2023-09-07 RX ADMIN — IRON SUCROSE 300 MG: 20 INJECTION, SOLUTION INTRAVENOUS at 09:23

## 2023-09-07 RX ADMIN — SODIUM CHLORIDE, PRESERVATIVE FREE 10 ML: 5 INJECTION INTRAVENOUS at 08:21

## 2023-09-07 NOTE — PLAN OF CARE
Problem: Safety - Adult  Goal: Free from fall injury  Outcome: Adequate for Discharge  Flowsheets (Taken 9/7/2023 0928)  Free From Fall Injury: Instruct family/caregiver on patient safety  Note: Free from falls while in O.P. Oncology. Discussed the need to use the call light for assistance when getting up to ambulate. Problem: Discharge Planning  Goal: Discharge to home or other facility with appropriate resources  Outcome: Adequate for Discharge  Flowsheets (Taken 9/7/2023 4320)  Discharge to home or other facility with appropriate resources: Identify barriers to discharge with patient and caregiver  Note: Verbalize understanding of discharge instructions, follow up appointments, and when to call Physician. Problem: Chronic Conditions and Co-morbidities  Goal: Patient's chronic conditions and co-morbidity symptoms are monitored and maintained or improved  Outcome: Adequate for Discharge  Flowsheets (Taken 9/7/2023 0928)  Care Plan - Patient's Chronic Conditions and Co-Morbidity Symptoms are Monitored and Maintained or Improved: Monitor and assess patient's chronic conditions and comorbid symptoms for stability, deterioration, or improvement  Note: Patient verbalizes understanding to verbal information given on venofer and blood draw from mediport, action and possible side effects. Aware to call MD if develop complications. Care plan reviewed with patient. Patient verbalizes understanding of the plan of care and contribute to goal setting.

## 2023-09-07 NOTE — PROGRESS NOTES
Patient tolerated venofer and blood draw without any complications. Discharge instructions given to patient-verbalizes understanding. Ambulated off unit per self with belongings.

## 2023-09-07 NOTE — PROGRESS NOTES
avaialable    MOLECULAR:  Not yet available. ASSESSMENT/PLAN:    Patient Active Problem List   Diagnosis    Atrial fibrillation ([address])    Benign essential HTN    Mixed hyperlipidemia    Hyperuricemia    Colon cancer (HCC)    Large cell neuroendocrine carcinoma (HCC)    Metastatic malignant neoplasm (HCC)    Iron deficiency anemia        1) Large bowel , large cell neuroendocrine tumor :   Poorly differentiated, large cell neuroendocrine tumor:   S/p right hemicolectomy 7/20/2023 : Pathologic stage: pT4a, pN0. Ki 67: 90%  - MMR stable  - Metastatic in abdominal lymph nodes and lung lesion.   - PET scan with uptake in lung lesion and abdominal lymph nodes consistent with metastatic disease.   - No mets on CT head with contrast ( patient can not get even open MRI due to claustrophobia, said he tried with xanax as well and that did not work)     - NGS showed ALK rearrangement ALK-EML4 rearrangement. That might indicate sensitivity to ALK inhibitors. - 8/28/2023:  c1 D1 of  Carbo/etoposide, will plan to repeat PET scan after 2 cycles. - 9/7/2023:  diarrhea resolved, he seems to have tolerated first cycle okay except for diarrhea the first two days, continue chemo as planned. ## Anemia:   SUKH, due to blood loss from tumor and perioperative  Receiving IV iron replacement     ## Afib; On coumadin per cardiology in [facility].   Consider switching to [address] in the futuer. For now discussed with him that we might need to hold the medication if platelets are to go down. Platelets are still above 100k. ## pleural effusions seen on last CT chest in the ED, is to follow earlier with cardiology in [facility] and pcp. Reumed diuretics, effusions are small on recent PET, breathing is better. ## consult dietitian. ##  Debility:    Follow Up:   In 10 days for cycle 2        Discussed with him to call the clinic for any new symptoms or for any bleeding        Total of 30 minutes were spent on

## 2023-09-11 ENCOUNTER — OFFICE VISIT (OUTPATIENT)
Dept: SURGERY | Age: 68
End: 2023-09-11

## 2023-09-11 VITALS
DIASTOLIC BLOOD PRESSURE: 54 MMHG | TEMPERATURE: 97.7 F | SYSTOLIC BLOOD PRESSURE: 94 MMHG | HEIGHT: 68 IN | WEIGHT: 139.9 LBS | OXYGEN SATURATION: 97 % | BODY MASS INDEX: 21.2 KG/M2 | RESPIRATION RATE: 18 BRPM | HEART RATE: 109 BPM

## 2023-09-11 DIAGNOSIS — Z90.49 S/P RIGHT COLECTOMY: Primary | ICD-10-CM

## 2023-09-11 PROCEDURE — 99024 POSTOP FOLLOW-UP VISIT: CPT | Performed by: NURSE PRACTITIONER

## 2023-09-11 NOTE — PROGRESS NOTES
tissue reveal several tentatively identified lymph nodes. Representative sections are submitted. Cassette #1 - proximal and   distal resection line; cassette #2 - mesenteric margin; cassette #3 -   appendix; cassettes #4 and #5 - lesion with perforation; cassettes #6   through #8 - lesion; cassette #9 - uninvolved right colon; cassette #10   - omentum; and cassettes #11 - #12 - tentatively identified lymph   nodes. ss.  SMW/DKR:[id number]       Microscopic Examination:   A. Microscopic examination was performed. B.   Reporting Template   Protocol Posting Date: June 2022   CASE SUMMARY: (COLON AND RECTUM: Resection, Including Transanal Disk   Excision of Rectal Neoplasms)   Standard(s): AJCC-UICC 8     Procedure   Right hemicolectomy     Tumor Site   Hepatic flexure     Histologic Type   Large cell neuroendocrine carcinoma. Comment: immunostains show the malignant cells express pankeratin and   synaptophysin without chromogranin, which supports the diagnosis.      Histologic Grade   G3, poorly differentiated     Tumor Size   6 x 5 cm     Multiple Primary Sites (e.g., hepatic flexure and transverse colon)   Not applicable (no additional primary site(s) present)     Tumor Extent   Invades visceral peritoneum (including tumor continuous with serosal   surface through area of inflammation)     Macroscopic Tumor Perforation   Present     Lymphovascular Invasion   Not identified     Perineural Invasion   Not identified     Treatment Effect   No known presurgical therapy     Margin Status for Invasive Carcinoma   All margins negative for invasive carcinoma     Closest Margin(s) to Invasive Carcinoma (select all that apply)   Distal     Distance from Invasive Carcinoma to Closest Margin   3 cm     Margin Status for Non-Invasive Tumor   All margins negative for high-grade dysplasia / intramucosal carcinoma   and low-grade dysplasia     Regional Lymph Node Status   All regional lymph nodes negative for tumor

## 2023-09-18 ENCOUNTER — CLINICAL DOCUMENTATION (OUTPATIENT)
Dept: CASE MANAGEMENT | Age: 68
End: 2023-09-18

## 2023-09-18 ENCOUNTER — HOSPITAL ENCOUNTER (OUTPATIENT)
Dept: INFUSION THERAPY | Age: 68
Discharge: HOME | End: 2023-09-18
Payer: MEDICARE

## 2023-09-18 ENCOUNTER — OFFICE VISIT (OUTPATIENT)
Dept: ONCOLOGY | Age: 68
End: 2023-09-18
Payer: MEDICARE

## 2023-09-18 VITALS
DIASTOLIC BLOOD PRESSURE: 58 MMHG | TEMPERATURE: 98.2 F | RESPIRATION RATE: 16 BRPM | HEART RATE: 65 BPM | BODY MASS INDEX: 21.25 KG/M2 | HEIGHT: 68 IN | WEIGHT: 140.2 LBS | SYSTOLIC BLOOD PRESSURE: 107 MMHG | OXYGEN SATURATION: 95 %

## 2023-09-18 VITALS
HEART RATE: 65 BPM | HEIGHT: 68 IN | RESPIRATION RATE: 18 BRPM | SYSTOLIC BLOOD PRESSURE: 127 MMHG | TEMPERATURE: 97.9 F | BODY MASS INDEX: 21.25 KG/M2 | OXYGEN SATURATION: 98 % | DIASTOLIC BLOOD PRESSURE: 60 MMHG | WEIGHT: 140.2 LBS

## 2023-09-18 DIAGNOSIS — C79.9 METASTATIC MALIGNANT NEOPLASM, UNSPECIFIED SITE (HCC): ICD-10-CM

## 2023-09-18 DIAGNOSIS — C7A.8 LARGE CELL NEUROENDOCRINE CARCINOMA (HCC): Primary | ICD-10-CM

## 2023-09-18 LAB
ABSOLUTE IMMATURE GRANULOCYTE: 0.03 THOU/MM3 (ref 0–0.07)
ALBUMIN SERPL BCG-MCNC: 3.9 G/DL (ref 3.5–5.1)
ALP SERPL-CCNC: 164 U/L (ref 38–126)
ALT SERPL W/O P-5'-P-CCNC: 26 U/L (ref 11–66)
AST SERPL-CCNC: 33 U/L (ref 5–40)
BASOPHILS ABSOLUTE: 0.1 THOU/MM3 (ref 0–0.1)
BASOPHILS NFR BLD AUTO: 1 % (ref 0–3)
BILIRUB CONJ SERPL-MCNC: < 0.2 MG/DL (ref 0–0.3)
BILIRUB SERPL-MCNC: 0.3 MG/DL (ref 0.3–1.2)
BUN BLDP-MCNC: 16 MG/DL (ref 8–26)
CHLORIDE BLD-SCNC: 101 MEQ/L (ref 98–109)
CREAT BLD-MCNC: 0.6 MG/DL (ref 0.5–1.2)
EOSINOPHIL NFR BLD AUTO: 0 % (ref 0–4)
EOSINOPHILS ABSOLUTE: 0 THOU/MM3 (ref 0–0.4)
ERYTHROCYTE [DISTWIDTH] IN BLOOD BY AUTOMATED COUNT: 24.8 % (ref 11.5–14.5)
GFR SERPL CREATININE-BSD FRML MDRD: > 60 ML/MIN/1.73M2
GLUCOSE BLD-MCNC: 118 MG/DL (ref 70–108)
HCT VFR BLD AUTO: 34.2 % (ref 42–52)
HGB BLD-MCNC: 10.4 GM/DL (ref 14–18)
IMMATURE GRANULOCYTES: 0 %
IONIZED CALCIUM, WHOLE BLOOD: 1.21 MMOL/L (ref 1.12–1.32)
LYMPHOCYTES ABSOLUTE: 2.1 THOU/MM3 (ref 1–4.8)
LYMPHOCYTES NFR BLD AUTO: 21 % (ref 15–47)
MCH RBC QN AUTO: 27.3 PG (ref 26–33)
MCHC RBC AUTO-ENTMCNC: 30.4 GM/DL (ref 32.2–35.5)
MCV RBC AUTO: 90 FL (ref 80–94)
MONOCYTES ABSOLUTE: 0.9 THOU/MM3 (ref 0.4–1.3)
MONOCYTES NFR BLD AUTO: 9 % (ref 0–12)
NEUTROPHILS NFR BLD AUTO: 69 % (ref 43–75)
PLATELET # BLD AUTO: 382 THOU/MM3 (ref 130–400)
PMV BLD AUTO: 10.1 FL (ref 9.4–12.4)
POTASSIUM BLD-SCNC: 4.5 MEQ/L (ref 3.5–4.9)
PROT SERPL-MCNC: 7.2 G/DL (ref 6.1–8)
RBC # BLD AUTO: 3.81 MILL/MM3 (ref 4.7–6.1)
SEGMENTED NEUTROPHILS ABSOLUTE COUNT: 6.9 THOU/MM3 (ref 1.8–7.7)
SODIUM BLD-SCNC: 136 MEQ/L (ref 138–146)
TOTAL CO2, WHOLE BLOOD: 28 MEQ/L (ref 23–33)
WBC # BLD AUTO: 9.9 THOU/MM3 (ref 4.8–10.8)

## 2023-09-18 PROCEDURE — 3074F SYST BP LT 130 MM HG: CPT | Performed by: INTERNAL MEDICINE

## 2023-09-18 PROCEDURE — 96367 TX/PROPH/DG ADDL SEQ IV INF: CPT

## 2023-09-18 PROCEDURE — 99211 OFF/OP EST MAY X REQ PHY/QHP: CPT

## 2023-09-18 PROCEDURE — 6360000002 HC RX W HCPCS: Performed by: INTERNAL MEDICINE

## 2023-09-18 PROCEDURE — 1124F ACP DISCUSS-NO DSCNMKR DOCD: CPT | Performed by: INTERNAL MEDICINE

## 2023-09-18 PROCEDURE — 96375 TX/PRO/DX INJ NEW DRUG ADDON: CPT

## 2023-09-18 PROCEDURE — 85025 COMPLETE CBC W/AUTO DIFF WBC: CPT

## 2023-09-18 PROCEDURE — 2580000003 HC RX 258: Performed by: INTERNAL MEDICINE

## 2023-09-18 PROCEDURE — 3078F DIAST BP <80 MM HG: CPT | Performed by: INTERNAL MEDICINE

## 2023-09-18 PROCEDURE — 36591 DRAW BLOOD OFF VENOUS DEVICE: CPT

## 2023-09-18 PROCEDURE — 96417 CHEMO IV INFUS EACH ADDL SEQ: CPT

## 2023-09-18 PROCEDURE — 99214 OFFICE O/P EST MOD 30 MIN: CPT | Performed by: INTERNAL MEDICINE

## 2023-09-18 PROCEDURE — 96413 CHEMO IV INFUSION 1 HR: CPT

## 2023-09-18 PROCEDURE — 80047 BASIC METABLC PNL IONIZED CA: CPT

## 2023-09-18 PROCEDURE — 80076 HEPATIC FUNCTION PANEL: CPT

## 2023-09-18 RX ORDER — ALBUTEROL SULFATE 90 UG/1
4 AEROSOL, METERED RESPIRATORY (INHALATION) PRN
Status: CANCELLED | OUTPATIENT
Start: 2023-09-19

## 2023-09-18 RX ORDER — ACETAMINOPHEN 325 MG/1
650 TABLET ORAL
Status: CANCELLED | OUTPATIENT
Start: 2023-09-18

## 2023-09-18 RX ORDER — ONDANSETRON 2 MG/ML
8 INJECTION INTRAMUSCULAR; INTRAVENOUS
Status: CANCELLED | OUTPATIENT
Start: 2023-09-18

## 2023-09-18 RX ORDER — MEPERIDINE HYDROCHLORIDE 50 MG/ML
12.5 INJECTION INTRAMUSCULAR; INTRAVENOUS; SUBCUTANEOUS PRN
Status: CANCELLED | OUTPATIENT
Start: 2023-09-18

## 2023-09-18 RX ORDER — HEPARIN SODIUM (PORCINE) LOCK FLUSH IV SOLN 100 UNIT/ML 100 UNIT/ML
500 SOLUTION INTRAVENOUS PRN
Status: CANCELLED | OUTPATIENT
Start: 2023-09-18

## 2023-09-18 RX ORDER — FAMOTIDINE 10 MG/ML
20 INJECTION, SOLUTION INTRAVENOUS
Status: CANCELLED | OUTPATIENT
Start: 2023-09-20

## 2023-09-18 RX ORDER — HEPARIN SODIUM (PORCINE) LOCK FLUSH IV SOLN 100 UNIT/ML 100 UNIT/ML
500 SOLUTION INTRAVENOUS PRN
Status: CANCELLED | OUTPATIENT
Start: 2023-09-20

## 2023-09-18 RX ORDER — SODIUM CHLORIDE 9 MG/ML
INJECTION, SOLUTION INTRAVENOUS CONTINUOUS
Status: CANCELLED | OUTPATIENT
Start: 2023-09-18

## 2023-09-18 RX ORDER — HEPARIN 100 UNIT/ML
500 SYRINGE INTRAVENOUS PRN
Status: DISCONTINUED | OUTPATIENT
Start: 2023-09-18 | End: 2023-09-19 | Stop reason: HOSPADM

## 2023-09-18 RX ORDER — FAMOTIDINE 10 MG/ML
20 INJECTION, SOLUTION INTRAVENOUS
Status: CANCELLED | OUTPATIENT
Start: 2023-09-19

## 2023-09-18 RX ORDER — ONDANSETRON 2 MG/ML
8 INJECTION INTRAMUSCULAR; INTRAVENOUS
Status: CANCELLED | OUTPATIENT
Start: 2023-09-19

## 2023-09-18 RX ORDER — EPINEPHRINE 1 MG/ML
0.3 INJECTION, SOLUTION, CONCENTRATE INTRAVENOUS PRN
Status: CANCELLED | OUTPATIENT
Start: 2023-09-19

## 2023-09-18 RX ORDER — SODIUM CHLORIDE 9 MG/ML
5-250 INJECTION, SOLUTION INTRAVENOUS PRN
Status: DISCONTINUED | OUTPATIENT
Start: 2023-09-18 | End: 2023-09-19 | Stop reason: HOSPADM

## 2023-09-18 RX ORDER — MEPERIDINE HYDROCHLORIDE 50 MG/ML
12.5 INJECTION INTRAMUSCULAR; INTRAVENOUS; SUBCUTANEOUS PRN
Status: CANCELLED | OUTPATIENT
Start: 2023-09-20

## 2023-09-18 RX ORDER — SODIUM CHLORIDE 9 MG/ML
25 INJECTION, SOLUTION INTRAVENOUS PRN
Status: CANCELLED | OUTPATIENT
Start: 2023-09-18

## 2023-09-18 RX ORDER — HEPARIN SODIUM (PORCINE) LOCK FLUSH IV SOLN 100 UNIT/ML 100 UNIT/ML
500 SOLUTION INTRAVENOUS PRN
Status: CANCELLED | OUTPATIENT
Start: 2023-09-19

## 2023-09-18 RX ORDER — ALBUTEROL SULFATE 90 UG/1
4 AEROSOL, METERED RESPIRATORY (INHALATION) PRN
Status: CANCELLED | OUTPATIENT
Start: 2023-09-18

## 2023-09-18 RX ORDER — MEPERIDINE HYDROCHLORIDE 50 MG/ML
12.5 INJECTION INTRAMUSCULAR; INTRAVENOUS; SUBCUTANEOUS PRN
Status: CANCELLED | OUTPATIENT
Start: 2023-09-19

## 2023-09-18 RX ORDER — EPINEPHRINE 1 MG/ML
0.3 INJECTION, SOLUTION, CONCENTRATE INTRAVENOUS PRN
Status: CANCELLED | OUTPATIENT
Start: 2023-09-18

## 2023-09-18 RX ORDER — PALONOSETRON 0.05 MG/ML
0.25 INJECTION, SOLUTION INTRAVENOUS ONCE
Status: COMPLETED | OUTPATIENT
Start: 2023-09-18 | End: 2023-09-18

## 2023-09-18 RX ORDER — SODIUM CHLORIDE 9 MG/ML
5-250 INJECTION, SOLUTION INTRAVENOUS PRN
Status: CANCELLED | OUTPATIENT
Start: 2023-09-20

## 2023-09-18 RX ORDER — SODIUM CHLORIDE 0.9 % (FLUSH) 0.9 %
5-40 SYRINGE (ML) INJECTION PRN
Status: CANCELLED | OUTPATIENT
Start: 2023-09-18

## 2023-09-18 RX ORDER — SODIUM CHLORIDE 9 MG/ML
INJECTION, SOLUTION INTRAVENOUS CONTINUOUS
Status: CANCELLED | OUTPATIENT
Start: 2023-09-20

## 2023-09-18 RX ORDER — OXYCODONE HYDROCHLORIDE 5 MG/1
5 TABLET ORAL EVERY 4 HOURS PRN
Qty: 42 TABLET | Refills: 0 | Status: SHIPPED | OUTPATIENT
Start: 2023-09-18 | End: 2023-09-25

## 2023-09-18 RX ORDER — SODIUM CHLORIDE 0.9 % (FLUSH) 0.9 %
5-40 SYRINGE (ML) INJECTION PRN
Status: CANCELLED | OUTPATIENT
Start: 2023-09-19

## 2023-09-18 RX ORDER — SODIUM CHLORIDE 9 MG/ML
5-250 INJECTION, SOLUTION INTRAVENOUS PRN
Status: CANCELLED | OUTPATIENT
Start: 2023-09-19

## 2023-09-18 RX ORDER — ACETAMINOPHEN 325 MG/1
650 TABLET ORAL
Status: CANCELLED | OUTPATIENT
Start: 2023-09-19

## 2023-09-18 RX ORDER — PALONOSETRON 0.05 MG/ML
0.25 INJECTION, SOLUTION INTRAVENOUS ONCE
Status: CANCELLED | OUTPATIENT
Start: 2023-09-18 | End: 2023-09-18

## 2023-09-18 RX ORDER — SODIUM CHLORIDE 9 MG/ML
5-250 INJECTION, SOLUTION INTRAVENOUS PRN
Status: CANCELLED | OUTPATIENT
Start: 2023-09-18

## 2023-09-18 RX ORDER — ACETAMINOPHEN 325 MG/1
650 TABLET ORAL
Status: CANCELLED | OUTPATIENT
Start: 2023-09-20

## 2023-09-18 RX ORDER — ONDANSETRON 2 MG/ML
8 INJECTION INTRAMUSCULAR; INTRAVENOUS
Status: CANCELLED | OUTPATIENT
Start: 2023-09-20

## 2023-09-18 RX ORDER — DIPHENHYDRAMINE HYDROCHLORIDE 50 MG/ML
50 INJECTION INTRAMUSCULAR; INTRAVENOUS
Status: CANCELLED | OUTPATIENT
Start: 2023-09-20

## 2023-09-18 RX ORDER — SODIUM CHLORIDE 0.9 % (FLUSH) 0.9 %
5-40 SYRINGE (ML) INJECTION PRN
Status: DISCONTINUED | OUTPATIENT
Start: 2023-09-18 | End: 2023-09-19 | Stop reason: HOSPADM

## 2023-09-18 RX ORDER — EPINEPHRINE 1 MG/ML
0.3 INJECTION, SOLUTION, CONCENTRATE INTRAVENOUS PRN
Status: CANCELLED | OUTPATIENT
Start: 2023-09-20

## 2023-09-18 RX ORDER — HEPARIN 100 UNIT/ML
500 SYRINGE INTRAVENOUS PRN
Status: CANCELLED | OUTPATIENT
Start: 2023-09-18

## 2023-09-18 RX ORDER — FAMOTIDINE 10 MG/ML
20 INJECTION, SOLUTION INTRAVENOUS
Status: CANCELLED | OUTPATIENT
Start: 2023-09-18

## 2023-09-18 RX ORDER — DIPHENHYDRAMINE HYDROCHLORIDE 50 MG/ML
50 INJECTION INTRAMUSCULAR; INTRAVENOUS
Status: CANCELLED | OUTPATIENT
Start: 2023-09-19

## 2023-09-18 RX ORDER — ALBUTEROL SULFATE 90 UG/1
4 AEROSOL, METERED RESPIRATORY (INHALATION) PRN
Status: CANCELLED | OUTPATIENT
Start: 2023-09-20

## 2023-09-18 RX ORDER — SODIUM CHLORIDE 9 MG/ML
INJECTION, SOLUTION INTRAVENOUS CONTINUOUS
Status: CANCELLED | OUTPATIENT
Start: 2023-09-19

## 2023-09-18 RX ORDER — SODIUM CHLORIDE 0.9 % (FLUSH) 0.9 %
5-40 SYRINGE (ML) INJECTION PRN
Status: CANCELLED | OUTPATIENT
Start: 2023-09-20

## 2023-09-18 RX ORDER — DIPHENHYDRAMINE HYDROCHLORIDE 50 MG/ML
50 INJECTION INTRAMUSCULAR; INTRAVENOUS
Status: CANCELLED | OUTPATIENT
Start: 2023-09-18

## 2023-09-18 RX ADMIN — PALONOSETRON 0.25 MG: 0.05 INJECTION, SOLUTION INTRAVENOUS at 09:07

## 2023-09-18 RX ADMIN — CARBOPLATIN 585.5 MG: 10 INJECTION INTRAVENOUS at 10:44

## 2023-09-18 RX ADMIN — DEXAMETHASONE SODIUM PHOSPHATE 12 MG: 4 INJECTION, SOLUTION INTRA-ARTICULAR; INTRALESIONAL; INTRAMUSCULAR; INTRAVENOUS; SOFT TISSUE at 09:34

## 2023-09-18 RX ADMIN — SODIUM CHLORIDE 25 ML/HR: 9 INJECTION, SOLUTION INTRAVENOUS at 08:55

## 2023-09-18 RX ADMIN — SODIUM CHLORIDE, PRESERVATIVE FREE 10 ML: 5 INJECTION INTRAVENOUS at 08:05

## 2023-09-18 RX ADMIN — Medication 500 UNITS: at 13:19

## 2023-09-18 RX ADMIN — SODIUM CHLORIDE, PRESERVATIVE FREE 20 ML: 5 INJECTION INTRAVENOUS at 08:06

## 2023-09-18 RX ADMIN — SODIUM CHLORIDE, PRESERVATIVE FREE 10 ML: 5 INJECTION INTRAVENOUS at 13:19

## 2023-09-18 RX ADMIN — ETOPOSIDE 178 MG: 20 INJECTION INTRAVENOUS at 11:58

## 2023-09-18 RX ADMIN — FOSAPREPITANT 150 MG: 150 INJECTION, POWDER, LYOPHILIZED, FOR SOLUTION INTRAVENOUS at 09:58

## 2023-09-18 NOTE — PLAN OF CARE
Problem: Safety - Adult  Goal: Free from fall injury  Outcome: Adequate for Discharge  Flowsheets (Taken 9/18/2023 0852)  Free From Fall Injury: Instruct family/caregiver on patient safety     Problem: Discharge Planning  Goal: Discharge to home or other facility with appropriate resources  Outcome: Adequate for Discharge  Flowsheets (Taken 9/18/2023 9799)  Discharge to home or other facility with appropriate resources: Identify barriers to discharge with patient and caregiver     Problem: Chronic Conditions and Co-morbidities  Goal: Patient's chronic conditions and co-morbidity symptoms are monitored and maintained or improved  Outcome: Adequate for Discharge  Flowsheets (Taken 9/18/2023 0852)  Care Plan - Patient's Chronic Conditions and Co-Morbidity Symptoms are Monitored and Maintained or Improved: Monitor and assess patient's chronic conditions and comorbid symptoms for stability, deterioration, or improvement  Note:   Chemotherapy Teaching     What is Chemotherapy   Drug action [x]   Method of Administration [x]   Handouts given []     Side Effects  Nausea/vomiting [x]   Diarrhea [x]   Fatigue [x]   Signs / Symptoms of infection [x]   Neutropenia [x]   Thrombocytopenia [x]   Alopecia [x]   neuropathy [x]   [place] diet &  the importance of fluids [x]       Micellaneous  Importance of nutrition [x]   Importance of oral hygiene [x]   When to call the MD [x]   Monitoring labs [x]   Use of supportive services []     Explanation of Drug Regimen / Frequency  Carboplatin/Etoposide     Comments  Verbalized understanding to drug,action,side effects and when to call MD         Problem: Infection - Adult  Goal: Absence of infection at discharge  Outcome: Adequate for Discharge  Flowsheets (Taken 9/18/2023 1372)  Absence of infection at discharge: Monitor all insertion sites i.e., indwelling lines, tubes and drains  Note: Mediport site with no redness or warmth. Skin over port site intact with no signs of breakdown noted. Patient verbalizes signs/symptoms of port infection and when to notify the physician. Care plan reviewed with patient and family. Patient and family verbalize understanding of the plan of care and contribute to goal setting.

## 2023-09-18 NOTE — DISCHARGE INSTRUCTIONS
Please contact your Oncologist if you have any questions regarding the chemotherapy Carboplatin/Etoposide that you received today. Patient instructed if experience any of the symptoms following today's chemotherapy treatment to notify MD immediately or go to emergency department. * dizziness/lightheadedness  *acute nausea/vomiting - not relieved with medication  *headache - not relieved from Tylenol/pain medication  *chest pain/pressure  *rash/itching  *shortness of breath        Drink fluids - 48oz fluids daily  Call if develop fever/ chills/ signs or symptoms of infection     Physician's instructions:  Oxycodone refilled. Labs reviewed, proceed with chemo. PET scan ordered, plan to schedule it next week. ( Week of 25th)  He will follow up with Dr. Alpesh Campos with the results of the PET scan before the next cycle of chemotherapy. Follow up in 3 weeks for MD visit and treatment visit and review the results of the [address].

## 2023-09-18 NOTE — PATIENT INSTRUCTIONS
Oxycodone refilled. Labs reviewed, proceed with chemo. PET scan ordered, plan to schedule it next week. ( Week of 25th)  He will follow up with Dr. Candi Kilpatrick with the results of the PET scan before the next cycle of chemotherapy. Follow up in 3 weeks for MD visit and treatment visit and review the results of the [address].

## 2023-09-19 ENCOUNTER — HOSPITAL ENCOUNTER (OUTPATIENT)
Dept: INFUSION THERAPY | Age: 68
Discharge: HOME | End: 2023-09-19
Payer: MEDICARE

## 2023-09-19 VITALS
RESPIRATION RATE: 16 BRPM | HEART RATE: 64 BPM | DIASTOLIC BLOOD PRESSURE: 54 MMHG | HEIGHT: 68 IN | WEIGHT: 140 LBS | BODY MASS INDEX: 21.22 KG/M2 | TEMPERATURE: 97.6 F | OXYGEN SATURATION: 98 % | SYSTOLIC BLOOD PRESSURE: 93 MMHG

## 2023-09-19 DIAGNOSIS — C7A.8 LARGE CELL NEUROENDOCRINE CARCINOMA (HCC): Primary | ICD-10-CM

## 2023-09-19 DIAGNOSIS — C79.9 METASTATIC MALIGNANT NEOPLASM, UNSPECIFIED SITE (HCC): ICD-10-CM

## 2023-09-19 PROCEDURE — 2580000003 HC RX 258: Performed by: INTERNAL MEDICINE

## 2023-09-19 PROCEDURE — 96413 CHEMO IV INFUSION 1 HR: CPT

## 2023-09-19 PROCEDURE — 6360000002 HC RX W HCPCS: Performed by: INTERNAL MEDICINE

## 2023-09-19 PROCEDURE — 96375 TX/PRO/DX INJ NEW DRUG ADDON: CPT

## 2023-09-19 RX ORDER — SODIUM CHLORIDE 9 MG/ML
5-250 INJECTION, SOLUTION INTRAVENOUS PRN
Status: DISCONTINUED | OUTPATIENT
Start: 2023-09-19 | End: 2023-09-20 | Stop reason: HOSPADM

## 2023-09-19 RX ORDER — SODIUM CHLORIDE 0.9 % (FLUSH) 0.9 %
5-40 SYRINGE (ML) INJECTION PRN
Status: DISCONTINUED | OUTPATIENT
Start: 2023-09-19 | End: 2023-09-20 | Stop reason: HOSPADM

## 2023-09-19 RX ORDER — DEXAMETHASONE SODIUM PHOSPHATE 4 MG/ML
8 INJECTION, SOLUTION INTRA-ARTICULAR; INTRALESIONAL; INTRAMUSCULAR; INTRAVENOUS; SOFT TISSUE ONCE
Status: COMPLETED | OUTPATIENT
Start: 2023-09-19 | End: 2023-09-19

## 2023-09-19 RX ORDER — HEPARIN 100 UNIT/ML
500 SYRINGE INTRAVENOUS PRN
Status: DISCONTINUED | OUTPATIENT
Start: 2023-09-19 | End: 2023-09-20 | Stop reason: HOSPADM

## 2023-09-19 RX ADMIN — SODIUM CHLORIDE, PRESERVATIVE FREE 10 ML: 5 INJECTION INTRAVENOUS at 10:22

## 2023-09-19 RX ADMIN — DEXAMETHASONE SODIUM PHOSPHATE 8 MG: 4 INJECTION, SOLUTION INTRAMUSCULAR; INTRAVENOUS at 08:34

## 2023-09-19 RX ADMIN — ETOPOSIDE 178 MG: 20 INJECTION INTRAVENOUS at 09:09

## 2023-09-19 RX ADMIN — HEPARIN 500 UNITS: 100 SYRINGE at 10:22

## 2023-09-19 RX ADMIN — SODIUM CHLORIDE, PRESERVATIVE FREE 20 ML: 5 INJECTION INTRAVENOUS at 08:24

## 2023-09-19 RX ADMIN — SODIUM CHLORIDE 20 ML/HR: 9 INJECTION, SOLUTION INTRAVENOUS at 08:24

## 2023-09-19 ASSESSMENT — ENCOUNTER SYMPTOMS
SINUS PRESSURE: 0
SHORTNESS OF BREATH: 1
VOMITING: 0
SORE THROAT: 0
WHEEZING: 0
ABDOMINAL PAIN: 1
APNEA: 0
EYE DISCHARGE: 0
DIARRHEA: 0
EYE PAIN: 0
CHOKING: 0
PHOTOPHOBIA: 0
BLOOD IN STOOL: 0
CHEST TIGHTNESS: 0
RHINORRHEA: 0
NAUSEA: 0
COLOR CHANGE: 0
ABDOMINAL DISTENTION: 0
ANAL BLEEDING: 0
ALLERGIC/IMMUNOLOGIC NEGATIVE: 1
EYE ITCHING: 0
BACK PAIN: 0
CONSTIPATION: 0
COUGH: 0

## 2023-09-19 NOTE — PLAN OF CARE
Problem: Safety - Adult  Goal: Free from fall injury  Outcome: Adequate for Discharge  Flowsheets (Taken 9/19/2023 1358)  Free From Fall Injury: Instruct family/caregiver on patient safety     Problem: Discharge Planning  Goal: Discharge to home or other facility with appropriate resources  Outcome: Adequate for Discharge  Flowsheets (Taken 9/19/2023 1358)  Discharge to home or other facility with appropriate resources: Identify barriers to discharge with patient and caregiver     Problem: Chronic Conditions and Co-morbidities  Goal: Patient's chronic conditions and co-morbidity symptoms are monitored and maintained or improved  Outcome: Adequate for Discharge  Flowsheets (Taken 9/19/2023 1358)  Care Plan - Patient's Chronic Conditions and Co-Morbidity Symptoms are Monitored and Maintained or Improved: Monitor and assess patient's chronic conditions and comorbid symptoms for stability, deterioration, or improvement  Note:   Chemotherapy Teaching     What is Chemotherapy   Drug action [x]   Method of Administration [x]   Handouts given []     Side Effects  Nausea/vomiting [x]   Diarrhea [x]   Fatigue [x]   Signs / Symptoms of infection [x]   Neutropenia [x]   Thrombocytopenia [x]   Alopecia [x]   neuropathy [x]   [place] diet &  the importance of fluids [x]       Micellaneous  Importance of nutrition [x]   Importance of oral hygiene [x]   When to call the MD [x]   Monitoring labs [x]   Use of supportive services []     Explanation of Drug Regimen / Frequency  Etoposide     Comments  Verbalized understanding to drug,action,side effects and when to call MD         Problem: Infection - Adult  Goal: Absence of infection at discharge  Outcome: Adequate for Discharge  Flowsheets (Taken 9/19/2023 1353)  Absence of infection at discharge: Monitor all insertion sites i.e., indwelling lines, tubes and drains  Note: Mediport site with no redness or warmth. Skin over port site intact with no signs of breakdown noted.  Patient

## 2023-09-19 NOTE — DISCHARGE INSTRUCTIONS
Please contact your Oncologist if you have any questions regarding the chemotherapy Etoposide that you received today. Patient instructed if experience any of the symptoms following today's chemotherapy tretament to notify MD immediately or go to emergency department.     * dizziness/lightheadedness  *acute nausea/vomiting - not relieved with medication  *headache - not relieved from Tylenol/pain medication  *chest pain/pressure  *rash/itching  *shortness of breath        Drink fluids - 48oz fluids daily  Call if develop fever/ chills/ signs or symptoms of infection

## 2023-09-20 ENCOUNTER — HOSPITAL ENCOUNTER (OUTPATIENT)
Dept: INFUSION THERAPY | Age: 68
Discharge: HOME | End: 2023-09-20
Payer: MEDICARE

## 2023-09-20 VITALS
TEMPERATURE: 98.3 F | SYSTOLIC BLOOD PRESSURE: 106 MMHG | HEART RATE: 61 BPM | OXYGEN SATURATION: 100 % | DIASTOLIC BLOOD PRESSURE: 65 MMHG | HEIGHT: 68 IN | BODY MASS INDEX: 21.22 KG/M2 | WEIGHT: 140 LBS | RESPIRATION RATE: 18 BRPM

## 2023-09-20 DIAGNOSIS — C79.9 METASTATIC MALIGNANT NEOPLASM, UNSPECIFIED SITE (HCC): ICD-10-CM

## 2023-09-20 DIAGNOSIS — C7A.8 LARGE CELL NEUROENDOCRINE CARCINOMA (HCC): Primary | ICD-10-CM

## 2023-09-20 PROCEDURE — 6360000002 HC RX W HCPCS: Performed by: INTERNAL MEDICINE

## 2023-09-20 PROCEDURE — 96375 TX/PRO/DX INJ NEW DRUG ADDON: CPT

## 2023-09-20 PROCEDURE — 2580000003 HC RX 258: Performed by: INTERNAL MEDICINE

## 2023-09-20 PROCEDURE — 96413 CHEMO IV INFUSION 1 HR: CPT

## 2023-09-20 PROCEDURE — 96377 APPLICATON ON-BODY INJECTOR: CPT

## 2023-09-20 RX ORDER — SODIUM CHLORIDE 0.9 % (FLUSH) 0.9 %
5-40 SYRINGE (ML) INJECTION PRN
Status: CANCELLED | OUTPATIENT
Start: 2023-09-20

## 2023-09-20 RX ORDER — SODIUM CHLORIDE 9 MG/ML
25 INJECTION, SOLUTION INTRAVENOUS PRN
Status: CANCELLED | OUTPATIENT
Start: 2023-09-20

## 2023-09-20 RX ORDER — HEPARIN 100 UNIT/ML
500 SYRINGE INTRAVENOUS PRN
Status: CANCELLED | OUTPATIENT
Start: 2023-09-20

## 2023-09-20 RX ORDER — SODIUM CHLORIDE 0.9 % (FLUSH) 0.9 %
5-40 SYRINGE (ML) INJECTION PRN
Status: DISCONTINUED | OUTPATIENT
Start: 2023-09-20 | End: 2023-09-21 | Stop reason: HOSPADM

## 2023-09-20 RX ORDER — DEXAMETHASONE SODIUM PHOSPHATE 4 MG/ML
8 INJECTION, SOLUTION INTRA-ARTICULAR; INTRALESIONAL; INTRAMUSCULAR; INTRAVENOUS; SOFT TISSUE ONCE
Status: COMPLETED | OUTPATIENT
Start: 2023-09-20 | End: 2023-09-20

## 2023-09-20 RX ORDER — HEPARIN 100 UNIT/ML
500 SYRINGE INTRAVENOUS PRN
Status: DISCONTINUED | OUTPATIENT
Start: 2023-09-20 | End: 2023-09-21 | Stop reason: HOSPADM

## 2023-09-20 RX ORDER — SODIUM CHLORIDE 9 MG/ML
5-250 INJECTION, SOLUTION INTRAVENOUS PRN
Status: DISCONTINUED | OUTPATIENT
Start: 2023-09-20 | End: 2023-09-21 | Stop reason: HOSPADM

## 2023-09-20 RX ADMIN — SODIUM CHLORIDE, PRESERVATIVE FREE 20 ML: 5 INJECTION INTRAVENOUS at 10:00

## 2023-09-20 RX ADMIN — SODIUM CHLORIDE, PRESERVATIVE FREE 20 ML: 5 INJECTION INTRAVENOUS at 11:47

## 2023-09-20 RX ADMIN — PEGFILGRASTIM 6 MG: KIT SUBCUTANEOUS at 11:47

## 2023-09-20 RX ADMIN — SODIUM CHLORIDE 20 ML/HR: 9 INJECTION, SOLUTION INTRAVENOUS at 10:02

## 2023-09-20 RX ADMIN — HEPARIN 500 UNITS: 100 SYRINGE at 11:47

## 2023-09-20 RX ADMIN — ETOPOSIDE 178 MG: 20 INJECTION INTRAVENOUS at 10:32

## 2023-09-20 RX ADMIN — DEXAMETHASONE SODIUM PHOSPHATE 8 MG: 4 INJECTION, SOLUTION INTRAMUSCULAR; INTRAVENOUS at 10:09

## 2023-09-20 NOTE — PLAN OF CARE
Problem: Safety - Adult  Goal: Free from fall injury  Outcome: Adequate for Discharge  Flowsheets (Taken 9/20/2023 1511)  Free From Fall Injury:   Instruct family/caregiver on patient safety   Based on caregiver fall risk screen, instruct family/caregiver to ask for assistance with transferring infant if caregiver noted to have fall risk factors  Note: Free from falls while in O.P. Oncology. Problem: Discharge Planning  Goal: Discharge to home or other facility with appropriate resources  Flowsheets (Taken 9/20/2023 1511)  Discharge to home or other facility with appropriate resources:   Identify barriers to discharge with patient and caregiver   Arrange for needed discharge resources and transportation as appropriate   Identify discharge learning needs (meds, wound care, etc)  Note: Verbalize understanding of discharge instructions, follow up appointments, and when to call Physician. Problem: Chronic Conditions and Co-morbidities  Goal: Patient's chronic conditions and co-morbidity symptoms are monitored and maintained or improved  Flowsheets (Taken 9/20/2023 1511)  Care Plan - Patient's Chronic Conditions and Co-Morbidity Symptoms are Monitored and Maintained or Improved:   Monitor and assess patient's chronic conditions and comorbid symptoms for stability, deterioration, or improvement   Collaborate with multidisciplinary team to address chronic and comorbid conditions and prevent exacerbation or deterioration   Update acute care plan with appropriate goals if chronic or comorbid symptoms are exacerbated and prevent overall improvement and discharge  Note: Patient verbalizes understanding to verbal information given on Etopside,action and possible side effects. Aware to call MD if develop complications.       Chemotherapy Teaching     What is Chemotherapy   Drug action [x]   Method of Administration [x]   Handouts given []     Side Effects  Nausea/vomiting [x]   Diarrhea [x]   Fatigue [x]   Signs /

## 2023-09-20 NOTE — DISCHARGE INSTRUCTIONS
Please contact your Oncologist if you have any questions regarding the chemotherapy Etopside that you received today. Patient instructed if experience any of the symptoms following today's chemotherapy / to notify MD immediately or go to emergency department. * dizziness/lightheadedness  *acute nausea/vomiting - not relieved with medication  *headache - not relieved from Tylenol/pain medication  *chest pain/pressure  *rash/itching  *shortness of breath    After approximately 27 hours, your On-body injector will beep to let you know your dose delivery will begin in 2 minutes. When the dose delivery starts it will take about 45 minutes to complete. During this time, the On-body injector will flash a green light. You may hear a series of clicks, this is Okay. A beep will sound when the dose delivery is complete. Check to see the status light is Solid Green or has switched off, this means dose is complete. Grab edge of adhesive pad. Slowly peel off the On-body injector and look for black line next to EMPTY indicator. Place into plastic disposable container. If during the administration of drug, the adhesive becomes wet or you notice dripping - call physician immediately.        Drink fluids - 48oz fluids daily  Call if develop fever/ chills/ signs or symptoms of infection

## 2023-09-21 NOTE — PROGRESS NOTES
Name: Ben Garibay  : 1955  MRN: [id number]    Oncology Navigation Follow-Up Note    Contact Type:  Medical Oncology    Subjective: appt with ONC    Objective: Pt reports sick for 1 day after tx with upset stomach, diarrhea--managed with imodium & zofran. ONC POC:  -PET week of   -RX Oxycodone  -Labs reviewed  -Proceed with cycle 2 chemo today  -Return 10/9 to see Dr Kera Kimbrough before next cycle of tx    Assistance Needed: denies    Receptive to [address] [place] [address] / Palliative Care:  deferred    Referrals: N/A    Education: POC reiterated    Notes: Ernie available to assist as needed.     Electronically signed by Polina Carrizales RN on 2023 at 10:57 AM

## 2023-09-22 ENCOUNTER — APPOINTMENT (OUTPATIENT)
Dept: GENERAL RADIOLOGY | Age: 68
End: 2023-09-22
Payer: MEDICARE

## 2023-09-22 ENCOUNTER — HOSPITAL ENCOUNTER (OUTPATIENT)
Age: 68
Setting detail: OBSERVATION
LOS: 1 days | Discharge: HOME | End: 2023-09-24
Attending: INTERNAL MEDICINE | Admitting: INTERNAL MEDICINE
Payer: MEDICARE

## 2023-09-22 ENCOUNTER — TELEPHONE (OUTPATIENT)
Dept: CASE MANAGEMENT | Age: 68
End: 2023-09-22

## 2023-09-22 ENCOUNTER — APPOINTMENT (OUTPATIENT)
Dept: CT IMAGING | Age: 68
End: 2023-09-22
Payer: MEDICARE

## 2023-09-22 ENCOUNTER — TELEPHONE (OUTPATIENT)
Dept: ONCOLOGY | Age: 68
End: 2023-09-22

## 2023-09-22 DIAGNOSIS — R11.0 NAUSEA: Primary | ICD-10-CM

## 2023-09-22 DIAGNOSIS — C79.9 METASTATIC MALIGNANT NEOPLASM, UNSPECIFIED SITE (HCC): ICD-10-CM

## 2023-09-22 DIAGNOSIS — R10.13 ABDOMINAL PAIN, EPIGASTRIC: ICD-10-CM

## 2023-09-22 DIAGNOSIS — D50.9 IRON DEFICIENCY ANEMIA, UNSPECIFIED IRON DEFICIENCY ANEMIA TYPE: ICD-10-CM

## 2023-09-22 PROBLEM — R10.9 ABDOMINAL PAIN: Status: ACTIVE | Noted: 2023-09-22

## 2023-09-22 LAB
ALBUMIN SERPL BCG-MCNC: 3.7 G/DL (ref 3.5–5.1)
ALP SERPL-CCNC: 161 U/L (ref 38–126)
ALT SERPL W/O P-5'-P-CCNC: 20 U/L (ref 11–66)
ANION GAP SERPL CALC-SCNC: 10 MEQ/L (ref 8–16)
ANISOCYTOSIS BLD QL SMEAR: PRESENT
AST SERPL-CCNC: 21 U/L (ref 5–40)
BASOPHILS ABSOLUTE: 0 THOU/MM3 (ref 0–0.1)
BASOPHILS NFR BLD AUTO: 0 %
BILIRUB SERPL-MCNC: 0.7 MG/DL (ref 0.3–1.2)
BILIRUB UR QL STRIP.AUTO: NEGATIVE
BUN SERPL-MCNC: 25 MG/DL (ref 7–22)
CALCIUM SERPL-MCNC: 9 MG/DL (ref 8.5–10.5)
CHARACTER UR: CLEAR
CHLORIDE SERPL-SCNC: 98 MEQ/L (ref 98–111)
CO2 SERPL-SCNC: 26 MEQ/L (ref 23–33)
COLOR: YELLOW
CREAT SERPL-MCNC: 0.7 MG/DL (ref 0.4–1.2)
DEPRECATED RDW RBC AUTO: 77 FL (ref 35–45)
EOSINOPHIL NFR BLD AUTO: 0 %
EOSINOPHILS ABSOLUTE: 0 THOU/MM3 (ref 0–0.4)
ERYTHROCYTE [DISTWIDTH] IN BLOOD BY AUTOMATED COUNT: 25.2 % (ref 11.5–14.5)
GFR SERPL CREATININE-BSD FRML MDRD: > 60 ML/MIN/1.73M2
GLUCOSE SERPL-MCNC: 113 MG/DL (ref 70–108)
GLUCOSE UR QL STRIP.AUTO: NEGATIVE MG/DL
HCT VFR BLD AUTO: 34 % (ref 42–52)
HGB BLD-MCNC: 10.9 GM/DL (ref 14–18)
HGB UR QL STRIP.AUTO: NEGATIVE
IMM GRANULOCYTES # BLD AUTO: 4.7 THOU/MM3 (ref 0–0.07)
IMM GRANULOCYTES NFR BLD AUTO: 7 %
INR PPP: 1.02 (ref 0.85–1.13)
KETONES UR QL STRIP.AUTO: NEGATIVE
LIPASE SERPL-CCNC: 35.8 U/L (ref 5.6–51.3)
LYMPHOCYTES ABSOLUTE: 1.2 THOU/MM3 (ref 1–4.8)
LYMPHOCYTES NFR BLD AUTO: 1.8 %
MAGNESIUM SERPL-MCNC: 2.1 MG/DL (ref 1.6–2.4)
MCH RBC QN AUTO: 27.5 PG (ref 26–33)
MCHC RBC AUTO-ENTMCNC: 32.1 GM/DL (ref 32.2–35.5)
MCV RBC AUTO: 85.9 FL (ref 80–94)
MONOCYTES ABSOLUTE: 0.1 THOU/MM3 (ref 0.4–1.3)
MONOCYTES NFR BLD AUTO: 0.1 %
NEUTROPHILS NFR BLD AUTO: 91.1 %
NITRITE UR QL STRIP: NEGATIVE
NRBC BLD AUTO-RTO: 0 /100 WBC
OSMOLALITY SERPL CALC.SUM OF ELEC: 273.4 MOSMOL/KG (ref 275–300)
PATHOLOGIST REVIEW: ABNORMAL
PH UR STRIP.AUTO: 5 [PH] (ref 5–9)
PLATELET # BLD AUTO: 378 THOU/MM3 (ref 130–400)
PMV BLD AUTO: 11.2 FL (ref 9.4–12.4)
POTASSIUM SERPL-SCNC: 4.3 MEQ/L (ref 3.5–5.2)
PROT SERPL-MCNC: 6.4 G/DL (ref 6.1–8)
PROT UR STRIP.AUTO-MCNC: NEGATIVE MG/DL
RBC # BLD AUTO: 3.96 MILL/MM3 (ref 4.7–6.1)
SCAN OF BLOOD SMEAR: NORMAL
SEGMENTED NEUTROPHILS ABSOLUTE COUNT: 61.5 THOU/MM3 (ref 1.8–7.7)
SODIUM SERPL-SCNC: 134 MEQ/L (ref 135–145)
SP GR UR REFRACT.AUTO: > 1.03 (ref 1–1.03)
TROPONIN, HIGH SENSITIVITY: 14 NG/L (ref 0–12)
TROPONIN, HIGH SENSITIVITY: 16 NG/L (ref 0–12)
UROBILINOGEN, URINE: 0.2 EU/DL (ref 0–1)
VARIANT LYMPHS BLD QL SMEAR: ABNORMAL %
WBC # BLD AUTO: 67.5 THOU/MM3 (ref 4.8–10.8)
WBC #/AREA URNS HPF: NEGATIVE /[HPF]

## 2023-09-22 PROCEDURE — 6360000002 HC RX W HCPCS

## 2023-09-22 PROCEDURE — 96375 TX/PRO/DX INJ NEW DRUG ADDON: CPT

## 2023-09-22 PROCEDURE — 81003 URINALYSIS AUTO W/O SCOPE: CPT

## 2023-09-22 PROCEDURE — C9113 INJ PANTOPRAZOLE SODIUM, VIA: HCPCS

## 2023-09-22 PROCEDURE — 85610 PROTHROMBIN TIME: CPT

## 2023-09-22 PROCEDURE — 85025 COMPLETE CBC W/AUTO DIFF WBC: CPT

## 2023-09-22 PROCEDURE — 96361 HYDRATE IV INFUSION ADD-ON: CPT

## 2023-09-22 PROCEDURE — 2580000003 HC RX 258

## 2023-09-22 PROCEDURE — 83690 ASSAY OF LIPASE: CPT

## 2023-09-22 PROCEDURE — 96374 THER/PROPH/DIAG INJ IV PUSH: CPT

## 2023-09-22 PROCEDURE — 6360000002 HC RX W HCPCS: Performed by: EMERGENCY MEDICINE

## 2023-09-22 PROCEDURE — 93005 ELECTROCARDIOGRAM TRACING: CPT

## 2023-09-22 PROCEDURE — 99285 EMERGENCY DEPT VISIT HI MDM: CPT

## 2023-09-22 PROCEDURE — 71046 X-RAY EXAM CHEST 2 VIEWS: CPT

## 2023-09-22 PROCEDURE — 74177 CT ABD & PELVIS W/CONTRAST: CPT

## 2023-09-22 PROCEDURE — 87040 BLOOD CULTURE FOR BACTERIA: CPT

## 2023-09-22 PROCEDURE — 83735 ASSAY OF MAGNESIUM: CPT

## 2023-09-22 PROCEDURE — 6360000004 HC RX CONTRAST MEDICATION

## 2023-09-22 PROCEDURE — 96372 THER/PROPH/DIAG INJ SC/IM: CPT

## 2023-09-22 PROCEDURE — 6370000000 HC RX 637 (ALT 250 FOR IP)

## 2023-09-22 PROCEDURE — 80053 COMPREHEN METABOLIC PANEL: CPT

## 2023-09-22 PROCEDURE — G0378 HOSPITAL OBSERVATION PER HR: HCPCS

## 2023-09-22 PROCEDURE — 84484 ASSAY OF TROPONIN QUANT: CPT

## 2023-09-22 PROCEDURE — 36415 COLL VENOUS BLD VENIPUNCTURE: CPT

## 2023-09-22 RX ORDER — CAPSAICIN 0.025 %
CREAM (GRAM) TOPICAL 2 TIMES DAILY
Status: DISCONTINUED | OUTPATIENT
Start: 2023-09-22 | End: 2023-09-24 | Stop reason: HOSPADM

## 2023-09-22 RX ORDER — ACETAMINOPHEN 500 MG
1000 TABLET ORAL
Status: COMPLETED | OUTPATIENT
Start: 2023-09-22 | End: 2023-09-22

## 2023-09-22 RX ORDER — PANTOPRAZOLE SODIUM 40 MG/10ML
40 INJECTION, POWDER, LYOPHILIZED, FOR SOLUTION INTRAVENOUS DAILY
Status: DISCONTINUED | OUTPATIENT
Start: 2023-09-22 | End: 2023-09-24 | Stop reason: HOSPADM

## 2023-09-22 RX ORDER — NICOTINE 21 MG/24HR
1 PATCH, TRANSDERMAL 24 HOURS TRANSDERMAL DAILY
Status: DISCONTINUED | OUTPATIENT
Start: 2023-09-22 | End: 2023-09-24 | Stop reason: HOSPADM

## 2023-09-22 RX ORDER — ONDANSETRON 2 MG/ML
4 INJECTION INTRAMUSCULAR; INTRAVENOUS ONCE
Status: COMPLETED | OUTPATIENT
Start: 2023-09-22 | End: 2023-09-22

## 2023-09-22 RX ORDER — FUROSEMIDE 20 MG/1
20 TABLET ORAL DAILY
Status: DISCONTINUED | OUTPATIENT
Start: 2023-09-23 | End: 2023-09-24 | Stop reason: HOSPADM

## 2023-09-22 RX ORDER — SODIUM CHLORIDE 9 MG/ML
INJECTION, SOLUTION INTRAVENOUS PRN
Status: DISCONTINUED | OUTPATIENT
Start: 2023-09-22 | End: 2023-09-24 | Stop reason: HOSPADM

## 2023-09-22 RX ORDER — ENOXAPARIN SODIUM 100 MG/ML
1 INJECTION SUBCUTANEOUS 2 TIMES DAILY
Status: DISCONTINUED | OUTPATIENT
Start: 2023-09-22 | End: 2023-09-24 | Stop reason: HOSPADM

## 2023-09-22 RX ORDER — MAGNESIUM SULFATE IN WATER 40 MG/ML
2000 INJECTION, SOLUTION INTRAVENOUS PRN
Status: DISCONTINUED | OUTPATIENT
Start: 2023-09-22 | End: 2023-09-24 | Stop reason: HOSPADM

## 2023-09-22 RX ORDER — 0.9 % SODIUM CHLORIDE 0.9 %
1000 INTRAVENOUS SOLUTION INTRAVENOUS ONCE
Status: COMPLETED | OUTPATIENT
Start: 2023-09-22 | End: 2023-09-22

## 2023-09-22 RX ORDER — ACETAMINOPHEN 325 MG/1
650 TABLET ORAL EVERY 6 HOURS PRN
Status: DISCONTINUED | OUTPATIENT
Start: 2023-09-22 | End: 2023-09-24 | Stop reason: HOSPADM

## 2023-09-22 RX ORDER — LOPERAMIDE HYDROCHLORIDE 2 MG/1
2 CAPSULE ORAL 4 TIMES DAILY PRN
Status: DISCONTINUED | OUTPATIENT
Start: 2023-09-22 | End: 2023-09-24 | Stop reason: HOSPADM

## 2023-09-22 RX ORDER — POLYETHYLENE GLYCOL 3350 17 G/17G
17 POWDER, FOR SOLUTION ORAL DAILY PRN
Status: DISCONTINUED | OUTPATIENT
Start: 2023-09-22 | End: 2023-09-24 | Stop reason: HOSPADM

## 2023-09-22 RX ORDER — METOPROLOL TARTRATE 50 MG/1
50 TABLET, FILM COATED ORAL 2 TIMES DAILY
Status: DISCONTINUED | OUTPATIENT
Start: 2023-09-22 | End: 2023-09-24 | Stop reason: HOSPADM

## 2023-09-22 RX ORDER — SODIUM CHLORIDE, SODIUM LACTATE, POTASSIUM CHLORIDE, CALCIUM CHLORIDE 600; 310; 30; 20 MG/100ML; MG/100ML; MG/100ML; MG/100ML
INJECTION, SOLUTION INTRAVENOUS CONTINUOUS
Status: DISCONTINUED | OUTPATIENT
Start: 2023-09-22 | End: 2023-09-23

## 2023-09-22 RX ORDER — POTASSIUM CHLORIDE 20 MEQ/1
20 TABLET, EXTENDED RELEASE ORAL DAILY
Status: DISCONTINUED | OUTPATIENT
Start: 2023-09-23 | End: 2023-09-24 | Stop reason: HOSPADM

## 2023-09-22 RX ORDER — ATORVASTATIN CALCIUM 20 MG/1
20 TABLET, FILM COATED ORAL NIGHTLY
Status: DISCONTINUED | OUTPATIENT
Start: 2023-09-22 | End: 2023-09-24 | Stop reason: HOSPADM

## 2023-09-22 RX ORDER — WARFARIN SODIUM 2.5 MG/1
2.5 TABLET ORAL
Status: COMPLETED | OUTPATIENT
Start: 2023-09-22 | End: 2023-09-22

## 2023-09-22 RX ORDER — ONDANSETRON 2 MG/ML
4 INJECTION INTRAMUSCULAR; INTRAVENOUS EVERY 6 HOURS PRN
Status: DISCONTINUED | OUTPATIENT
Start: 2023-09-22 | End: 2023-09-24 | Stop reason: HOSPADM

## 2023-09-22 RX ORDER — ACETAMINOPHEN 650 MG/1
650 SUPPOSITORY RECTAL EVERY 6 HOURS PRN
Status: DISCONTINUED | OUTPATIENT
Start: 2023-09-22 | End: 2023-09-24 | Stop reason: HOSPADM

## 2023-09-22 RX ORDER — HYDROCODONE BITARTRATE AND ACETAMINOPHEN 5; 325 MG/1; MG/1
2 TABLET ORAL EVERY 6 HOURS PRN
Status: DISCONTINUED | OUTPATIENT
Start: 2023-09-22 | End: 2023-09-23

## 2023-09-22 RX ORDER — SODIUM CHLORIDE 0.9 % (FLUSH) 0.9 %
5-40 SYRINGE (ML) INJECTION EVERY 12 HOURS SCHEDULED
Status: DISCONTINUED | OUTPATIENT
Start: 2023-09-22 | End: 2023-09-24 | Stop reason: HOSPADM

## 2023-09-22 RX ORDER — LISINOPRIL 5 MG/1
5 TABLET ORAL NIGHTLY
Status: DISCONTINUED | OUTPATIENT
Start: 2023-09-22 | End: 2023-09-24 | Stop reason: HOSPADM

## 2023-09-22 RX ORDER — HYDROCODONE BITARTRATE AND ACETAMINOPHEN 5; 325 MG/1; MG/1
1 TABLET ORAL EVERY 6 HOURS PRN
Status: DISCONTINUED | OUTPATIENT
Start: 2023-09-22 | End: 2023-09-23

## 2023-09-22 RX ORDER — ONDANSETRON 4 MG/1
4 TABLET, ORALLY DISINTEGRATING ORAL EVERY 8 HOURS PRN
Status: DISCONTINUED | OUTPATIENT
Start: 2023-09-22 | End: 2023-09-24 | Stop reason: HOSPADM

## 2023-09-22 RX ORDER — SODIUM CHLORIDE 0.9 % (FLUSH) 0.9 %
5-40 SYRINGE (ML) INJECTION PRN
Status: DISCONTINUED | OUTPATIENT
Start: 2023-09-22 | End: 2023-09-24 | Stop reason: HOSPADM

## 2023-09-22 RX ADMIN — PANTOPRAZOLE SODIUM 40 MG: 40 INJECTION, POWDER, FOR SOLUTION INTRAVENOUS at 22:03

## 2023-09-22 RX ADMIN — SODIUM CHLORIDE, POTASSIUM CHLORIDE, SODIUM LACTATE AND CALCIUM CHLORIDE: 600; 310; 30; 20 INJECTION, SOLUTION INTRAVENOUS at 22:53

## 2023-09-22 RX ADMIN — HYDROCODONE BITARTRATE AND ACETAMINOPHEN 2 TABLET: 5; 325 TABLET ORAL at 22:46

## 2023-09-22 RX ADMIN — HYDROMORPHONE HYDROCHLORIDE 0.5 MG: 1 INJECTION, SOLUTION INTRAMUSCULAR; INTRAVENOUS; SUBCUTANEOUS at 16:55

## 2023-09-22 RX ADMIN — SODIUM CHLORIDE 1000 ML: 9 INJECTION, SOLUTION INTRAVENOUS at 13:37

## 2023-09-22 RX ADMIN — SODIUM CHLORIDE, PRESERVATIVE FREE 10 ML: 5 INJECTION INTRAVENOUS at 22:04

## 2023-09-22 RX ADMIN — WARFARIN SODIUM 2.5 MG: 2.5 TABLET ORAL at 23:19

## 2023-09-22 RX ADMIN — METOPROLOL TARTRATE 50 MG: 50 TABLET, FILM COATED ORAL at 22:03

## 2023-09-22 RX ADMIN — ENOXAPARIN SODIUM 70 MG: 100 INJECTION SUBCUTANEOUS at 22:02

## 2023-09-22 RX ADMIN — IOPAMIDOL 80 ML: 755 INJECTION, SOLUTION INTRAVENOUS at 13:56

## 2023-09-22 RX ADMIN — ATORVASTATIN CALCIUM 20 MG: 20 TABLET, FILM COATED ORAL at 22:03

## 2023-09-22 RX ADMIN — ACETAMINOPHEN 1000 MG: 500 TABLET ORAL at 15:37

## 2023-09-22 RX ADMIN — ONDANSETRON 4 MG: 2 INJECTION INTRAMUSCULAR; INTRAVENOUS at 13:36

## 2023-09-22 ASSESSMENT — PAIN DESCRIPTION - DESCRIPTORS: DESCRIPTORS: ACHING

## 2023-09-22 ASSESSMENT — PAIN SCALES - GENERAL
PAINLEVEL_OUTOF10: 10
PAINLEVEL_OUTOF10: 8
PAINLEVEL_OUTOF10: 1
PAINLEVEL_OUTOF10: 10
PAINLEVEL_OUTOF10: 10

## 2023-09-22 ASSESSMENT — PAIN DESCRIPTION - LOCATION
LOCATION: ABDOMEN
LOCATION: ABDOMEN

## 2023-09-22 ASSESSMENT — PAIN - FUNCTIONAL ASSESSMENT
PAIN_FUNCTIONAL_ASSESSMENT: 0-10
PAIN_FUNCTIONAL_ASSESSMENT: ACTIVITIES ARE NOT PREVENTED

## 2023-09-22 ASSESSMENT — PAIN SCALES - WONG BAKER: WONGBAKER_NUMERICALRESPONSE: 0

## 2023-09-22 ASSESSMENT — PAIN DESCRIPTION - ORIENTATION: ORIENTATION: LOWER;MID

## 2023-09-22 NOTE — ED TRIAGE NOTES
Patient to ED from home c/o abdominal pain and nausea. Patient family at bedside states patient has stomach and lung cancer. States he had chemo treatment on Thursday. Patient states he started feeling bad last night. Rates pain 10/10.

## 2023-09-22 NOTE — ED NOTES
Patient resting in bed. Respirations easy and unlabored. No distress noted. Call light within reach.        Ambar Sotelo RN  09/22/23 7031

## 2023-09-22 NOTE — H&P
CONTRAST Additional Contrast? None   Final Result   1. Multiple metastatic pulmonary nodules noted at the limited images through the lung bases appear to have increased in size and number with the largest visualized nodule measuring up to 16 mm (previously measured 13 mm). 2. The metastatic lesion in the lateral segment of the left hepatic lobe also appears larger measuring 23 x 29 mm (previously measured 15 x 22 mm). A small amount of possibly malignant perihepatic ascites is now visualized. There is also the suggestion    of thickening of the small bowel loops in the right upper quadrant associated with the ascites. No bowel obstruction, fluid collection, or free air is observed. 3. Chronic findings are discussed. **This report has been created using voice recognition software. It may contain minor errors which are inherent in voice recognition technology. **      Final report electronically signed by Dr Ruth Baeza on 9/22/2023 2:23 PM      XR CHEST (2 VW)   Final Result   1. No acute intrathoracic process. 2. Stable right mid lung nodule. 3. Findings suggestive of chronic lung disease. **This report has been created using voice recognition software. It may contain minor errors which are inherent in voice recognition technology. **      Final report electronically signed by Dr. Piotr Kinney on 9/22/2023 1:33 PM             Past Social History  The patient currently lives at home with girlfriend. Tobacco use:   reports that he has been smoking cigarettes. He has a 15.00 pack-year smoking history. He has never used smokeless tobacco.  Alcohol use:   reports no history of alcohol use. Drug use:  reports current drug use. Drug: Marijuana Orrie Harness).      Medical Hx      Diagnosis Date    Atrial fibrillation ([address])     Cancer ([address])     Carotid artery stenosis     GERD (gastroesophageal reflux disease)     Hyperlipidemia     Hypertension     Hyperuricemia        Surgical Hx

## 2023-09-22 NOTE — TELEPHONE ENCOUNTER
Ernie phoned pt's brother. He confirms pt is in ER for evaluation; labwork drawn, CT ordered, IVF started. Brother reports they were alerted that pt is also in A-Fib. Ernie provided emotional support. Will FU with pt next week. Family aware how to reach Ernie if needed. Pt/family appreciative of FU call.

## 2023-09-22 NOTE — ED PROVIDER NOTES
pneumonia, cholecystitis, ACS, and aortic dissection. FORMAL DIAGNOSTIC RESULTS     EKG: All EKG's are interpreted by the Emergency Department Physician who eithersigns or Co-signs this chart in the absence of a cardiologist.    EKG read and interpreted by myself gives impression of A-fib rhythm with heart rate of 85; interval *; QRS 78;QTc 394/468; axis */93/90. RADIOLOGY: Interpretation per the Radiologist below, if available at the time of this note (none if blank):  non-plainfilm images(s) such as CT, Ultrasound and MRI are read by the radiologist.  Plain radiographic images are visualized and preliminarily interpreted by the emergency physician unless otherwise stated below. CT ABDOMEN PELVIS W IV CONTRAST Additional Contrast? None   Final Result   1. Multiple metastatic pulmonary nodules noted at the limited images through the lung bases appear to have increased in size and number with the largest visualized nodule measuring up to 16 mm (previously measured 13 mm). 2. The metastatic lesion in the lateral segment of the left hepatic lobe also appears larger measuring 23 x 29 mm (previously measured 15 x 22 mm). A small amount of possibly malignant perihepatic ascites is now visualized. There is also the suggestion    of thickening of the small bowel loops in the right upper quadrant associated with the ascites. No bowel obstruction, fluid collection, or free air is observed. 3. Chronic findings are discussed. **This report has been created using voice recognition software. It may contain minor errors which are inherent in voice recognition technology. **      Final report electronically signed by Dr Irina Christina on 9/22/2023 2:23 PM      XR CHEST (2 VW)   Final Result   1. No acute intrathoracic process. 2. Stable right mid lung nodule. 3. Findings suggestive of chronic lung disease. **This report has been created using voice recognition software.  It may contain show

## 2023-09-22 NOTE — TELEPHONE ENCOUNTER
Patient's family called stating patient has been having nausea, diarrhea, and is now vomiting today. He is unable to eat or drink now for the last couple of days. Family states he is weak. Discussed with Dr. Amilcar Garcia, who is covering for Dr. Dejah Lucio, recommend patient go to ED for evaluation. Patient is agreeable and family will take him. Onc navigator Vianca lee.

## 2023-09-22 NOTE — ED NOTES
Patient resting in bed. Respirations easy and unlabored. No distress noted. Call light within reach.        Sheila Mitchell RN  09/22/23 [id number]

## 2023-09-23 LAB
ANION GAP SERPL CALC-SCNC: 10 MEQ/L (ref 8–16)
ANISOCYTOSIS BLD QL SMEAR: PRESENT
BASOPHILS ABSOLUTE: 0 THOU/MM3 (ref 0–0.1)
BASOPHILS NFR BLD AUTO: 0 %
BUN SERPL-MCNC: 19 MG/DL (ref 7–22)
CALCIUM SERPL-MCNC: 8.6 MG/DL (ref 8.5–10.5)
CHLORIDE SERPL-SCNC: 99 MEQ/L (ref 98–111)
CO2 SERPL-SCNC: 25 MEQ/L (ref 23–33)
CREAT SERPL-MCNC: 0.5 MG/DL (ref 0.4–1.2)
DEPRECATED RDW RBC AUTO: 78.1 FL (ref 35–45)
EKG Q-T INTERVAL: 394 MS
EKG QRS DURATION: 78 MS
EKG QTC CALCULATION (BAZETT): 468 MS
EKG R AXIS: 93 DEGREES
EKG T AXIS: 90 DEGREES
EKG VENTRICULAR RATE: 85 BPM
EOSINOPHIL NFR BLD AUTO: 0 %
EOSINOPHILS ABSOLUTE: 0 THOU/MM3 (ref 0–0.4)
ERYTHROCYTE [DISTWIDTH] IN BLOOD BY AUTOMATED COUNT: 24.9 % (ref 11.5–14.5)
GFR SERPL CREATININE-BSD FRML MDRD: > 60 ML/MIN/1.73M2
GLUCOSE SERPL-MCNC: 99 MG/DL (ref 70–108)
HCT VFR BLD AUTO: 32.3 % (ref 42–52)
HGB BLD-MCNC: 10.3 GM/DL (ref 14–18)
IMM GRANULOCYTES # BLD AUTO: 7.7 THOU/MM3 (ref 0–0.07)
IMM GRANULOCYTES NFR BLD AUTO: 16.4 %
INR PPP: 1.17 (ref 0.85–1.13)
LYMPHOCYTES ABSOLUTE: 1.5 THOU/MM3 (ref 1–4.8)
LYMPHOCYTES NFR BLD AUTO: 3.2 %
MCH RBC QN AUTO: 28 PG (ref 26–33)
MCHC RBC AUTO-ENTMCNC: 31.9 GM/DL (ref 32.2–35.5)
MCV RBC AUTO: 87.8 FL (ref 80–94)
MONOCYTES ABSOLUTE: 0.1 THOU/MM3 (ref 0.4–1.3)
MONOCYTES NFR BLD AUTO: 0.2 %
NEUTROPHILS NFR BLD AUTO: 80.2 %
NRBC BLD AUTO-RTO: 0 /100 WBC
PLATELET # BLD AUTO: 334 THOU/MM3 (ref 130–400)
PLATELET BLD QL SMEAR: ADEQUATE
PMV BLD AUTO: 11.6 FL (ref 9.4–12.4)
POTASSIUM SERPL-SCNC: 4.1 MEQ/L (ref 3.5–5.2)
RBC # BLD AUTO: 3.68 MILL/MM3 (ref 4.7–6.1)
SCAN OF BLOOD SMEAR: NORMAL
SEGMENTED NEUTROPHILS ABSOLUTE COUNT: 37.7 THOU/MM3 (ref 1.8–7.7)
SODIUM SERPL-SCNC: 134 MEQ/L (ref 135–145)
TROPONIN, HIGH SENSITIVITY: 17 NG/L (ref 0–12)
WBC # BLD AUTO: 47 THOU/MM3 (ref 4.8–10.8)

## 2023-09-23 PROCEDURE — G0378 HOSPITAL OBSERVATION PER HR: HCPCS

## 2023-09-23 PROCEDURE — 96376 TX/PRO/DX INJ SAME DRUG ADON: CPT

## 2023-09-23 PROCEDURE — C9113 INJ PANTOPRAZOLE SODIUM, VIA: HCPCS

## 2023-09-23 PROCEDURE — 85025 COMPLETE CBC W/AUTO DIFF WBC: CPT

## 2023-09-23 PROCEDURE — 96372 THER/PROPH/DIAG INJ SC/IM: CPT

## 2023-09-23 PROCEDURE — 85610 PROTHROMBIN TIME: CPT

## 2023-09-23 PROCEDURE — 80048 BASIC METABOLIC PNL TOTAL CA: CPT

## 2023-09-23 PROCEDURE — 6370000000 HC RX 637 (ALT 250 FOR IP): Performed by: STUDENT IN AN ORGANIZED HEALTH CARE EDUCATION/TRAINING PROGRAM

## 2023-09-23 PROCEDURE — 2580000003 HC RX 258

## 2023-09-23 PROCEDURE — 6370000000 HC RX 637 (ALT 250 FOR IP)

## 2023-09-23 PROCEDURE — 6360000002 HC RX W HCPCS

## 2023-09-23 PROCEDURE — 84484 ASSAY OF TROPONIN QUANT: CPT

## 2023-09-23 PROCEDURE — 96361 HYDRATE IV INFUSION ADD-ON: CPT

## 2023-09-23 PROCEDURE — 36415 COLL VENOUS BLD VENIPUNCTURE: CPT

## 2023-09-23 RX ORDER — OXYCODONE HYDROCHLORIDE 5 MG/1
5 TABLET ORAL EVERY 4 HOURS PRN
Status: DISCONTINUED | OUTPATIENT
Start: 2023-09-23 | End: 2023-09-24 | Stop reason: HOSPADM

## 2023-09-23 RX ADMIN — POTASSIUM CHLORIDE 20 MEQ: 1500 TABLET, EXTENDED RELEASE ORAL at 08:49

## 2023-09-23 RX ADMIN — SODIUM CHLORIDE, POTASSIUM CHLORIDE, SODIUM LACTATE AND CALCIUM CHLORIDE: 600; 310; 30; 20 INJECTION, SOLUTION INTRAVENOUS at 07:00

## 2023-09-23 RX ADMIN — OXYCODONE HYDROCHLORIDE 5 MG: 5 TABLET ORAL at 12:39

## 2023-09-23 RX ADMIN — METOPROLOL TARTRATE 50 MG: 50 TABLET, FILM COATED ORAL at 20:56

## 2023-09-23 RX ADMIN — METOPROLOL TARTRATE 50 MG: 50 TABLET, FILM COATED ORAL at 09:32

## 2023-09-23 RX ADMIN — ATORVASTATIN CALCIUM 20 MG: 20 TABLET, FILM COATED ORAL at 20:55

## 2023-09-23 RX ADMIN — ENOXAPARIN SODIUM 70 MG: 100 INJECTION SUBCUTANEOUS at 20:56

## 2023-09-23 RX ADMIN — SODIUM CHLORIDE, POTASSIUM CHLORIDE, SODIUM LACTATE AND CALCIUM CHLORIDE: 600; 310; 30; 20 INJECTION, SOLUTION INTRAVENOUS at 16:03

## 2023-09-23 RX ADMIN — OXYCODONE HYDROCHLORIDE 5 MG: 5 TABLET ORAL at 08:48

## 2023-09-23 RX ADMIN — PANTOPRAZOLE SODIUM 40 MG: 40 INJECTION, POWDER, FOR SOLUTION INTRAVENOUS at 08:49

## 2023-09-23 RX ADMIN — OXYCODONE HYDROCHLORIDE 5 MG: 5 TABLET ORAL at 20:56

## 2023-09-23 RX ADMIN — SODIUM CHLORIDE, PRESERVATIVE FREE 10 ML: 5 INJECTION INTRAVENOUS at 20:56

## 2023-09-23 RX ADMIN — OXYCODONE HYDROCHLORIDE 5 MG: 5 TABLET ORAL at 16:37

## 2023-09-23 ASSESSMENT — PAIN DESCRIPTION - DESCRIPTORS
DESCRIPTORS: DISCOMFORT
DESCRIPTORS: ACHING;DISCOMFORT
DESCRIPTORS: ACHING;DISCOMFORT

## 2023-09-23 ASSESSMENT — PAIN DESCRIPTION - LOCATION
LOCATION: GENERALIZED

## 2023-09-23 ASSESSMENT — PAIN SCALES - GENERAL
PAINLEVEL_OUTOF10: 7
PAINLEVEL_OUTOF10: 8
PAINLEVEL_OUTOF10: 7
PAINLEVEL_OUTOF10: 7
PAINLEVEL_OUTOF10: 8
PAINLEVEL_OUTOF10: 6
PAINLEVEL_OUTOF10: 7

## 2023-09-23 ASSESSMENT — PAIN - FUNCTIONAL ASSESSMENT
PAIN_FUNCTIONAL_ASSESSMENT: ACTIVITIES ARE NOT PREVENTED
PAIN_FUNCTIONAL_ASSESSMENT: PREVENTS OR INTERFERES SOME ACTIVE ACTIVITIES AND ADLS
PAIN_FUNCTIONAL_ASSESSMENT: ACTIVITIES ARE NOT PREVENTED

## 2023-09-23 NOTE — PROGRESS NOTES
Hospitalist Progress Note    Patient:  Severa Rein      Unit/Bed:5K-19/019-A    YOB: 1955    MRN: [id number]       Acct: [de-identified]     PCP: Soledad Brock MD    Date of Admission: 9/22/2023    Assessment/Plan:    #Abdominal pain, nausea: Acute onset 1 day prior to arrival in ED, has been having dry heaving. Able to tolerate water, unable to tolerate anything else. 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 CT A/P multiple metastatic pulmonary nodules through the lung bases increased in size (largest measuring up to 16 mm, previously 13 mm, metastatic lesion lateral segment of left hepatic lobe increased in size 23x29 (previously 15x22 mm), small amount malignant perihepatic ascites, thickening small bowel loops in right upper quadrant. Zofran PRN  Restarted home Oxycodone as needed   Started on Full liquid diet today. Advance diet as tolerated. Apply heat to abdomen as needed  Can trial capsaicin cream to see if helps with nausea, do not use with heating pad     #Leukocytosis: Improving. 2/2 recent neulasta injection on 9/20/23. Denying fever, chills, recent sick contacts. Afebrile on admission. WBC 47 today from 67.5. Continue to monitor with daily CBC     #Chronic diarrhea: Continue immodium. Check cdiff if having loose stool. 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 Rosalie Gibbs. Recently had cycle 2 of chemotherapy on 9/21. Also received neulasta on 9/20/23. CT imaging findings as above. #Atrial fibrillation: [id number] 5. Patient follows with Cardiology at [facility]. Initial EKG showed afib, vent rate 85, and Qtc 468. On Warfarin, consulted pharmacy to dose. Continue Lopressor 50 mg BID w/ parameters and Warfarin  Echo pending      #HFrEF: Not in exacerbation. Most recent EF 40% per [facility] on in 2014. More recent echo not available.

## 2023-09-23 NOTE — PROGRESS NOTES
differentiated large cell neuroendocrine carcinoma. Lymph nodes (12): Negative for malignancy. Margins negative for carcinoma. Pathologic stage: pT4a, pN0. Appendix: fibrous obliteration of lumen. See synoptic report below. Specimen:   A) GALLBLADDER   B) RIGHT COLON         GENETICS:  Not yet avaialable    MOLECULAR:  Not yet available. ASSESSMENT/PLAN:    Patient Active Problem List   Diagnosis    Atrial fibrillation ([address])    Benign essential HTN    Mixed hyperlipidemia    Hyperuricemia    Colon cancer (HCC)    Large cell neuroendocrine carcinoma (HCC)    Metastatic malignant neoplasm (HCC)    Iron deficiency anemia    Abdominal pain        1) Large bowel , large cell neuroendocrine tumor :   Poorly differentiated, large cell neuroendocrine tumor:   S/p right hemicolectomy 7/20/2023 : Pathologic stage: pT4a, pN0. Ki 67: 90%. - MMR stable. - Metastatic in abdominal lymph nodes and lung lesion.   - PET scan with uptake in lung lesion and abdominal lymph nodes consistent with metastatic disease.   - No mets on CT head with contrast ( patient can not get even open MRI due to claustrophobia, said he tried with xanax as well and that did not work). - NGS showed ALK rearrangement ALK-EML4 rearrangement. That might indicate sensitivity to ALK inhibitors. - 8/28/2023:  C1 D1 of  Carbo/etoposide. - 9/7/2023:  diarrhea resolved, he seems to have tolerated first cycle okay except for diarrhea the first two days, continue chemo as planned. - Labs reviewed, proceed with C2 D1 of treatment on 9/19.  - PET scan ordered prior to the next cycle of Rx. Orders Placed This Encounter   Procedures    PET CT SKULL BASE TO MID THIGH    CBC With Auto Differential    Comprehensive metabolic panel         ## Anemia:   SUKH, due to blood loss from tumor and perioperative bleeding. Receiving IV iron replacement     ## Afib;    On coumadin per cardiology in [facility].   Consider switching to [address] in

## 2023-09-23 NOTE — PLAN OF CARE
Problem: Pain  Goal: Verbalizes/displays adequate comfort level or baseline comfort level  9/23/2023 1011 by Elias Damico RN  Outcome: Progressing   Pain Assessment: 0-10  Pain Level: 7   Patient's Stated Pain Goal: 8   Is pain goal met at this time? No     Non-Pharmaceutical Pain Intervention(s): Rest    Problem: ABCDS Injury Assessment  Goal: Absence of physical injury  9/23/2023 1011 by Elias Damico RN  Outcome: Progressing     Problem: Safety - Adult  Goal: Free from fall injury  9/23/2023 1011 by Elias Damico RN  Outcome: Progressing   Fall assessment completed. Patient using call light appropriately to call for assistance with ambulation to bathroom. Personal items within reach. Patient is also compliant with use of non-skid slippers. Care plan reviewed with patient and wife. Patient and wife verbalize understanding of the plan of care and contribute to goal setting.

## 2023-09-23 NOTE — PROGRESS NOTES
Warfarin Pharmacy Consult Note    Yeyo Alves is a 80 y.o. male for whom pharmacy has been asked to manage warfarin therapy. Consulting Provider: Dr Katrin Leblanc  Warfarin Indication: Atrial fibrillation or Atrial flutter  Target INR range: 2.0-3.0   Warfarin dose prior to admission: 2.5mg MWF, 5mg TThSS  Outpatient warfarin provider: [facility]     09/22/23  1306 09/23/23  0535   INR 1.02 1.17*     Recent Labs     09/22/23  1306 09/23/23  0535   HGB 10.9* 10.3*    334     Concurrent anticoagulants/antiplatelets: lovenox  Significant warfarin drug-drug interactions: none    Date INR Warfarin Dose   9/22/23 1.02 2.5 mg   9/23/23 1.17 5 mg               INR will be monitored routinely until therapeutic INR is achieved. Pharmacy will continue to follow. Thank you for the consult,   Carmina Avalos Pharm. D., BCPS, BCCCP 9/23/2023 10:12 AM

## 2023-09-24 VITALS
RESPIRATION RATE: 18 BRPM | SYSTOLIC BLOOD PRESSURE: 141 MMHG | TEMPERATURE: 97.9 F | WEIGHT: 148 LBS | BODY MASS INDEX: 22.43 KG/M2 | DIASTOLIC BLOOD PRESSURE: 83 MMHG | HEIGHT: 68 IN | OXYGEN SATURATION: 95 % | HEART RATE: 90 BPM

## 2023-09-24 LAB
ANION GAP SERPL CALC-SCNC: 7 MEQ/L (ref 8–16)
ANISOCYTOSIS BLD QL SMEAR: PRESENT
BASOPHILS ABSOLUTE: 0.2 THOU/MM3 (ref 0–0.1)
BASOPHILS NFR BLD AUTO: 0.7 %
BUN SERPL-MCNC: 14 MG/DL (ref 7–22)
CALCIUM SERPL-MCNC: 8.4 MG/DL (ref 8.5–10.5)
CHLORIDE SERPL-SCNC: 100 MEQ/L (ref 98–111)
CO2 SERPL-SCNC: 28 MEQ/L (ref 23–33)
CREAT SERPL-MCNC: 0.5 MG/DL (ref 0.4–1.2)
DEPRECATED RDW RBC AUTO: 77.3 FL (ref 35–45)
EOSINOPHIL NFR BLD AUTO: 0 %
EOSINOPHILS ABSOLUTE: 0 THOU/MM3 (ref 0–0.4)
ERYTHROCYTE [DISTWIDTH] IN BLOOD BY AUTOMATED COUNT: 24.1 % (ref 11.5–14.5)
GFR SERPL CREATININE-BSD FRML MDRD: > 60 ML/MIN/1.73M2
GLUCOSE SERPL-MCNC: 92 MG/DL (ref 70–108)
HCT VFR BLD AUTO: 29.9 % (ref 42–52)
HGB BLD-MCNC: 9.4 GM/DL (ref 14–18)
IMM GRANULOCYTES # BLD AUTO: 4.61 THOU/MM3 (ref 0–0.07)
IMM GRANULOCYTES NFR BLD AUTO: 20.2 %
INR PPP: 1.19 (ref 0.85–1.13)
LYMPHOCYTES ABSOLUTE: 1.2 THOU/MM3 (ref 1–4.8)
LYMPHOCYTES NFR BLD AUTO: 5.3 %
MCH RBC QN AUTO: 28.1 PG (ref 26–33)
MCHC RBC AUTO-ENTMCNC: 31.4 GM/DL (ref 32.2–35.5)
MCV RBC AUTO: 89.3 FL (ref 80–94)
MONOCYTES ABSOLUTE: 0.1 THOU/MM3 (ref 0.4–1.3)
MONOCYTES NFR BLD AUTO: 0.4 %
NEUTROPHILS NFR BLD AUTO: 73.4 %
NRBC BLD AUTO-RTO: 0 /100 WBC
PLATELET # BLD AUTO: 258 THOU/MM3 (ref 130–400)
PMV BLD AUTO: 11.7 FL (ref 9.4–12.4)
POTASSIUM SERPL-SCNC: 4 MEQ/L (ref 3.5–5.2)
RBC # BLD AUTO: 3.35 MILL/MM3 (ref 4.7–6.1)
SEGMENTED NEUTROPHILS ABSOLUTE COUNT: 16.7 THOU/MM3 (ref 1.8–7.7)
SODIUM SERPL-SCNC: 135 MEQ/L (ref 135–145)
WBC # BLD AUTO: 22.8 THOU/MM3 (ref 4.8–10.8)

## 2023-09-24 PROCEDURE — 36415 COLL VENOUS BLD VENIPUNCTURE: CPT

## 2023-09-24 PROCEDURE — 6370000000 HC RX 637 (ALT 250 FOR IP): Performed by: STUDENT IN AN ORGANIZED HEALTH CARE EDUCATION/TRAINING PROGRAM

## 2023-09-24 PROCEDURE — 80048 BASIC METABOLIC PNL TOTAL CA: CPT

## 2023-09-24 PROCEDURE — 85610 PROTHROMBIN TIME: CPT

## 2023-09-24 PROCEDURE — G0378 HOSPITAL OBSERVATION PER HR: HCPCS

## 2023-09-24 PROCEDURE — 85025 COMPLETE CBC W/AUTO DIFF WBC: CPT

## 2023-09-24 PROCEDURE — 6370000000 HC RX 637 (ALT 250 FOR IP)

## 2023-09-24 RX ORDER — WARFARIN SODIUM 7.5 MG/1
7.5 TABLET ORAL
Status: DISCONTINUED | OUTPATIENT
Start: 2023-09-24 | End: 2023-09-24 | Stop reason: HOSPADM

## 2023-09-24 RX ORDER — FUROSEMIDE 20 MG/1
20 TABLET ORAL DAILY
Qty: 60 TABLET | Refills: 3 | Status: SHIPPED | OUTPATIENT
Start: 2023-09-27

## 2023-09-24 RX ADMIN — METOPROLOL TARTRATE 50 MG: 50 TABLET, FILM COATED ORAL at 10:32

## 2023-09-24 RX ADMIN — OXYCODONE HYDROCHLORIDE 5 MG: 5 TABLET ORAL at 02:12

## 2023-09-24 RX ADMIN — POTASSIUM CHLORIDE 20 MEQ: 1500 TABLET, EXTENDED RELEASE ORAL at 10:32

## 2023-09-24 RX ADMIN — OXYCODONE HYDROCHLORIDE 5 MG: 5 TABLET ORAL at 11:58

## 2023-09-24 RX ADMIN — OXYCODONE HYDROCHLORIDE 5 MG: 5 TABLET ORAL at 08:01

## 2023-09-24 ASSESSMENT — PAIN DESCRIPTION - DESCRIPTORS
DESCRIPTORS: DISCOMFORT
DESCRIPTORS: ACHING;DISCOMFORT

## 2023-09-24 ASSESSMENT — PAIN SCALES - WONG BAKER: WONGBAKER_NUMERICALRESPONSE: 0

## 2023-09-24 ASSESSMENT — PAIN DESCRIPTION - ORIENTATION
ORIENTATION: LOWER
ORIENTATION: LOWER

## 2023-09-24 ASSESSMENT — PAIN - FUNCTIONAL ASSESSMENT
PAIN_FUNCTIONAL_ASSESSMENT: ACTIVITIES ARE NOT PREVENTED

## 2023-09-24 ASSESSMENT — PAIN DESCRIPTION - PAIN TYPE: TYPE: CHRONIC PAIN

## 2023-09-24 ASSESSMENT — PAIN DESCRIPTION - LOCATION
LOCATION: ABDOMEN;GENERALIZED
LOCATION: ABDOMEN;GENERALIZED
LOCATION: ABDOMEN

## 2023-09-24 ASSESSMENT — PAIN SCALES - GENERAL
PAINLEVEL_OUTOF10: 8
PAINLEVEL_OUTOF10: 7

## 2023-09-24 ASSESSMENT — PAIN DESCRIPTION - FREQUENCY: FREQUENCY: CONTINUOUS

## 2023-09-24 ASSESSMENT — PAIN DESCRIPTION - ONSET: ONSET: ON-GOING

## 2023-09-24 NOTE — CARE COORDINATION
Patient Goals/Plan/Treatment Preferences: Abdominal Pain w Metastatic Pulmonary Nodules/Left Hepatic Lobe/Ascites  History: Colon Cancer, A-fib, Colectomy, Smoker, Marijuana Use    Elevated WBC, H 9.4; monitor    IV PPI continued    FL Diet; await diet advancement    Discharge Plan:   plans home w FRANCINE Wan (refused Spiritual Care referral for POA); has port; denied needs      If patient is discharged prior to next notation, then this note serves as note for discharge by case management.

## 2023-09-24 NOTE — PLAN OF CARE
Problem: Pain  Goal: Verbalizes/displays adequate comfort level or baseline comfort level  Outcome: Progressing  Flowsheets (Taken 9/23/2023 1941 by Gage Lundy RN)  Verbalizes/displays adequate comfort level or baseline comfort level: Encourage patient to monitor pain and request assistance   Pain Assessment: 0-10  Pain Level: 7   Patient's Stated Pain Goal: 5   Is pain goal met at this time? No     Non-Pharmaceutical Pain Intervention(s): Ice, Rest    Problem: ABCDS Injury Assessment  Goal: Absence of physical injury  Outcome: Progressing     Problem: Safety - Adult  Goal: Free from fall injury  Outcome: Progressing   Fall assessment completed. Patient using call light appropriately to call for assistance with ambulation to bathroom. Personal items within reach. Patient is also compliant with use of non-skid slippers. ;    Care plan reviewed with patient. Patient verbalize understanding of the plan of care and contribute to goal setting.

## 2023-09-24 NOTE — PROGRESS NOTES
Report received from primary nurse Jesus Kenney detailing pts medical history and current complaints. Begin hourly rounds.

## 2023-09-24 NOTE — DISCHARGE INSTRUCTIONS
Stop taking Lisinopril  Can resume taking Lasix on 9/27 if not having diarrhea and are eating and drinking ok  Follow up with Coumadin Clinic

## 2023-09-24 NOTE — PROGRESS NOTES
Discharge paperwork reviewed with patient. Discharge summary page signed. No questions verbalized. Port de-accessed. All belongings packed and taken with wife. Tech assisted patient to family vehicle. No other concerns. Spoke with pharmacy about dosing coumadin. They informed me to tell the patient to take 10 mg today and tomorrow and 5 mg the following day, then return to normal schedule. Then to reach out and make an appointment with the [facility] coumadin clinic this week to ensure continuity of care.

## 2023-09-24 NOTE — DISCHARGE SUMMARY
by Dr. Rodger Moreno on 9/22/2023 1:33 PM             Consults:   IP CONSULT TO PHARMACY    Disposition: Home  Condition at Discharge: Stable    Code Status:  Prior     Patient Instructions:    Discharge lab work: CBC in 1 week  Activity: activity as tolerated  Diet: No diet orders on file      Follow-up visits:   Lucas Cole MD  [address] [facility] [address] [facility]  [phone]    Follow up in 1 week(s)  Follow up after hospital discharge         Discharge Medications:      Medication List        CHANGE how you take these medications      furosemide 20 MG tablet  Commonly known as: LASIX  Take 1 tablet by mouth daily  Start taking on: September 27, 2023  What changed: These instructions start on September 27, 2023. If you are unsure what to do until then, ask your doctor or other care provider. CONTINUE taking these medications      allopurinol 100 MG tablet  Commonly known as: ZYLOPRIM     atorvastatin 20 MG tablet  Commonly known as: LIPITOR     famotidine 20 MG tablet  Commonly known as: PEPCID     loperamide 2 MG capsule  Commonly known as: IMODIUM     metoprolol tartrate 50 MG tablet  Commonly known as: LOPRESSOR     omeprazole 40 MG delayed release capsule  Commonly known as: PRILOSEC     ondansetron 4 MG tablet  Commonly known as: Zofran  Take 1 tablet by mouth every 8 hours as needed for Nausea or Vomiting     oxyCODONE 5 MG immediate release tablet  Commonly known as: Roxicodone  Take 1 tablet by mouth every 4 hours as needed for Pain for up to 7 days. Intended supply: 7 days.  Take lowest dose possible to manage pain Max Daily Amount: 30 mg     potassium chloride 20 MEQ extended release tablet  Commonly known as: KLOR-CON M     sennosides-docusate sodium 8.6-50 MG tablet  Commonly known as: SENOKOT-S  Take 1 tablet by mouth daily     warfarin 5 MG tablet  Commonly known as: COUMADIN            STOP taking these medications      hydroCHLOROthiazide 12.5 MG capsule  Commonly

## 2023-09-24 NOTE — PROGRESS NOTES
Warfarin Pharmacy Consult Note    Warfarin Indication: Atrial fibrillation or Atrial flutter  Target INR: 2.0-3.0  Dose prior to admission: 5 mg daily    Recent Labs     09/22/23  1306 09/23/23  0535 09/24/23  0457   INR 1.02 1.17* 1.19*     Recent Labs     09/22/23  1306 09/23/23  0535 09/24/23  0457   HGB 10.9* 10.3* 9.4*    334 258     Concurrent anticoagulants/antiplatelets: Lovenox  Significant warfarin drug-drug interactions: none     Date INR Warfarin Dose   9/22/23 1.02 2.5 mg   9/23/23 1.17 5 mg    9/24/23  1.19  7.5 mg                                         Monitoring:                   INR will be monitored daily until therapeutic INR is achieved.     **Please contact pharmacy for discharge instructions when indicated**    Irais Reddy PharmD, [facility] 9/24/2023 8:24 AM

## 2023-09-24 NOTE — CARE COORDINATION
09/24/23 4523   Service Assessment   Patient Orientation Alert and Oriented   Cognition Alert   History Provided By Patient;Significant Other;Medical Record   Primary Caregiver Self   Accompanied By/Relationship SO [place]   Support Systems Spouse/Significant Other   Patient's Healthcare Decision Maker is: Legal Next of Kin   PCP Verified by CM Yes   Last Visit to PCP Within last 3 months   Prior Functional Level Independent in ADLs/IADLs   Current Functional Level Independent in ADLs/IADLs   Can patient return to prior living arrangement Yes   Ability to make needs known: Good   Family able to assist with home care needs: Yes   Would you like for me to discuss the discharge plan with any other family members/significant others, and if so, who? No   Financial Resources Medicare;Medicaid   Community Resources None   CM/SW Referral ADLs/IADLs   Social/Functional History   Lives With Significant other   Type of Home House   Discharge Planning   Type of Residence House   Living Arrangements Spouse/Significant Other   Current Services Prior To Admission None   Potential Assistance Needed N/A   DME Ordered? No   Potential Assistance Purchasing Medications No   Patient expects to be discharged to: House   Follow Up Appointment: Best Day/Time    (pm)   One/Two Story Residence Two story, live on first floor   Lift Chair Available No   History of falls? 0   Services At/After Discharge   Transition of Care Consult (CM Consult) N/A   Services At/After Discharge None   [employer] Information Provided?  No   Mode of Transport at Discharge Other (see comment)  (SO Sherlyn and brother Marvin Arielain)   Confirm Follow Up Transport Friends   Condition of Participation: Discharge Planning   The Plan for Transition of Care is related to the following treatment goals: Abdominal Pain treatment   Freedom of Choice list was provided with basic dialogue that supports the patient's individualized plan of care/goals, treatment preferences, and

## 2023-09-24 NOTE — PROGRESS NOTES
Physical assessment completed at 1015. Juan Saenz was reclined in bed watching TV. Spouse and brother at bedside. Pt is AOx4; speech is clear and appropriate. Denies pain. Mucous membranes pink and moist. Upper extremities tan, warm, dry, with full sensation and full ROM; denies numbness or tingling. Hand grasp is strong bilaterally. Radial pulses +2 bilaterally. Cap refill & skin turgor <3s. Nailbeds appropriate for ethnicity. Heart sounds +3, regular rhythm, 94 BPM. No JVD noted. Lung sounds diminished with expiratory wheezes throughout; moderate depth, symmetrical movement, 18 breaths per minute, unlabored. SPO2 95 on room air. Abdomen is distended, soft, and tender. Bowel sounds hypoactive in all 4 quadrants. Posterior is free of wounds; skin tan, warm, dry. Lower extremities tan, warm, dry, with no swelling; sensation present with full ROM. Pt comfortable and denies further needs at this time.

## 2023-09-25 ENCOUNTER — HOSPITAL ENCOUNTER (OUTPATIENT)
Dept: INFUSION THERAPY | Age: 68
Discharge: HOME | End: 2023-09-25
Payer: MEDICARE

## 2023-09-25 ENCOUNTER — TELEPHONE (OUTPATIENT)
Dept: ONCOLOGY | Age: 68
End: 2023-09-25

## 2023-09-25 VITALS
SYSTOLIC BLOOD PRESSURE: 93 MMHG | DIASTOLIC BLOOD PRESSURE: 50 MMHG | HEART RATE: 79 BPM | TEMPERATURE: 98.1 F | RESPIRATION RATE: 18 BRPM | OXYGEN SATURATION: 98 %

## 2023-09-25 DIAGNOSIS — R19.7 DIARRHEA, UNSPECIFIED TYPE: ICD-10-CM

## 2023-09-25 DIAGNOSIS — C7A.8 LARGE CELL NEUROENDOCRINE CARCINOMA (HCC): Primary | ICD-10-CM

## 2023-09-25 LAB
ANISOCYTOSIS BLD QL SMEAR: PRESENT
BASOPHILS ABSOLUTE: 0 THOU/MM3 (ref 0–0.1)
BASOPHILS NFR BLD AUTO: 0.3 %
BUN BLDP-MCNC: 21 MG/DL (ref 8–26)
CHLORIDE BLD-SCNC: 99 MEQ/L (ref 98–109)
CREAT BLD-MCNC: 0.6 MG/DL (ref 0.5–1.2)
DEPRECATED RDW RBC AUTO: 77.7 FL (ref 35–45)
DOHLE BOD BLD QL SMEAR: PRESENT
EOSINOPHIL NFR BLD AUTO: 0.1 %
EOSINOPHILS ABSOLUTE: 0 THOU/MM3 (ref 0–0.4)
ERYTHROCYTE [DISTWIDTH] IN BLOOD BY AUTOMATED COUNT: 24.6 % (ref 11.5–14.5)
GFR SERPL CREATININE-BSD FRML MDRD: > 60 ML/MIN/1.73M2
GLUCOSE BLD-MCNC: 124 MG/DL (ref 70–108)
HCT VFR BLD AUTO: 30 % (ref 42–52)
HGB BLD-MCNC: 9.4 GM/DL (ref 14–18)
HYPOCHROMIA BLD QL SMEAR: PRESENT
IMM GRANULOCYTES # BLD AUTO: 0.1 THOU/MM3 (ref 0–0.07)
IMM GRANULOCYTES NFR BLD AUTO: 1.3 %
IONIZED CALCIUM, WHOLE BLOOD: 1.2 MMOL/L (ref 1.12–1.32)
LYMPHOCYTES ABSOLUTE: 0.9 THOU/MM3 (ref 1–4.8)
LYMPHOCYTES NFR BLD AUTO: 11.4 %
MCH RBC QN AUTO: 27.9 PG (ref 26–33)
MCHC RBC AUTO-ENTMCNC: 31.3 GM/DL (ref 32.2–35.5)
MCV RBC AUTO: 89 FL (ref 80–94)
MONOCYTES ABSOLUTE: 0.3 THOU/MM3 (ref 0.4–1.3)
MONOCYTES NFR BLD AUTO: 3.6 %
NEUTROPHILS NFR BLD AUTO: 83.3 %
NRBC BLD AUTO-RTO: 0 /100 WBC
PLATELET # BLD AUTO: 243 THOU/MM3 (ref 130–400)
PLATELET BLD QL SMEAR: ADEQUATE
PMV BLD AUTO: 12 FL (ref 9.4–12.4)
POTASSIUM BLD-SCNC: 3.9 MEQ/L (ref 3.5–4.9)
RBC # BLD AUTO: 3.37 MILL/MM3 (ref 4.7–6.1)
SCAN OF BLOOD SMEAR: NORMAL
SEGMENTED NEUTROPHILS ABSOLUTE COUNT: 6.7 THOU/MM3 (ref 1.8–7.7)
SODIUM BLD-SCNC: 134 MEQ/L (ref 138–146)
TOTAL CO2, WHOLE BLOOD: 25 MEQ/L (ref 23–33)
TOXIC GRANULES BLD QL SMEAR: PRESENT
WBC # BLD AUTO: 8 THOU/MM3 (ref 4.8–10.8)

## 2023-09-25 PROCEDURE — 36591 DRAW BLOOD OFF VENOUS DEVICE: CPT

## 2023-09-25 PROCEDURE — 96361 HYDRATE IV INFUSION ADD-ON: CPT

## 2023-09-25 PROCEDURE — 85025 COMPLETE CBC W/AUTO DIFF WBC: CPT

## 2023-09-25 PROCEDURE — 96360 HYDRATION IV INFUSION INIT: CPT

## 2023-09-25 PROCEDURE — 99211 OFF/OP EST MAY X REQ PHY/QHP: CPT

## 2023-09-25 PROCEDURE — 6360000002 HC RX W HCPCS: Performed by: INTERNAL MEDICINE

## 2023-09-25 PROCEDURE — 2580000003 HC RX 258: Performed by: INTERNAL MEDICINE

## 2023-09-25 PROCEDURE — 80047 BASIC METABLC PNL IONIZED CA: CPT

## 2023-09-25 RX ORDER — 0.9 % SODIUM CHLORIDE 0.9 %
1000 INTRAVENOUS SOLUTION INTRAVENOUS ONCE
Status: DISCONTINUED | OUTPATIENT
Start: 2023-09-25 | End: 2023-09-25 | Stop reason: CLARIF

## 2023-09-25 RX ORDER — 0.9 % SODIUM CHLORIDE 0.9 %
1000 INTRAVENOUS SOLUTION INTRAVENOUS ONCE
Status: COMPLETED | OUTPATIENT
Start: 2023-09-25 | End: 2023-09-25

## 2023-09-25 RX ORDER — DIPHENOXYLATE HYDROCHLORIDE AND ATROPINE SULFATE 2.5; .025 MG/1; MG/1
1 TABLET ORAL 4 TIMES DAILY PRN
Qty: 40 TABLET | Refills: 0 | Status: SHIPPED | OUTPATIENT
Start: 2023-09-25 | End: 2023-10-05

## 2023-09-25 RX ORDER — HEPARIN 100 UNIT/ML
500 SYRINGE INTRAVENOUS PRN
Status: DISCONTINUED | OUTPATIENT
Start: 2023-09-25 | End: 2023-09-26 | Stop reason: HOSPADM

## 2023-09-25 RX ORDER — SODIUM CHLORIDE 0.9 % (FLUSH) 0.9 %
5-40 SYRINGE (ML) INJECTION PRN
OUTPATIENT
Start: 2023-09-25

## 2023-09-25 RX ORDER — SODIUM CHLORIDE 0.9 % (FLUSH) 0.9 %
5-40 SYRINGE (ML) INJECTION PRN
Status: DISCONTINUED | OUTPATIENT
Start: 2023-09-25 | End: 2023-09-26 | Stop reason: HOSPADM

## 2023-09-25 RX ORDER — SODIUM CHLORIDE 9 MG/ML
25 INJECTION, SOLUTION INTRAVENOUS PRN
OUTPATIENT
Start: 2023-09-25

## 2023-09-25 RX ORDER — OXYCODONE HYDROCHLORIDE 5 MG/1
5 TABLET ORAL EVERY 4 HOURS PRN
Qty: 42 TABLET | Refills: 0 | Status: SHIPPED | OUTPATIENT
Start: 2023-09-25 | End: 2023-10-02

## 2023-09-25 RX ORDER — HEPARIN 100 UNIT/ML
500 SYRINGE INTRAVENOUS PRN
OUTPATIENT
Start: 2023-09-25

## 2023-09-25 RX ADMIN — SODIUM CHLORIDE, PRESERVATIVE FREE 10 ML: 5 INJECTION INTRAVENOUS at 11:45

## 2023-09-25 RX ADMIN — SODIUM CHLORIDE, PRESERVATIVE FREE 10 ML: 5 INJECTION INTRAVENOUS at 13:48

## 2023-09-25 RX ADMIN — HEPARIN 500 UNITS: 100 SYRINGE at 13:48

## 2023-09-25 RX ADMIN — SODIUM CHLORIDE 1000 ML: 9 INJECTION, SOLUTION INTRAVENOUS at 11:46

## 2023-09-25 NOTE — PLAN OF CARE
Problem: Safety - Adult  Goal: Free from fall injury  Outcome: Adequate for Discharge  Flowsheets (Taken 9/25/2023 1247)  Free From Fall Injury: Instruct family/caregiver on patient safety  Note: Free from falls while in O.P. Oncology. Discussed the need to use the call light for assistance when getting up to ambulate. Problem: Discharge Planning  Goal: Discharge to home or other facility with appropriate resources  Outcome: Adequate for Discharge  Flowsheets (Taken 9/25/2023 1247)  Discharge to home or other facility with appropriate resources:   Identify barriers to discharge with patient and caregiver   Identify discharge learning needs (meds, wound care, etc)  Note: Verbalize understanding of discharge instructions, follow up appointments, and when to call Physician. Discuss understanding of discharge instructions, follow up appointments and when to call Physician. Problem: Chronic Conditions and Co-morbidities  Goal: Patient's chronic conditions and co-morbidity symptoms are monitored and maintained or improved  Outcome: Adequate for Discharge  Flowsheets (Taken 9/25/2023 1247)  Care Plan - Patient's Chronic Conditions and Co-Morbidity Symptoms are Monitored and Maintained or Improved:   Monitor and assess patient's chronic conditions and comorbid symptoms for stability, deterioration, or improvement   Collaborate with multidisciplinary team to address chronic and comorbid conditions and prevent exacerbation or deterioration  Note: Patient verbalizes understanding to verbal information given on IV fluids,action and possible side effects. Aware to call MD if develop complications.

## 2023-09-25 NOTE — PROGRESS NOTES
Patient here for IV hydration of 1000 ml over 2 hr's,  Due to fall and poor oral intake.   Per Dr. Quang Quick

## 2023-09-25 NOTE — TELEPHONE ENCOUNTER
Discussed with pt's significant other, pt is refusing to come to our facility or ER for evaluation today. He will come Wednesday for labs and fluids. Pt and sig other were asked to call the office if any changes.

## 2023-09-25 NOTE — TELEPHONE ENCOUNTER
Pt was just discharged from the hospital yesterday with dehydration, significant other calling this morning stating patient lost his balance and fell this morning. He did not hit his head. He is still having diarrhea and poor appetite, but no more vomiting. He is scheduled for a PET scan 10/5 & f/u with Dr. Mary Lou Matute 10/9. FYI.

## 2023-09-25 NOTE — PROGRESS NOTES
Patient tolerated IV fluids without any complications. Discharge instructions given to patient-verbalizes understanding. Ambulated off unit per self with belongings.

## 2023-09-26 ENCOUNTER — TELEPHONE (OUTPATIENT)
Dept: ONCOLOGY | Age: 68
End: 2023-09-26

## 2023-09-26 RX ORDER — ALBUTEROL SULFATE 90 UG/1
2 AEROSOL, METERED RESPIRATORY (INHALATION) 4 TIMES DAILY PRN
Qty: 54 G | Refills: 1 | Status: SHIPPED | OUTPATIENT
Start: 2023-09-26

## 2023-09-26 NOTE — TELEPHONE ENCOUNTER
Pt's significant other calling the office asking if you were going to write a script for an inhaler? She says this was discussed when pt was here yesterday. Also, diarrhea has improved with lomotil & immodium.
Significant other notified of script.
Patient requests all Lab, Cardiology, and Radiology Results on their Discharge Instructions

## 2023-09-27 ENCOUNTER — TELEPHONE (OUTPATIENT)
Dept: INTERNAL MEDICINE CLINIC | Age: 68
End: 2023-09-27

## 2023-09-27 LAB
BACTERIA BLD AEROBE CULT: NORMAL
BACTERIA BLD AEROBE CULT: NORMAL

## 2023-09-28 ENCOUNTER — OFFICE VISIT (OUTPATIENT)
Dept: INTERNAL MEDICINE CLINIC | Age: 68
End: 2023-09-28
Payer: MEDICARE

## 2023-09-28 VITALS — WEIGHT: 139.8 LBS | BODY MASS INDEX: 21.19 KG/M2 | HEIGHT: 68 IN

## 2023-09-28 DIAGNOSIS — C7A.8 LARGE CELL NEUROENDOCRINE CARCINOMA (HCC): Primary | ICD-10-CM

## 2023-09-28 PROCEDURE — 99211 OFF/OP EST MAY X REQ PHY/QHP: CPT | Performed by: DIETITIAN, REGISTERED

## 2023-09-28 NOTE — PATIENT INSTRUCTIONS
Great that you get Chocolate Equate Plus.  - Continue to drink 3 of these in btw meals. Eat 3 small meals and 1-2 snacks/day    Make super pudding recipe. Drink 1-2 glasses of pineapple or orange juice everyday    Drink your glass of whole milk every meal.    Bring a 1-2 week food to log to next dietitian appt.

## 2023-09-28 NOTE — PROGRESS NOTES
[facility]  [address] [facility] [place]. [facility], [address] [place] [address]  [phone] (phone)  [phone] (fax)    Patient Name: Edda Brock. Date of Birth: [id number]. MRN: [id number]      Assessment: Patient is a 80 y.o. male seen for Initial MNT visit for Weight loss. -Nutritionally relevant labs:   Lab Results   Component Value Date/Time    LABA1C 6.2 07/21/2023 12:21 PM    LABA1C 5.6 09/02/2014 08:59 AM    GLUCOSE 92 09/24/2023 04:57 AM    GLUCOSE 99 09/23/2023 05:35 AM    CHOL 174 06/30/2017 11:00 AM    HDL 25 06/30/2017 11:00 AM    LDLCALC 115 06/30/2017 11:00 AM    TRIG 169 06/30/2017 11:00 AM     Pt here with SO Sherlyn  #. Chemo tx once every 21 days. S/p hemicolectomy    Equate Plus - chocolate 3-4/day. Gets this supplement drink for free using his OTC card. No N/V. Occ constipation then over treats and had diarrhea. Last week Chemo treatment made him sick N/V & diarrhea and was hospitalized x 2-3 days. Since discharged Sunday (4 days ago) Regular + BM 1-2/day    -Food recall:   Breakfast: 730 am - 2 eggs and 2 slices wheat with butter, half pint milk. Snacks today - date bar (2), cheesepuffs. Lunch: [place] - 1 bowl ~ 1 cup,  2 sl bread and butter, Prime hydrating drink. Not drinking red pop anymore. Dinner: baked chicken, mashed pot and corn OR pork chop meal OR plans tonight to have salmon jean pierre and fried potatoes. Snacks: Pecan cookies polish a bag of them off last night and drinking prime  No teeth. Slept well last night. Dislikes all fruit. Ok with raisins and orange or p/a juice. Dislikes milkshakes and states does not tolerate ice cream well. -Main Beverages: 2 bottles Prime/day, 1 cup milk with brkf.   Coffee 1 cup.    -Impression of Dietary Intake: on average, 3 meals per day, 3 snacks/day, no fruits and lacking vegetables in meal planning    Current Outpatient Medications on File Prior to Visit

## 2023-10-03 DIAGNOSIS — C7A.8 LARGE CELL NEUROENDOCRINE CARCINOMA (HCC): ICD-10-CM

## 2023-10-03 RX ORDER — OXYCODONE HYDROCHLORIDE 5 MG/1
5 TABLET ORAL EVERY 4 HOURS PRN
Qty: 42 TABLET | Refills: 0 | Status: SHIPPED | OUTPATIENT
Start: 2023-10-03 | End: 2023-10-10

## 2023-10-05 ENCOUNTER — HOSPITAL ENCOUNTER (OUTPATIENT)
Dept: PET IMAGING | Age: 68
Discharge: HOME | End: 2023-10-05
Attending: INTERNAL MEDICINE
Payer: MEDICARE

## 2023-10-05 DIAGNOSIS — C7A.8 LARGE CELL NEUROENDOCRINE CARCINOMA (HCC): ICD-10-CM

## 2023-10-05 PROCEDURE — 78815 PET IMAGE W/CT SKULL-THIGH: CPT

## 2023-10-05 PROCEDURE — A9552 F18 FDG: HCPCS | Performed by: INTERNAL MEDICINE

## 2023-10-05 PROCEDURE — 3430000000 HC RX DIAGNOSTIC RADIOPHARMACEUTICAL: Performed by: INTERNAL MEDICINE

## 2023-10-05 RX ORDER — FLUDEOXYGLUCOSE F 18 200 MCI/ML
14 INJECTION, SOLUTION INTRAVENOUS
Status: COMPLETED | OUTPATIENT
Start: 2023-10-05 | End: 2023-10-05

## 2023-10-05 RX ADMIN — FLUDEOXYGLUCOSE F 18 14 MILLICURIE: 200 INJECTION, SOLUTION INTRAVENOUS at 08:59

## 2023-10-08 NOTE — PROGRESS NOTES
[facility]  [address] [place] [address]  Dept: [phone]  Loc: [phone]   Hematology/Oncology Consult (Clinic)        10/08/23       Brooks Sylvester   1955     No ref. provider found   Panchito Gaitan MD       Reason:   Chief Complaint   Patient presents with    Follow-up     Large cell neuroendocrine carcinoma            Prior visit Intervals    8/28/2023:  He had port placement on 8/22  Received  IV iron 8/21. Is feeling well today, still some scattered abdominal pains. But he thinks energy and eating is good. 9/19/23:  Pt states diarrhea has resolved and he feels better. Discussed the forward plan with him       Interval hx:   10/9/23:  Had a short hospital admission due to dehydration and diarreha 9/22 after second chemotherapy cycle. Had the PET scan done. He fell due to tripping after he someone startles him, did not hit his head, fell on his left elbow, had pain in the left hip but that has resolved. Diarrhea has resolved and lomotil has helped that a lot. Is feeling well today    HPI:   70-year-old male who initially presented to GI and surgery for evaluation  secondary to newly diagnosed colonic mass that is near obstructing. He had colonoscopy with Dr. Claude Perez and they were Not able to get a scope through the area of concern. Proximal transverse colon close to the hepatic flexure. Patient admits to abdominal discomfort in the right upper quadrant. Mild to moderate. No severe sharp pain. Admits to weight loss and fatigue. Decreased appetite. No significant nausea or vomiting. Still passing flatus and having bowel movements. No hematochezia. No change in urination. Headaches. No lightheadedness. CT abdomen/pelvis demonstrated colonic mass proximal transverse colon close to the hepatic flexure.   Patient denies any family or personal history of colorectal disease

## 2023-10-09 ENCOUNTER — OFFICE VISIT (OUTPATIENT)
Dept: ONCOLOGY | Age: 68
End: 2023-10-09
Payer: MEDICARE

## 2023-10-09 ENCOUNTER — HOSPITAL ENCOUNTER (OUTPATIENT)
Dept: INFUSION THERAPY | Age: 68
Discharge: HOME | End: 2023-10-09
Payer: COMMERCIAL

## 2023-10-09 VITALS
BODY MASS INDEX: 21.58 KG/M2 | HEIGHT: 68 IN | SYSTOLIC BLOOD PRESSURE: 107 MMHG | TEMPERATURE: 97.4 F | RESPIRATION RATE: 16 BRPM | HEART RATE: 69 BPM | OXYGEN SATURATION: 97 % | DIASTOLIC BLOOD PRESSURE: 56 MMHG | WEIGHT: 142.4 LBS

## 2023-10-09 VITALS
DIASTOLIC BLOOD PRESSURE: 55 MMHG | OXYGEN SATURATION: 99 % | HEIGHT: 68 IN | HEART RATE: 60 BPM | WEIGHT: 142.4 LBS | SYSTOLIC BLOOD PRESSURE: 109 MMHG | RESPIRATION RATE: 16 BRPM | BODY MASS INDEX: 21.58 KG/M2 | TEMPERATURE: 97.8 F

## 2023-10-09 DIAGNOSIS — C79.9 METASTATIC MALIGNANT NEOPLASM, UNSPECIFIED SITE (HCC): ICD-10-CM

## 2023-10-09 DIAGNOSIS — C7A.8 LARGE CELL NEUROENDOCRINE CARCINOMA (HCC): Primary | ICD-10-CM

## 2023-10-09 LAB
ABSOLUTE IMMATURE GRANULOCYTE: 0.02 THOU/MM3 (ref 0–0.07)
BASOPHILS ABSOLUTE: 0 THOU/MM3 (ref 0–0.1)
BASOPHILS NFR BLD AUTO: 0 % (ref 0–3)
BUN BLDP-MCNC: 9 MG/DL (ref 8–26)
CHLORIDE BLD-SCNC: 107 MEQ/L (ref 98–109)
CREAT BLD-MCNC: 0.5 MG/DL (ref 0.5–1.2)
EOSINOPHIL NFR BLD AUTO: 0 % (ref 0–4)
EOSINOPHILS ABSOLUTE: 0 THOU/MM3 (ref 0–0.4)
ERYTHROCYTE [DISTWIDTH] IN BLOOD BY AUTOMATED COUNT: 25.7 % (ref 11.5–14.5)
GFR SERPL CREATININE-BSD FRML MDRD: > 60 ML/MIN/1.73M2
GLUCOSE BLD-MCNC: 130 MG/DL (ref 70–108)
HCT VFR BLD AUTO: 30.1 % (ref 42–52)
HGB BLD-MCNC: 9.2 GM/DL (ref 14–18)
IMMATURE GRANULOCYTES: 0 %
IONIZED CALCIUM, WHOLE BLOOD: 1.17 MMOL/L (ref 1.12–1.32)
LYMPHOCYTES ABSOLUTE: 1.5 THOU/MM3 (ref 1–4.8)
LYMPHOCYTES NFR BLD AUTO: 19 % (ref 15–47)
MAGNESIUM SERPL-MCNC: 2 MG/DL (ref 1.6–2.4)
MCH RBC QN AUTO: 28.4 PG (ref 26–33)
MCHC RBC AUTO-ENTMCNC: 30.6 GM/DL (ref 32.2–35.5)
MCV RBC AUTO: 93 FL (ref 80–94)
MONOCYTES ABSOLUTE: 0.7 THOU/MM3 (ref 0.4–1.3)
MONOCYTES NFR BLD AUTO: 9 % (ref 0–12)
NEUTROPHILS NFR BLD AUTO: 72 % (ref 43–75)
PLATELET # BLD AUTO: 329 THOU/MM3 (ref 130–400)
PMV BLD AUTO: 10.6 FL (ref 9.4–12.4)
POTASSIUM BLD-SCNC: 4.3 MEQ/L (ref 3.5–4.9)
RBC # BLD AUTO: 3.24 MILL/MM3 (ref 4.7–6.1)
REASON FOR REJECTION: NORMAL
REJECTED TEST: NORMAL
SEGMENTED NEUTROPHILS ABSOLUTE COUNT: 5.7 THOU/MM3 (ref 1.8–7.7)
SODIUM BLD-SCNC: 138 MEQ/L (ref 138–146)
TOTAL CO2, WHOLE BLOOD: 28 MEQ/L (ref 23–33)
WBC # BLD AUTO: 8 THOU/MM3 (ref 4.8–10.8)

## 2023-10-09 PROCEDURE — 3078F DIAST BP <80 MM HG: CPT | Performed by: INTERNAL MEDICINE

## 2023-10-09 PROCEDURE — G8427 DOCREV CUR MEDS BY ELIG CLIN: HCPCS | Performed by: INTERNAL MEDICINE

## 2023-10-09 PROCEDURE — G8484 FLU IMMUNIZE NO ADMIN: HCPCS | Performed by: INTERNAL MEDICINE

## 2023-10-09 PROCEDURE — 99213 OFFICE O/P EST LOW 20 MIN: CPT | Performed by: INTERNAL MEDICINE

## 2023-10-09 PROCEDURE — 96367 TX/PROPH/DG ADDL SEQ IV INF: CPT

## 2023-10-09 PROCEDURE — 4004F PT TOBACCO SCREEN RCVD TLK: CPT | Performed by: INTERNAL MEDICINE

## 2023-10-09 PROCEDURE — 80047 BASIC METABLC PNL IONIZED CA: CPT

## 2023-10-09 PROCEDURE — 1123F ACP DISCUSS/DSCN MKR DOCD: CPT | Performed by: INTERNAL MEDICINE

## 2023-10-09 PROCEDURE — 2580000003 HC RX 258: Performed by: INTERNAL MEDICINE

## 2023-10-09 PROCEDURE — 99211 OFF/OP EST MAY X REQ PHY/QHP: CPT

## 2023-10-09 PROCEDURE — 96417 CHEMO IV INFUS EACH ADDL SEQ: CPT

## 2023-10-09 PROCEDURE — 3017F COLORECTAL CA SCREEN DOC REV: CPT | Performed by: INTERNAL MEDICINE

## 2023-10-09 PROCEDURE — 6360000002 HC RX W HCPCS: Performed by: INTERNAL MEDICINE

## 2023-10-09 PROCEDURE — 85025 COMPLETE CBC W/AUTO DIFF WBC: CPT

## 2023-10-09 PROCEDURE — 36591 DRAW BLOOD OFF VENOUS DEVICE: CPT

## 2023-10-09 PROCEDURE — 83735 ASSAY OF MAGNESIUM: CPT

## 2023-10-09 PROCEDURE — 96413 CHEMO IV INFUSION 1 HR: CPT

## 2023-10-09 PROCEDURE — 3074F SYST BP LT 130 MM HG: CPT | Performed by: INTERNAL MEDICINE

## 2023-10-09 PROCEDURE — 96375 TX/PRO/DX INJ NEW DRUG ADDON: CPT

## 2023-10-09 PROCEDURE — 1111F DSCHRG MED/CURRENT MED MERGE: CPT | Performed by: INTERNAL MEDICINE

## 2023-10-09 PROCEDURE — G8420 CALC BMI NORM PARAMETERS: HCPCS | Performed by: INTERNAL MEDICINE

## 2023-10-09 RX ORDER — ACETAMINOPHEN 325 MG/1
650 TABLET ORAL
Status: CANCELLED | OUTPATIENT
Start: 2023-10-09

## 2023-10-09 RX ORDER — SODIUM CHLORIDE 9 MG/ML
25 INJECTION, SOLUTION INTRAVENOUS PRN
Status: CANCELLED | OUTPATIENT
Start: 2023-10-09

## 2023-10-09 RX ORDER — ONDANSETRON 2 MG/ML
8 INJECTION INTRAMUSCULAR; INTRAVENOUS
Status: CANCELLED | OUTPATIENT
Start: 2023-10-10

## 2023-10-09 RX ORDER — OXYCODONE HYDROCHLORIDE 5 MG/1
5 TABLET ORAL EVERY 4 HOURS PRN
Qty: 42 TABLET | Refills: 0 | Status: SHIPPED | OUTPATIENT
Start: 2023-10-09 | End: 2023-10-16

## 2023-10-09 RX ORDER — SODIUM CHLORIDE 9 MG/ML
5-250 INJECTION, SOLUTION INTRAVENOUS PRN
Status: DISCONTINUED | OUTPATIENT
Start: 2023-10-09 | End: 2023-10-10 | Stop reason: HOSPADM

## 2023-10-09 RX ORDER — ALBUTEROL SULFATE 90 UG/1
4 AEROSOL, METERED RESPIRATORY (INHALATION) PRN
Status: CANCELLED | OUTPATIENT
Start: 2023-10-09

## 2023-10-09 RX ORDER — SODIUM CHLORIDE 9 MG/ML
5-250 INJECTION, SOLUTION INTRAVENOUS PRN
Status: CANCELLED | OUTPATIENT
Start: 2023-10-10

## 2023-10-09 RX ORDER — SODIUM CHLORIDE 9 MG/ML
5-250 INJECTION, SOLUTION INTRAVENOUS PRN
Status: CANCELLED | OUTPATIENT
Start: 2023-10-09

## 2023-10-09 RX ORDER — HEPARIN 100 UNIT/ML
500 SYRINGE INTRAVENOUS PRN
Status: DISCONTINUED | OUTPATIENT
Start: 2023-10-09 | End: 2023-10-10 | Stop reason: HOSPADM

## 2023-10-09 RX ORDER — SODIUM CHLORIDE 9 MG/ML
5-250 INJECTION, SOLUTION INTRAVENOUS PRN
Status: CANCELLED | OUTPATIENT
Start: 2023-10-11

## 2023-10-09 RX ORDER — PALONOSETRON 0.05 MG/ML
0.25 INJECTION, SOLUTION INTRAVENOUS ONCE
Status: CANCELLED | OUTPATIENT
Start: 2023-10-09 | End: 2023-10-09

## 2023-10-09 RX ORDER — SODIUM CHLORIDE 0.9 % (FLUSH) 0.9 %
5-40 SYRINGE (ML) INJECTION PRN
Status: CANCELLED | OUTPATIENT
Start: 2023-10-09

## 2023-10-09 RX ORDER — DIPHENHYDRAMINE HYDROCHLORIDE 50 MG/ML
50 INJECTION INTRAMUSCULAR; INTRAVENOUS
Status: CANCELLED | OUTPATIENT
Start: 2023-10-11

## 2023-10-09 RX ORDER — SODIUM CHLORIDE 0.9 % (FLUSH) 0.9 %
5-40 SYRINGE (ML) INJECTION PRN
Status: DISCONTINUED | OUTPATIENT
Start: 2023-10-09 | End: 2023-10-10 | Stop reason: HOSPADM

## 2023-10-09 RX ORDER — FAMOTIDINE 10 MG/ML
20 INJECTION, SOLUTION INTRAVENOUS
Status: CANCELLED | OUTPATIENT
Start: 2023-10-09

## 2023-10-09 RX ORDER — ACETAMINOPHEN 325 MG/1
650 TABLET ORAL
Status: CANCELLED | OUTPATIENT
Start: 2023-10-11

## 2023-10-09 RX ORDER — HEPARIN SODIUM (PORCINE) LOCK FLUSH IV SOLN 100 UNIT/ML 100 UNIT/ML
500 SOLUTION INTRAVENOUS PRN
Status: CANCELLED | OUTPATIENT
Start: 2023-10-11

## 2023-10-09 RX ORDER — LISINOPRIL 20 MG/1
5 TABLET ORAL DAILY
COMMUNITY
Start: 2023-08-30

## 2023-10-09 RX ORDER — FAMOTIDINE 10 MG/ML
20 INJECTION, SOLUTION INTRAVENOUS
Status: CANCELLED | OUTPATIENT
Start: 2023-10-10

## 2023-10-09 RX ORDER — ONDANSETRON 2 MG/ML
8 INJECTION INTRAMUSCULAR; INTRAVENOUS
Status: CANCELLED | OUTPATIENT
Start: 2023-10-11

## 2023-10-09 RX ORDER — EPINEPHRINE 1 MG/ML
0.3 INJECTION, SOLUTION, CONCENTRATE INTRAVENOUS PRN
Status: CANCELLED | OUTPATIENT
Start: 2023-10-09

## 2023-10-09 RX ORDER — SODIUM CHLORIDE 9 MG/ML
INJECTION, SOLUTION INTRAVENOUS CONTINUOUS
Status: CANCELLED | OUTPATIENT
Start: 2023-10-10

## 2023-10-09 RX ORDER — ONDANSETRON 2 MG/ML
8 INJECTION INTRAMUSCULAR; INTRAVENOUS
Status: CANCELLED | OUTPATIENT
Start: 2023-10-09

## 2023-10-09 RX ORDER — HEPARIN 100 UNIT/ML
500 SYRINGE INTRAVENOUS PRN
Status: CANCELLED | OUTPATIENT
Start: 2023-10-09

## 2023-10-09 RX ORDER — HEPARIN SODIUM (PORCINE) LOCK FLUSH IV SOLN 100 UNIT/ML 100 UNIT/ML
500 SOLUTION INTRAVENOUS PRN
Status: CANCELLED | OUTPATIENT
Start: 2023-10-10

## 2023-10-09 RX ORDER — DIPHENHYDRAMINE HYDROCHLORIDE 50 MG/ML
50 INJECTION INTRAMUSCULAR; INTRAVENOUS
Status: CANCELLED | OUTPATIENT
Start: 2023-10-10

## 2023-10-09 RX ORDER — HEPARIN SODIUM (PORCINE) LOCK FLUSH IV SOLN 100 UNIT/ML 100 UNIT/ML
500 SOLUTION INTRAVENOUS PRN
Status: CANCELLED | OUTPATIENT
Start: 2023-10-09

## 2023-10-09 RX ORDER — SODIUM CHLORIDE 0.9 % (FLUSH) 0.9 %
5-40 SYRINGE (ML) INJECTION PRN
Status: CANCELLED | OUTPATIENT
Start: 2023-10-11

## 2023-10-09 RX ORDER — EPINEPHRINE 1 MG/ML
0.3 INJECTION, SOLUTION, CONCENTRATE INTRAVENOUS PRN
Status: CANCELLED | OUTPATIENT
Start: 2023-10-11

## 2023-10-09 RX ORDER — ALBUTEROL SULFATE 90 UG/1
4 AEROSOL, METERED RESPIRATORY (INHALATION) PRN
Status: CANCELLED | OUTPATIENT
Start: 2023-10-11

## 2023-10-09 RX ORDER — SODIUM CHLORIDE 9 MG/ML
INJECTION, SOLUTION INTRAVENOUS CONTINUOUS
Status: CANCELLED | OUTPATIENT
Start: 2023-10-09

## 2023-10-09 RX ORDER — SODIUM CHLORIDE 9 MG/ML
INJECTION, SOLUTION INTRAVENOUS CONTINUOUS
Status: CANCELLED | OUTPATIENT
Start: 2023-10-11

## 2023-10-09 RX ORDER — MEPERIDINE HYDROCHLORIDE 50 MG/ML
12.5 INJECTION INTRAMUSCULAR; INTRAVENOUS; SUBCUTANEOUS PRN
Status: CANCELLED | OUTPATIENT
Start: 2023-10-11

## 2023-10-09 RX ORDER — SODIUM CHLORIDE 0.9 % (FLUSH) 0.9 %
5-40 SYRINGE (ML) INJECTION PRN
Status: CANCELLED | OUTPATIENT
Start: 2023-10-10

## 2023-10-09 RX ORDER — ACETAMINOPHEN 325 MG/1
650 TABLET ORAL
Status: CANCELLED | OUTPATIENT
Start: 2023-10-10

## 2023-10-09 RX ORDER — ALBUTEROL SULFATE 90 UG/1
4 AEROSOL, METERED RESPIRATORY (INHALATION) PRN
Status: CANCELLED | OUTPATIENT
Start: 2023-10-10

## 2023-10-09 RX ORDER — EPINEPHRINE 1 MG/ML
0.3 INJECTION, SOLUTION, CONCENTRATE INTRAVENOUS PRN
Status: CANCELLED | OUTPATIENT
Start: 2023-10-10

## 2023-10-09 RX ORDER — DIPHENHYDRAMINE HYDROCHLORIDE 50 MG/ML
50 INJECTION INTRAMUSCULAR; INTRAVENOUS
Status: CANCELLED | OUTPATIENT
Start: 2023-10-09

## 2023-10-09 RX ORDER — FAMOTIDINE 10 MG/ML
20 INJECTION, SOLUTION INTRAVENOUS
Status: CANCELLED | OUTPATIENT
Start: 2023-10-11

## 2023-10-09 RX ORDER — MEPERIDINE HYDROCHLORIDE 50 MG/ML
12.5 INJECTION INTRAMUSCULAR; INTRAVENOUS; SUBCUTANEOUS PRN
Status: CANCELLED | OUTPATIENT
Start: 2023-10-09

## 2023-10-09 RX ORDER — PALONOSETRON 0.05 MG/ML
0.25 INJECTION, SOLUTION INTRAVENOUS ONCE
Status: COMPLETED | OUTPATIENT
Start: 2023-10-09 | End: 2023-10-09

## 2023-10-09 RX ORDER — MEPERIDINE HYDROCHLORIDE 50 MG/ML
12.5 INJECTION INTRAMUSCULAR; INTRAVENOUS; SUBCUTANEOUS PRN
Status: CANCELLED | OUTPATIENT
Start: 2023-10-10

## 2023-10-09 RX ADMIN — DEXAMETHASONE SODIUM PHOSPHATE 12 MG: 4 INJECTION, SOLUTION INTRA-ARTICULAR; INTRALESIONAL; INTRAMUSCULAR; INTRAVENOUS; SOFT TISSUE at 09:33

## 2023-10-09 RX ADMIN — SODIUM CHLORIDE, PRESERVATIVE FREE 10 ML: 5 INJECTION INTRAVENOUS at 08:08

## 2023-10-09 RX ADMIN — ETOPOSIDE 178 MG: 20 INJECTION INTRAVENOUS at 10:35

## 2023-10-09 RX ADMIN — PALONOSETRON 0.25 MG: 0.05 INJECTION, SOLUTION INTRAVENOUS at 09:29

## 2023-10-09 RX ADMIN — FOSAPREPITANT 150 MG: 150 INJECTION, POWDER, LYOPHILIZED, FOR SOLUTION INTRAVENOUS at 09:51

## 2023-10-09 RX ADMIN — SODIUM CHLORIDE, PRESERVATIVE FREE 10 ML: 5 INJECTION INTRAVENOUS at 13:22

## 2023-10-09 RX ADMIN — HEPARIN 500 UNITS: 100 SYRINGE at 13:22

## 2023-10-09 RX ADMIN — SODIUM CHLORIDE, PRESERVATIVE FREE 20 ML: 5 INJECTION INTRAVENOUS at 08:09

## 2023-10-09 RX ADMIN — CARBOPLATIN 593 MG: 10 INJECTION INTRAVENOUS at 12:08

## 2023-10-09 RX ADMIN — SODIUM CHLORIDE 55 ML/HR: 9 INJECTION, SOLUTION INTRAVENOUS at 08:56

## 2023-10-09 NOTE — PLAN OF CARE
Problem: Safety - Adult  Goal: Free from fall injury  Outcome: Adequate for Discharge  Flowsheets (Taken 10/9/2023 1620)  Free From Fall Injury: Instruct family/caregiver on patient safety     Problem: Discharge Planning  Goal: Discharge to home or other facility with appropriate resources  Outcome: Adequate for Discharge  Flowsheets (Taken 10/9/2023 1620)  Discharge to home or other facility with appropriate resources: Identify barriers to discharge with patient and caregiver     Problem: Chronic Conditions and Co-morbidities  Goal: Patient's chronic conditions and co-morbidity symptoms are monitored and maintained or improved  Outcome: Adequate for Discharge  Flowsheets (Taken 10/9/2023 1620)  Care Plan - Patient's Chronic Conditions and Co-Morbidity Symptoms are Monitored and Maintained or Improved: Monitor and assess patient's chronic conditions and comorbid symptoms for stability, deterioration, or improvement  Note:   Chemotherapy Teaching     What is Chemotherapy   Drug action [x]   Method of Administration [x]   Handouts given []     Side Effects  Nausea/vomiting [x]   Diarrhea [x]   Fatigue [x]   Signs / Symptoms of infection [x]   Neutropenia [x]   Thrombocytopenia [x]   Alopecia [x]   neuropathy [x]   [place] diet &  the importance of fluids [x]       Micellaneous  Importance of nutrition [x]   Importance of oral hygiene [x]   When to call the MD [x]   Monitoring labs [x]   Use of supportive services []     Explanation of Drug Regimen / Frequency  Carboplatin /Etoposide     Comments  Verbalized understanding to drug,action,side effects and when to call MD         Problem: Infection - Adult  Goal: Absence of infection at discharge  Outcome: Adequate for Discharge  Flowsheets (Taken 10/9/2023 1620)  Absence of infection at discharge: Monitor all insertion sites i.e., indwelling lines, tubes and drains  Note: Mediport site with no redness or warmth.  Skin over port site intact with no signs of breakdown

## 2023-10-09 NOTE — PATIENT INSTRUCTIONS
Follow up in 10 days for labs and IV fluids  Please schedule IV iron to be given that same day. Call if you have new symptoms.

## 2023-10-09 NOTE — DISCHARGE INSTRUCTIONS
Please contact your Oncologist if you have any questions regarding the chemotherapy Carboplatin/Etoposide that you received today. Patient instructed if experience any of the symptoms following today's chemotherapy treatment to notify MD immediately or go to emergency department. * dizziness/lightheadedness  *acute nausea/vomiting - not relieved with medication  *headache - not relieved from Tylenol/pain medication  *chest pain/pressure  *rash/itching  *shortness of breath        Drink fluids - 48oz fluids daily  Call if develop fever/ chills/ signs or symptoms of infection     Physician's instructions: Follow up in 10 days for labs and IV fluids  Please schedule IV iron to be given that same day. Call if you have new symptoms.

## 2023-10-10 ENCOUNTER — HOSPITAL ENCOUNTER (OUTPATIENT)
Dept: INFUSION THERAPY | Age: 68
Discharge: HOME | End: 2023-10-10
Payer: COMMERCIAL

## 2023-10-10 VITALS
RESPIRATION RATE: 16 BRPM | HEART RATE: 57 BPM | SYSTOLIC BLOOD PRESSURE: 105 MMHG | WEIGHT: 142.4 LBS | DIASTOLIC BLOOD PRESSURE: 51 MMHG | BODY MASS INDEX: 21.58 KG/M2 | HEIGHT: 68 IN | TEMPERATURE: 98.1 F | OXYGEN SATURATION: 100 %

## 2023-10-10 DIAGNOSIS — C79.9 METASTATIC MALIGNANT NEOPLASM, UNSPECIFIED SITE (HCC): ICD-10-CM

## 2023-10-10 DIAGNOSIS — C7A.8 LARGE CELL NEUROENDOCRINE CARCINOMA (HCC): Primary | ICD-10-CM

## 2023-10-10 PROCEDURE — 6360000002 HC RX W HCPCS: Performed by: INTERNAL MEDICINE

## 2023-10-10 PROCEDURE — 2580000003 HC RX 258: Performed by: INTERNAL MEDICINE

## 2023-10-10 PROCEDURE — 96375 TX/PRO/DX INJ NEW DRUG ADDON: CPT

## 2023-10-10 PROCEDURE — 96413 CHEMO IV INFUSION 1 HR: CPT

## 2023-10-10 RX ORDER — SODIUM CHLORIDE 9 MG/ML
5-250 INJECTION, SOLUTION INTRAVENOUS PRN
Status: DISCONTINUED | OUTPATIENT
Start: 2023-10-10 | End: 2023-10-11 | Stop reason: HOSPADM

## 2023-10-10 RX ORDER — DEXAMETHASONE SODIUM PHOSPHATE 4 MG/ML
8 INJECTION, SOLUTION INTRA-ARTICULAR; INTRALESIONAL; INTRAMUSCULAR; INTRAVENOUS; SOFT TISSUE ONCE
Status: COMPLETED | OUTPATIENT
Start: 2023-10-10 | End: 2023-10-10

## 2023-10-10 RX ORDER — SODIUM CHLORIDE 0.9 % (FLUSH) 0.9 %
5-40 SYRINGE (ML) INJECTION PRN
Status: DISCONTINUED | OUTPATIENT
Start: 2023-10-10 | End: 2023-10-11 | Stop reason: HOSPADM

## 2023-10-10 RX ORDER — HEPARIN 100 UNIT/ML
500 SYRINGE INTRAVENOUS PRN
Status: DISCONTINUED | OUTPATIENT
Start: 2023-10-10 | End: 2023-10-11 | Stop reason: HOSPADM

## 2023-10-10 RX ADMIN — ETOPOSIDE 178 MG: 20 INJECTION INTRAVENOUS at 12:51

## 2023-10-10 RX ADMIN — SODIUM CHLORIDE, PRESERVATIVE FREE 10 ML: 5 INJECTION INTRAVENOUS at 14:08

## 2023-10-10 RX ADMIN — HEPARIN 500 UNITS: 100 SYRINGE at 14:09

## 2023-10-10 RX ADMIN — SODIUM CHLORIDE, PRESERVATIVE FREE 20 ML: 5 INJECTION INTRAVENOUS at 12:20

## 2023-10-10 RX ADMIN — SODIUM CHLORIDE 55 ML/HR: 9 INJECTION, SOLUTION INTRAVENOUS at 12:21

## 2023-10-10 RX ADMIN — DEXAMETHASONE SODIUM PHOSPHATE 8 MG: 4 INJECTION, SOLUTION INTRAMUSCULAR; INTRAVENOUS at 12:22

## 2023-10-10 NOTE — PLAN OF CARE
Problem: Safety - Adult  Goal: Free from fall injury  Outcome: Adequate for Discharge  Flowsheets (Taken 10/10/2023 1225)  Free From Fall Injury: Instruct family/caregiver on patient safety     Problem: Discharge Planning  Goal: Discharge to home or other facility with appropriate resources  Outcome: Adequate for Discharge  Flowsheets (Taken 10/10/2023 1225)  Discharge to home or other facility with appropriate resources: Identify barriers to discharge with patient and caregiver     Problem: Chronic Conditions and Co-morbidities  Goal: Patient's chronic conditions and co-morbidity symptoms are monitored and maintained or improved  Outcome: Adequate for Discharge  Flowsheets (Taken 10/10/2023 1225)  Care Plan - Patient's Chronic Conditions and Co-Morbidity Symptoms are Monitored and Maintained or Improved: Monitor and assess patient's chronic conditions and comorbid symptoms for stability, deterioration, or improvement  Note:   Chemotherapy Teaching     What is Chemotherapy   Drug action [x]   Method of Administration [x]   Handouts given []     Side Effects  Nausea/vomiting [x]   Diarrhea [x]   Fatigue [x]   Signs / Symptoms of infection [x]   Neutropenia [x]   Thrombocytopenia [x]   Alopecia [x]   neuropathy [x]   [place] diet &  the importance of fluids [x]       Micellaneous  Importance of nutrition [x]   Importance of oral hygiene [x]   When to call the MD [x]   Monitoring labs [x]   Use of supportive services []     Explanation of Drug Regimen / Frequency  Etoposide     Comments  Verbalized understanding to drug,action,side effects and when to call MD         Problem: Infection - Adult  Goal: Absence of infection at discharge  Outcome: Adequate for Discharge  Flowsheets (Taken 10/10/2023 1225)  Absence of infection at discharge: Monitor all insertion sites i.e., indwelling lines, tubes and drains  Note: Mediport site with no redness or warmth. Skin over port site intact with no signs of breakdown noted.  Patient

## 2023-10-11 ENCOUNTER — HOSPITAL ENCOUNTER (OUTPATIENT)
Dept: INFUSION THERAPY | Age: 68
Discharge: HOME | End: 2023-10-11
Payer: COMMERCIAL

## 2023-10-11 VITALS
HEART RATE: 93 BPM | SYSTOLIC BLOOD PRESSURE: 143 MMHG | TEMPERATURE: 98 F | OXYGEN SATURATION: 97 % | DIASTOLIC BLOOD PRESSURE: 71 MMHG | RESPIRATION RATE: 16 BRPM

## 2023-10-11 DIAGNOSIS — C79.9 METASTATIC MALIGNANT NEOPLASM, UNSPECIFIED SITE (HCC): ICD-10-CM

## 2023-10-11 DIAGNOSIS — C7A.8 LARGE CELL NEUROENDOCRINE CARCINOMA (HCC): Primary | ICD-10-CM

## 2023-10-11 PROCEDURE — 6360000002 HC RX W HCPCS: Performed by: INTERNAL MEDICINE

## 2023-10-11 PROCEDURE — 2580000003 HC RX 258: Performed by: INTERNAL MEDICINE

## 2023-10-11 PROCEDURE — 96377 APPLICATON ON-BODY INJECTOR: CPT

## 2023-10-11 PROCEDURE — 96413 CHEMO IV INFUSION 1 HR: CPT

## 2023-10-11 PROCEDURE — 96375 TX/PRO/DX INJ NEW DRUG ADDON: CPT

## 2023-10-11 PROCEDURE — 99211 OFF/OP EST MAY X REQ PHY/QHP: CPT

## 2023-10-11 PROCEDURE — 96360 HYDRATION IV INFUSION INIT: CPT

## 2023-10-11 PROCEDURE — 96361 HYDRATE IV INFUSION ADD-ON: CPT

## 2023-10-11 RX ORDER — MORPHINE SULFATE 4 MG/ML
4 INJECTION, SOLUTION INTRAMUSCULAR; INTRAVENOUS ONCE
Status: COMPLETED | OUTPATIENT
Start: 2023-10-11 | End: 2023-10-11

## 2023-10-11 RX ORDER — SODIUM CHLORIDE 0.9 % (FLUSH) 0.9 %
5-40 SYRINGE (ML) INJECTION PRN
Status: CANCELLED | OUTPATIENT
Start: 2023-10-11

## 2023-10-11 RX ORDER — HEPARIN 100 UNIT/ML
500 SYRINGE INTRAVENOUS PRN
Status: DISCONTINUED | OUTPATIENT
Start: 2023-10-11 | End: 2023-10-12 | Stop reason: HOSPADM

## 2023-10-11 RX ORDER — SODIUM CHLORIDE 0.9 % (FLUSH) 0.9 %
5-40 SYRINGE (ML) INJECTION PRN
Status: DISCONTINUED | OUTPATIENT
Start: 2023-10-11 | End: 2023-10-12 | Stop reason: HOSPADM

## 2023-10-11 RX ORDER — DEXAMETHASONE SODIUM PHOSPHATE 4 MG/ML
8 INJECTION, SOLUTION INTRA-ARTICULAR; INTRALESIONAL; INTRAMUSCULAR; INTRAVENOUS; SOFT TISSUE ONCE
Status: COMPLETED | OUTPATIENT
Start: 2023-10-11 | End: 2023-10-11

## 2023-10-11 RX ORDER — ONDANSETRON 2 MG/ML
4 INJECTION INTRAMUSCULAR; INTRAVENOUS ONCE
Status: COMPLETED | OUTPATIENT
Start: 2023-10-11 | End: 2023-10-11

## 2023-10-11 RX ORDER — SODIUM CHLORIDE 9 MG/ML
25 INJECTION, SOLUTION INTRAVENOUS PRN
Status: CANCELLED | OUTPATIENT
Start: 2023-10-11

## 2023-10-11 RX ORDER — SODIUM CHLORIDE 9 MG/ML
5-250 INJECTION, SOLUTION INTRAVENOUS PRN
Status: DISCONTINUED | OUTPATIENT
Start: 2023-10-11 | End: 2023-10-12 | Stop reason: HOSPADM

## 2023-10-11 RX ORDER — HEPARIN 100 UNIT/ML
500 SYRINGE INTRAVENOUS PRN
Status: CANCELLED | OUTPATIENT
Start: 2023-10-11

## 2023-10-11 RX ORDER — 0.9 % SODIUM CHLORIDE 0.9 %
1000 INTRAVENOUS SOLUTION INTRAVENOUS ONCE
Status: COMPLETED | OUTPATIENT
Start: 2023-10-11 | End: 2023-10-11

## 2023-10-11 RX ADMIN — SODIUM CHLORIDE 1000 ML: 9 INJECTION, SOLUTION INTRAVENOUS at 12:04

## 2023-10-11 RX ADMIN — ONDANSETRON 4 MG: 2 INJECTION INTRAMUSCULAR; INTRAVENOUS at 12:07

## 2023-10-11 RX ADMIN — DEXAMETHASONE SODIUM PHOSPHATE 8 MG: 4 INJECTION, SOLUTION INTRAMUSCULAR; INTRAVENOUS at 14:22

## 2023-10-11 RX ADMIN — SODIUM CHLORIDE 20 ML/HR: 9 INJECTION, SOLUTION INTRAVENOUS at 14:16

## 2023-10-11 RX ADMIN — HEPARIN 500 UNITS: 100 SYRINGE at 16:00

## 2023-10-11 RX ADMIN — SODIUM CHLORIDE, PRESERVATIVE FREE 20 ML: 5 INJECTION INTRAVENOUS at 16:00

## 2023-10-11 RX ADMIN — ETOPOSIDE 178 MG: 20 INJECTION INTRAVENOUS at 14:34

## 2023-10-11 RX ADMIN — SODIUM CHLORIDE, PRESERVATIVE FREE 10 ML: 5 INJECTION INTRAVENOUS at 12:03

## 2023-10-11 RX ADMIN — MORPHINE SULFATE 4 MG: 4 INJECTION, SOLUTION INTRAMUSCULAR; INTRAVENOUS at 12:05

## 2023-10-11 RX ADMIN — PEGFILGRASTIM 6 MG: KIT SUBCUTANEOUS at 15:57

## 2023-10-11 ASSESSMENT — PAIN SCALES - GENERAL: PAINLEVEL_OUTOF10: 7

## 2023-10-11 NOTE — PLAN OF CARE
Problem: Safety - Adult  Goal: Free from fall injury  Outcome: Adequate for Discharge  Flowsheets (Taken 10/11/2023 1722)  Free From Fall Injury:   Instruct family/caregiver on patient safety   Based on caregiver fall risk screen, instruct family/caregiver to ask for assistance with transferring infant if caregiver noted to have fall risk factors  Note: Free from falls while in O.P. Oncology. Discussed the need to use the call light for assistance when getting up to ambulate. Problem: Discharge Planning  Goal: Discharge to home or other facility with appropriate resources  Outcome: Adequate for Discharge  Flowsheets (Taken 10/11/2023 1722)  Discharge to home or other facility with appropriate resources:   Identify barriers to discharge with patient and caregiver   Identify discharge learning needs (meds, wound care, etc)   Refer to discharge planning if patient needs post-hospital services based on physician order or complex needs related to functional status, cognitive ability or social support system  Note: Verbalize understanding of discharge instructions, follow up appointments, and when to call Physician. Discuss understanding of discharge instructions, follow up appointments and when to call Physician.       Problem: Chronic Conditions and Co-morbidities  Goal: Patient's chronic conditions and co-morbidity symptoms are monitored and maintained or improved  Outcome: Adequate for Discharge  Flowsheets (Taken 10/11/2023 1722)  Care Plan - Patient's Chronic Conditions and Co-Morbidity Symptoms are Monitored and Maintained or Improved:   Monitor and assess patient's chronic conditions and comorbid symptoms for stability, deterioration, or improvement   Collaborate with multidisciplinary team to address chronic and comorbid conditions and prevent exacerbation or deterioration   Update acute care plan with appropriate goals if chronic or comorbid symptoms are exacerbated and prevent overall improvement and

## 2023-10-12 ENCOUNTER — HOSPITAL ENCOUNTER (OUTPATIENT)
Dept: INFUSION THERAPY | Age: 68
Discharge: HOME | End: 2023-10-12
Payer: COMMERCIAL

## 2023-10-12 VITALS
OXYGEN SATURATION: 100 % | RESPIRATION RATE: 16 BRPM | DIASTOLIC BLOOD PRESSURE: 54 MMHG | HEART RATE: 70 BPM | TEMPERATURE: 98.1 F | SYSTOLIC BLOOD PRESSURE: 95 MMHG

## 2023-10-12 DIAGNOSIS — C7A.8 LARGE CELL NEUROENDOCRINE CARCINOMA (HCC): Primary | ICD-10-CM

## 2023-10-12 DIAGNOSIS — C79.9 METASTATIC MALIGNANT NEOPLASM, UNSPECIFIED SITE (HCC): ICD-10-CM

## 2023-10-12 LAB
ABSOLUTE IMMATURE GRANULOCYTE: 0.02 THOU/MM3 (ref 0–0.07)
ALBUMIN SERPL BCG-MCNC: 3.4 G/DL (ref 3.5–5.1)
ALP SERPL-CCNC: 99 U/L (ref 38–126)
ALT SERPL W/O P-5'-P-CCNC: 19 U/L (ref 11–66)
AST SERPL-CCNC: 23 U/L (ref 5–40)
BASOPHILS ABSOLUTE: 0 THOU/MM3 (ref 0–0.1)
BASOPHILS NFR BLD AUTO: 0 % (ref 0–3)
BILIRUB CONJ SERPL-MCNC: < 0.2 MG/DL (ref 0–0.3)
BILIRUB SERPL-MCNC: 0.6 MG/DL (ref 0.3–1.2)
BUN BLDP-MCNC: 24 MG/DL (ref 8–26)
CHLORIDE BLD-SCNC: 102 MEQ/L (ref 98–109)
CREAT BLD-MCNC: 0.4 MG/DL (ref 0.5–1.2)
EOSINOPHIL NFR BLD AUTO: 0 % (ref 0–4)
EOSINOPHILS ABSOLUTE: 0 THOU/MM3 (ref 0–0.4)
ERYTHROCYTE [DISTWIDTH] IN BLOOD BY AUTOMATED COUNT: 26 % (ref 11.5–14.5)
GFR SERPL CREATININE-BSD FRML MDRD: > 60 ML/MIN/1.73M2
GLUCOSE BLD-MCNC: 108 MG/DL (ref 70–108)
HCT VFR BLD AUTO: 29.1 % (ref 42–52)
HGB BLD-MCNC: 9.1 GM/DL (ref 14–18)
IMMATURE GRANULOCYTES: 0 %
IONIZED CALCIUM, WHOLE BLOOD: 1.18 MMOL/L (ref 1.12–1.32)
LYMPHOCYTES ABSOLUTE: 0.7 THOU/MM3 (ref 1–4.8)
LYMPHOCYTES NFR BLD AUTO: 6 % (ref 15–47)
MCH RBC QN AUTO: 29 PG (ref 26–33)
MCHC RBC AUTO-ENTMCNC: 31.3 GM/DL (ref 32.2–35.5)
MCV RBC AUTO: 93 FL (ref 80–94)
MONOCYTES ABSOLUTE: 0.2 THOU/MM3 (ref 0.4–1.3)
MONOCYTES NFR BLD AUTO: 1 % (ref 0–12)
NEUTROPHILS NFR BLD AUTO: 93 % (ref 43–75)
PLATELET # BLD AUTO: 311 THOU/MM3 (ref 130–400)
PMV BLD AUTO: 10.9 FL (ref 9.4–12.4)
POTASSIUM BLD-SCNC: 4.1 MEQ/L (ref 3.5–4.9)
PROT SERPL-MCNC: 6.1 G/DL (ref 6.1–8)
RBC # BLD AUTO: 3.14 MILL/MM3 (ref 4.7–6.1)
SEGMENTED NEUTROPHILS ABSOLUTE COUNT: 11.5 THOU/MM3 (ref 1.8–7.7)
SODIUM BLD-SCNC: 135 MEQ/L (ref 138–146)
TOTAL CO2, WHOLE BLOOD: 24 MEQ/L (ref 23–33)
WBC # BLD AUTO: 12.4 THOU/MM3 (ref 4.8–10.8)

## 2023-10-12 PROCEDURE — 96360 HYDRATION IV INFUSION INIT: CPT

## 2023-10-12 PROCEDURE — 36591 DRAW BLOOD OFF VENOUS DEVICE: CPT

## 2023-10-12 PROCEDURE — 80076 HEPATIC FUNCTION PANEL: CPT

## 2023-10-12 PROCEDURE — 6360000002 HC RX W HCPCS: Performed by: INTERNAL MEDICINE

## 2023-10-12 PROCEDURE — 2580000003 HC RX 258: Performed by: INTERNAL MEDICINE

## 2023-10-12 PROCEDURE — 85025 COMPLETE CBC W/AUTO DIFF WBC: CPT

## 2023-10-12 PROCEDURE — 80047 BASIC METABLC PNL IONIZED CA: CPT

## 2023-10-12 PROCEDURE — 96361 HYDRATE IV INFUSION ADD-ON: CPT

## 2023-10-12 RX ORDER — SODIUM CHLORIDE 9 MG/ML
25 INJECTION, SOLUTION INTRAVENOUS PRN
Status: CANCELLED | OUTPATIENT
Start: 2023-10-12

## 2023-10-12 RX ORDER — 0.9 % SODIUM CHLORIDE 0.9 %
1000 INTRAVENOUS SOLUTION INTRAVENOUS ONCE
Status: COMPLETED | OUTPATIENT
Start: 2023-10-12 | End: 2023-10-12

## 2023-10-12 RX ORDER — SODIUM CHLORIDE 0.9 % (FLUSH) 0.9 %
5-40 SYRINGE (ML) INJECTION PRN
Status: DISCONTINUED | OUTPATIENT
Start: 2023-10-12 | End: 2023-10-13 | Stop reason: HOSPADM

## 2023-10-12 RX ORDER — HEPARIN 100 UNIT/ML
500 SYRINGE INTRAVENOUS PRN
Status: CANCELLED | OUTPATIENT
Start: 2023-10-12

## 2023-10-12 RX ORDER — SODIUM CHLORIDE 0.9 % (FLUSH) 0.9 %
5-40 SYRINGE (ML) INJECTION PRN
Status: CANCELLED | OUTPATIENT
Start: 2023-10-12

## 2023-10-12 RX ORDER — HEPARIN 100 UNIT/ML
500 SYRINGE INTRAVENOUS PRN
Status: DISCONTINUED | OUTPATIENT
Start: 2023-10-12 | End: 2023-10-13 | Stop reason: HOSPADM

## 2023-10-12 RX ADMIN — HEPARIN 500 UNITS: 100 SYRINGE at 13:57

## 2023-10-12 RX ADMIN — SODIUM CHLORIDE 1000 ML: 9 INJECTION, SOLUTION INTRAVENOUS at 11:51

## 2023-10-12 RX ADMIN — SODIUM CHLORIDE, PRESERVATIVE FREE 10 ML: 5 INJECTION INTRAVENOUS at 11:44

## 2023-10-12 RX ADMIN — SODIUM CHLORIDE, PRESERVATIVE FREE 10 ML: 5 INJECTION INTRAVENOUS at 11:43

## 2023-10-12 RX ADMIN — SODIUM CHLORIDE, PRESERVATIVE FREE 10 ML: 5 INJECTION INTRAVENOUS at 13:57

## 2023-10-12 NOTE — PLAN OF CARE
Problem: Safety - Adult  Goal: Free from fall injury  Outcome: Adequate for Discharge     Problem: Discharge Planning  Goal: Discharge to home or other facility with appropriate resources  Outcome: Adequate for Discharge  Flowsheets (Taken 10/12/2023 1151)  Discharge to home or other facility with appropriate resources:   Identify barriers to discharge with patient and caregiver   Identify discharge learning needs (meds, wound care, etc)   Arrange for needed discharge resources and transportation as appropriate  Note: Discharge instructions given and reviewed with patient. All questions answered. Patient verbalized understanding Patient and family member able to teach back follow up appointments and when to call the doctor. Patient offers no questions at this time      Problem: Chronic Conditions and Co-morbidities  Goal: Patient's chronic conditions and co-morbidity symptoms are monitored and maintained or improved  Outcome: Adequate for Discharge  Flowsheets (Taken 10/12/2023 1151)  Care Plan - Patient's Chronic Conditions and Co-Morbidity Symptoms are Monitored and Maintained or Improved:   Monitor and assess patient's chronic conditions and comorbid symptoms for stability, deterioration, or improvement   Collaborate with multidisciplinary team to address chronic and comorbid conditions and prevent exacerbation or deterioration  Note: Reviewed hydration fluids  with patient, patient offered no questions or concerns. Patient verbalized understanding of drug being administered.       Problem: Infection - Adult  Goal: Absence of infection at discharge  Outcome: Adequate for Discharge  Flowsheets (Taken 10/12/2023 1151)  Absence of infection at discharge:   Assess and monitor for signs and symptoms of infection   Monitor lab/diagnostic results   Monitor all insertion sites i.e., indwelling lines, tubes and drains   Instruct and encourage patient and family to use good hand hygiene technique  Note: No signs of

## 2023-10-12 NOTE — PROGRESS NOTES
Patient tolerated IV fluids and lab draw from [facility] without any complications. Discharge instructions given to patient-verbalizes understanding. Ambulated off unit per self with belongings. Patient here for IV hydration of 1000 ml over 2 hr's,  Due to poor oral intake.   Per Dr. Neida Leyden

## 2023-10-13 ENCOUNTER — HOSPITAL ENCOUNTER (OUTPATIENT)
Dept: INFUSION THERAPY | Age: 68
Discharge: HOME | End: 2023-10-13
Payer: COMMERCIAL

## 2023-10-13 VITALS
OXYGEN SATURATION: 99 % | HEART RATE: 89 BPM | RESPIRATION RATE: 16 BRPM | DIASTOLIC BLOOD PRESSURE: 53 MMHG | TEMPERATURE: 97.9 F | SYSTOLIC BLOOD PRESSURE: 103 MMHG

## 2023-10-13 DIAGNOSIS — C7A.8 LARGE CELL NEUROENDOCRINE CARCINOMA (HCC): Primary | ICD-10-CM

## 2023-10-13 PROCEDURE — 96360 HYDRATION IV INFUSION INIT: CPT

## 2023-10-13 PROCEDURE — 6360000002 HC RX W HCPCS: Performed by: INTERNAL MEDICINE

## 2023-10-13 PROCEDURE — 2580000003 HC RX 258: Performed by: INTERNAL MEDICINE

## 2023-10-13 PROCEDURE — 96361 HYDRATE IV INFUSION ADD-ON: CPT

## 2023-10-13 RX ORDER — SODIUM CHLORIDE 0.9 % (FLUSH) 0.9 %
5-40 SYRINGE (ML) INJECTION PRN
Status: DISCONTINUED | OUTPATIENT
Start: 2023-10-13 | End: 2023-10-14 | Stop reason: HOSPADM

## 2023-10-13 RX ORDER — HEPARIN 100 UNIT/ML
500 SYRINGE INTRAVENOUS PRN
Status: CANCELLED | OUTPATIENT
Start: 2023-10-13

## 2023-10-13 RX ORDER — 0.9 % SODIUM CHLORIDE 0.9 %
1000 INTRAVENOUS SOLUTION INTRAVENOUS ONCE
Status: COMPLETED | OUTPATIENT
Start: 2023-10-13 | End: 2023-10-13

## 2023-10-13 RX ORDER — SODIUM CHLORIDE 9 MG/ML
25 INJECTION, SOLUTION INTRAVENOUS PRN
Status: CANCELLED | OUTPATIENT
Start: 2023-10-13

## 2023-10-13 RX ORDER — HEPARIN 100 UNIT/ML
500 SYRINGE INTRAVENOUS PRN
Status: DISCONTINUED | OUTPATIENT
Start: 2023-10-13 | End: 2023-10-14 | Stop reason: HOSPADM

## 2023-10-13 RX ORDER — SODIUM CHLORIDE 0.9 % (FLUSH) 0.9 %
5-40 SYRINGE (ML) INJECTION PRN
Status: CANCELLED | OUTPATIENT
Start: 2023-10-13

## 2023-10-13 RX ADMIN — SODIUM CHLORIDE 1000 ML: 9 INJECTION, SOLUTION INTRAVENOUS at 11:57

## 2023-10-13 RX ADMIN — SODIUM CHLORIDE, PRESERVATIVE FREE 10 ML: 5 INJECTION INTRAVENOUS at 14:02

## 2023-10-13 RX ADMIN — HEPARIN 500 UNITS: 100 SYRINGE at 14:02

## 2023-10-13 RX ADMIN — SODIUM CHLORIDE, PRESERVATIVE FREE 10 ML: 5 INJECTION INTRAVENOUS at 11:57

## 2023-10-13 NOTE — PROGRESS NOTES
Patient here for IV hydration of 1000 ml over 2 hr's,  Due to inadequate PO hydration.   Per Keesha's

## 2023-10-13 NOTE — PLAN OF CARE
Problem: Safety - Adult  Goal: Free from fall injury  Outcome: Adequate for Discharge  Flowsheets (Taken 10/13/2023 1224)  Free From Fall Injury:   Instruct family/caregiver on patient safety   Based on caregiver fall risk screen, instruct family/caregiver to ask for assistance with transferring infant if caregiver noted to have fall risk factors  Note: Free from falls while in O.P. Oncology. Problem: Discharge Planning  Goal: Discharge to home or other facility with appropriate resources  Outcome: Adequate for Discharge  Flowsheets (Taken 10/13/2023 1224)  Discharge to home or other facility with appropriate resources:   Identify barriers to discharge with patient and caregiver   Identify discharge learning needs (meds, wound care, etc)  Note: Verbalize understanding of discharge instructions, follow up appointments, and when to call Physician. Problem: Chronic Conditions and Co-morbidities  Goal: Patient's chronic conditions and co-morbidity symptoms are monitored and maintained or improved  Outcome: Adequate for Discharge  Flowsheets (Taken 10/13/2023 1224)  Care Plan - Patient's Chronic Conditions and Co-Morbidity Symptoms are Monitored and Maintained or Improved:   Monitor and assess patient's chronic conditions and comorbid symptoms for stability, deterioration, or improvement   Collaborate with multidisciplinary team to address chronic and comorbid conditions and prevent exacerbation or deterioration  Note: Patient received 1 liter NS IV infusion over 2. Patient drank some water while in OP oncology. Encouraged patient to drink 48 to 64 ounces of fluids everyday. Care plan reviewed with patient and family. Patient and family verbalize understanding of the plan of care and contribute to goal setting.

## 2023-10-19 ENCOUNTER — OFFICE VISIT (OUTPATIENT)
Dept: ONCOLOGY | Age: 68
End: 2023-10-19
Payer: COMMERCIAL

## 2023-10-19 ENCOUNTER — HOSPITAL ENCOUNTER (OUTPATIENT)
Dept: INFUSION THERAPY | Age: 68
Discharge: HOME | End: 2023-10-19
Payer: COMMERCIAL

## 2023-10-19 VITALS
TEMPERATURE: 97.9 F | SYSTOLIC BLOOD PRESSURE: 84 MMHG | HEIGHT: 68 IN | RESPIRATION RATE: 16 BRPM | HEART RATE: 79 BPM | OXYGEN SATURATION: 99 % | DIASTOLIC BLOOD PRESSURE: 47 MMHG | BODY MASS INDEX: 20.76 KG/M2 | WEIGHT: 137 LBS

## 2023-10-19 VITALS
HEIGHT: 68 IN | SYSTOLIC BLOOD PRESSURE: 111 MMHG | OXYGEN SATURATION: 99 % | RESPIRATION RATE: 16 BRPM | HEART RATE: 79 BPM | BODY MASS INDEX: 20.76 KG/M2 | DIASTOLIC BLOOD PRESSURE: 59 MMHG | TEMPERATURE: 97.9 F | WEIGHT: 137 LBS

## 2023-10-19 DIAGNOSIS — R19.7 DIARRHEA, UNSPECIFIED TYPE: ICD-10-CM

## 2023-10-19 DIAGNOSIS — C7A.8 LARGE CELL NEUROENDOCRINE CARCINOMA (HCC): ICD-10-CM

## 2023-10-19 DIAGNOSIS — C7A.8 LARGE CELL NEUROENDOCRINE CARCINOMA (HCC): Primary | ICD-10-CM

## 2023-10-19 LAB
ABSOLUTE IMMATURE GRANULOCYTE: 0.04 THOU/MM3 (ref 0–0.07)
BASOPHILS ABSOLUTE: 0.1 THOU/MM3 (ref 0–0.1)
BASOPHILS NFR BLD AUTO: 2 % (ref 0–3)
BUN BLDP-MCNC: 11 MG/DL (ref 8–26)
CHLORIDE BLD-SCNC: 104 MEQ/L (ref 98–109)
CREAT BLD-MCNC: 0.5 MG/DL (ref 0.5–1.2)
EOSINOPHIL NFR BLD AUTO: 0 % (ref 0–4)
EOSINOPHILS ABSOLUTE: 0 THOU/MM3 (ref 0–0.4)
ERYTHROCYTE [DISTWIDTH] IN BLOOD BY AUTOMATED COUNT: 24.9 % (ref 11.5–14.5)
GFR SERPL CREATININE-BSD FRML MDRD: > 60 ML/MIN/1.73M2
GLUCOSE BLD-MCNC: 110 MG/DL (ref 70–108)
HCT VFR BLD AUTO: 27.1 % (ref 42–52)
HGB BLD-MCNC: 8.5 GM/DL (ref 14–18)
IMMATURE GRANULOCYTES: 1 %
IONIZED CALCIUM, WHOLE BLOOD: 1.25 MMOL/L (ref 1.12–1.32)
LYMPHOCYTES ABSOLUTE: 1.5 THOU/MM3 (ref 1–4.8)
LYMPHOCYTES NFR BLD AUTO: 38 % (ref 15–47)
MAGNESIUM SERPL-MCNC: 1.6 MG/DL (ref 1.6–2.4)
MCH RBC QN AUTO: 29.6 PG (ref 26–33)
MCHC RBC AUTO-ENTMCNC: 31.4 GM/DL (ref 32.2–35.5)
MCV RBC AUTO: 94 FL (ref 80–94)
MONOCYTES ABSOLUTE: 0.5 THOU/MM3 (ref 0.4–1.3)
MONOCYTES NFR BLD AUTO: 14 % (ref 0–12)
NEUTROPHILS NFR BLD AUTO: 46 % (ref 43–75)
PLATELET # BLD AUTO: 90 THOU/MM3 (ref 130–400)
PMV BLD AUTO: 12.8 FL (ref 9.4–12.4)
POTASSIUM BLD-SCNC: 3.7 MEQ/L (ref 3.5–4.9)
RBC # BLD AUTO: 2.87 MILL/MM3 (ref 4.7–6.1)
SEGMENTED NEUTROPHILS ABSOLUTE COUNT: 1.8 THOU/MM3 (ref 1.8–7.7)
SODIUM BLD-SCNC: 139 MEQ/L (ref 138–146)
TOTAL CO2, WHOLE BLOOD: 25 MEQ/L (ref 23–33)
WBC # BLD AUTO: 3.9 THOU/MM3 (ref 4.8–10.8)

## 2023-10-19 PROCEDURE — 99211 OFF/OP EST MAY X REQ PHY/QHP: CPT

## 2023-10-19 PROCEDURE — 3078F DIAST BP <80 MM HG: CPT | Performed by: INTERNAL MEDICINE

## 2023-10-19 PROCEDURE — 85025 COMPLETE CBC W/AUTO DIFF WBC: CPT

## 2023-10-19 PROCEDURE — 3074F SYST BP LT 130 MM HG: CPT | Performed by: INTERNAL MEDICINE

## 2023-10-19 PROCEDURE — 80047 BASIC METABLC PNL IONIZED CA: CPT

## 2023-10-19 PROCEDURE — G8428 CUR MEDS NOT DOCUMENT: HCPCS | Performed by: INTERNAL MEDICINE

## 2023-10-19 PROCEDURE — 3017F COLORECTAL CA SCREEN DOC REV: CPT | Performed by: INTERNAL MEDICINE

## 2023-10-19 PROCEDURE — 6360000002 HC RX W HCPCS: Performed by: INTERNAL MEDICINE

## 2023-10-19 PROCEDURE — 4004F PT TOBACCO SCREEN RCVD TLK: CPT | Performed by: INTERNAL MEDICINE

## 2023-10-19 PROCEDURE — G8420 CALC BMI NORM PARAMETERS: HCPCS | Performed by: INTERNAL MEDICINE

## 2023-10-19 PROCEDURE — 96360 HYDRATION IV INFUSION INIT: CPT

## 2023-10-19 PROCEDURE — 1111F DSCHRG MED/CURRENT MED MERGE: CPT | Performed by: INTERNAL MEDICINE

## 2023-10-19 PROCEDURE — 36591 DRAW BLOOD OFF VENOUS DEVICE: CPT

## 2023-10-19 PROCEDURE — 96361 HYDRATE IV INFUSION ADD-ON: CPT

## 2023-10-19 PROCEDURE — G8484 FLU IMMUNIZE NO ADMIN: HCPCS | Performed by: INTERNAL MEDICINE

## 2023-10-19 PROCEDURE — 2580000003 HC RX 258: Performed by: INTERNAL MEDICINE

## 2023-10-19 PROCEDURE — 99212 OFFICE O/P EST SF 10 MIN: CPT | Performed by: INTERNAL MEDICINE

## 2023-10-19 PROCEDURE — 83735 ASSAY OF MAGNESIUM: CPT

## 2023-10-19 PROCEDURE — 1123F ACP DISCUSS/DSCN MKR DOCD: CPT | Performed by: INTERNAL MEDICINE

## 2023-10-19 RX ORDER — SODIUM CHLORIDE 0.9 % (FLUSH) 0.9 %
5-40 SYRINGE (ML) INJECTION PRN
Status: DISCONTINUED | OUTPATIENT
Start: 2023-10-19 | End: 2023-10-20 | Stop reason: HOSPADM

## 2023-10-19 RX ORDER — HEPARIN 100 UNIT/ML
500 SYRINGE INTRAVENOUS PRN
Status: DISCONTINUED | OUTPATIENT
Start: 2023-10-19 | End: 2023-10-20 | Stop reason: HOSPADM

## 2023-10-19 RX ORDER — ONDANSETRON 4 MG/1
4 TABLET, FILM COATED ORAL EVERY 8 HOURS PRN
Qty: 30 TABLET | Refills: 1 | Status: SHIPPED | OUTPATIENT
Start: 2023-10-19

## 2023-10-19 RX ORDER — SODIUM CHLORIDE 0.9 % (FLUSH) 0.9 %
5-40 SYRINGE (ML) INJECTION PRN
OUTPATIENT
Start: 2023-10-19

## 2023-10-19 RX ORDER — SODIUM CHLORIDE 9 MG/ML
25 INJECTION, SOLUTION INTRAVENOUS PRN
OUTPATIENT
Start: 2023-10-19

## 2023-10-19 RX ORDER — DIPHENOXYLATE HYDROCHLORIDE AND ATROPINE SULFATE 2.5; .025 MG/1; MG/1
1 TABLET ORAL 4 TIMES DAILY PRN
Qty: 40 TABLET | Refills: 0 | Status: SHIPPED | OUTPATIENT
Start: 2023-10-19 | End: 2023-10-29

## 2023-10-19 RX ORDER — HEPARIN 100 UNIT/ML
500 SYRINGE INTRAVENOUS PRN
OUTPATIENT
Start: 2023-10-19

## 2023-10-19 RX ORDER — OXYCODONE HYDROCHLORIDE 5 MG/1
5 TABLET ORAL EVERY 4 HOURS PRN
Qty: 42 TABLET | Refills: 0 | Status: SHIPPED | OUTPATIENT
Start: 2023-10-19 | End: 2023-10-26

## 2023-10-19 RX ORDER — 0.9 % SODIUM CHLORIDE 0.9 %
1000 INTRAVENOUS SOLUTION INTRAVENOUS ONCE
Status: COMPLETED | OUTPATIENT
Start: 2023-10-19 | End: 2023-10-19

## 2023-10-19 RX ADMIN — SODIUM CHLORIDE, PRESERVATIVE FREE 10 ML: 5 INJECTION INTRAVENOUS at 13:14

## 2023-10-19 RX ADMIN — SODIUM CHLORIDE, PRESERVATIVE FREE 10 ML: 5 INJECTION INTRAVENOUS at 15:26

## 2023-10-19 RX ADMIN — SODIUM CHLORIDE, PRESERVATIVE FREE 10 ML: 5 INJECTION INTRAVENOUS at 13:15

## 2023-10-19 RX ADMIN — HEPARIN 500 UNITS: 100 SYRINGE at 15:26

## 2023-10-19 RX ADMIN — SODIUM CHLORIDE 1000 ML: 9 INJECTION, SOLUTION INTRAVENOUS at 13:16

## 2023-10-19 NOTE — PROGRESS NOTES
[facility]  [address] [place] [address] [address] [place] [address]  Dept: [phone]  Loc: [phone]   Hematology/Oncology Consult (Clinic)        10/19/23       Stevan Hughes   1955     No ref. provider found   Son Bowen MD       Reason:   Chief Complaint   Patient presents with    Follow-up     Large cell neuroendocrine carcinoma [facility])            Prior visit Intervals    8/28/2023:  He had port placement on 8/22  Received  IV iron 8/21. Is feeling well today, still some scattered abdominal pains. But he thinks energy and eating is good. 9/19/23:  Pt states diarrhea has resolved and he feels better. Discussed the forward plan with him       10/9/23:  Had a short hospital admission due to dehydration and diarreha 9/22 after second chemotherapy cycle. Had the PET scan done. He fell due to tripping after he someone startles him, did not hit his head, fell on his left elbow, had pain in the left hip but that has resolved. Diarrhea has resolved and lomotil has helped that a lot. Is feeling well today    Interval hx:   10/19/2023:   He is feeling better than the last visit, but still has some diarrhea, about 3 bowel movements a day , not all diarrhea. He is running out of the nausea meds, diarrhea meds and pain meds. No bleeding. HPI:   79-year-old male who initially presented to GI and surgery for evaluation  secondary to newly diagnosed colonic mass that is near obstructing. He had colonoscopy with Dr. Earl Cortes and they were Not able to get a scope through the area of concern. Proximal transverse colon close to the hepatic flexure. Patient admits to abdominal discomfort in the right upper quadrant. Mild to moderate. No severe sharp pain. Admits to weight loss and fatigue. Decreased appetite. No significant nausea or vomiting. Still passing flatus and having bowel movements. No hematochezia.

## 2023-10-19 NOTE — PLAN OF CARE
Problem: Safety - Adult  Goal: Free from fall injury  Outcome: Adequate for Discharge  Flowsheets (Taken 10/19/2023 1325)  Free From Fall Injury:   Instruct family/caregiver on patient safety   Based on caregiver fall risk screen, instruct family/caregiver to ask for assistance with transferring infant if caregiver noted to have fall risk factors  Note: Free from falls while in O.P. Oncology. Discussed the need to use the call light for assistance when getting up to ambulate. Problem: Discharge Planning  Goal: Discharge to home or other facility with appropriate resources  Outcome: Adequate for Discharge  Flowsheets (Taken 10/19/2023 1325)  Discharge to home or other facility with appropriate resources:   Identify barriers to discharge with patient and caregiver   Identify discharge learning needs (meds, wound care, etc)  Note: Verbalize understanding of discharge instructions, follow up appointments, and when to call Physician. Discuss understanding of discharge instructions, follow up appointments and when to call Physician. Problem: Chronic Conditions and Co-morbidities  Goal: Patient's chronic conditions and co-morbidity symptoms are monitored and maintained or improved  Outcome: Adequate for Discharge  Flowsheets (Taken 10/19/2023 1325)  Care Plan - Patient's Chronic Conditions and Co-Morbidity Symptoms are Monitored and Maintained or Improved:   Monitor and assess patient's chronic conditions and comorbid symptoms for stability, deterioration, or improvement   Collaborate with multidisciplinary team to address chronic and comorbid conditions and prevent exacerbation or deterioration  Note: Patient received 1000 IV infusion over 2hrs. Patient drank no water while in OP oncology. Encouraged patient to drink 48 to 64 ounces of fluids everyday. Care plan reviewed with patient. Patient verbalizes understanding of the plan of care and contribute to goal setting.

## 2023-10-27 DIAGNOSIS — C7A.8 LARGE CELL NEUROENDOCRINE CARCINOMA (HCC): ICD-10-CM

## 2023-10-27 RX ORDER — OXYCODONE HYDROCHLORIDE 5 MG/1
5 TABLET ORAL EVERY 4 HOURS PRN
Qty: 42 TABLET | Refills: 0 | Status: SHIPPED | OUTPATIENT
Start: 2023-10-27 | End: 2023-11-03

## 2023-10-29 NOTE — PROGRESS NOTES
[facility]  [address] [place] [address]  Dept: [phone]  Loc: [phone]   Hematology/Oncology Consult (Clinic)        10/29/23       Nate Officer   1955     No ref. provider found   Сергей Newton MD       Reason:   Chief Complaint   Patient presents with    Follow-up     Large cell neuroendocrine carcinoma            Prior visit Intervals    8/28/2023:  He had port placement on 8/22  Received  IV iron 8/21. Is feeling well today, still some scattered abdominal pains. But he thinks energy and eating is good. 9/19/23:  Pt states diarrhea has resolved and he feels better. Discussed the forward plan with him       10/9/23:  Had a short hospital admission due to dehydration and diarreha 9/22 after second chemotherapy cycle. Had the PET scan done. He fell due to tripping after he someone startles him, did not hit his head, fell on his left elbow, had pain in the left hip but that has resolved. Diarrhea has resolved and lomotil has helped that a lot. Is feeling well today      10/19/2023:   He is feeling better than the last visit, but still has some diarrhea, about 3 bowel movements a day , not all diarrhea. He is running out of the nausea meds, diarrhea meds and pain meds. No bleeding. Interval hx:   10/30/2023  Feeling well today, eating is good, bowel movement is almost normal.   Pains are okay   Cutting down on his smoking. No fevers or chills      HPI:   76-year-old male who initially presented to GI and surgery for evaluation  secondary to newly diagnosed colonic mass that is near obstructing. He had colonoscopy with Dr. Marcie Rosas and they were Not able to get a scope through the area of concern. Proximal transverse colon close to the hepatic flexure. Patient admits to abdominal discomfort in the right upper quadrant. Mild to moderate. No severe sharp pain.   Admits to

## 2023-10-30 ENCOUNTER — OFFICE VISIT (OUTPATIENT)
Dept: ONCOLOGY | Age: 68
End: 2023-10-30
Payer: COMMERCIAL

## 2023-10-30 ENCOUNTER — HOSPITAL ENCOUNTER (OUTPATIENT)
Dept: INFUSION THERAPY | Age: 68
Discharge: HOME | End: 2023-10-30
Payer: COMMERCIAL

## 2023-10-30 VITALS
TEMPERATURE: 97.7 F | WEIGHT: 139 LBS | HEIGHT: 68 IN | BODY MASS INDEX: 21.07 KG/M2 | HEART RATE: 84 BPM | SYSTOLIC BLOOD PRESSURE: 95 MMHG | RESPIRATION RATE: 18 BRPM | OXYGEN SATURATION: 95 % | DIASTOLIC BLOOD PRESSURE: 50 MMHG

## 2023-10-30 VITALS
SYSTOLIC BLOOD PRESSURE: 97 MMHG | BODY MASS INDEX: 21.07 KG/M2 | RESPIRATION RATE: 18 BRPM | TEMPERATURE: 97.6 F | WEIGHT: 139 LBS | HEART RATE: 73 BPM | DIASTOLIC BLOOD PRESSURE: 54 MMHG | OXYGEN SATURATION: 97 % | HEIGHT: 68 IN

## 2023-10-30 DIAGNOSIS — C7A.8 LARGE CELL NEUROENDOCRINE CARCINOMA (HCC): Primary | ICD-10-CM

## 2023-10-30 DIAGNOSIS — R19.7 DIARRHEA, UNSPECIFIED TYPE: ICD-10-CM

## 2023-10-30 DIAGNOSIS — C79.9 METASTATIC MALIGNANT NEOPLASM, UNSPECIFIED SITE (HCC): ICD-10-CM

## 2023-10-30 DIAGNOSIS — R19.7 DIARRHEA, UNSPECIFIED TYPE: Primary | ICD-10-CM

## 2023-10-30 DIAGNOSIS — C7A.8 LARGE CELL NEUROENDOCRINE CARCINOMA (HCC): ICD-10-CM

## 2023-10-30 LAB
ABSOLUTE IMMATURE GRANULOCYTE: 0.02 THOU/MM3 (ref 0–0.07)
ALBUMIN SERPL BCG-MCNC: 3.7 G/DL (ref 3.5–5.1)
ALP SERPL-CCNC: 177 U/L (ref 38–126)
ALT SERPL W/O P-5'-P-CCNC: 37 U/L (ref 11–66)
AST SERPL-CCNC: 37 U/L (ref 5–40)
BASOPHILS ABSOLUTE: 0 THOU/MM3 (ref 0–0.1)
BASOPHILS NFR BLD AUTO: 0 % (ref 0–3)
BILIRUB CONJ SERPL-MCNC: < 0.2 MG/DL (ref 0–0.3)
BILIRUB SERPL-MCNC: 0.2 MG/DL (ref 0.3–1.2)
BUN BLDP-MCNC: 12 MG/DL (ref 8–26)
CHLORIDE BLD-SCNC: 102 MEQ/L (ref 98–109)
CREAT BLD-MCNC: 0.4 MG/DL (ref 0.5–1.2)
EOSINOPHIL NFR BLD AUTO: 0 % (ref 0–4)
EOSINOPHILS ABSOLUTE: 0 THOU/MM3 (ref 0–0.4)
ERYTHROCYTE [DISTWIDTH] IN BLOOD BY AUTOMATED COUNT: 26.3 % (ref 11.5–14.5)
GFR SERPL CREATININE-BSD FRML MDRD: > 60 ML/MIN/1.73M2
GLUCOSE BLD-MCNC: 108 MG/DL (ref 70–108)
HCT VFR BLD AUTO: 29.4 % (ref 42–52)
HGB BLD-MCNC: 9.4 GM/DL (ref 14–18)
IMMATURE GRANULOCYTES: 0 %
IONIZED CALCIUM, WHOLE BLOOD: 1.19 MMOL/L (ref 1.12–1.32)
LYMPHOCYTES ABSOLUTE: 1.6 THOU/MM3 (ref 1–4.8)
LYMPHOCYTES NFR BLD AUTO: 13 % (ref 15–47)
MCH RBC QN AUTO: 30.5 PG (ref 26–33)
MCHC RBC AUTO-ENTMCNC: 32 GM/DL (ref 32.2–35.5)
MCV RBC AUTO: 96 FL (ref 80–94)
MONOCYTES ABSOLUTE: 1 THOU/MM3 (ref 0.4–1.3)
MONOCYTES NFR BLD AUTO: 9 % (ref 0–12)
NEUTROPHILS NFR BLD AUTO: 77 % (ref 43–75)
PLATELET # BLD AUTO: 281 THOU/MM3 (ref 130–400)
PMV BLD AUTO: 10.7 FL (ref 9.4–12.4)
POTASSIUM BLD-SCNC: 4.1 MEQ/L (ref 3.5–4.9)
PROT SERPL-MCNC: 7.1 G/DL (ref 6.1–8)
RBC # BLD AUTO: 3.08 MILL/MM3 (ref 4.7–6.1)
SEGMENTED NEUTROPHILS ABSOLUTE COUNT: 9 THOU/MM3 (ref 1.8–7.7)
SODIUM BLD-SCNC: 138 MEQ/L (ref 138–146)
TOTAL CO2, WHOLE BLOOD: 27 MEQ/L (ref 23–33)
WBC # BLD AUTO: 11.6 THOU/MM3 (ref 4.8–10.8)

## 2023-10-30 PROCEDURE — 96413 CHEMO IV INFUSION 1 HR: CPT

## 2023-10-30 PROCEDURE — 36591 DRAW BLOOD OFF VENOUS DEVICE: CPT

## 2023-10-30 PROCEDURE — 3017F COLORECTAL CA SCREEN DOC REV: CPT | Performed by: INTERNAL MEDICINE

## 2023-10-30 PROCEDURE — 3078F DIAST BP <80 MM HG: CPT | Performed by: INTERNAL MEDICINE

## 2023-10-30 PROCEDURE — 96367 TX/PROPH/DG ADDL SEQ IV INF: CPT

## 2023-10-30 PROCEDURE — 99213 OFFICE O/P EST LOW 20 MIN: CPT | Performed by: INTERNAL MEDICINE

## 2023-10-30 PROCEDURE — G8427 DOCREV CUR MEDS BY ELIG CLIN: HCPCS | Performed by: INTERNAL MEDICINE

## 2023-10-30 PROCEDURE — 96417 CHEMO IV INFUS EACH ADDL SEQ: CPT

## 2023-10-30 PROCEDURE — 99211 OFF/OP EST MAY X REQ PHY/QHP: CPT

## 2023-10-30 PROCEDURE — 1123F ACP DISCUSS/DSCN MKR DOCD: CPT | Performed by: INTERNAL MEDICINE

## 2023-10-30 PROCEDURE — 3074F SYST BP LT 130 MM HG: CPT | Performed by: INTERNAL MEDICINE

## 2023-10-30 PROCEDURE — 2580000003 HC RX 258: Performed by: INTERNAL MEDICINE

## 2023-10-30 PROCEDURE — 85025 COMPLETE CBC W/AUTO DIFF WBC: CPT

## 2023-10-30 PROCEDURE — 80076 HEPATIC FUNCTION PANEL: CPT

## 2023-10-30 PROCEDURE — G8420 CALC BMI NORM PARAMETERS: HCPCS | Performed by: INTERNAL MEDICINE

## 2023-10-30 PROCEDURE — 4004F PT TOBACCO SCREEN RCVD TLK: CPT | Performed by: INTERNAL MEDICINE

## 2023-10-30 PROCEDURE — 6360000002 HC RX W HCPCS: Performed by: INTERNAL MEDICINE

## 2023-10-30 PROCEDURE — 96375 TX/PRO/DX INJ NEW DRUG ADDON: CPT

## 2023-10-30 PROCEDURE — 80047 BASIC METABLC PNL IONIZED CA: CPT

## 2023-10-30 PROCEDURE — G8484 FLU IMMUNIZE NO ADMIN: HCPCS | Performed by: INTERNAL MEDICINE

## 2023-10-30 RX ORDER — DIPHENHYDRAMINE HYDROCHLORIDE 50 MG/ML
50 INJECTION INTRAMUSCULAR; INTRAVENOUS
Status: CANCELLED | OUTPATIENT
Start: 2023-10-31

## 2023-10-30 RX ORDER — SODIUM CHLORIDE 9 MG/ML
5-250 INJECTION, SOLUTION INTRAVENOUS PRN
Status: DISCONTINUED | OUTPATIENT
Start: 2023-10-30 | End: 2023-10-31 | Stop reason: HOSPADM

## 2023-10-30 RX ORDER — MEPERIDINE HYDROCHLORIDE 50 MG/ML
12.5 INJECTION INTRAMUSCULAR; INTRAVENOUS; SUBCUTANEOUS PRN
Status: CANCELLED | OUTPATIENT
Start: 2023-10-31

## 2023-10-30 RX ORDER — ONDANSETRON 2 MG/ML
8 INJECTION INTRAMUSCULAR; INTRAVENOUS
Status: CANCELLED | OUTPATIENT
Start: 2023-11-01

## 2023-10-30 RX ORDER — EPINEPHRINE 1 MG/ML
0.3 INJECTION, SOLUTION, CONCENTRATE INTRAVENOUS PRN
Status: CANCELLED | OUTPATIENT
Start: 2023-11-01

## 2023-10-30 RX ORDER — SODIUM CHLORIDE 9 MG/ML
INJECTION, SOLUTION INTRAVENOUS CONTINUOUS
Status: CANCELLED | OUTPATIENT
Start: 2023-11-01

## 2023-10-30 RX ORDER — SODIUM CHLORIDE 0.9 % (FLUSH) 0.9 %
5-40 SYRINGE (ML) INJECTION PRN
Status: DISCONTINUED | OUTPATIENT
Start: 2023-10-30 | End: 2023-10-31 | Stop reason: HOSPADM

## 2023-10-30 RX ORDER — HEPARIN 100 UNIT/ML
500 SYRINGE INTRAVENOUS PRN
Status: DISCONTINUED | OUTPATIENT
Start: 2023-10-30 | End: 2023-10-31 | Stop reason: HOSPADM

## 2023-10-30 RX ORDER — SODIUM CHLORIDE 9 MG/ML
25 INJECTION, SOLUTION INTRAVENOUS PRN
Status: CANCELLED | OUTPATIENT
Start: 2023-10-30

## 2023-10-30 RX ORDER — HEPARIN SODIUM (PORCINE) LOCK FLUSH IV SOLN 100 UNIT/ML 100 UNIT/ML
500 SOLUTION INTRAVENOUS PRN
Status: CANCELLED | OUTPATIENT
Start: 2023-10-31

## 2023-10-30 RX ORDER — ACETAMINOPHEN 325 MG/1
650 TABLET ORAL
Status: CANCELLED | OUTPATIENT
Start: 2023-11-01

## 2023-10-30 RX ORDER — HEPARIN 100 UNIT/ML
500 SYRINGE INTRAVENOUS PRN
Status: CANCELLED | OUTPATIENT
Start: 2023-10-30

## 2023-10-30 RX ORDER — MEPERIDINE HYDROCHLORIDE 50 MG/ML
12.5 INJECTION INTRAMUSCULAR; INTRAVENOUS; SUBCUTANEOUS PRN
Status: CANCELLED | OUTPATIENT
Start: 2023-10-30

## 2023-10-30 RX ORDER — SODIUM CHLORIDE 0.9 % (FLUSH) 0.9 %
5-40 SYRINGE (ML) INJECTION PRN
Status: CANCELLED | OUTPATIENT
Start: 2023-11-01

## 2023-10-30 RX ORDER — SODIUM CHLORIDE 9 MG/ML
5-250 INJECTION, SOLUTION INTRAVENOUS PRN
Status: CANCELLED | OUTPATIENT
Start: 2023-11-01

## 2023-10-30 RX ORDER — SODIUM CHLORIDE 9 MG/ML
5-250 INJECTION, SOLUTION INTRAVENOUS PRN
Status: CANCELLED | OUTPATIENT
Start: 2023-10-31

## 2023-10-30 RX ORDER — ALBUTEROL SULFATE 90 UG/1
4 AEROSOL, METERED RESPIRATORY (INHALATION) PRN
Status: CANCELLED | OUTPATIENT
Start: 2023-10-30

## 2023-10-30 RX ORDER — PALONOSETRON 0.05 MG/ML
0.25 INJECTION, SOLUTION INTRAVENOUS ONCE
Status: COMPLETED | OUTPATIENT
Start: 2023-10-30 | End: 2023-10-30

## 2023-10-30 RX ORDER — OXYCODONE HYDROCHLORIDE 5 MG/1
5 TABLET ORAL EVERY 4 HOURS PRN
Qty: 82 TABLET | Refills: 0 | Status: SHIPPED | OUTPATIENT
Start: 2023-10-30 | End: 2023-11-13

## 2023-10-30 RX ORDER — ALBUTEROL SULFATE 90 UG/1
4 AEROSOL, METERED RESPIRATORY (INHALATION) PRN
Status: CANCELLED | OUTPATIENT
Start: 2023-10-31

## 2023-10-30 RX ORDER — SODIUM CHLORIDE 0.9 % (FLUSH) 0.9 %
5-40 SYRINGE (ML) INJECTION PRN
Status: CANCELLED | OUTPATIENT
Start: 2023-10-30

## 2023-10-30 RX ORDER — SODIUM CHLORIDE 0.9 % (FLUSH) 0.9 %
5-40 SYRINGE (ML) INJECTION PRN
Status: CANCELLED | OUTPATIENT
Start: 2023-10-31

## 2023-10-30 RX ORDER — DIPHENOXYLATE HYDROCHLORIDE AND ATROPINE SULFATE 2.5; .025 MG/1; MG/1
1 TABLET ORAL 4 TIMES DAILY PRN
Qty: 40 TABLET | Refills: 0 | Status: SHIPPED | OUTPATIENT
Start: 2023-10-30 | End: 2023-11-09

## 2023-10-30 RX ORDER — DIPHENHYDRAMINE HYDROCHLORIDE 50 MG/ML
50 INJECTION INTRAMUSCULAR; INTRAVENOUS
Status: CANCELLED | OUTPATIENT
Start: 2023-10-30

## 2023-10-30 RX ORDER — EPINEPHRINE 1 MG/ML
0.3 INJECTION, SOLUTION, CONCENTRATE INTRAVENOUS PRN
Status: CANCELLED | OUTPATIENT
Start: 2023-10-31

## 2023-10-30 RX ORDER — HEPARIN SODIUM (PORCINE) LOCK FLUSH IV SOLN 100 UNIT/ML 100 UNIT/ML
500 SOLUTION INTRAVENOUS PRN
Status: CANCELLED | OUTPATIENT
Start: 2023-11-01

## 2023-10-30 RX ORDER — FAMOTIDINE 10 MG/ML
20 INJECTION, SOLUTION INTRAVENOUS
Status: CANCELLED | OUTPATIENT
Start: 2023-11-01

## 2023-10-30 RX ORDER — ALBUTEROL SULFATE 90 UG/1
4 AEROSOL, METERED RESPIRATORY (INHALATION) PRN
Status: CANCELLED | OUTPATIENT
Start: 2023-11-01

## 2023-10-30 RX ORDER — SODIUM CHLORIDE 9 MG/ML
INJECTION, SOLUTION INTRAVENOUS CONTINUOUS
Status: CANCELLED | OUTPATIENT
Start: 2023-10-30

## 2023-10-30 RX ORDER — DIPHENHYDRAMINE HYDROCHLORIDE 50 MG/ML
50 INJECTION INTRAMUSCULAR; INTRAVENOUS
Status: CANCELLED | OUTPATIENT
Start: 2023-11-01

## 2023-10-30 RX ORDER — ACETAMINOPHEN 325 MG/1
650 TABLET ORAL
Status: CANCELLED | OUTPATIENT
Start: 2023-10-31

## 2023-10-30 RX ORDER — ONDANSETRON 2 MG/ML
8 INJECTION INTRAMUSCULAR; INTRAVENOUS
Status: CANCELLED | OUTPATIENT
Start: 2023-10-31

## 2023-10-30 RX ORDER — ACETAMINOPHEN 325 MG/1
650 TABLET ORAL
Status: CANCELLED | OUTPATIENT
Start: 2023-10-30

## 2023-10-30 RX ORDER — FAMOTIDINE 10 MG/ML
20 INJECTION, SOLUTION INTRAVENOUS
Status: CANCELLED | OUTPATIENT
Start: 2023-10-31

## 2023-10-30 RX ORDER — EPINEPHRINE 1 MG/ML
0.3 INJECTION, SOLUTION, CONCENTRATE INTRAVENOUS PRN
Status: CANCELLED | OUTPATIENT
Start: 2023-10-30

## 2023-10-30 RX ORDER — SODIUM CHLORIDE 9 MG/ML
5-250 INJECTION, SOLUTION INTRAVENOUS PRN
Status: CANCELLED | OUTPATIENT
Start: 2023-10-30

## 2023-10-30 RX ORDER — MEPERIDINE HYDROCHLORIDE 50 MG/ML
12.5 INJECTION INTRAMUSCULAR; INTRAVENOUS; SUBCUTANEOUS PRN
Status: CANCELLED | OUTPATIENT
Start: 2023-11-01

## 2023-10-30 RX ORDER — SODIUM CHLORIDE 9 MG/ML
INJECTION, SOLUTION INTRAVENOUS CONTINUOUS
Status: CANCELLED | OUTPATIENT
Start: 2023-10-31

## 2023-10-30 RX ORDER — ONDANSETRON 2 MG/ML
8 INJECTION INTRAMUSCULAR; INTRAVENOUS
Status: CANCELLED | OUTPATIENT
Start: 2023-10-30

## 2023-10-30 RX ORDER — FAMOTIDINE 10 MG/ML
20 INJECTION, SOLUTION INTRAVENOUS
Status: CANCELLED | OUTPATIENT
Start: 2023-10-30

## 2023-10-30 RX ORDER — PALONOSETRON 0.05 MG/ML
0.25 INJECTION, SOLUTION INTRAVENOUS ONCE
Status: CANCELLED | OUTPATIENT
Start: 2023-10-30 | End: 2023-10-30

## 2023-10-30 RX ORDER — HEPARIN SODIUM (PORCINE) LOCK FLUSH IV SOLN 100 UNIT/ML 100 UNIT/ML
500 SOLUTION INTRAVENOUS PRN
Status: CANCELLED | OUTPATIENT
Start: 2023-10-30

## 2023-10-30 RX ADMIN — ETOPOSIDE 142 MG: 20 INJECTION INTRAVENOUS at 11:43

## 2023-10-30 RX ADMIN — SODIUM CHLORIDE 20 ML/HR: 9 INJECTION, SOLUTION INTRAVENOUS at 10:32

## 2023-10-30 RX ADMIN — SODIUM CHLORIDE, PRESERVATIVE FREE 10 ML: 5 INJECTION INTRAVENOUS at 09:13

## 2023-10-30 RX ADMIN — FOSAPREPITANT 150 MG: 150 INJECTION, POWDER, LYOPHILIZED, FOR SOLUTION INTRAVENOUS at 10:59

## 2023-10-30 RX ADMIN — PALONOSETRON 0.25 MG: 0.05 INJECTION, SOLUTION INTRAVENOUS at 11:37

## 2023-10-30 RX ADMIN — CARBOPLATIN 465 MG: 600 INJECTION, SOLUTION INTRAVENOUS at 12:56

## 2023-10-30 RX ADMIN — HEPARIN 500 UNITS: 100 SYRINGE at 13:51

## 2023-10-30 RX ADMIN — SODIUM CHLORIDE, PRESERVATIVE FREE 10 ML: 5 INJECTION INTRAVENOUS at 09:14

## 2023-10-30 RX ADMIN — SODIUM CHLORIDE, PRESERVATIVE FREE 10 ML: 5 INJECTION INTRAVENOUS at 13:51

## 2023-10-30 RX ADMIN — DEXAMETHASONE SODIUM PHOSPHATE 12 MG: 4 INJECTION, SOLUTION INTRAMUSCULAR; INTRAVENOUS at 10:33

## 2023-10-30 RX ADMIN — SODIUM CHLORIDE, PRESERVATIVE FREE 10 ML: 5 INJECTION INTRAVENOUS at 10:31

## 2023-10-30 NOTE — DISCHARGE INSTRUCTIONS
In 1 week for provider,  fluids and labs checked  Call us if you have new symptoms       Please contact your Oncologist if you have any questions regarding the etoposide that you received today. You are instructed to call the office or go to the Emergency Dept. If you experience any of the following symptoms:    Dizziness/lightheadedness   Acute nausea or vomiting-not relieved by medications  Headaches-not relieved by medications  New chest pain or pressure  New rash /itching  New shortness of breath  Fever,chills or signs or symptoms of infection    Make sure you are drinking 48 to 64 ounces of water daily-if you are unable to drink fluids let us know right away.

## 2023-10-30 NOTE — PLAN OF CARE
Problem: Safety - Adult  Goal: Free from fall injury  Outcome: Adequate for Discharge  Flowsheets (Taken 10/30/2023 1610)  Free From Fall Injury:   Instruct family/caregiver on patient safety   Based on caregiver fall risk screen, instruct family/caregiver to ask for assistance with transferring infant if caregiver noted to have fall risk factors  Note: Free from falls while in O.P. Oncology. Discussed the need to use the call light for assistance when getting up to ambulate. Problem: Discharge Planning  Goal: Discharge to home or other facility with appropriate resources  Outcome: Adequate for Discharge  Flowsheets (Taken 10/30/2023 1610)  Discharge to home or other facility with appropriate resources:   Identify barriers to discharge with patient and caregiver   Identify discharge learning needs (meds, wound care, etc)  Note: Verbalize understanding of discharge instructions, follow up appointments, and when to call Physician. Discuss understanding of discharge instructions, follow up appointments and when to call Physician.       Problem: Chronic Conditions and Co-morbidities  Goal: Patient's chronic conditions and co-morbidity symptoms are monitored and maintained or improved  Outcome: Adequate for Discharge  Flowsheets (Taken 10/30/2023 1610)  Care Plan - Patient's Chronic Conditions and Co-Morbidity Symptoms are Monitored and Maintained or Improved:   Collaborate with multidisciplinary team to address chronic and comorbid conditions and prevent exacerbation or deterioration   Monitor and assess patient's chronic conditions and comorbid symptoms for stability, deterioration, or improvement  Note:   Chemotherapy Teaching     What is Chemotherapy   Drug action [x]   Method of Administration [x]   Handouts given []     Side Effects  Nausea/vomiting [x]   Diarrhea [x]   Fatigue [x]   Signs / Symptoms of infection [x]   Neutropenia [x]   Thrombocytopenia [x]   Alopecia [x]   neuropathy [x]   [place] diet &  the

## 2023-10-31 ENCOUNTER — HOSPITAL ENCOUNTER (OUTPATIENT)
Dept: INFUSION THERAPY | Age: 68
Discharge: HOME | End: 2023-10-31
Payer: COMMERCIAL

## 2023-10-31 VITALS
SYSTOLIC BLOOD PRESSURE: 119 MMHG | OXYGEN SATURATION: 98 % | HEART RATE: 73 BPM | HEIGHT: 68 IN | RESPIRATION RATE: 16 BRPM | DIASTOLIC BLOOD PRESSURE: 58 MMHG | TEMPERATURE: 97.9 F | BODY MASS INDEX: 21.37 KG/M2 | WEIGHT: 141 LBS

## 2023-10-31 DIAGNOSIS — C7A.8 LARGE CELL NEUROENDOCRINE CARCINOMA (HCC): Primary | ICD-10-CM

## 2023-10-31 DIAGNOSIS — C79.9 METASTATIC MALIGNANT NEOPLASM, UNSPECIFIED SITE (HCC): ICD-10-CM

## 2023-10-31 PROCEDURE — 96375 TX/PRO/DX INJ NEW DRUG ADDON: CPT

## 2023-10-31 PROCEDURE — 2580000003 HC RX 258: Performed by: INTERNAL MEDICINE

## 2023-10-31 PROCEDURE — 6360000002 HC RX W HCPCS: Performed by: INTERNAL MEDICINE

## 2023-10-31 PROCEDURE — 96413 CHEMO IV INFUSION 1 HR: CPT

## 2023-10-31 RX ORDER — SODIUM CHLORIDE 9 MG/ML
25 INJECTION, SOLUTION INTRAVENOUS PRN
Status: CANCELLED | OUTPATIENT
Start: 2023-10-31

## 2023-10-31 RX ORDER — SODIUM CHLORIDE 0.9 % (FLUSH) 0.9 %
5-40 SYRINGE (ML) INJECTION PRN
Status: DISCONTINUED | OUTPATIENT
Start: 2023-10-31 | End: 2023-11-01 | Stop reason: HOSPADM

## 2023-10-31 RX ORDER — DEXAMETHASONE SODIUM PHOSPHATE 4 MG/ML
8 INJECTION, SOLUTION INTRA-ARTICULAR; INTRALESIONAL; INTRAMUSCULAR; INTRAVENOUS; SOFT TISSUE ONCE
Status: COMPLETED | OUTPATIENT
Start: 2023-10-31 | End: 2023-10-31

## 2023-10-31 RX ORDER — SODIUM CHLORIDE 0.9 % (FLUSH) 0.9 %
5-40 SYRINGE (ML) INJECTION PRN
Status: CANCELLED | OUTPATIENT
Start: 2023-10-31

## 2023-10-31 RX ORDER — HEPARIN 100 UNIT/ML
500 SYRINGE INTRAVENOUS PRN
Status: CANCELLED | OUTPATIENT
Start: 2023-10-31

## 2023-10-31 RX ORDER — SODIUM CHLORIDE 9 MG/ML
5-250 INJECTION, SOLUTION INTRAVENOUS PRN
Status: DISCONTINUED | OUTPATIENT
Start: 2023-10-31 | End: 2023-11-01 | Stop reason: HOSPADM

## 2023-10-31 RX ORDER — HEPARIN 100 UNIT/ML
500 SYRINGE INTRAVENOUS PRN
Status: DISCONTINUED | OUTPATIENT
Start: 2023-10-31 | End: 2023-11-01 | Stop reason: HOSPADM

## 2023-10-31 RX ADMIN — SODIUM CHLORIDE 20 ML/HR: 9 INJECTION, SOLUTION INTRAVENOUS at 09:58

## 2023-10-31 RX ADMIN — SODIUM CHLORIDE, PRESERVATIVE FREE 10 ML: 5 INJECTION INTRAVENOUS at 12:09

## 2023-10-31 RX ADMIN — ETOPOSIDE 142 MG: 20 INJECTION INTRAVENOUS at 10:49

## 2023-10-31 RX ADMIN — DEXAMETHASONE SODIUM PHOSPHATE 8 MG: 4 INJECTION, SOLUTION INTRAMUSCULAR; INTRAVENOUS at 09:59

## 2023-10-31 RX ADMIN — SODIUM CHLORIDE, PRESERVATIVE FREE 10 ML: 5 INJECTION INTRAVENOUS at 09:57

## 2023-10-31 RX ADMIN — HEPARIN 500 UNITS: 100 SYRINGE at 12:09

## 2023-10-31 NOTE — PLAN OF CARE
Problem: Safety - Adult  Goal: Free from fall injury  Outcome: Adequate for Discharge  Flowsheets (Taken 10/31/2023 1008)  Free From Fall Injury:   Instruct family/caregiver on patient safety   Based on caregiver fall risk screen, instruct family/caregiver to ask for assistance with transferring infant if caregiver noted to have fall risk factors  Note: Free from falls while in O.P. Oncology. Discussed the need to use the call light for assistance when getting up to ambulate. Problem: Discharge Planning  Goal: Discharge to home or other facility with appropriate resources  Outcome: Adequate for Discharge  Flowsheets (Taken 10/31/2023 1008)  Discharge to home or other facility with appropriate resources:   Identify barriers to discharge with patient and caregiver   Identify discharge learning needs (meds, wound care, etc)  Note: Verbalize understanding of discharge instructions, follow up appointments, and when to call Physician. Discuss understanding of discharge instructions, follow up appointments and when to call Physician.       Problem: Chronic Conditions and Co-morbidities  Goal: Patient's chronic conditions and co-morbidity symptoms are monitored and maintained or improved  Outcome: Adequate for Discharge  Flowsheets (Taken 10/31/2023 1008)  Care Plan - Patient's Chronic Conditions and Co-Morbidity Symptoms are Monitored and Maintained or Improved:   Monitor and assess patient's chronic conditions and comorbid symptoms for stability, deterioration, or improvement   Collaborate with multidisciplinary team to address chronic and comorbid conditions and prevent exacerbation or deterioration  Note:   Chemotherapy Teaching     What is Chemotherapy   Drug action [x]   Method of Administration [x]   Handouts given []     Side Effects  Nausea/vomiting [x]   Diarrhea [x]   Fatigue [x]   Signs / Symptoms of infection [x]   Neutropenia [x]   Thrombocytopenia [x]   Alopecia [x]   neuropathy [x]   [place] diet &  the importance of fluids [x]       Micellaneous  Importance of nutrition [x]   Importance of oral hygiene [x]   When to call the MD [x]   Monitoring labs [x]   Use of supportive services []     Explanation of Drug Regimen / Frequency  Etoposide     Comments  Verbalized understanding to drug,action,side effects and when to call MD     Care plan reviewed with patient. Patient verbalizes understanding of the plan of care and contribute to goal setting.

## 2023-11-01 ENCOUNTER — HOSPITAL ENCOUNTER (OUTPATIENT)
Dept: INFUSION THERAPY | Age: 68
Discharge: HOME | End: 2023-11-01
Payer: COMMERCIAL

## 2023-11-01 VITALS
DIASTOLIC BLOOD PRESSURE: 57 MMHG | TEMPERATURE: 97.7 F | RESPIRATION RATE: 16 BRPM | WEIGHT: 141 LBS | OXYGEN SATURATION: 99 % | BODY MASS INDEX: 21.44 KG/M2 | SYSTOLIC BLOOD PRESSURE: 119 MMHG | HEART RATE: 72 BPM

## 2023-11-01 DIAGNOSIS — C79.9 METASTATIC MALIGNANT NEOPLASM, UNSPECIFIED SITE (HCC): ICD-10-CM

## 2023-11-01 DIAGNOSIS — C7A.8 LARGE CELL NEUROENDOCRINE CARCINOMA (HCC): Primary | ICD-10-CM

## 2023-11-01 PROCEDURE — 96375 TX/PRO/DX INJ NEW DRUG ADDON: CPT

## 2023-11-01 PROCEDURE — 6360000002 HC RX W HCPCS: Performed by: INTERNAL MEDICINE

## 2023-11-01 PROCEDURE — 96377 APPLICATON ON-BODY INJECTOR: CPT

## 2023-11-01 PROCEDURE — 96413 CHEMO IV INFUSION 1 HR: CPT

## 2023-11-01 PROCEDURE — 2580000003 HC RX 258: Performed by: INTERNAL MEDICINE

## 2023-11-01 RX ORDER — SODIUM CHLORIDE 0.9 % (FLUSH) 0.9 %
5-40 SYRINGE (ML) INJECTION PRN
Status: CANCELLED | OUTPATIENT
Start: 2023-11-01

## 2023-11-01 RX ORDER — DEXAMETHASONE SODIUM PHOSPHATE 4 MG/ML
8 INJECTION, SOLUTION INTRA-ARTICULAR; INTRALESIONAL; INTRAMUSCULAR; INTRAVENOUS; SOFT TISSUE ONCE
Status: COMPLETED | OUTPATIENT
Start: 2023-11-01 | End: 2023-11-01

## 2023-11-01 RX ORDER — SODIUM CHLORIDE 9 MG/ML
5-250 INJECTION, SOLUTION INTRAVENOUS PRN
Status: DISCONTINUED | OUTPATIENT
Start: 2023-11-01 | End: 2023-11-02 | Stop reason: HOSPADM

## 2023-11-01 RX ORDER — HEPARIN 100 UNIT/ML
500 SYRINGE INTRAVENOUS PRN
Status: DISCONTINUED | OUTPATIENT
Start: 2023-11-01 | End: 2023-11-02 | Stop reason: HOSPADM

## 2023-11-01 RX ORDER — HEPARIN 100 UNIT/ML
500 SYRINGE INTRAVENOUS PRN
Status: CANCELLED | OUTPATIENT
Start: 2023-11-01

## 2023-11-01 RX ORDER — SODIUM CHLORIDE 0.9 % (FLUSH) 0.9 %
5-40 SYRINGE (ML) INJECTION PRN
Status: DISCONTINUED | OUTPATIENT
Start: 2023-11-01 | End: 2023-11-02 | Stop reason: HOSPADM

## 2023-11-01 RX ORDER — SODIUM CHLORIDE 9 MG/ML
25 INJECTION, SOLUTION INTRAVENOUS PRN
Status: CANCELLED | OUTPATIENT
Start: 2023-11-01

## 2023-11-01 RX ADMIN — DEXAMETHASONE SODIUM PHOSPHATE 8 MG: 4 INJECTION, SOLUTION INTRAMUSCULAR; INTRAVENOUS at 10:06

## 2023-11-01 RX ADMIN — SODIUM CHLORIDE, PRESERVATIVE FREE 10 ML: 5 INJECTION INTRAVENOUS at 11:29

## 2023-11-01 RX ADMIN — HEPARIN 500 UNITS: 100 SYRINGE at 11:29

## 2023-11-01 RX ADMIN — PEGFILGRASTIM 6 MG: KIT SUBCUTANEOUS at 11:26

## 2023-11-01 RX ADMIN — SODIUM CHLORIDE 20 ML/HR: 9 INJECTION, SOLUTION INTRAVENOUS at 10:05

## 2023-11-01 RX ADMIN — SODIUM CHLORIDE, PRESERVATIVE FREE 10 ML: 5 INJECTION INTRAVENOUS at 10:04

## 2023-11-01 RX ADMIN — ETOPOSIDE 142 MG: 20 INJECTION INTRAVENOUS at 10:13

## 2023-11-01 NOTE — PLAN OF CARE
Problem: Safety - Adult  Goal: Free from fall injury  Outcome: Adequate for Discharge  Flowsheets (Taken 11/1/2023 1014)  Free From Fall Injury:   Instruct family/caregiver on patient safety   Based on caregiver fall risk screen, instruct family/caregiver to ask for assistance with transferring infant if caregiver noted to have fall risk factors  Note: Free from falls while in O.P. Oncology. Discussed the need to use the call light for assistance when getting up to ambulate. Problem: Discharge Planning  Goal: Discharge to home or other facility with appropriate resources  Outcome: Adequate for Discharge  Flowsheets (Taken 11/1/2023 1014)  Discharge to home or other facility with appropriate resources:   Identify barriers to discharge with patient and caregiver   Identify discharge learning needs (meds, wound care, etc)  Note: Verbalize understanding of discharge instructions, follow up appointments, and when to call Physician. Discuss understanding of discharge instructions, follow up appointments and when to call Physician.       Problem: Chronic Conditions and Co-morbidities  Goal: Patient's chronic conditions and co-morbidity symptoms are monitored and maintained or improved  Outcome: Adequate for Discharge  Flowsheets (Taken 11/1/2023 1014)  Care Plan - Patient's Chronic Conditions and Co-Morbidity Symptoms are Monitored and Maintained or Improved:   Monitor and assess patient's chronic conditions and comorbid symptoms for stability, deterioration, or improvement   Collaborate with multidisciplinary team to address chronic and comorbid conditions and prevent exacerbation or deterioration  Note:   Chemotherapy Teaching     What is Chemotherapy   Drug action [x]   Method of Administration [x]   Handouts given []     Side Effects  Nausea/vomiting [x]   Diarrhea [x]   Fatigue [x]   Signs / Symptoms of infection [x]   Neutropenia [x]   Thrombocytopenia [x]   Alopecia [x]   neuropathy [x]   [place] diet &  the importance of fluids [x]       Micellaneous  Importance of nutrition [x]   Importance of oral hygiene [x]   When to call the MD [x]   Monitoring labs [x]   Use of supportive services []     Explanation of Drug Regimen / Frequency  Etoposide     Comments  Verbalized understanding to drug,action,side effects and when to call MD        Care plan reviewed with patient. Patient verbalizes understanding of the plan of care and contribute to goal setting.

## 2023-11-06 ENCOUNTER — OFFICE VISIT (OUTPATIENT)
Dept: ONCOLOGY | Age: 68
End: 2023-11-06
Payer: COMMERCIAL

## 2023-11-06 ENCOUNTER — SOCIAL WORK (OUTPATIENT)
Dept: INFUSION THERAPY | Age: 68
End: 2023-11-06

## 2023-11-06 ENCOUNTER — HOSPITAL ENCOUNTER (OUTPATIENT)
Dept: INFUSION THERAPY | Age: 68
Discharge: HOME | End: 2023-11-06
Payer: COMMERCIAL

## 2023-11-06 VITALS
BODY MASS INDEX: 21.41 KG/M2 | HEART RATE: 67 BPM | TEMPERATURE: 97.8 F | WEIGHT: 140.8 LBS | SYSTOLIC BLOOD PRESSURE: 87 MMHG | RESPIRATION RATE: 16 BRPM | OXYGEN SATURATION: 97 % | DIASTOLIC BLOOD PRESSURE: 55 MMHG

## 2023-11-06 VITALS
DIASTOLIC BLOOD PRESSURE: 54 MMHG | RESPIRATION RATE: 16 BRPM | OXYGEN SATURATION: 98 % | HEART RATE: 75 BPM | SYSTOLIC BLOOD PRESSURE: 121 MMHG | TEMPERATURE: 98.3 F | HEIGHT: 67 IN | BODY MASS INDEX: 22.1 KG/M2 | WEIGHT: 140.8 LBS

## 2023-11-06 DIAGNOSIS — C7A.8 LARGE CELL NEUROENDOCRINE CARCINOMA (HCC): ICD-10-CM

## 2023-11-06 DIAGNOSIS — R19.7 DIARRHEA, UNSPECIFIED TYPE: Primary | ICD-10-CM

## 2023-11-06 DIAGNOSIS — C7A.8 LARGE CELL NEUROENDOCRINE CARCINOMA (HCC): Primary | ICD-10-CM

## 2023-11-06 LAB
ABSOLUTE IMMATURE GRANULOCYTE: 0.08 THOU/MM3 (ref 0–0.07)
BASOPHILS ABSOLUTE: 0 THOU/MM3 (ref 0–0.1)
BASOPHILS NFR BLD AUTO: 0 % (ref 0–3)
BUN BLDP-MCNC: 13 MG/DL (ref 8–26)
CHLORIDE BLD-SCNC: 102 MEQ/L (ref 98–109)
CREAT BLD-MCNC: 0.4 MG/DL (ref 0.5–1.2)
EOSINOPHIL NFR BLD AUTO: 0 % (ref 0–4)
EOSINOPHILS ABSOLUTE: 0 THOU/MM3 (ref 0–0.4)
ERYTHROCYTE [DISTWIDTH] IN BLOOD BY AUTOMATED COUNT: 24.9 % (ref 11.5–14.5)
GFR SERPL CREATININE-BSD FRML MDRD: > 60 ML/MIN/1.73M2
GLUCOSE BLD-MCNC: 84 MG/DL (ref 70–108)
HCT VFR BLD AUTO: 26.6 % (ref 42–52)
HGB BLD-MCNC: 8.4 GM/DL (ref 14–18)
IMMATURE GRANULOCYTES: 1 %
IONIZED CALCIUM, WHOLE BLOOD: 1.16 MMOL/L (ref 1.12–1.32)
IRON SATN MFR SERPL: 15 % (ref 20–50)
IRON SERPL-MCNC: 39 UG/DL (ref 65–195)
LYMPHOCYTES ABSOLUTE: 1.2 THOU/MM3 (ref 1–4.8)
LYMPHOCYTES NFR BLD AUTO: 8 % (ref 15–47)
MAGNESIUM SERPL-MCNC: 1.8 MG/DL (ref 1.6–2.4)
MCH RBC QN AUTO: 30.2 PG (ref 26–33)
MCHC RBC AUTO-ENTMCNC: 31.6 GM/DL (ref 32.2–35.5)
MCV RBC AUTO: 96 FL (ref 80–94)
MONOCYTES ABSOLUTE: 0.6 THOU/MM3 (ref 0.4–1.3)
MONOCYTES NFR BLD AUTO: 4 % (ref 0–12)
NEUTROPHILS NFR BLD AUTO: 87 % (ref 43–75)
PLATELET # BLD AUTO: 181 THOU/MM3 (ref 130–400)
PMV BLD AUTO: 10.9 FL (ref 9.4–12.4)
POTASSIUM BLD-SCNC: 4 MEQ/L (ref 3.5–4.9)
RBC # BLD AUTO: 2.78 MILL/MM3 (ref 4.7–6.1)
SEGMENTED NEUTROPHILS ABSOLUTE COUNT: 12.1 THOU/MM3 (ref 1.8–7.7)
SODIUM BLD-SCNC: 135 MEQ/L (ref 138–146)
TIBC SERPL-MCNC: 257 UG/DL (ref 171–450)
TOTAL CO2, WHOLE BLOOD: 26 MEQ/L (ref 23–33)
WBC # BLD AUTO: 13.9 THOU/MM3 (ref 4.8–10.8)

## 2023-11-06 PROCEDURE — 2580000003 HC RX 258: Performed by: INTERNAL MEDICINE

## 2023-11-06 PROCEDURE — 36591 DRAW BLOOD OFF VENOUS DEVICE: CPT

## 2023-11-06 PROCEDURE — 80047 BASIC METABLC PNL IONIZED CA: CPT

## 2023-11-06 PROCEDURE — 83540 ASSAY OF IRON: CPT

## 2023-11-06 PROCEDURE — 85025 COMPLETE CBC W/AUTO DIFF WBC: CPT

## 2023-11-06 PROCEDURE — 99214 OFFICE O/P EST MOD 30 MIN: CPT | Performed by: INTERNAL MEDICINE

## 2023-11-06 PROCEDURE — G8427 DOCREV CUR MEDS BY ELIG CLIN: HCPCS | Performed by: INTERNAL MEDICINE

## 2023-11-06 PROCEDURE — 83550 IRON BINDING TEST: CPT

## 2023-11-06 PROCEDURE — 4004F PT TOBACCO SCREEN RCVD TLK: CPT | Performed by: INTERNAL MEDICINE

## 2023-11-06 PROCEDURE — 6360000002 HC RX W HCPCS: Performed by: INTERNAL MEDICINE

## 2023-11-06 PROCEDURE — 83735 ASSAY OF MAGNESIUM: CPT

## 2023-11-06 PROCEDURE — 3017F COLORECTAL CA SCREEN DOC REV: CPT | Performed by: INTERNAL MEDICINE

## 2023-11-06 PROCEDURE — G8484 FLU IMMUNIZE NO ADMIN: HCPCS | Performed by: INTERNAL MEDICINE

## 2023-11-06 PROCEDURE — 3074F SYST BP LT 130 MM HG: CPT | Performed by: INTERNAL MEDICINE

## 2023-11-06 PROCEDURE — G8420 CALC BMI NORM PARAMETERS: HCPCS | Performed by: INTERNAL MEDICINE

## 2023-11-06 PROCEDURE — 96361 HYDRATE IV INFUSION ADD-ON: CPT

## 2023-11-06 PROCEDURE — 99211 OFF/OP EST MAY X REQ PHY/QHP: CPT

## 2023-11-06 PROCEDURE — 96360 HYDRATION IV INFUSION INIT: CPT

## 2023-11-06 PROCEDURE — 3078F DIAST BP <80 MM HG: CPT | Performed by: INTERNAL MEDICINE

## 2023-11-06 PROCEDURE — 1123F ACP DISCUSS/DSCN MKR DOCD: CPT | Performed by: INTERNAL MEDICINE

## 2023-11-06 RX ORDER — SODIUM CHLORIDE 9 MG/ML
25 INJECTION, SOLUTION INTRAVENOUS PRN
OUTPATIENT
Start: 2023-11-06

## 2023-11-06 RX ORDER — BENZONATATE 100 MG/1
100 CAPSULE ORAL 3 TIMES DAILY PRN
Qty: 30 CAPSULE | Refills: 0 | Status: SHIPPED | OUTPATIENT
Start: 2023-11-06 | End: 2023-11-16

## 2023-11-06 RX ORDER — HEPARIN 100 UNIT/ML
500 SYRINGE INTRAVENOUS PRN
OUTPATIENT
Start: 2023-11-06

## 2023-11-06 RX ORDER — HEPARIN 100 UNIT/ML
500 SYRINGE INTRAVENOUS PRN
Status: DISCONTINUED | OUTPATIENT
Start: 2023-11-06 | End: 2023-11-07 | Stop reason: HOSPADM

## 2023-11-06 RX ORDER — SODIUM CHLORIDE 0.9 % (FLUSH) 0.9 %
5-40 SYRINGE (ML) INJECTION PRN
Status: DISCONTINUED | OUTPATIENT
Start: 2023-11-06 | End: 2023-11-07 | Stop reason: HOSPADM

## 2023-11-06 RX ORDER — 0.9 % SODIUM CHLORIDE 0.9 %
1000 INTRAVENOUS SOLUTION INTRAVENOUS ONCE
Status: COMPLETED | OUTPATIENT
Start: 2023-11-06 | End: 2023-11-06

## 2023-11-06 RX ORDER — SODIUM CHLORIDE 0.9 % (FLUSH) 0.9 %
5-40 SYRINGE (ML) INJECTION PRN
OUTPATIENT
Start: 2023-11-06

## 2023-11-06 RX ADMIN — SODIUM CHLORIDE, PRESERVATIVE FREE 10 ML: 5 INJECTION INTRAVENOUS at 15:02

## 2023-11-06 RX ADMIN — SODIUM CHLORIDE, PRESERVATIVE FREE 10 ML: 5 INJECTION INTRAVENOUS at 12:53

## 2023-11-06 RX ADMIN — SODIUM CHLORIDE 1000 ML: 9 INJECTION, SOLUTION INTRAVENOUS at 12:53

## 2023-11-06 RX ADMIN — HEPARIN 500 UNITS: 100 SYRINGE at 15:02

## 2023-11-06 RX ADMIN — SODIUM CHLORIDE, PRESERVATIVE FREE 10 ML: 5 INJECTION INTRAVENOUS at 12:52

## 2023-11-06 NOTE — DISCHARGE INSTRUCTIONS
Labs reviewed, proceed with IV hydration. 2.  Tolerated last cycle of chemotherapy fairly well. 3.  IV hydration today. 4.  Follow-up in 2 weeks for MD visit +treatment visit + labs. 5.  CBC and CMP ordered for the next clinic visit. 6.  He will call the clinic for IV hydration visit if his p.o. intake is poor over the next 1 week. 7.  Iron studies from 11/6/2023 pending. 8.  Okay to take flu shot and COVID-vaccine next week. 9.  Prescription for Climmie Lot sent to desired pharmacy. (100 mg 3 times daily as needed for cough). Please contact your Oncologist if you have any questions regarding the IV fluids that you received today. You are instructed to call the office or go to the Emergency Dept. If you experience any of the following symptoms:    Dizziness/lightheadedness   Acute nausea or vomiting-not relieved by medications  Headaches-not relieved by medications  New chest pain or pressure  New rash /itching  New shortness of breath  Fever,chills or signs or symptoms of infection    Make sure you are drinking 48 to 64 ounces of water daily-if you are unable to drink fluids let us know right away.

## 2023-11-06 NOTE — PATIENT INSTRUCTIONS
1.  Labs reviewed, proceed with IV hydration. 2.  Tolerated last cycle of chemotherapy fairly well. 3.  IV hydration today. 4.  Follow-up in 2 weeks for MD visit +treatment visit + labs. 5.  CBC and CMP ordered for the next clinic visit. 6.  He will call the clinic for IV hydration visit if his p.o. intake is poor over the next 1 week. 7.  Iron studies from 11/6/2023 pending. 8.  Okay to take flu shot and COVID-vaccine next week. 9.  Prescription for Janae Garcia sent to desired pharmacy. (100 mg 3 times daily as needed for cough).

## 2023-11-06 NOTE — PROGRESS NOTES
Oncology Social Work    Date: 11/6/2023  Time: 2:05 PM  Name: Basilio Zurita  MRN: [id number]     Contact Type: Follow-up    Note:   Situation: This staff was asked by Benedict maurice to explain the Advance Directive documents to Lindsay Verma, his S/O and his brother while while they were in the Infusion treatment area. Background:  Lindsay Verma was currently receiving chemo treatment and his brother expressed concerns over not understanding his care choices. He asked for more information about and someone to explain the Advance Directive documents to Lindsay Verma. This staff was was called upon to review the documents. Assessment: As we discussed the Advance Directive, Lindsay Verma agreed that he has little to no understanding of his care choices. A page of Special Instructions was provided to him for review. This staff also reviewed the different docs making up the Advance Directive and provided him with paperwork to review and we can complete the docs during a future visit. - Education regarding the services provided by the SW were also discussed. - No referrals were made at this time since that was not the intent of the conversation. Recommendation: Lindsay Verma will initiate further follow-up based on need.  provided him with my contact information and will continue to follow up as needed.       MATTHEW Adair LSW, ROMANA  Oncology Social Worker      Electronically signed by MATTHEW Adair LSW, ACHP-SW on 11/6/2023 at 2:05 PM

## 2023-11-06 NOTE — PLAN OF CARE
Problem: Safety - Adult  Goal: Free from fall injury  Outcome: Adequate for Discharge  Flowsheets (Taken 11/6/2023 1631)  Free From Fall Injury:   Instruct family/caregiver on patient safety   Based on caregiver fall risk screen, instruct family/caregiver to ask for assistance with transferring infant if caregiver noted to have fall risk factors  Note: Free from falls while in O.P. Oncology. Discussed the need to use the call light for assistance when getting up to ambulate. Problem: Discharge Planning  Goal: Discharge to home or other facility with appropriate resources  Outcome: Adequate for Discharge  Flowsheets (Taken 11/6/2023 1631)  Discharge to home or other facility with appropriate resources:   Identify barriers to discharge with patient and caregiver   Identify discharge learning needs (meds, wound care, etc)  Note: Verbalize understanding of discharge instructions, follow up appointments, and when to call Physician. Discuss understanding of discharge instructions, follow up appointments and when to call Physician. Problem: Chronic Conditions and Co-morbidities  Goal: Patient's chronic conditions and co-morbidity symptoms are monitored and maintained or improved  Outcome: Adequate for Discharge  Flowsheets (Taken 11/6/2023 1631)  Care Plan - Patient's Chronic Conditions and Co-Morbidity Symptoms are Monitored and Maintained or Improved:   Monitor and assess patient's chronic conditions and comorbid symptoms for stability, deterioration, or improvement   Collaborate with multidisciplinary team to address chronic and comorbid conditions and prevent exacerbation or deterioration  Note: Verbalizes understanding to signs and symptoms of dehydration and when to call the Physician. Patient received 1000ml IV infusion over 2hrs. Patient drank 1 ensure while in OP oncology. Encouraged patient to drink 48 to 64 ounces of fluids everyday. Care plan reviewed with patient.   Patient verbalize understanding of the plan of care and contribute to goal setting.

## 2023-11-06 NOTE — PROGRESS NOTES
Patient tolerated IV fluids without any complications. Discharge instructions given to patient-verbalizes understanding. Ambulated off unit per self with belongings. Patient here for IV hydration of 1000 ml over 2 hrs.   Per Dr. Rose Mullen

## 2023-11-07 RX ORDER — SODIUM CHLORIDE 9 MG/ML
5-250 INJECTION, SOLUTION INTRAVENOUS PRN
OUTPATIENT
Start: 2023-11-20

## 2023-11-07 RX ORDER — ACETAMINOPHEN 325 MG/1
650 TABLET ORAL
OUTPATIENT
Start: 2023-11-20

## 2023-11-07 RX ORDER — SODIUM CHLORIDE 0.9 % (FLUSH) 0.9 %
5-40 SYRINGE (ML) INJECTION PRN
OUTPATIENT
Start: 2023-11-20

## 2023-11-07 RX ORDER — FAMOTIDINE 10 MG/ML
20 INJECTION, SOLUTION INTRAVENOUS
OUTPATIENT
Start: 2023-11-20

## 2023-11-07 RX ORDER — DIPHENHYDRAMINE HYDROCHLORIDE 50 MG/ML
50 INJECTION INTRAMUSCULAR; INTRAVENOUS
OUTPATIENT
Start: 2023-11-20

## 2023-11-07 RX ORDER — ALBUTEROL SULFATE 90 UG/1
4 AEROSOL, METERED RESPIRATORY (INHALATION) PRN
OUTPATIENT
Start: 2023-11-20

## 2023-11-07 RX ORDER — HEPARIN SODIUM (PORCINE) LOCK FLUSH IV SOLN 100 UNIT/ML 100 UNIT/ML
500 SOLUTION INTRAVENOUS PRN
OUTPATIENT
Start: 2023-11-20

## 2023-11-07 RX ORDER — EPINEPHRINE 1 MG/ML
0.3 INJECTION, SOLUTION, CONCENTRATE INTRAVENOUS PRN
OUTPATIENT
Start: 2023-11-20

## 2023-11-07 RX ORDER — SODIUM CHLORIDE 9 MG/ML
INJECTION, SOLUTION INTRAVENOUS CONTINUOUS
OUTPATIENT
Start: 2023-11-20

## 2023-11-07 RX ORDER — ONDANSETRON 2 MG/ML
8 INJECTION INTRAMUSCULAR; INTRAVENOUS
OUTPATIENT
Start: 2023-11-20
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
## 2023-11-14 DIAGNOSIS — C7A.8 LARGE CELL NEUROENDOCRINE CARCINOMA (HCC): ICD-10-CM

## 2023-11-14 RX ORDER — OXYCODONE HYDROCHLORIDE 5 MG/1
5 TABLET ORAL EVERY 4 HOURS PRN
Qty: 82 TABLET | Refills: 0 | Status: SHIPPED | OUTPATIENT
Start: 2023-11-14 | End: 2023-11-28

## 2023-11-17 DIAGNOSIS — C18.9 MALIGNANT NEOPLASM OF COLON, UNSPECIFIED PART OF COLON (HCC): Primary | ICD-10-CM

## 2023-11-20 ENCOUNTER — HOSPITAL ENCOUNTER (OUTPATIENT)
Dept: INFUSION THERAPY | Age: 68
Discharge: HOME | End: 2023-11-20
Payer: MEDICARE

## 2023-11-20 ENCOUNTER — OFFICE VISIT (OUTPATIENT)
Dept: ONCOLOGY | Age: 68
End: 2023-11-20
Payer: MEDICARE

## 2023-11-20 VITALS
HEART RATE: 76 BPM | WEIGHT: 142.4 LBS | BODY MASS INDEX: 22.3 KG/M2 | OXYGEN SATURATION: 95 % | SYSTOLIC BLOOD PRESSURE: 119 MMHG | DIASTOLIC BLOOD PRESSURE: 56 MMHG | RESPIRATION RATE: 16 BRPM | TEMPERATURE: 97.7 F

## 2023-11-20 VITALS
SYSTOLIC BLOOD PRESSURE: 102 MMHG | DIASTOLIC BLOOD PRESSURE: 60 MMHG | WEIGHT: 142.4 LBS | HEIGHT: 67 IN | RESPIRATION RATE: 16 BRPM | TEMPERATURE: 97.5 F | HEART RATE: 73 BPM | OXYGEN SATURATION: 97 % | BODY MASS INDEX: 22.35 KG/M2

## 2023-11-20 DIAGNOSIS — C7A.8 LARGE CELL NEUROENDOCRINE CARCINOMA (HCC): Primary | ICD-10-CM

## 2023-11-20 DIAGNOSIS — C79.9 METASTATIC MALIGNANT NEOPLASM, UNSPECIFIED SITE (HCC): ICD-10-CM

## 2023-11-20 DIAGNOSIS — C18.9 MALIGNANT NEOPLASM OF COLON, UNSPECIFIED PART OF COLON (HCC): ICD-10-CM

## 2023-11-20 LAB
ABSOLUTE IMMATURE GRANULOCYTE: 0.02 THOU/MM3 (ref 0–0.07)
ALBUMIN SERPL BCG-MCNC: 3.4 G/DL (ref 3.5–5.1)
ALP SERPL-CCNC: 142 U/L (ref 38–126)
ALT SERPL W/O P-5'-P-CCNC: 20 U/L (ref 11–66)
AST SERPL-CCNC: 28 U/L (ref 5–40)
BASOPHILS ABSOLUTE: 0 THOU/MM3 (ref 0–0.1)
BASOPHILS NFR BLD AUTO: 0 % (ref 0–3)
BILIRUB CONJ SERPL-MCNC: < 0.2 MG/DL (ref 0–0.3)
BILIRUB SERPL-MCNC: 0.2 MG/DL (ref 0.3–1.2)
BUN BLDP-MCNC: 10 MG/DL (ref 8–26)
CHLORIDE BLD-SCNC: 104 MEQ/L (ref 98–109)
CREAT BLD-MCNC: 0.5 MG/DL (ref 0.5–1.2)
EOSINOPHIL NFR BLD AUTO: 0 % (ref 0–4)
EOSINOPHILS ABSOLUTE: 0 THOU/MM3 (ref 0–0.4)
ERYTHROCYTE [DISTWIDTH] IN BLOOD BY AUTOMATED COUNT: 24.4 % (ref 11.5–14.5)
GFR SERPL CREATININE-BSD FRML MDRD: > 60 ML/MIN/1.73M2
GLUCOSE BLD-MCNC: 103 MG/DL (ref 70–108)
HCT VFR BLD AUTO: 30.1 % (ref 42–52)
HGB BLD-MCNC: 9.5 GM/DL (ref 14–18)
IMMATURE GRANULOCYTES: 0 %
IONIZED CALCIUM, WHOLE BLOOD: 1.24 MMOL/L (ref 1.12–1.32)
LYMPHOCYTES ABSOLUTE: 1.6 THOU/MM3 (ref 1–4.8)
LYMPHOCYTES NFR BLD AUTO: 17 % (ref 15–47)
MCH RBC QN AUTO: 30.6 PG (ref 26–33)
MCHC RBC AUTO-ENTMCNC: 31.6 GM/DL (ref 32.2–35.5)
MCV RBC AUTO: 97 FL (ref 80–94)
MONOCYTES ABSOLUTE: 0.8 THOU/MM3 (ref 0.4–1.3)
MONOCYTES NFR BLD AUTO: 8 % (ref 0–12)
NEUTROPHILS NFR BLD AUTO: 74 % (ref 43–75)
PLATELET # BLD AUTO: 391 THOU/MM3 (ref 130–400)
PMV BLD AUTO: 10.1 FL (ref 9.4–12.4)
POTASSIUM BLD-SCNC: 4.4 MEQ/L (ref 3.5–4.9)
PROT SERPL-MCNC: 7 G/DL (ref 6.1–8)
RBC # BLD AUTO: 3.1 MILL/MM3 (ref 4.7–6.1)
SEGMENTED NEUTROPHILS ABSOLUTE COUNT: 7.1 THOU/MM3 (ref 1.8–7.7)
SODIUM BLD-SCNC: 137 MEQ/L (ref 138–146)
TOTAL CO2, WHOLE BLOOD: 27 MEQ/L (ref 23–33)
WBC # BLD AUTO: 9.6 THOU/MM3 (ref 4.8–10.8)

## 2023-11-20 PROCEDURE — 96375 TX/PRO/DX INJ NEW DRUG ADDON: CPT

## 2023-11-20 PROCEDURE — 99211 OFF/OP EST MAY X REQ PHY/QHP: CPT

## 2023-11-20 PROCEDURE — 3078F DIAST BP <80 MM HG: CPT | Performed by: INTERNAL MEDICINE

## 2023-11-20 PROCEDURE — 96417 CHEMO IV INFUS EACH ADDL SEQ: CPT

## 2023-11-20 PROCEDURE — 96367 TX/PROPH/DG ADDL SEQ IV INF: CPT

## 2023-11-20 PROCEDURE — 80076 HEPATIC FUNCTION PANEL: CPT

## 2023-11-20 PROCEDURE — 85025 COMPLETE CBC W/AUTO DIFF WBC: CPT

## 2023-11-20 PROCEDURE — 6360000002 HC RX W HCPCS: Performed by: INTERNAL MEDICINE

## 2023-11-20 PROCEDURE — 2580000003 HC RX 258: Performed by: INTERNAL MEDICINE

## 2023-11-20 PROCEDURE — 3074F SYST BP LT 130 MM HG: CPT | Performed by: INTERNAL MEDICINE

## 2023-11-20 PROCEDURE — 80047 BASIC METABLC PNL IONIZED CA: CPT

## 2023-11-20 PROCEDURE — 99213 OFFICE O/P EST LOW 20 MIN: CPT | Performed by: INTERNAL MEDICINE

## 2023-11-20 PROCEDURE — 36591 DRAW BLOOD OFF VENOUS DEVICE: CPT

## 2023-11-20 PROCEDURE — 1123F ACP DISCUSS/DSCN MKR DOCD: CPT | Performed by: INTERNAL MEDICINE

## 2023-11-20 PROCEDURE — 96413 CHEMO IV INFUSION 1 HR: CPT

## 2023-11-20 RX ORDER — ONDANSETRON 2 MG/ML
8 INJECTION INTRAMUSCULAR; INTRAVENOUS
Status: CANCELLED | OUTPATIENT
Start: 2023-11-22

## 2023-11-20 RX ORDER — EPINEPHRINE 1 MG/ML
0.3 INJECTION, SOLUTION, CONCENTRATE INTRAVENOUS PRN
Status: CANCELLED | OUTPATIENT
Start: 2023-11-22

## 2023-11-20 RX ORDER — EPINEPHRINE 1 MG/ML
0.3 INJECTION, SOLUTION, CONCENTRATE INTRAVENOUS PRN
Status: CANCELLED | OUTPATIENT
Start: 2023-11-20

## 2023-11-20 RX ORDER — FAMOTIDINE 10 MG/ML
20 INJECTION, SOLUTION INTRAVENOUS
Status: CANCELLED | OUTPATIENT
Start: 2023-11-20

## 2023-11-20 RX ORDER — HEPARIN 100 UNIT/ML
500 SYRINGE INTRAVENOUS PRN
Status: DISCONTINUED | OUTPATIENT
Start: 2023-11-20 | End: 2023-11-21 | Stop reason: HOSPADM

## 2023-11-20 RX ORDER — DIPHENHYDRAMINE HYDROCHLORIDE 50 MG/ML
50 INJECTION INTRAMUSCULAR; INTRAVENOUS
Status: CANCELLED | OUTPATIENT
Start: 2023-11-21

## 2023-11-20 RX ORDER — SODIUM CHLORIDE 9 MG/ML
5-250 INJECTION, SOLUTION INTRAVENOUS PRN
Status: CANCELLED | OUTPATIENT
Start: 2023-11-20

## 2023-11-20 RX ORDER — ALBUTEROL SULFATE 90 UG/1
4 AEROSOL, METERED RESPIRATORY (INHALATION) PRN
Status: CANCELLED | OUTPATIENT
Start: 2023-11-21

## 2023-11-20 RX ORDER — HEPARIN SODIUM (PORCINE) LOCK FLUSH IV SOLN 100 UNIT/ML 100 UNIT/ML
500 SOLUTION INTRAVENOUS PRN
Status: CANCELLED | OUTPATIENT
Start: 2023-11-20

## 2023-11-20 RX ORDER — ACETAMINOPHEN 325 MG/1
650 TABLET ORAL
Status: CANCELLED | OUTPATIENT
Start: 2023-11-21

## 2023-11-20 RX ORDER — PALONOSETRON 0.05 MG/ML
0.25 INJECTION, SOLUTION INTRAVENOUS ONCE
Status: CANCELLED | OUTPATIENT
Start: 2023-11-20 | End: 2023-11-20

## 2023-11-20 RX ORDER — SODIUM CHLORIDE 9 MG/ML
INJECTION, SOLUTION INTRAVENOUS CONTINUOUS
Status: CANCELLED | OUTPATIENT
Start: 2023-11-22

## 2023-11-20 RX ORDER — SODIUM CHLORIDE 9 MG/ML
5-250 INJECTION, SOLUTION INTRAVENOUS PRN
Status: CANCELLED | OUTPATIENT
Start: 2023-11-22

## 2023-11-20 RX ORDER — DIPHENHYDRAMINE HYDROCHLORIDE 50 MG/ML
50 INJECTION INTRAMUSCULAR; INTRAVENOUS
Status: CANCELLED | OUTPATIENT
Start: 2023-11-20

## 2023-11-20 RX ORDER — MEPERIDINE HYDROCHLORIDE 50 MG/ML
12.5 INJECTION INTRAMUSCULAR; INTRAVENOUS; SUBCUTANEOUS PRN
Status: CANCELLED | OUTPATIENT
Start: 2023-11-20

## 2023-11-20 RX ORDER — SODIUM CHLORIDE 0.9 % (FLUSH) 0.9 %
5-40 SYRINGE (ML) INJECTION PRN
OUTPATIENT
Start: 2023-11-20

## 2023-11-20 RX ORDER — SODIUM CHLORIDE 0.9 % (FLUSH) 0.9 %
5-40 SYRINGE (ML) INJECTION PRN
Status: CANCELLED | OUTPATIENT
Start: 2023-11-20

## 2023-11-20 RX ORDER — ONDANSETRON 2 MG/ML
8 INJECTION INTRAMUSCULAR; INTRAVENOUS
Status: CANCELLED | OUTPATIENT
Start: 2023-11-20

## 2023-11-20 RX ORDER — MEPERIDINE HYDROCHLORIDE 50 MG/ML
12.5 INJECTION INTRAMUSCULAR; INTRAVENOUS; SUBCUTANEOUS PRN
Status: CANCELLED | OUTPATIENT
Start: 2023-11-22

## 2023-11-20 RX ORDER — HEPARIN 100 UNIT/ML
500 SYRINGE INTRAVENOUS PRN
OUTPATIENT
Start: 2023-11-20

## 2023-11-20 RX ORDER — HEPARIN SODIUM (PORCINE) LOCK FLUSH IV SOLN 100 UNIT/ML 100 UNIT/ML
500 SOLUTION INTRAVENOUS PRN
Status: CANCELLED | OUTPATIENT
Start: 2023-11-21

## 2023-11-20 RX ORDER — ACETAMINOPHEN 325 MG/1
650 TABLET ORAL
Status: CANCELLED | OUTPATIENT
Start: 2023-11-20

## 2023-11-20 RX ORDER — SODIUM CHLORIDE 0.9 % (FLUSH) 0.9 %
5-40 SYRINGE (ML) INJECTION PRN
Status: CANCELLED | OUTPATIENT
Start: 2023-11-21

## 2023-11-20 RX ORDER — SODIUM CHLORIDE 9 MG/ML
25 INJECTION, SOLUTION INTRAVENOUS PRN
OUTPATIENT
Start: 2023-11-20

## 2023-11-20 RX ORDER — FAMOTIDINE 10 MG/ML
20 INJECTION, SOLUTION INTRAVENOUS
Status: CANCELLED | OUTPATIENT
Start: 2023-11-21

## 2023-11-20 RX ORDER — SODIUM CHLORIDE 9 MG/ML
5-250 INJECTION, SOLUTION INTRAVENOUS PRN
Status: DISCONTINUED | OUTPATIENT
Start: 2023-11-20 | End: 2023-11-21 | Stop reason: HOSPADM

## 2023-11-20 RX ORDER — SODIUM CHLORIDE 9 MG/ML
INJECTION, SOLUTION INTRAVENOUS CONTINUOUS
Status: CANCELLED | OUTPATIENT
Start: 2023-11-20

## 2023-11-20 RX ORDER — SODIUM CHLORIDE 0.9 % (FLUSH) 0.9 %
5-40 SYRINGE (ML) INJECTION PRN
Status: DISCONTINUED | OUTPATIENT
Start: 2023-11-20 | End: 2023-11-21 | Stop reason: HOSPADM

## 2023-11-20 RX ORDER — FAMOTIDINE 10 MG/ML
20 INJECTION, SOLUTION INTRAVENOUS
Status: CANCELLED | OUTPATIENT
Start: 2023-11-22

## 2023-11-20 RX ORDER — ONDANSETRON 2 MG/ML
8 INJECTION INTRAMUSCULAR; INTRAVENOUS
Status: CANCELLED | OUTPATIENT
Start: 2023-11-21

## 2023-11-20 RX ORDER — ALBUTEROL SULFATE 90 UG/1
4 AEROSOL, METERED RESPIRATORY (INHALATION) PRN
Status: CANCELLED | OUTPATIENT
Start: 2023-11-22

## 2023-11-20 RX ORDER — EPINEPHRINE 1 MG/ML
0.3 INJECTION, SOLUTION, CONCENTRATE INTRAVENOUS PRN
Status: CANCELLED | OUTPATIENT
Start: 2023-11-21

## 2023-11-20 RX ORDER — SODIUM CHLORIDE 0.9 % (FLUSH) 0.9 %
5-40 SYRINGE (ML) INJECTION PRN
Status: CANCELLED | OUTPATIENT
Start: 2023-11-22

## 2023-11-20 RX ORDER — SODIUM CHLORIDE 9 MG/ML
5-250 INJECTION, SOLUTION INTRAVENOUS PRN
Status: CANCELLED | OUTPATIENT
Start: 2023-11-21

## 2023-11-20 RX ORDER — PALONOSETRON 0.05 MG/ML
0.25 INJECTION, SOLUTION INTRAVENOUS ONCE
Status: COMPLETED | OUTPATIENT
Start: 2023-11-20 | End: 2023-11-20

## 2023-11-20 RX ORDER — DIPHENHYDRAMINE HYDROCHLORIDE 50 MG/ML
50 INJECTION INTRAMUSCULAR; INTRAVENOUS
Status: CANCELLED | OUTPATIENT
Start: 2023-11-22

## 2023-11-20 RX ORDER — ACETAMINOPHEN 325 MG/1
650 TABLET ORAL
Status: CANCELLED | OUTPATIENT
Start: 2023-11-22

## 2023-11-20 RX ORDER — MEPERIDINE HYDROCHLORIDE 50 MG/ML
12.5 INJECTION INTRAMUSCULAR; INTRAVENOUS; SUBCUTANEOUS PRN
Status: CANCELLED | OUTPATIENT
Start: 2023-11-21

## 2023-11-20 RX ORDER — ALBUTEROL SULFATE 90 UG/1
4 AEROSOL, METERED RESPIRATORY (INHALATION) PRN
Status: CANCELLED | OUTPATIENT
Start: 2023-11-20

## 2023-11-20 RX ORDER — SODIUM CHLORIDE 9 MG/ML
INJECTION, SOLUTION INTRAVENOUS CONTINUOUS
Status: CANCELLED | OUTPATIENT
Start: 2023-11-21

## 2023-11-20 RX ORDER — HEPARIN SODIUM (PORCINE) LOCK FLUSH IV SOLN 100 UNIT/ML 100 UNIT/ML
500 SOLUTION INTRAVENOUS PRN
Status: CANCELLED | OUTPATIENT
Start: 2023-11-22

## 2023-11-20 RX ADMIN — Medication 500 UNITS: at 13:53

## 2023-11-20 RX ADMIN — ETOPOSIDE 142 MG: 20 INJECTION INTRAVENOUS at 12:40

## 2023-11-20 RX ADMIN — CARBOPLATIN 475 MG: 600 INJECTION, SOLUTION INTRAVENOUS at 11:57

## 2023-11-20 RX ADMIN — SODIUM CHLORIDE, PRESERVATIVE FREE 10 ML: 5 INJECTION INTRAVENOUS at 09:03

## 2023-11-20 RX ADMIN — SODIUM CHLORIDE, PRESERVATIVE FREE 10 ML: 5 INJECTION INTRAVENOUS at 09:02

## 2023-11-20 RX ADMIN — SODIUM CHLORIDE 20 ML/HR: 9 INJECTION, SOLUTION INTRAVENOUS at 10:37

## 2023-11-20 RX ADMIN — DEXAMETHASONE SODIUM PHOSPHATE 12 MG: 4 INJECTION, SOLUTION INTRA-ARTICULAR; INTRALESIONAL; INTRAMUSCULAR; INTRAVENOUS; SOFT TISSUE at 11:33

## 2023-11-20 RX ADMIN — FOSAPREPITANT 150 MG: 150 INJECTION, POWDER, LYOPHILIZED, FOR SOLUTION INTRAVENOUS at 10:42

## 2023-11-20 RX ADMIN — PALONOSETRON 0.25 MG: 0.05 INJECTION, SOLUTION INTRAVENOUS at 10:38

## 2023-11-20 RX ADMIN — SODIUM CHLORIDE, PRESERVATIVE FREE 10 ML: 5 INJECTION INTRAVENOUS at 13:53

## 2023-11-20 NOTE — DISCHARGE INSTRUCTIONS
Please do the PET scan in 2-3 weeks  Please call us if you have new symptoms. Follow up with NP in 10 days and with myself in 3 weeks       Please contact your Oncologist if you have any questions regarding the carboplatin and etoposide that you received today. You are instructed to call the office or go to the Emergency Dept. If you experience any of the following symptoms:    Dizziness/lightheadedness   Acute nausea or vomiting-not relieved by medications  Headaches-not relieved by medications  New chest pain or pressure  New rash /itching  New shortness of breath  Fever,chills or signs or symptoms of infection    Make sure you are drinking 48 to 64 ounces of water daily-if you are unable to drink fluids let us know right away.
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
## 2023-11-20 NOTE — PLAN OF CARE
Problem: Safety - Adult  Goal: Free from fall injury  Outcome: Adequate for Discharge  Flowsheets (Taken 11/20/2023 1247)  Free From Fall Injury:   Instruct family/caregiver on patient safety   Based on caregiver fall risk screen, instruct family/caregiver to ask for assistance with transferring infant if caregiver noted to have fall risk factors  Note: Free from falls while in O.P. Oncology. Discussed the need to use the call light for assistance when getting up to ambulate. Problem: Discharge Planning  Goal: Discharge to home or other facility with appropriate resources  Outcome: Adequate for Discharge  Flowsheets (Taken 11/20/2023 1247)  Discharge to home or other facility with appropriate resources:   Identify barriers to discharge with patient and caregiver   Identify discharge learning needs (meds, wound care, etc)  Note: Verbalize understanding of discharge instructions, follow up appointments, and when to call Physician. Discuss understanding of discharge instructions, follow up appointments and when to call Physician.       Problem: Chronic Conditions and Co-morbidities  Goal: Patient's chronic conditions and co-morbidity symptoms are monitored and maintained or improved  Outcome: Adequate for Discharge  Flowsheets (Taken 11/20/2023 1247)  Care Plan - Patient's Chronic Conditions and Co-Morbidity Symptoms are Monitored and Maintained or Improved:   Monitor and assess patient's chronic conditions and comorbid symptoms for stability, deterioration, or improvement   Collaborate with multidisciplinary team to address chronic and comorbid conditions and prevent exacerbation or deterioration  Note:   Chemotherapy Teaching     What is Chemotherapy   Drug action [x]   Method of Administration [x]   Handouts given []     Side Effects  Nausea/vomiting [x]   Diarrhea [x]   Fatigue [x]   Signs / Symptoms of infection [x]   Neutropenia [x]   Thrombocytopenia [x]   Alopecia [x]   neuropathy [x]   [place] diet &  the

## 2023-11-20 NOTE — PATIENT INSTRUCTIONS
Please do the PET scan in 2-3 weeks  Please call us if you have new symptoms.    Follow up with NP in 10 days and with myself in 3 weeks

## 2023-11-21 ENCOUNTER — HOSPITAL ENCOUNTER (OUTPATIENT)
Dept: INFUSION THERAPY | Age: 68
Discharge: HOME | End: 2023-11-21
Payer: MEDICARE

## 2023-11-21 VITALS
BODY MASS INDEX: 22.29 KG/M2 | TEMPERATURE: 98 F | RESPIRATION RATE: 16 BRPM | WEIGHT: 142 LBS | HEART RATE: 79 BPM | HEIGHT: 67 IN | SYSTOLIC BLOOD PRESSURE: 128 MMHG | OXYGEN SATURATION: 96 % | DIASTOLIC BLOOD PRESSURE: 60 MMHG

## 2023-11-21 DIAGNOSIS — C7A.8 LARGE CELL NEUROENDOCRINE CARCINOMA (HCC): Primary | ICD-10-CM

## 2023-11-21 DIAGNOSIS — C79.9 METASTATIC MALIGNANT NEOPLASM, UNSPECIFIED SITE (HCC): ICD-10-CM

## 2023-11-21 DIAGNOSIS — D50.0 IRON DEFICIENCY ANEMIA DUE TO CHRONIC BLOOD LOSS: ICD-10-CM

## 2023-11-21 PROCEDURE — 6360000002 HC RX W HCPCS: Performed by: INTERNAL MEDICINE

## 2023-11-21 PROCEDURE — 2580000003 HC RX 258: Performed by: INTERNAL MEDICINE

## 2023-11-21 PROCEDURE — 96366 THER/PROPH/DIAG IV INF ADDON: CPT

## 2023-11-21 PROCEDURE — 96375 TX/PRO/DX INJ NEW DRUG ADDON: CPT

## 2023-11-21 PROCEDURE — 96413 CHEMO IV INFUSION 1 HR: CPT

## 2023-11-21 PROCEDURE — 96367 TX/PROPH/DG ADDL SEQ IV INF: CPT

## 2023-11-21 RX ORDER — SODIUM CHLORIDE 9 MG/ML
5-250 INJECTION, SOLUTION INTRAVENOUS PRN
OUTPATIENT
Start: 2023-11-28

## 2023-11-21 RX ORDER — DIPHENHYDRAMINE HYDROCHLORIDE 50 MG/ML
50 INJECTION INTRAMUSCULAR; INTRAVENOUS
OUTPATIENT
Start: 2023-11-28

## 2023-11-21 RX ORDER — SODIUM CHLORIDE 0.9 % (FLUSH) 0.9 %
5-40 SYRINGE (ML) INJECTION PRN
Status: DISCONTINUED | OUTPATIENT
Start: 2023-11-21 | End: 2023-11-22 | Stop reason: HOSPADM

## 2023-11-21 RX ORDER — EPINEPHRINE 1 MG/ML
0.3 INJECTION, SOLUTION INTRAMUSCULAR; SUBCUTANEOUS PRN
OUTPATIENT
Start: 2023-11-28

## 2023-11-21 RX ORDER — SODIUM CHLORIDE 9 MG/ML
INJECTION, SOLUTION INTRAVENOUS CONTINUOUS
OUTPATIENT
Start: 2023-11-28

## 2023-11-21 RX ORDER — SODIUM CHLORIDE 9 MG/ML
5-250 INJECTION, SOLUTION INTRAVENOUS PRN
Status: DISCONTINUED | OUTPATIENT
Start: 2023-11-21 | End: 2023-11-22 | Stop reason: HOSPADM

## 2023-11-21 RX ORDER — HEPARIN 100 UNIT/ML
500 SYRINGE INTRAVENOUS PRN
Status: DISCONTINUED | OUTPATIENT
Start: 2023-11-21 | End: 2023-11-22 | Stop reason: HOSPADM

## 2023-11-21 RX ORDER — SODIUM CHLORIDE 0.9 % (FLUSH) 0.9 %
5-40 SYRINGE (ML) INJECTION PRN
OUTPATIENT
Start: 2023-11-28

## 2023-11-21 RX ORDER — ALBUTEROL SULFATE 90 UG/1
4 AEROSOL, METERED RESPIRATORY (INHALATION) PRN
OUTPATIENT
Start: 2023-11-28

## 2023-11-21 RX ORDER — HEPARIN 100 UNIT/ML
500 SYRINGE INTRAVENOUS PRN
OUTPATIENT
Start: 2023-11-28

## 2023-11-21 RX ORDER — DEXAMETHASONE SODIUM PHOSPHATE 4 MG/ML
8 INJECTION, SOLUTION INTRA-ARTICULAR; INTRALESIONAL; INTRAMUSCULAR; INTRAVENOUS; SOFT TISSUE ONCE
Status: COMPLETED | OUTPATIENT
Start: 2023-11-21 | End: 2023-11-21

## 2023-11-21 RX ORDER — ONDANSETRON 2 MG/ML
8 INJECTION INTRAMUSCULAR; INTRAVENOUS
OUTPATIENT
Start: 2023-11-28

## 2023-11-21 RX ORDER — ACETAMINOPHEN 325 MG/1
650 TABLET ORAL
OUTPATIENT
Start: 2023-11-28

## 2023-11-21 RX ADMIN — SODIUM CHLORIDE 300 MG: 9 INJECTION, SOLUTION INTRAVENOUS at 10:23

## 2023-11-21 RX ADMIN — ETOPOSIDE 142 MG: 20 INJECTION INTRAVENOUS at 12:07

## 2023-11-21 RX ADMIN — DEXAMETHASONE SODIUM PHOSPHATE 8 MG: 4 INJECTION, SOLUTION INTRAMUSCULAR; INTRAVENOUS at 12:01

## 2023-11-21 RX ADMIN — SODIUM CHLORIDE 20 ML/HR: 9 INJECTION, SOLUTION INTRAVENOUS at 10:08

## 2023-11-21 RX ADMIN — SODIUM CHLORIDE, PRESERVATIVE FREE 10 ML: 5 INJECTION INTRAVENOUS at 13:13

## 2023-11-21 RX ADMIN — HEPARIN 500 UNITS: 100 SYRINGE at 13:13

## 2023-11-21 RX ADMIN — SODIUM CHLORIDE, PRESERVATIVE FREE 10 ML: 5 INJECTION INTRAVENOUS at 10:05

## 2023-11-21 NOTE — DISCHARGE INSTRUCTIONS
Please contact your Oncologist if you have any questions regarding the etoposide/venofer that you received today. You are instructed to call the office or go to the Emergency Dept. If you experience any of the following symptoms:    Dizziness/lightheadedness   Acute nausea or vomiting-not relieved by medications  Headaches-not relieved by medications  New chest pain or pressure  New rash /itching  New shortness of breath  Fever,chills or signs or symptoms of infection    Make sure you are drinking 48 to 64 ounces of water daily-if you are unable to drink fluids let us know right away.

## 2023-11-21 NOTE — PLAN OF CARE
Problem: Safety - Adult  Goal: Free from fall injury  Outcome: Progressing  Flowsheets (Taken 11/21/2023 1319)  Free From Fall Injury: Instruct family/caregiver on patient safety  Note: No falls occurred with visit today. Problem: Discharge Planning  Goal: Discharge to home or other facility with appropriate resources  Outcome: Progressing  Flowsheets (Taken 11/21/2023 1319)  Discharge to home or other facility with appropriate resources: Identify barriers to discharge with patient and caregiver  Note: Verbalize understanding of discharge instructions, follow up appointments, and when to call Physician. Problem: Chronic Conditions and Co-morbidities  Goal: Patient's chronic conditions and co-morbidity symptoms are monitored and maintained or improved  Outcome: Progressing  Flowsheets (Taken 11/21/2023 1319)  Care Plan - Patient's Chronic Conditions and Co-Morbidity Symptoms are Monitored and Maintained or Improved: Monitor and assess patient's chronic conditions and comorbid symptoms for stability, deterioration, or improvement  Note:   Chemotherapy Teaching     What is Chemotherapy   Drug action [x]   Method of Administration [x]   Handouts given []     Side Effects  Nausea/vomiting [x]   Diarrhea [x]   Fatigue [x]   Signs / Symptoms of infection [x]   Neutropenia [x]   Thrombocytopenia [x]   Alopecia [x]   neuropathy [x]   Nallen diet &  the importance of fluids [x]       Micellaneous  Importance of nutrition [x]   Importance of oral hygiene [x]   When to call the MD [x]   Monitoring labs [x]   Use of supportive services []     Explanation of Drug Regimen / Frequency  Venofer/etoposide     Comments  Verbalized understanding to drug,action,side effects and when to call MD    Care plan reviewed with patient. Patient verbalizes understanding of the plan of care and contributes to goal setting.

## 2023-11-22 ENCOUNTER — HOSPITAL ENCOUNTER (OUTPATIENT)
Dept: INFUSION THERAPY | Age: 68
Discharge: HOME | End: 2023-11-22
Payer: MEDICARE

## 2023-11-22 VITALS
HEART RATE: 87 BPM | WEIGHT: 142 LBS | HEIGHT: 67 IN | BODY MASS INDEX: 22.29 KG/M2 | OXYGEN SATURATION: 96 % | DIASTOLIC BLOOD PRESSURE: 73 MMHG | RESPIRATION RATE: 16 BRPM | TEMPERATURE: 98.3 F | SYSTOLIC BLOOD PRESSURE: 119 MMHG

## 2023-11-22 DIAGNOSIS — C7A.8 LARGE CELL NEUROENDOCRINE CARCINOMA (HCC): Primary | ICD-10-CM

## 2023-11-22 DIAGNOSIS — C79.9 METASTATIC MALIGNANT NEOPLASM, UNSPECIFIED SITE (HCC): ICD-10-CM

## 2023-11-22 PROCEDURE — 2580000003 HC RX 258: Performed by: INTERNAL MEDICINE

## 2023-11-22 PROCEDURE — 96413 CHEMO IV INFUSION 1 HR: CPT

## 2023-11-22 PROCEDURE — 96375 TX/PRO/DX INJ NEW DRUG ADDON: CPT

## 2023-11-22 PROCEDURE — 6360000002 HC RX W HCPCS: Performed by: INTERNAL MEDICINE

## 2023-11-22 PROCEDURE — 96377 APPLICATON ON-BODY INJECTOR: CPT

## 2023-11-22 RX ORDER — SODIUM CHLORIDE 9 MG/ML
5-250 INJECTION, SOLUTION INTRAVENOUS PRN
Status: DISCONTINUED | OUTPATIENT
Start: 2023-11-22 | End: 2023-11-23 | Stop reason: HOSPADM

## 2023-11-22 RX ORDER — HEPARIN 100 UNIT/ML
500 SYRINGE INTRAVENOUS PRN
Status: DISCONTINUED | OUTPATIENT
Start: 2023-11-22 | End: 2023-11-23 | Stop reason: HOSPADM

## 2023-11-22 RX ORDER — SODIUM CHLORIDE 0.9 % (FLUSH) 0.9 %
5-40 SYRINGE (ML) INJECTION PRN
Status: DISCONTINUED | OUTPATIENT
Start: 2023-11-22 | End: 2023-11-23 | Stop reason: HOSPADM

## 2023-11-22 RX ORDER — DEXAMETHASONE SODIUM PHOSPHATE 4 MG/ML
8 INJECTION, SOLUTION INTRA-ARTICULAR; INTRALESIONAL; INTRAMUSCULAR; INTRAVENOUS; SOFT TISSUE ONCE
Status: COMPLETED | OUTPATIENT
Start: 2023-11-22 | End: 2023-11-22

## 2023-11-22 RX ADMIN — Medication 500 UNITS: at 12:23

## 2023-11-22 RX ADMIN — SODIUM CHLORIDE, PRESERVATIVE FREE 10 ML: 5 INJECTION INTRAVENOUS at 12:23

## 2023-11-22 RX ADMIN — PEGFILGRASTIM 6 MG: KIT SUBCUTANEOUS at 12:25

## 2023-11-22 RX ADMIN — SODIUM CHLORIDE, PRESERVATIVE FREE 10 ML: 5 INJECTION INTRAVENOUS at 10:18

## 2023-11-22 RX ADMIN — DEXAMETHASONE SODIUM PHOSPHATE 8 MG: 4 INJECTION, SOLUTION INTRAMUSCULAR; INTRAVENOUS at 10:20

## 2023-11-22 RX ADMIN — SODIUM CHLORIDE 20 ML/HR: 9 INJECTION, SOLUTION INTRAVENOUS at 10:17

## 2023-11-22 RX ADMIN — ETOPOSIDE 142 MG: 20 INJECTION INTRAVENOUS at 11:06

## 2023-11-22 NOTE — PLAN OF CARE
Problem: Safety - Adult  Goal: Free from fall injury  Outcome: Progressing  Flowsheets (Taken 11/22/2023 1024)  Free From Fall Injury: Instruct family/caregiver on patient safety  Note: No falls occurred with visit today. Problem: Discharge Planning  Goal: Discharge to home or other facility with appropriate resources  Outcome: Progressing  Flowsheets (Taken 11/22/2023 1024)  Discharge to home or other facility with appropriate resources: Identify barriers to discharge with patient and caregiver  Note: Verbalize understanding of discharge instructions, follow up appointments, and when to call Physician. Problem: Chronic Conditions and Co-morbidities  Goal: Patient's chronic conditions and co-morbidity symptoms are monitored and maintained or improved  Outcome: Progressing  Flowsheets (Taken 11/22/2023 1024)  Care Plan - Patient's Chronic Conditions and Co-Morbidity Symptoms are Monitored and Maintained or Improved: Monitor and assess patient's chronic conditions and comorbid symptoms for stability, deterioration, or improvement  Note:   Chemotherapy Teaching     What is Chemotherapy   Drug action [x]   Method of Administration [x]   Handouts given []     Side Effects  Nausea/vomiting [x]   Diarrhea [x]   Fatigue [x]   Signs / Symptoms of infection [x]   Neutropenia [x]   Thrombocytopenia [x]   Alopecia [x]   neuropathy [x]   Wright diet &  the importance of fluids [x]       Micellaneous  Importance of nutrition [x]   Importance of oral hygiene [x]   When to call the MD [x]   Monitoring labs [x]   Use of supportive services []     Explanation of Drug Regimen / Frequency  ETOPOSIDE     Comments  Verbalized understanding to drug,action,side effects and when to call MD       Care plan reviewed with patient. Patient verbalizes understanding of the plan of care and contributes to goal setting.

## 2023-11-22 NOTE — DISCHARGE INSTRUCTIONS
Please contact your Oncologist if you have any questions regarding the VP16 that you received today. You are instructed to call the office or go to the Emergency Dept. If you experience any of the following symptoms:    Dizziness/lightheadedness   Acute nausea or vomiting-not relieved by medications  Headaches-not relieved by medications  New chest pain or pressure  New rash /itching  New shortness of breath  Fever,chills or signs or symptoms of infection    Make sure you are drinking 48 to 64 ounces of water daily-if you are unable to drink fluids let us know right away.

## 2023-11-27 DIAGNOSIS — C7A.8 LARGE CELL NEUROENDOCRINE CARCINOMA (HCC): ICD-10-CM

## 2023-11-27 RX ORDER — ONDANSETRON 4 MG/1
4 TABLET, FILM COATED ORAL EVERY 8 HOURS PRN
Qty: 30 TABLET | Refills: 1 | Status: SHIPPED | OUTPATIENT
Start: 2023-11-27

## 2023-11-27 RX ORDER — OXYCODONE HYDROCHLORIDE 5 MG/1
5 TABLET ORAL EVERY 4 HOURS PRN
Qty: 82 TABLET | Refills: 0 | Status: SHIPPED | OUTPATIENT
Start: 2023-11-27 | End: 2023-12-11

## 2023-11-27 RX ORDER — LOPERAMIDE HYDROCHLORIDE 2 MG/1
2 CAPSULE ORAL 4 TIMES DAILY PRN
Qty: 30 CAPSULE | Refills: 1 | Status: SHIPPED | OUTPATIENT
Start: 2023-11-27

## 2023-12-08 DIAGNOSIS — C7A.8 LARGE CELL NEUROENDOCRINE CARCINOMA (HCC): ICD-10-CM

## 2023-12-08 RX ORDER — OXYCODONE HYDROCHLORIDE 5 MG/1
5 TABLET ORAL EVERY 4 HOURS PRN
Qty: 82 TABLET | Refills: 0 | Status: SHIPPED | OUTPATIENT
Start: 2023-12-08 | End: 2023-12-22

## 2023-12-13 ENCOUNTER — HOSPITAL ENCOUNTER (OUTPATIENT)
Dept: PET IMAGING | Age: 68
Discharge: HOME | End: 2023-12-13
Attending: INTERNAL MEDICINE
Payer: MEDICARE

## 2023-12-13 DIAGNOSIS — C79.9 METASTATIC MALIGNANT NEOPLASM, UNSPECIFIED SITE (HCC): ICD-10-CM

## 2023-12-13 DIAGNOSIS — C7A.8 LARGE CELL NEUROENDOCRINE CARCINOMA (HCC): ICD-10-CM

## 2023-12-13 PROCEDURE — 3430000000 HC RX DIAGNOSTIC RADIOPHARMACEUTICAL: Performed by: INTERNAL MEDICINE

## 2023-12-13 PROCEDURE — A9552 F18 FDG: HCPCS | Performed by: INTERNAL MEDICINE

## 2023-12-13 PROCEDURE — 78815 PET IMAGE W/CT SKULL-THIGH: CPT

## 2023-12-13 RX ORDER — FLUDEOXYGLUCOSE F 18 200 MCI/ML
11.9 INJECTION, SOLUTION INTRAVENOUS
Status: COMPLETED | OUTPATIENT
Start: 2023-12-13 | End: 2023-12-13

## 2023-12-13 RX ADMIN — FLUDEOXYGLUCOSE F 18 11.9 MILLICURIE: 200 INJECTION, SOLUTION INTRAVENOUS at 08:05

## 2023-12-14 DIAGNOSIS — C7A.8 LARGE CELL NEUROENDOCRINE CARCINOMA (HCC): Primary | ICD-10-CM

## 2023-12-15 ENCOUNTER — HOSPITAL ENCOUNTER (OUTPATIENT)
Dept: INFUSION THERAPY | Age: 68
Discharge: HOME | End: 2023-12-15
Payer: MEDICARE

## 2023-12-15 ENCOUNTER — OFFICE VISIT (OUTPATIENT)
Dept: ONCOLOGY | Age: 68
End: 2023-12-15
Payer: MEDICARE

## 2023-12-15 VITALS
OXYGEN SATURATION: 97 % | SYSTOLIC BLOOD PRESSURE: 109 MMHG | HEART RATE: 90 BPM | BODY MASS INDEX: 20.81 KG/M2 | HEIGHT: 67 IN | TEMPERATURE: 96.6 F | DIASTOLIC BLOOD PRESSURE: 57 MMHG | WEIGHT: 132.6 LBS | RESPIRATION RATE: 16 BRPM

## 2023-12-15 VITALS
DIASTOLIC BLOOD PRESSURE: 57 MMHG | RESPIRATION RATE: 16 BRPM | HEIGHT: 67 IN | SYSTOLIC BLOOD PRESSURE: 109 MMHG | TEMPERATURE: 96.6 F | HEART RATE: 90 BPM | WEIGHT: 132.6 LBS | OXYGEN SATURATION: 97 % | BODY MASS INDEX: 20.81 KG/M2

## 2023-12-15 DIAGNOSIS — C7A.8 LARGE CELL NEUROENDOCRINE CARCINOMA (HCC): Primary | ICD-10-CM

## 2023-12-15 DIAGNOSIS — C7A.8 LARGE CELL NEUROENDOCRINE CARCINOMA (HCC): ICD-10-CM

## 2023-12-15 DIAGNOSIS — I48.91 ATRIAL FIBRILLATION, UNSPECIFIED TYPE (HCC): Primary | ICD-10-CM

## 2023-12-15 DIAGNOSIS — I48.91 ATRIAL FIBRILLATION, UNSPECIFIED TYPE (HCC): ICD-10-CM

## 2023-12-15 LAB
ABSOLUTE IMMATURE GRANULOCYTE: 0.02 THOU/MM3 (ref 0–0.07)
ALBUMIN SERPL BCG-MCNC: 3.1 G/DL (ref 3.5–5.1)
ALP SERPL-CCNC: 120 U/L (ref 38–126)
ALT SERPL W/O P-5'-P-CCNC: 17 U/L (ref 11–66)
AST SERPL-CCNC: 32 U/L (ref 5–40)
BASOPHILS ABSOLUTE: 0 THOU/MM3 (ref 0–0.1)
BASOPHILS NFR BLD AUTO: 0 % (ref 0–3)
BILIRUB CONJ SERPL-MCNC: < 0.2 MG/DL (ref 0–0.3)
BILIRUB SERPL-MCNC: 0.3 MG/DL (ref 0.3–1.2)
BUN BLDP-MCNC: 17 MG/DL (ref 8–26)
CHLORIDE BLD-SCNC: 96 MEQ/L (ref 98–109)
CK SERPL-CCNC: 28 U/L (ref 55–170)
CREAT BLD-MCNC: 0.6 MG/DL (ref 0.5–1.2)
EOSINOPHIL NFR BLD AUTO: 0 % (ref 0–4)
EOSINOPHILS ABSOLUTE: 0 THOU/MM3 (ref 0–0.4)
ERYTHROCYTE [DISTWIDTH] IN BLOOD BY AUTOMATED COUNT: 20.6 % (ref 11.5–14.5)
GFR SERPL CREATININE-BSD FRML MDRD: > 60 ML/MIN/1.73M2
GLUCOSE BLD-MCNC: 117 MG/DL (ref 70–108)
HCT VFR BLD AUTO: 28.9 % (ref 42–52)
HCV IGG SERPL QL IA: ABNORMAL
HGB BLD-MCNC: 9.1 GM/DL (ref 14–18)
IMMATURE GRANULOCYTES: 0 %
IONIZED CALCIUM, WHOLE BLOOD: 1.19 MMOL/L (ref 1.12–1.32)
LDH SERPL L TO P-CCNC: 314 U/L (ref 100–190)
LYMPHOCYTES ABSOLUTE: 1.4 THOU/MM3 (ref 1–4.8)
LYMPHOCYTES NFR BLD AUTO: 16 % (ref 15–47)
MCH RBC QN AUTO: 31.6 PG (ref 26–33)
MCHC RBC AUTO-ENTMCNC: 31.5 GM/DL (ref 32.2–35.5)
MCV RBC AUTO: 100 FL (ref 80–94)
MONOCYTES ABSOLUTE: 0.9 THOU/MM3 (ref 0.4–1.3)
MONOCYTES NFR BLD AUTO: 10 % (ref 0–12)
NEUTROPHILS NFR BLD AUTO: 74 % (ref 43–75)
PLATELET # BLD AUTO: 379 THOU/MM3 (ref 130–400)
PMV BLD AUTO: 10.4 FL (ref 9.4–12.4)
POTASSIUM BLD-SCNC: 3.6 MEQ/L (ref 3.5–4.9)
PROT SERPL-MCNC: 7.2 G/DL (ref 6.1–8)
RBC # BLD AUTO: 2.88 MILL/MM3 (ref 4.7–6.1)
SEGMENTED NEUTROPHILS ABSOLUTE COUNT: 6.4 THOU/MM3 (ref 1.8–7.7)
SODIUM BLD-SCNC: 138 MEQ/L (ref 138–146)
TOTAL CO2, WHOLE BLOOD: 33 MEQ/L (ref 23–33)
WBC # BLD AUTO: 8.7 THOU/MM3 (ref 4.8–10.8)

## 2023-12-15 PROCEDURE — 99211 OFF/OP EST MAY X REQ PHY/QHP: CPT

## 2023-12-15 PROCEDURE — 3074F SYST BP LT 130 MM HG: CPT | Performed by: INTERNAL MEDICINE

## 2023-12-15 PROCEDURE — 83615 LACTATE (LD) (LDH) ENZYME: CPT

## 2023-12-15 PROCEDURE — 82550 ASSAY OF CK (CPK): CPT

## 2023-12-15 PROCEDURE — 80047 BASIC METABLC PNL IONIZED CA: CPT

## 2023-12-15 PROCEDURE — 80076 HEPATIC FUNCTION PANEL: CPT

## 2023-12-15 PROCEDURE — 3078F DIAST BP <80 MM HG: CPT | Performed by: INTERNAL MEDICINE

## 2023-12-15 PROCEDURE — 1123F ACP DISCUSS/DSCN MKR DOCD: CPT | Performed by: INTERNAL MEDICINE

## 2023-12-15 PROCEDURE — 2580000003 HC RX 258: Performed by: INTERNAL MEDICINE

## 2023-12-15 PROCEDURE — 87522 HEPATITIS C REVRS TRNSCRPJ: CPT

## 2023-12-15 PROCEDURE — 6360000002 HC RX W HCPCS: Performed by: INTERNAL MEDICINE

## 2023-12-15 PROCEDURE — 99214 OFFICE O/P EST MOD 30 MIN: CPT | Performed by: INTERNAL MEDICINE

## 2023-12-15 PROCEDURE — 86803 HEPATITIS C AB TEST: CPT

## 2023-12-15 PROCEDURE — 36591 DRAW BLOOD OFF VENOUS DEVICE: CPT

## 2023-12-15 PROCEDURE — 85025 COMPLETE CBC W/AUTO DIFF WBC: CPT

## 2023-12-15 RX ORDER — BENZONATATE 100 MG/1
100 CAPSULE ORAL 3 TIMES DAILY PRN
Qty: 30 CAPSULE | Refills: 0 | Status: SHIPPED | OUTPATIENT
Start: 2023-12-15 | End: 2023-12-25

## 2023-12-15 RX ORDER — ONDANSETRON 4 MG/1
4 TABLET, FILM COATED ORAL EVERY 8 HOURS PRN
Qty: 30 TABLET | Refills: 1 | Status: SHIPPED | OUTPATIENT
Start: 2023-12-15

## 2023-12-15 RX ORDER — SODIUM CHLORIDE 0.9 % (FLUSH) 0.9 %
5-40 SYRINGE (ML) INJECTION PRN
Status: DISCONTINUED | OUTPATIENT
Start: 2023-12-15 | End: 2023-12-16 | Stop reason: HOSPADM

## 2023-12-15 RX ORDER — HEPARIN 100 UNIT/ML
500 SYRINGE INTRAVENOUS PRN
OUTPATIENT
Start: 2023-12-15

## 2023-12-15 RX ORDER — SODIUM CHLORIDE 0.9 % (FLUSH) 0.9 %
5-40 SYRINGE (ML) INJECTION PRN
OUTPATIENT
Start: 2023-12-15

## 2023-12-15 RX ORDER — HEPARIN 100 UNIT/ML
500 SYRINGE INTRAVENOUS PRN
Status: DISCONTINUED | OUTPATIENT
Start: 2023-12-15 | End: 2023-12-16 | Stop reason: HOSPADM

## 2023-12-15 RX ORDER — SODIUM CHLORIDE 9 MG/ML
25 INJECTION, SOLUTION INTRAVENOUS PRN
OUTPATIENT
Start: 2023-12-15

## 2023-12-15 RX ADMIN — SODIUM CHLORIDE, PRESERVATIVE FREE 20 ML: 5 INJECTION INTRAVENOUS at 09:55

## 2023-12-15 RX ADMIN — SODIUM CHLORIDE, PRESERVATIVE FREE 10 ML: 5 INJECTION INTRAVENOUS at 09:00

## 2023-12-15 RX ADMIN — HEPARIN 500 UNITS: 100 SYRINGE at 09:55

## 2023-12-15 RX ADMIN — SODIUM CHLORIDE, PRESERVATIVE FREE 20 ML: 5 INJECTION INTRAVENOUS at 09:01

## 2023-12-15 NOTE — PLAN OF CARE
Problem: Safety - Adult  Goal: Free from fall injury  Outcome: Adequate for Discharge  Flowsheets (Taken 12/15/2023 0908)  Free From Fall Injury: Instruct family/caregiver on patient safety     Problem: Discharge Planning  Goal: Discharge to home or other facility with appropriate resources  Outcome: Adequate for Discharge  Flowsheets (Taken 12/15/2023 0908)  Discharge to home or other facility with appropriate resources: Identify barriers to discharge with patient and caregiver     Problem: Chronic Conditions and Co-morbidities  Goal: Patient's chronic conditions and co-morbidity symptoms are monitored and maintained or improved  Outcome: Adequate for Discharge  Flowsheets (Taken 12/15/2023 0908)  Care Plan - Patient's Chronic Conditions and Co-Morbidity Symptoms are Monitored and Maintained or Improved:   Monitor and assess patient's chronic conditions and comorbid symptoms for stability, deterioration, or improvement   Collaborate with multidisciplinary team to address chronic and comorbid conditions and prevent exacerbation or deterioration     Problem: Infection - Adult  Goal: Absence of infection at discharge  Outcome: Adequate for Discharge  Flowsheets (Taken 12/15/2023 0908)  Absence of infection at discharge: Monitor all insertion sites i.e., indwelling lines, tubes and drains  Note: Mediport site with no redness or warmth. Skin over port site intact with no signs of breakdown noted. Patient verbalizes signs/symptoms of port infection and when to notify the physician. Care plan reviewed with patient and family. Patient and family verbalize understanding of the plan of care and contribute to goal setting.

## 2023-12-15 NOTE — DISCHARGE INSTRUCTIONS
Physician Instructions       - if you receive the medication at home please do not start it but call us to set an appointment with [facility] for nursing education about the medication.       - please call us if you have new symptoms

## 2023-12-15 NOTE — PROGRESS NOTES
This nodule was likely obscured by pleural fluid in the prior study. There is no FDG avid mediastinal, hilar or axillary lymphadenopathy. A right-sided retrocrural lymph node measures 1.2 cm in short axis  diameter and has a maximum SUV of 7.2 (image 125; prior maximum SUV 2.3). Degenerative changes are present in the thoracic spine without evidence of hypermetabolic osseous metastatic disease. Abdomen/pelvis: Physiologic activity is present in the liver, spleen, urinary collecting system and gastrointestinal tract. Surgical changes in the colon are stable. Atherosclerotic calcifications are present in the abdominal aorta without evidence of  aneurysm. The prostate gland is enlarged. A nonenlarged mesenteric lymph node in the mid abdomen now has a maximum SUV of 2.8 (image 156; prior maximum SUV 6.2). A nonenlarged para-aortic retroperitoneal lymph node in the upper abdomen has a maximum SUV  of 4.0 (image 144). There is no FDG avid pelvic or inguinal lymphadenopathy. Degenerative changes are present in the lumbar spine and pelvis without evidence of hypermetabolic osseous metastatic disease. Impression  1. FDG avid pulmonary nodules highly suspicious for metastatic disease. 2. FDG avid retrocrural, mesenteric and retroperitoneal lymphadenopathy highly suspicious for metastatic disease. Final report electronically signed by Dr. Domenic Phoenix on 10/5/2023 11:45 AM          ASSESSMENT/PLAN:    1) metastatic Large bowel , large cell neuroendocrine tumor :   Poorly differentiated, large cell neuroendocrine tumor:   S/p right hemicolectomy 7/20/2023 : Pathologic stage: pT4a, pN0. Ki 67: 90%. - MMR stable.   - Metastatic in abdominal lymph nodes and lung lesions.   - PET scan with uptake in lung lesion and abdominal lymph nodes consistent with metastatic disease.   - No mets on CT head with contrast ( patient can not get even open MRI due to claustrophobia, said he tried with xanax as well and that did not

## 2023-12-15 NOTE — PATIENT INSTRUCTIONS
- if you receive the medication at home please do not start it but call us to set an appointment with [facility] for nursing education about the medication.      - please call us if you have new symptoms

## 2023-12-17 LAB
HCV LOAD REFLEX TO GENOTYPE: NORMAL
HCV RNA SERPL NAA+PROBE-ACNC: ABNORMAL IU/ML
HCV RNA SERPL NAA+PROBE-LOG IU: 6.67 LOG IU/ML
HCV RNA SERPL QL NAA+PROBE: DETECTED

## 2024-01-03 DIAGNOSIS — C7A.8 LARGE CELL NEUROENDOCRINE CARCINOMA (HCC): ICD-10-CM

## 2024-01-03 RX ORDER — OXYCODONE HYDROCHLORIDE 5 MG/1
5 TABLET ORAL EVERY 4 HOURS PRN
Qty: 82 TABLET | Refills: 0 | Status: SHIPPED | OUTPATIENT
Start: 2024-01-03 | End: 2024-01-17

## 2024-01-03 NOTE — TELEPHONE ENCOUNTER
Patients brother calling requesting on refill of pain medication stated will be out tomorrow. Please advise.   Pharmacy verified and medication pended

## 2024-01-05 ENCOUNTER — HOSPITAL ENCOUNTER (OUTPATIENT)
Dept: INFUSION THERAPY | Age: 69
Discharge: HOME | End: 2024-01-05
Payer: MEDICARE

## 2024-01-05 ENCOUNTER — OFFICE VISIT (OUTPATIENT)
Dept: ONCOLOGY | Age: 69
End: 2024-01-05
Payer: MEDICARE

## 2024-01-05 ENCOUNTER — CLINICAL DOCUMENTATION (OUTPATIENT)
Dept: CASE MANAGEMENT | Age: 69
End: 2024-01-05

## 2024-01-05 VITALS
OXYGEN SATURATION: 96 % | TEMPERATURE: 97.7 F | WEIGHT: 138.6 LBS | HEART RATE: 89 BPM | BODY MASS INDEX: 21.01 KG/M2 | HEIGHT: 68 IN | SYSTOLIC BLOOD PRESSURE: 105 MMHG | RESPIRATION RATE: 18 BRPM | DIASTOLIC BLOOD PRESSURE: 59 MMHG

## 2024-01-05 VITALS
WEIGHT: 138.6 LBS | OXYGEN SATURATION: 96 % | HEART RATE: 89 BPM | SYSTOLIC BLOOD PRESSURE: 105 MMHG | TEMPERATURE: 97.7 F | BODY MASS INDEX: 21.01 KG/M2 | HEIGHT: 68 IN | DIASTOLIC BLOOD PRESSURE: 59 MMHG | RESPIRATION RATE: 18 BRPM

## 2024-01-05 DIAGNOSIS — C7A.8 LARGE CELL NEUROENDOCRINE CARCINOMA (HCC): Primary | ICD-10-CM

## 2024-01-05 DIAGNOSIS — R93.89 ABNORMAL FINDINGS ON DIAGNOSTIC IMAGING OF OTHER SPECIFIED BODY STRUCTURES: ICD-10-CM

## 2024-01-05 LAB
ABSOLUTE IMMATURE GRANULOCYTE: 0.01 THOU/MM3 (ref 0–0.07)
ALBUMIN SERPL BCG-MCNC: 3.6 G/DL (ref 3.5–5.1)
ALP SERPL-CCNC: 135 U/L (ref 38–126)
ALT SERPL W/O P-5'-P-CCNC: 35 U/L (ref 11–66)
AST SERPL-CCNC: 52 U/L (ref 5–40)
BASOPHILS ABSOLUTE: 0 THOU/MM3 (ref 0–0.1)
BASOPHILS NFR BLD AUTO: 0 % (ref 0–3)
BILIRUB CONJ SERPL-MCNC: 0.3 MG/DL (ref 0–0.3)
BILIRUB SERPL-MCNC: 0.9 MG/DL (ref 0.3–1.2)
BUN BLDP-MCNC: 11 MG/DL (ref 8–26)
CHLORIDE BLD-SCNC: 103 MEQ/L (ref 98–109)
CREAT BLD-MCNC: 0.7 MG/DL (ref 0.5–1.2)
EOSINOPHIL NFR BLD AUTO: 0 % (ref 0–4)
EOSINOPHILS ABSOLUTE: 0 THOU/MM3 (ref 0–0.4)
ERYTHROCYTE [DISTWIDTH] IN BLOOD BY AUTOMATED COUNT: 16.5 % (ref 11.5–14.5)
GFR SERPL CREATININE-BSD FRML MDRD: > 60 ML/MIN/1.73M2
GLUCOSE BLD-MCNC: 130 MG/DL (ref 70–108)
HCT VFR BLD AUTO: 33.8 % (ref 42–52)
HGB BLD-MCNC: 10.7 GM/DL (ref 14–18)
IMMATURE GRANULOCYTES: 0 %
IONIZED CALCIUM, WHOLE BLOOD: 1.13 MMOL/L (ref 1.12–1.32)
LYMPHOCYTES ABSOLUTE: 1.4 THOU/MM3 (ref 1–4.8)
LYMPHOCYTES NFR BLD AUTO: 17 % (ref 15–47)
MCH RBC QN AUTO: 32.2 PG (ref 26–33)
MCHC RBC AUTO-ENTMCNC: 31.7 GM/DL (ref 32.2–35.5)
MCV RBC AUTO: 102 FL (ref 80–94)
MONOCYTES ABSOLUTE: 0.5 THOU/MM3 (ref 0.4–1.3)
MONOCYTES NFR BLD AUTO: 7 % (ref 0–12)
NEUTROPHILS NFR BLD AUTO: 75 % (ref 43–75)
PLATELET # BLD AUTO: 195 THOU/MM3 (ref 130–400)
PMV BLD AUTO: 10.1 FL (ref 9.4–12.4)
POTASSIUM BLD-SCNC: 4.3 MEQ/L (ref 3.5–4.9)
PROT SERPL-MCNC: 7.6 G/DL (ref 6.1–8)
RBC # BLD AUTO: 3.32 MILL/MM3 (ref 4.7–6.1)
SEGMENTED NEUTROPHILS ABSOLUTE COUNT: 6 THOU/MM3 (ref 1.8–7.7)
SODIUM BLD-SCNC: 136 MEQ/L (ref 138–146)
TOTAL CO2, WHOLE BLOOD: 27 MEQ/L (ref 23–33)
WBC # BLD AUTO: 7.9 THOU/MM3 (ref 4.8–10.8)

## 2024-01-05 PROCEDURE — 99214 OFFICE O/P EST MOD 30 MIN: CPT | Performed by: INTERNAL MEDICINE

## 2024-01-05 PROCEDURE — 85025 COMPLETE CBC W/AUTO DIFF WBC: CPT

## 2024-01-05 PROCEDURE — 80047 BASIC METABLC PNL IONIZED CA: CPT

## 2024-01-05 PROCEDURE — 1123F ACP DISCUSS/DSCN MKR DOCD: CPT | Performed by: INTERNAL MEDICINE

## 2024-01-05 PROCEDURE — 3078F DIAST BP <80 MM HG: CPT | Performed by: INTERNAL MEDICINE

## 2024-01-05 PROCEDURE — 36591 DRAW BLOOD OFF VENOUS DEVICE: CPT

## 2024-01-05 PROCEDURE — 2580000003 HC RX 258: Performed by: INTERNAL MEDICINE

## 2024-01-05 PROCEDURE — 99211 OFF/OP EST MAY X REQ PHY/QHP: CPT

## 2024-01-05 PROCEDURE — 80076 HEPATIC FUNCTION PANEL: CPT

## 2024-01-05 PROCEDURE — 6360000002 HC RX W HCPCS: Performed by: INTERNAL MEDICINE

## 2024-01-05 PROCEDURE — 3074F SYST BP LT 130 MM HG: CPT | Performed by: INTERNAL MEDICINE

## 2024-01-05 RX ORDER — SODIUM CHLORIDE 0.9 % (FLUSH) 0.9 %
5-40 SYRINGE (ML) INJECTION PRN
Status: DISCONTINUED | OUTPATIENT
Start: 2024-01-05 | End: 2024-01-06 | Stop reason: HOSPADM

## 2024-01-05 RX ORDER — HEPARIN 100 UNIT/ML
500 SYRINGE INTRAVENOUS PRN
Status: DISCONTINUED | OUTPATIENT
Start: 2024-01-05 | End: 2024-01-06 | Stop reason: HOSPADM

## 2024-01-05 RX ORDER — SODIUM CHLORIDE 9 MG/ML
25 INJECTION, SOLUTION INTRAVENOUS PRN
OUTPATIENT
Start: 2024-01-05

## 2024-01-05 RX ORDER — SODIUM CHLORIDE 0.9 % (FLUSH) 0.9 %
5-40 SYRINGE (ML) INJECTION PRN
OUTPATIENT
Start: 2024-01-05

## 2024-01-05 RX ORDER — HEPARIN 100 UNIT/ML
500 SYRINGE INTRAVENOUS PRN
OUTPATIENT
Start: 2024-01-05

## 2024-01-05 RX ADMIN — SODIUM CHLORIDE, PRESERVATIVE FREE 10 ML: 5 INJECTION INTRAVENOUS at 10:25

## 2024-01-05 RX ADMIN — SODIUM CHLORIDE, PRESERVATIVE FREE 10 ML: 5 INJECTION INTRAVENOUS at 11:34

## 2024-01-05 RX ADMIN — Medication 500 UNITS: at 11:34

## 2024-01-05 NOTE — PLAN OF CARE
Care plan reviewed with patient.  Patient  verbalized understanding of the plan of care and contribute to goal setting.    Problem: Infection - Adult  Goal: Absence of infection at discharge  Outcome: Adequate for Discharge  Flowsheets (Taken 1/5/2024 1046)  Absence of infection at discharge:   Monitor lab/diagnostic results   Assess and monitor for signs and symptoms of infection   Monitor all insertion sites i.e., indwelling lines, tubes and drains  Note: Mediport site with no redness or warmth. Skin over port intact with no signs of breakdown noted. Patient verbalizes signs/symptoms of port infection and when to notify the physician.      Problem: Safety - Adult  Goal: Free from fall injury  Outcome: Adequate for Discharge  Flowsheets (Taken 1/5/2024 1046)  Free From Fall Injury:   Based on caregiver fall risk screen, instruct family/caregiver to ask for assistance with transferring infant if caregiver noted to have fall risk factors   Instruct family/caregiver on patient safety  Note: Free from falls while in infusion center. Verbalized understanding of fall prevention and to ask for assistance with ambulation     Problem: Discharge Planning  Goal: Discharge to home or other facility with appropriate resources  Outcome: Adequate for Discharge  Flowsheets (Taken 1/5/2024 1046)  Discharge to home or other facility with appropriate resources:   Identify barriers to discharge with patient and caregiver   Identify discharge learning needs (meds, wound care, etc)   Arrange for needed discharge resources and transportation as appropriate  Note: Verbalized understanding of discharge instructions, follow ups and when to call doctor

## 2024-01-05 NOTE — PATIENT INSTRUCTIONS
Urgent authorization for nivo and ipi requested.  Message sent to Ms. Ludwig.  Chemo education to be scheduled.  Tentative plan to start treatment in one week. Labs ordered CBC, CMP, TSH and cortisol.  MD visit+ labs+ treatment visit on 1/12/24.  Pt should not start oral chemotherapy, until clarified by the physician.  Palliative care consult

## 2024-01-05 NOTE — PROGRESS NOTES
Name: Chris Perez  : 1955  MRN: [id number]    Oncology Navigation Follow-Up Note    Contact Type:  Medical Oncology    Subjective: appt with ONC    Objective:     ONC POC:  -ONC discussion re: tx plan  -ONC reviewed NCCN guidelines with pt & spouse  -Pt agreeable to Opdivo-Yervoy  tx  -Request to expedite the insurance authorization.  -Pt directed to HOLD the oral chemo tablets, & NOT TO TAKE.   -Referral to Dr Marshall for pain management  -Return 1 week for appt with ONC & receive Cycle 1 immunotherapy regimen.  -Tx teaching next week with 1st cycle      Assistance Needed: denies    Receptive to Advanced Care Planning / Palliative Care:  Order placed by ONC    Referrals: Palliative Care for pain & sx management    Education: POC reiterated    Notes: Ernie following to assist as needed.    Electronically signed by Hannah Eugene RN on 2024 at 6:10 PM

## 2024-01-05 NOTE — PROGRESS NOTES
Returned from seeing doctor. Mediport deaccessed per protocol. Discharged in satisfactory condition. Ambulated off unit per self

## 2024-01-05 NOTE — DISCHARGE INSTRUCTIONS
Please contact your Oncologist if you have any questions regarding the labs that you received today.

## 2024-01-05 NOTE — PROGRESS NOTES
other.  He is off of chemotherapy for couple of months.  He does not feel nauseated and energy levels are better.          -Allergies and medications, past medical history, past surgical history, family history, and social history reviewed.    ROS:  14 points ROS negative except as mentioned above in the interval history.    Phone: [phone]     Immunizations:    There is no immunization history on file for this patient.     Health Screenings:  Mammogram:  No results found for this or any previous visit.   No results found for this or any previous visit.      Allergies:  Allergies   Allergen Reactions    Adhesive Tape Other (See Comments)     Tears skin        Medications:  Current Outpatient Medications   Medication Sig Dispense Refill    MOMETASONE FUROATE NA 1 spray by Nasal route in the morning and at bedtime      oxyCODONE (ROXICODONE) 5 MG immediate release tablet Take 1 tablet by mouth every 4 hours as needed for Pain for up to 14 days. Intended supply: 7 days. Take lowest dose possible to manage pain Max Daily Amount: 30 mg 82 tablet 0    ondansetron (ZOFRAN) 4 MG tablet Take 1 tablet by mouth every 8 hours as needed for Nausea or Vomiting 30 tablet 1    Alectinib HCl 150 MG CAPS Take 600 mg by mouth in the morning and at bedtime (Patient not taking: Reported on 1/5/2024) 240 capsule 0    loperamide (IMODIUM) 2 MG capsule Take 1 capsule by mouth 4 times daily as needed for Diarrhea 30 capsule 1    lisinopril (PRINIVIL;ZESTRIL) 20 MG tablet Take 5 mg by mouth daily      albuterol sulfate HFA (VENTOLIN HFA) 108 (90 Base) MCG/ACT inhaler Inhale 2 puffs into the lungs 4 times daily as needed for Wheezing 54 g 1    furosemide (LASIX) 20 MG tablet Take 1 tablet by mouth daily 60 tablet 3    sennosides-docusate sodium (SENOKOT-S) 8.6-50 MG tablet Take 1 tablet by mouth daily (Patient not taking: Reported on 9/28/2023) 30 tablet 0    potassium chloride (KLOR-CON M) 20 MEQ extended release tablet Take 1 tablet by

## 2024-01-05 NOTE — PROGRESS NOTES
New chemotherapy validation note:    Diagnosis for chemotherapy: poorly differentiated neuroendocrine CA (not pancreatic)    Regimen ordered: Opdivo + Yervoy        Reference or literature used for validation: NCCN         Date literature or guideline last updated 8/7/23     Deviation from literature or guideline used: none    Summary of any verbal or telephone information obtained: n/a - discussed which regimen to use with Dr James DIEZ [facility]  1/5/2024  11:19 AM

## 2024-01-10 ENCOUNTER — TELEPHONE (OUTPATIENT)
Dept: ONCOLOGY | Age: 69
End: 2024-01-10

## 2024-01-10 NOTE — TELEPHONE ENCOUNTER
Pt calling in and asking questions about treatment plan. Dr. Ramirez available now to speak to patient's brother on the phone to review the plan of care.

## 2024-01-12 ENCOUNTER — HOSPITAL ENCOUNTER (OUTPATIENT)
Dept: INFUSION THERAPY | Age: 69
Discharge: HOME | End: 2024-01-12
Payer: MEDICARE

## 2024-01-12 ENCOUNTER — OFFICE VISIT (OUTPATIENT)
Dept: ONCOLOGY | Age: 69
End: 2024-01-12
Payer: MEDICARE

## 2024-01-12 VITALS
RESPIRATION RATE: 18 BRPM | TEMPERATURE: 98 F | WEIGHT: 138 LBS | SYSTOLIC BLOOD PRESSURE: 103 MMHG | HEIGHT: 67 IN | HEART RATE: 76 BPM | DIASTOLIC BLOOD PRESSURE: 58 MMHG | BODY MASS INDEX: 21.66 KG/M2 | OXYGEN SATURATION: 93 %

## 2024-01-12 VITALS
TEMPERATURE: 97.7 F | RESPIRATION RATE: 18 BRPM | WEIGHT: 138 LBS | HEIGHT: 67 IN | HEART RATE: 74 BPM | BODY MASS INDEX: 21.66 KG/M2 | OXYGEN SATURATION: 96 % | SYSTOLIC BLOOD PRESSURE: 90 MMHG | DIASTOLIC BLOOD PRESSURE: 52 MMHG

## 2024-01-12 DIAGNOSIS — R93.89 ABNORMAL FINDINGS ON DIAGNOSTIC IMAGING OF OTHER SPECIFIED BODY STRUCTURES: ICD-10-CM

## 2024-01-12 DIAGNOSIS — C7A.8 LARGE CELL NEUROENDOCRINE CARCINOMA (HCC): Primary | ICD-10-CM

## 2024-01-12 DIAGNOSIS — C79.9 METASTATIC MALIGNANT NEOPLASM, UNSPECIFIED SITE (HCC): ICD-10-CM

## 2024-01-12 LAB
ABSOLUTE IMMATURE GRANULOCYTE: 0.02 THOU/MM3 (ref 0–0.07)
ALBUMIN SERPL BCG-MCNC: 3.6 G/DL (ref 3.5–5.1)
ALP SERPL-CCNC: 140 U/L (ref 38–126)
ALT SERPL W/O P-5'-P-CCNC: 81 U/L (ref 11–66)
AST SERPL-CCNC: 110 U/L (ref 5–40)
BASOPHILS ABSOLUTE: 0 THOU/MM3 (ref 0–0.1)
BASOPHILS NFR BLD AUTO: 0 % (ref 0–3)
BILIRUB CONJ SERPL-MCNC: < 0.2 MG/DL (ref 0–0.3)
BILIRUB SERPL-MCNC: 0.6 MG/DL (ref 0.3–1.2)
BUN BLDP-MCNC: 11 MG/DL (ref 8–26)
CHLORIDE BLD-SCNC: 101 MEQ/L (ref 98–109)
CORTIS SERPL-MCNC: 10.23 UG/DL
CORTISOL COLLECTION INFO: NORMAL
CREAT BLD-MCNC: 0.5 MG/DL (ref 0.5–1.2)
EOSINOPHIL NFR BLD AUTO: 1 % (ref 0–4)
EOSINOPHILS ABSOLUTE: 0.1 THOU/MM3 (ref 0–0.4)
ERYTHROCYTE [DISTWIDTH] IN BLOOD BY AUTOMATED COUNT: 16 % (ref 11.5–14.5)
GFR SERPL CREATININE-BSD FRML MDRD: > 60 ML/MIN/1.73M2
GLUCOSE BLD-MCNC: 108 MG/DL (ref 70–108)
HCT VFR BLD AUTO: 36 % (ref 42–52)
HGB BLD-MCNC: 11.3 GM/DL (ref 14–18)
IMMATURE GRANULOCYTES: 0 %
IONIZED CALCIUM, WHOLE BLOOD: 1.18 MMOL/L (ref 1.12–1.32)
LYMPHOCYTES ABSOLUTE: 2.1 THOU/MM3 (ref 1–4.8)
LYMPHOCYTES NFR BLD AUTO: 26 % (ref 15–47)
MCH RBC QN AUTO: 31.7 PG (ref 26–33)
MCHC RBC AUTO-ENTMCNC: 31.4 GM/DL (ref 32.2–35.5)
MCV RBC AUTO: 101 FL (ref 80–94)
MONOCYTES ABSOLUTE: 0.7 THOU/MM3 (ref 0.4–1.3)
MONOCYTES NFR BLD AUTO: 8 % (ref 0–12)
NEUTROPHILS NFR BLD AUTO: 65 % (ref 43–75)
PLATELET # BLD AUTO: 211 THOU/MM3 (ref 130–400)
PMV BLD AUTO: 10.4 FL (ref 9.4–12.4)
POTASSIUM BLD-SCNC: 4.1 MEQ/L (ref 3.5–4.9)
PROT SERPL-MCNC: 7.6 G/DL (ref 6.1–8)
RBC # BLD AUTO: 3.56 MILL/MM3 (ref 4.7–6.1)
SEGMENTED NEUTROPHILS ABSOLUTE COUNT: 5.4 THOU/MM3 (ref 1.8–7.7)
SODIUM BLD-SCNC: 135 MEQ/L (ref 138–146)
TOTAL CO2, WHOLE BLOOD: 27 MEQ/L (ref 23–33)
TSH SERPL DL<=0.005 MIU/L-ACNC: 2.12 UIU/ML (ref 0.4–4.2)
WBC # BLD AUTO: 8.2 THOU/MM3 (ref 4.8–10.8)

## 2024-01-12 PROCEDURE — 6360000002 HC RX W HCPCS: Performed by: INTERNAL MEDICINE

## 2024-01-12 PROCEDURE — 96417 CHEMO IV INFUS EACH ADDL SEQ: CPT

## 2024-01-12 PROCEDURE — 99214 OFFICE O/P EST MOD 30 MIN: CPT | Performed by: INTERNAL MEDICINE

## 2024-01-12 PROCEDURE — 96413 CHEMO IV INFUSION 1 HR: CPT

## 2024-01-12 PROCEDURE — 80047 BASIC METABLC PNL IONIZED CA: CPT

## 2024-01-12 PROCEDURE — 84443 ASSAY THYROID STIM HORMONE: CPT

## 2024-01-12 PROCEDURE — 2580000003 HC RX 258: Performed by: INTERNAL MEDICINE

## 2024-01-12 PROCEDURE — 85025 COMPLETE CBC W/AUTO DIFF WBC: CPT

## 2024-01-12 PROCEDURE — 3078F DIAST BP <80 MM HG: CPT | Performed by: INTERNAL MEDICINE

## 2024-01-12 PROCEDURE — 1123F ACP DISCUSS/DSCN MKR DOCD: CPT | Performed by: INTERNAL MEDICINE

## 2024-01-12 PROCEDURE — 3074F SYST BP LT 130 MM HG: CPT | Performed by: INTERNAL MEDICINE

## 2024-01-12 PROCEDURE — 99211 OFF/OP EST MAY X REQ PHY/QHP: CPT

## 2024-01-12 PROCEDURE — 82533 TOTAL CORTISOL: CPT

## 2024-01-12 PROCEDURE — 80076 HEPATIC FUNCTION PANEL: CPT

## 2024-01-12 PROCEDURE — 36591 DRAW BLOOD OFF VENOUS DEVICE: CPT

## 2024-01-12 RX ORDER — HEPARIN 100 UNIT/ML
500 SYRINGE INTRAVENOUS PRN
Status: DISCONTINUED | OUTPATIENT
Start: 2024-01-12 | End: 2024-01-13 | Stop reason: HOSPADM

## 2024-01-12 RX ORDER — SODIUM CHLORIDE 0.9 % (FLUSH) 0.9 %
5-40 SYRINGE (ML) INJECTION PRN
Status: CANCELLED | OUTPATIENT
Start: 2024-01-12

## 2024-01-12 RX ORDER — SODIUM CHLORIDE 9 MG/ML
5-250 INJECTION, SOLUTION INTRAVENOUS PRN
Status: CANCELLED | OUTPATIENT
Start: 2024-01-12

## 2024-01-12 RX ORDER — ACETAMINOPHEN 325 MG/1
650 TABLET ORAL
Status: CANCELLED | OUTPATIENT
Start: 2024-01-12

## 2024-01-12 RX ORDER — SODIUM CHLORIDE 0.9 % (FLUSH) 0.9 %
5-40 SYRINGE (ML) INJECTION PRN
Status: DISCONTINUED | OUTPATIENT
Start: 2024-01-12 | End: 2024-01-13 | Stop reason: HOSPADM

## 2024-01-12 RX ORDER — FAMOTIDINE 10 MG/ML
20 INJECTION, SOLUTION INTRAVENOUS
Status: CANCELLED | OUTPATIENT
Start: 2024-01-12

## 2024-01-12 RX ORDER — MEPERIDINE HYDROCHLORIDE 50 MG/ML
12.5 INJECTION INTRAMUSCULAR; INTRAVENOUS; SUBCUTANEOUS PRN
Status: CANCELLED | OUTPATIENT
Start: 2024-01-12

## 2024-01-12 RX ORDER — DIPHENHYDRAMINE HYDROCHLORIDE 50 MG/ML
50 INJECTION INTRAMUSCULAR; INTRAVENOUS
Status: CANCELLED | OUTPATIENT
Start: 2024-01-12

## 2024-01-12 RX ORDER — EPINEPHRINE 1 MG/ML
0.3 INJECTION, SOLUTION, CONCENTRATE INTRAVENOUS PRN
Status: CANCELLED | OUTPATIENT
Start: 2024-01-12

## 2024-01-12 RX ORDER — SODIUM CHLORIDE 9 MG/ML
INJECTION, SOLUTION INTRAVENOUS CONTINUOUS
Status: CANCELLED | OUTPATIENT
Start: 2024-01-12

## 2024-01-12 RX ORDER — SODIUM CHLORIDE 9 MG/ML
25 INJECTION, SOLUTION INTRAVENOUS PRN
Status: CANCELLED | OUTPATIENT
Start: 2024-01-12

## 2024-01-12 RX ORDER — SODIUM CHLORIDE 9 MG/ML
5-250 INJECTION, SOLUTION INTRAVENOUS PRN
Status: DISCONTINUED | OUTPATIENT
Start: 2024-01-12 | End: 2024-01-13 | Stop reason: HOSPADM

## 2024-01-12 RX ORDER — HEPARIN 100 UNIT/ML
500 SYRINGE INTRAVENOUS PRN
Status: CANCELLED | OUTPATIENT
Start: 2024-01-12

## 2024-01-12 RX ORDER — ALBUTEROL SULFATE 90 UG/1
4 AEROSOL, METERED RESPIRATORY (INHALATION) PRN
Status: CANCELLED | OUTPATIENT
Start: 2024-01-12

## 2024-01-12 RX ORDER — ONDANSETRON 2 MG/ML
8 INJECTION INTRAMUSCULAR; INTRAVENOUS
Status: CANCELLED | OUTPATIENT
Start: 2024-01-12

## 2024-01-12 RX ORDER — HEPARIN SODIUM (PORCINE) LOCK FLUSH IV SOLN 100 UNIT/ML 100 UNIT/ML
500 SOLUTION INTRAVENOUS PRN
Status: CANCELLED | OUTPATIENT
Start: 2024-01-12

## 2024-01-12 RX ADMIN — SODIUM CHLORIDE 60 MG: 9 INJECTION, SOLUTION INTRAVENOUS at 10:34

## 2024-01-12 RX ADMIN — SODIUM CHLORIDE, PRESERVATIVE FREE 20 ML: 5 INJECTION INTRAVENOUS at 08:54

## 2024-01-12 RX ADMIN — HEPARIN 500 UNITS: 100 SYRINGE at 11:11

## 2024-01-12 RX ADMIN — SODIUM CHLORIDE 20 ML/HR: 9 INJECTION, SOLUTION INTRAVENOUS at 10:00

## 2024-01-12 RX ADMIN — SODIUM CHLORIDE 180 MG: 9 INJECTION, SOLUTION INTRAVENOUS at 10:03

## 2024-01-12 RX ADMIN — SODIUM CHLORIDE, PRESERVATIVE FREE 10 ML: 5 INJECTION INTRAVENOUS at 08:53

## 2024-01-12 NOTE — PATIENT INSTRUCTIONS
1.  Labs reviewed proceed with treatment.  2.  Follow-up visit with MD next week.  3.  Labs ordered for next week CBC CMP.  4.urgent referral to GI for treatment for hepC.

## 2024-01-12 NOTE — PROGRESS NOTES
Patient tolerated opdivo/yervoy without any complications.  Patient kept for 20 minuets observation post infusion. Denies dizziness, lightheadedness, acute nausea or vomiting, headache, heart palpitations, rash/itching or increased SOB.    Last vital signs  BP (!) 103/58   Pulse 76   Temp 98 °F (36.7 °C) (Oral)   Resp 18   Ht 1.702 m (5' 7\")   Wt 62.6 kg (138 lb)   SpO2 93%   BMI 21.61 kg/m²     Patient instructed if they experience any of the above symptoms following today's visit, he/she is to notify the Physician or go to the Emergency Dept.    Discharge instructions given to patient, Verbalizes understanding. Ambulated off unit per self in stable condition with all belongings.

## 2024-01-12 NOTE — DISCHARGE INSTRUCTIONS
Please contact your Oncologist if you have any questions regarding the chemotherapy Opdivo and Yervoy that you received today.      Patient instructed if experience any of the symptoms following today's chemotherapy / to notify MD immediately or go to emergency department.    * dizziness/lightheadedness  *acute nausea/vomiting - not relieved with medication  *headache - not relieved from Tylenol/pain medication  *chest pain/pressure  *rash/itching  *shortness of breath        Drink fluids - 48oz fluids daily  Call if develop fever/ chills/ signs or symptoms of infection

## 2024-01-12 NOTE — PLAN OF CARE
Problem: Infection - Adult  Goal: Absence of infection at discharge  Outcome: Progressing  Flowsheets (Taken 1/12/2024 1012)  Absence of infection at discharge: Assess and monitor for signs and symptoms of infection  Note: Mediport site with no redness or warmth. Skin over port site intact with no signs of breakdown noted. Patient verbalizes signs/symptoms of port infection and when to notify the physician.     Problem: Safety - Adult  Goal: Free from fall injury  Outcome: Progressing  Flowsheets (Taken 1/12/2024 1012)  Free From Fall Injury: Instruct family/caregiver on patient safety  Note: No falls occurred with visit today.     Problem: Discharge Planning  Goal: Discharge to home or other facility with appropriate resources  Outcome: Progressing  Flowsheets (Taken 1/12/2024 1012)  Discharge to home or other facility with appropriate resources: Identify barriers to discharge with patient and caregiver  Note: Verbalize understanding of discharge instructions, follow up appointments, and when to call Physician.   Care plan reviewed with patient.  Patient verbalizes understanding of the plan of care and contributes to goal setting.

## 2024-01-12 NOTE — PROGRESS NOTES
[facility] [place] [facility]  [facility]  [address]  [address]  [place] [id number]  Dept: [phone]  Loc: [phone]   Hematology/Oncology Consult (Clinic)        01/12/24       Chris Griffinall   1955     No ref. provider found   Donny Garnica MD       Reason:   Chief Complaint   Patient presents with    Follow-up     Large cell neuroendocrine carcinoma (HCC)          HPI:   68-year-old male who initially presented to GI and surgery for evaluation  secondary to newly diagnosed colonic mass that is near obstructing.  He had colonoscopy with Dr. Pemberton and they were Not able to get a scope through the area of concern.  Proximal transverse colon close to the hepatic flexure.  Patient admits to abdominal discomfort in the right upper quadrant.  Mild to moderate.  No severe sharp pain.  Admits to weight loss and fatigue.  Decreased appetite.  No significant nausea or vomiting.  Still passing flatus and having bowel movements.  No hematochezia.  No change in urination.  Headaches.  No lightheadedness.      CT abdomen/pelvis demonstrated colonic mass proximal transverse colon close to the hepatic flexure.  Patient denies any family or personal history of colorectal disease that he is aware of.  Small liver lesion identified on CT abdomen/pelvis.    Patient states he cannot proceed with an MRI even if it is a open MRI due to anxiety.  He is refusing to proceed with any further testing in regards to MRI.  Admits to smoking cigarettes but has been decreasing in number.  Less than half a pack a day.    Patient ended up having surgery right hemicolectomy with Dr. 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    Patient is here today with his brother and significant other. He is in a wheelchair today but said usually walks. Has been tired after the surgery, he did have some blood draining

## 2024-01-16 DIAGNOSIS — C7A.8 LARGE CELL NEUROENDOCRINE CARCINOMA (HCC): ICD-10-CM

## 2024-01-16 RX ORDER — OXYCODONE HYDROCHLORIDE 5 MG/1
5 TABLET ORAL EVERY 4 HOURS PRN
Qty: 82 TABLET | Refills: 0 | Status: SHIPPED | OUTPATIENT
Start: 2024-01-16 | End: 2024-01-30

## 2024-01-16 RX ORDER — ALECTINIB HYDROCHLORIDE 150 MG/1
CAPSULE ORAL
Qty: 240 CAPSULE | Refills: 0 | OUTPATIENT
Start: 2024-01-16

## 2024-01-19 ENCOUNTER — HOSPITAL ENCOUNTER (OUTPATIENT)
Dept: INFUSION THERAPY | Age: 69
Discharge: HOME | End: 2024-01-19
Payer: MEDICARE

## 2024-01-19 ENCOUNTER — OFFICE VISIT (OUTPATIENT)
Dept: ONCOLOGY | Age: 69
End: 2024-01-19
Payer: MEDICARE

## 2024-01-19 VITALS
HEART RATE: 75 BPM | SYSTOLIC BLOOD PRESSURE: 113 MMHG | BODY MASS INDEX: 21.37 KG/M2 | DIASTOLIC BLOOD PRESSURE: 57 MMHG | OXYGEN SATURATION: 98 % | HEIGHT: 68 IN | WEIGHT: 141 LBS | TEMPERATURE: 97.5 F | RESPIRATION RATE: 18 BRPM

## 2024-01-19 VITALS
TEMPERATURE: 97.5 F | HEART RATE: 75 BPM | BODY MASS INDEX: 21.37 KG/M2 | SYSTOLIC BLOOD PRESSURE: 113 MMHG | WEIGHT: 141 LBS | RESPIRATION RATE: 18 BRPM | HEIGHT: 68 IN | DIASTOLIC BLOOD PRESSURE: 57 MMHG | OXYGEN SATURATION: 98 %

## 2024-01-19 DIAGNOSIS — C7A.8 LARGE CELL NEUROENDOCRINE CARCINOMA (HCC): Primary | ICD-10-CM

## 2024-01-19 DIAGNOSIS — R93.89 ABNORMAL FINDINGS ON DIAGNOSTIC IMAGING OF OTHER SPECIFIED BODY STRUCTURES: ICD-10-CM

## 2024-01-19 LAB
ABSOLUTE IMMATURE GRANULOCYTE: 0 THOU/MM3 (ref 0–0.07)
ALBUMIN SERPL BCG-MCNC: 3.6 G/DL (ref 3.5–5.1)
ALP SERPL-CCNC: 157 U/L (ref 38–126)
ALT SERPL W/O P-5'-P-CCNC: 195 U/L (ref 11–66)
AST SERPL-CCNC: 244 U/L (ref 5–40)
BASOPHILS ABSOLUTE: 0 THOU/MM3 (ref 0–0.1)
BASOPHILS NFR BLD AUTO: 0 % (ref 0–3)
BILIRUB CONJ SERPL-MCNC: < 0.2 MG/DL (ref 0–0.3)
BILIRUB SERPL-MCNC: 0.4 MG/DL (ref 0.3–1.2)
BUN BLDP-MCNC: 14 MG/DL (ref 8–26)
CHLORIDE BLD-SCNC: 103 MEQ/L (ref 98–109)
CREAT BLD-MCNC: 0.5 MG/DL (ref 0.5–1.2)
EOSINOPHIL NFR BLD AUTO: 1 % (ref 0–4)
EOSINOPHILS ABSOLUTE: 0.1 THOU/MM3 (ref 0–0.4)
ERYTHROCYTE [DISTWIDTH] IN BLOOD BY AUTOMATED COUNT: 15.8 % (ref 11.5–14.5)
GFR SERPL CREATININE-BSD FRML MDRD: > 60 ML/MIN/1.73M2
GLUCOSE BLD-MCNC: 102 MG/DL (ref 70–108)
HCT VFR BLD AUTO: 35.7 % (ref 42–52)
HGB BLD-MCNC: 11.1 GM/DL (ref 14–18)
IMMATURE GRANULOCYTES: 0 %
IONIZED CALCIUM, WHOLE BLOOD: 1.18 MMOL/L (ref 1.12–1.32)
LYMPHOCYTES ABSOLUTE: 1.6 THOU/MM3 (ref 1–4.8)
LYMPHOCYTES NFR BLD AUTO: 30 % (ref 15–47)
MCH RBC QN AUTO: 31.4 PG (ref 26–33)
MCHC RBC AUTO-ENTMCNC: 31.1 GM/DL (ref 32.2–35.5)
MCV RBC AUTO: 101 FL (ref 80–94)
MONOCYTES ABSOLUTE: 0.4 THOU/MM3 (ref 0.4–1.3)
MONOCYTES NFR BLD AUTO: 7 % (ref 0–12)
NEUTROPHILS NFR BLD AUTO: 62 % (ref 43–75)
PLATELET # BLD AUTO: 186 THOU/MM3 (ref 130–400)
PMV BLD AUTO: 10.4 FL (ref 9.4–12.4)
POTASSIUM BLD-SCNC: 4.1 MEQ/L (ref 3.5–4.9)
PROT SERPL-MCNC: 7.3 G/DL (ref 6.1–8)
RBC # BLD AUTO: 3.53 MILL/MM3 (ref 4.7–6.1)
SEGMENTED NEUTROPHILS ABSOLUTE COUNT: 3.3 THOU/MM3 (ref 1.8–7.7)
SODIUM BLD-SCNC: 139 MEQ/L (ref 138–146)
TOTAL CO2, WHOLE BLOOD: 28 MEQ/L (ref 23–33)
WBC # BLD AUTO: 5.3 THOU/MM3 (ref 4.8–10.8)

## 2024-01-19 PROCEDURE — 85025 COMPLETE CBC W/AUTO DIFF WBC: CPT

## 2024-01-19 PROCEDURE — 1123F ACP DISCUSS/DSCN MKR DOCD: CPT | Performed by: INTERNAL MEDICINE

## 2024-01-19 PROCEDURE — 36591 DRAW BLOOD OFF VENOUS DEVICE: CPT

## 2024-01-19 PROCEDURE — 80047 BASIC METABLC PNL IONIZED CA: CPT

## 2024-01-19 PROCEDURE — 2580000003 HC RX 258: Performed by: INTERNAL MEDICINE

## 2024-01-19 PROCEDURE — 80076 HEPATIC FUNCTION PANEL: CPT

## 2024-01-19 PROCEDURE — 99214 OFFICE O/P EST MOD 30 MIN: CPT | Performed by: INTERNAL MEDICINE

## 2024-01-19 PROCEDURE — 99211 OFF/OP EST MAY X REQ PHY/QHP: CPT

## 2024-01-19 PROCEDURE — 3074F SYST BP LT 130 MM HG: CPT | Performed by: INTERNAL MEDICINE

## 2024-01-19 PROCEDURE — 3078F DIAST BP <80 MM HG: CPT | Performed by: INTERNAL MEDICINE

## 2024-01-19 PROCEDURE — 6360000002 HC RX W HCPCS: Performed by: INTERNAL MEDICINE

## 2024-01-19 RX ORDER — SODIUM CHLORIDE 0.9 % (FLUSH) 0.9 %
5-40 SYRINGE (ML) INJECTION PRN
OUTPATIENT
Start: 2024-01-19

## 2024-01-19 RX ORDER — SODIUM CHLORIDE 9 MG/ML
25 INJECTION, SOLUTION INTRAVENOUS PRN
OUTPATIENT
Start: 2024-01-19

## 2024-01-19 RX ORDER — HEPARIN 100 UNIT/ML
500 SYRINGE INTRAVENOUS PRN
OUTPATIENT
Start: 2024-01-19

## 2024-01-19 RX ORDER — HEPARIN 100 UNIT/ML
500 SYRINGE INTRAVENOUS PRN
Status: DISCONTINUED | OUTPATIENT
Start: 2024-01-19 | End: 2024-01-20 | Stop reason: HOSPADM

## 2024-01-19 RX ORDER — SODIUM CHLORIDE 0.9 % (FLUSH) 0.9 %
5-40 SYRINGE (ML) INJECTION PRN
Status: DISCONTINUED | OUTPATIENT
Start: 2024-01-19 | End: 2024-01-20 | Stop reason: HOSPADM

## 2024-01-19 RX ADMIN — HEPARIN 500 UNITS: 100 SYRINGE at 10:50

## 2024-01-19 RX ADMIN — SODIUM CHLORIDE, PRESERVATIVE FREE 10 ML: 5 INJECTION INTRAVENOUS at 10:15

## 2024-01-19 RX ADMIN — SODIUM CHLORIDE, PRESERVATIVE FREE 20 ML: 5 INJECTION INTRAVENOUS at 10:16

## 2024-01-19 RX ADMIN — SODIUM CHLORIDE, PRESERVATIVE FREE 10 ML: 5 INJECTION INTRAVENOUS at 10:50

## 2024-01-19 NOTE — DISCHARGE INSTRUCTIONS
Return in about 2 weeks (around 2/2/2024).      Orders Placed This Encounter   Procedures    CBC with Auto Differential    Comprehensive Metabolic Panel    Cortisol Total    TSH      Follow up visit with MD+Labs+ treatment in 2 weeks.  Clinically doing well. Labs at baseline.    You received a blood draw per port while in outpatient oncology clinic.  Call if any uncontrolled nausea/vomiting  Call if any fevers/chills/ problems or concerns  Call if any bleeding  Call if any chest pain/pressure  Call if any severe shortness of breath  Drink at least (6) 8oz glasses of fluids daily     If develop any of above condition, please call your MD or go to Emergency Department.

## 2024-01-19 NOTE — PROGRESS NOTES
Take 1 tablet by mouth daily (Patient not taking: Reported on 9/28/2023) 30 tablet 0    potassium chloride (KLOR-CON M) 20 MEQ extended release tablet Take 1 tablet by mouth daily      allopurinol (ZYLOPRIM) 100 MG tablet take 1 tablet by mouth three times a day for 90 DAYS      famotidine (PEPCID) 20 MG tablet take 1 tablet by mouth twice a day for 90 DAYS      metoprolol tartrate (LOPRESSOR) 50 MG tablet 1 tablet Orally Twice a day for 90 days      omeprazole (PRILOSEC) 40 MG delayed release capsule Take 1 capsule by mouth daily      warfarin (COUMADIN) 5 MG tablet Take 1 tablet by mouth daily 2.5 mg      atorvastatin (LIPITOR) 20 MG tablet Take 1 tablet by mouth daily       No current facility-administered medications for this visit.     Facility-Administered Medications Ordered in Other Visits   Medication Dose Route Frequency Provider Last Rate Last Admin    sodium chloride flush 0.9 % injection 5-40 mL  5-40 mL IntraVENous PRN Trevor Kramer MD   10 mL at 01/19/24 1050    heparin (PF) 100 UNIT/ML injection 500 Units  500 Units IntraCATHeter PRN Trevor Kramer MD   500 Units at 01/19/24 1050       Past Medical History:  Past Medical History:   Diagnosis Date    Atrial fibrillation (HCC)     Cancer (HCC)     Carotid artery stenosis     GERD (gastroesophageal reflux disease)     Hyperlipidemia     Hypertension     Hyperuricemia        Past Surgical History:  Past Surgical History:   Procedure Laterality Date    ABDOMEN SURGERY      CARDIAC CATHETERIZATION  10/27/2014    HAND SURGERY Right     HEMICOLECTOMY Right 07/20/2023    ROBOTIC EXTENDED RIGHT COLON RESECTION, CHOLECYSTECTOMY performed by Jaleel Jiang MD at [facility] OR    PORT SURGERY Right 8/22/2023    Single Lumen Smartport Insertion performed by Jaleel Jiang MD at [facility] OR      [facility] HX:   Family History   Problem Relation Age of Onset    Arthritis Mother     Diabetes Mother     Stroke Mother     Heart Disease Mother     High Blood Pressure Mother

## 2024-01-19 NOTE — PROGRESS NOTES
Patient tolerated  blood draw per port without any complications. Discharge instructions given to patient-verbalizes understanding. Ambulated off unit per self with family member and belongings.

## 2024-01-19 NOTE — PLAN OF CARE
Problem: Infection - Adult  Goal: Absence of infection at discharge  Outcome: Progressing  Flowsheets (Taken 1/19/2024 1238)  Absence of infection at discharge: Assess and monitor for signs and symptoms of infection  Note: Mediport site with no redness or warmth. Skin over port site intact with no signs of breakdown noted. Patient verbalizes signs/symptoms of port infection and when to notify the physician.     Problem: Safety - Adult  Goal: Free from fall injury  Outcome: Progressing  Flowsheets (Taken 1/19/2024 1238)  Free From Fall Injury: Instruct family/caregiver on patient safety  Note: No falls occurred with visit today.     Problem: Discharge Planning  Goal: Discharge to home or other facility with appropriate resources  Outcome: Progressing  Flowsheets (Taken 1/19/2024 1238)  Discharge to home or other facility with appropriate resources: Identify barriers to discharge with patient and caregiver  Note: Verbalized understanding of discharge instructions, follow-up appointments, and when to call the physician.     Problem: Chronic Conditions and Co-morbidities  Goal: Patient's chronic conditions and co-morbidity symptoms are monitored and maintained or improved  Outcome: Progressing  Flowsheets (Taken 1/19/2024 1238)  Care Plan - Patient's Chronic Conditions and Co-Morbidity Symptoms are Monitored and Maintained or Improved: Monitor and assess patient's chronic conditions and comorbid symptoms for stability, deterioration, or improvement  Note: Patient verbalizes understanding to verbal information given on blood draw per port. Aware to call MD if develop complications.      Care plan reviewed with patient and family member.  Patient and family member verbalize understanding of the plan of care and contribute to goal setting.

## 2024-01-30 DIAGNOSIS — C7A.8 LARGE CELL NEUROENDOCRINE CARCINOMA (HCC): ICD-10-CM

## 2024-01-30 RX ORDER — OXYCODONE HYDROCHLORIDE 5 MG/1
5 TABLET ORAL EVERY 4 HOURS PRN
Qty: 82 TABLET | Refills: 0 | Status: SHIPPED | OUTPATIENT
Start: 2024-01-30 | End: 2024-02-13

## 2024-01-30 NOTE — TELEPHONE ENCOUNTER
Pt brother called requesting refill of oxycodone medication pended pharmacy verified. Please advise.

## 2024-02-02 ENCOUNTER — OFFICE VISIT (OUTPATIENT)
Dept: ONCOLOGY | Age: 69
End: 2024-02-02
Payer: MEDICARE

## 2024-02-02 ENCOUNTER — HOSPITAL ENCOUNTER (OUTPATIENT)
Dept: INFUSION THERAPY | Age: 69
Discharge: HOME | End: 2024-02-02
Payer: MEDICARE

## 2024-02-02 VITALS
WEIGHT: 144.6 LBS | SYSTOLIC BLOOD PRESSURE: 114 MMHG | OXYGEN SATURATION: 97 % | DIASTOLIC BLOOD PRESSURE: 56 MMHG | RESPIRATION RATE: 18 BRPM | HEART RATE: 69 BPM | HEIGHT: 68 IN | TEMPERATURE: 97.6 F | BODY MASS INDEX: 21.92 KG/M2

## 2024-02-02 VITALS
RESPIRATION RATE: 16 BRPM | TEMPERATURE: 98.1 F | HEART RATE: 66 BPM | DIASTOLIC BLOOD PRESSURE: 57 MMHG | OXYGEN SATURATION: 97 % | SYSTOLIC BLOOD PRESSURE: 101 MMHG | BODY MASS INDEX: 21.92 KG/M2 | HEIGHT: 68 IN | WEIGHT: 144.6 LBS

## 2024-02-02 DIAGNOSIS — C79.9 METASTATIC MALIGNANT NEOPLASM, UNSPECIFIED SITE (HCC): ICD-10-CM

## 2024-02-02 DIAGNOSIS — C7A.8 LARGE CELL NEUROENDOCRINE CARCINOMA (HCC): Primary | ICD-10-CM

## 2024-02-02 DIAGNOSIS — R93.89 ABNORMAL FINDINGS ON DIAGNOSTIC IMAGING OF OTHER SPECIFIED BODY STRUCTURES: ICD-10-CM

## 2024-02-02 DIAGNOSIS — E27.3 ADRENAL INSUFFICIENCY DUE TO CANCER THERAPY (HCC): ICD-10-CM

## 2024-02-02 DIAGNOSIS — E03.2 HYPOTHYROIDISM DUE TO MEDICATION: ICD-10-CM

## 2024-02-02 DIAGNOSIS — C7A.1 LARGE CELL NEUROENDOCRINE CARCINOMA (HCC): Primary | ICD-10-CM

## 2024-02-02 LAB
ABSOLUTE IMMATURE GRANULOCYTE: 0 THOU/MM3 (ref 0–0.07)
ALBUMIN SERPL BCG-MCNC: 3.8 G/DL (ref 3.5–5.1)
ALP SERPL-CCNC: 172 U/L (ref 38–126)
ALT SERPL W/O P-5'-P-CCNC: 180 U/L (ref 11–66)
AST SERPL-CCNC: 194 U/L (ref 5–40)
BASOPHILS ABSOLUTE: 0 THOU/MM3 (ref 0–0.1)
BASOPHILS NFR BLD AUTO: 0 % (ref 0–3)
BILIRUB CONJ SERPL-MCNC: < 0.2 MG/DL (ref 0–0.3)
BILIRUB SERPL-MCNC: 0.4 MG/DL (ref 0.3–1.2)
BUN BLDP-MCNC: 14 MG/DL (ref 8–26)
CHLORIDE BLD-SCNC: 103 MEQ/L (ref 98–109)
CORTIS SERPL-MCNC: 13.76 UG/DL
CORTISOL COLLECTION INFO: NORMAL
CREAT BLD-MCNC: 0.6 MG/DL (ref 0.5–1.2)
EOSINOPHIL NFR BLD AUTO: 0 % (ref 0–4)
EOSINOPHILS ABSOLUTE: 0 THOU/MM3 (ref 0–0.4)
ERYTHROCYTE [DISTWIDTH] IN BLOOD BY AUTOMATED COUNT: 16 % (ref 11.5–14.5)
GFR SERPL CREATININE-BSD FRML MDRD: > 60 ML/MIN/1.73M2
GLUCOSE BLD-MCNC: 126 MG/DL (ref 70–108)
HCT VFR BLD AUTO: 37.7 % (ref 42–52)
HGB BLD-MCNC: 11.9 GM/DL (ref 14–18)
IMMATURE GRANULOCYTES: 0 %
IONIZED CALCIUM, WHOLE BLOOD: 1.17 MMOL/L (ref 1.12–1.32)
LYMPHOCYTES ABSOLUTE: 2.2 THOU/MM3 (ref 1–4.8)
LYMPHOCYTES NFR BLD AUTO: 30 % (ref 15–47)
MCH RBC QN AUTO: 31.6 PG (ref 26–33)
MCHC RBC AUTO-ENTMCNC: 31.6 GM/DL (ref 32.2–35.5)
MCV RBC AUTO: 100 FL (ref 80–94)
MONOCYTES ABSOLUTE: 0.6 THOU/MM3 (ref 0.4–1.3)
MONOCYTES NFR BLD AUTO: 9 % (ref 0–12)
NEUTROPHILS NFR BLD AUTO: 60 % (ref 43–75)
PLATELET # BLD AUTO: 223 THOU/MM3 (ref 130–400)
PMV BLD AUTO: 10.5 FL (ref 9.4–12.4)
POTASSIUM BLD-SCNC: 4.4 MEQ/L (ref 3.5–4.9)
PROT SERPL-MCNC: 7.7 G/DL (ref 6.1–8)
RBC # BLD AUTO: 3.77 MILL/MM3 (ref 4.7–6.1)
SEGMENTED NEUTROPHILS ABSOLUTE COUNT: 4.4 THOU/MM3 (ref 1.8–7.7)
SODIUM BLD-SCNC: 140 MEQ/L (ref 138–146)
TOTAL CO2, WHOLE BLOOD: 28 MEQ/L (ref 23–33)
TSH SERPL DL<=0.005 MIU/L-ACNC: 1.66 UIU/ML (ref 0.4–4.2)
WBC # BLD AUTO: 7.3 THOU/MM3 (ref 4.8–10.8)

## 2024-02-02 PROCEDURE — 3074F SYST BP LT 130 MM HG: CPT | Performed by: INTERNAL MEDICINE

## 2024-02-02 PROCEDURE — 84443 ASSAY THYROID STIM HORMONE: CPT

## 2024-02-02 PROCEDURE — 2580000003 HC RX 258: Performed by: INTERNAL MEDICINE

## 2024-02-02 PROCEDURE — 96417 CHEMO IV INFUS EACH ADDL SEQ: CPT

## 2024-02-02 PROCEDURE — 99211 OFF/OP EST MAY X REQ PHY/QHP: CPT

## 2024-02-02 PROCEDURE — 6360000002 HC RX W HCPCS: Performed by: INTERNAL MEDICINE

## 2024-02-02 PROCEDURE — 96413 CHEMO IV INFUSION 1 HR: CPT

## 2024-02-02 PROCEDURE — 80076 HEPATIC FUNCTION PANEL: CPT

## 2024-02-02 PROCEDURE — 82533 TOTAL CORTISOL: CPT

## 2024-02-02 PROCEDURE — 80047 BASIC METABLC PNL IONIZED CA: CPT

## 2024-02-02 PROCEDURE — 1123F ACP DISCUSS/DSCN MKR DOCD: CPT | Performed by: INTERNAL MEDICINE

## 2024-02-02 PROCEDURE — 3078F DIAST BP <80 MM HG: CPT | Performed by: INTERNAL MEDICINE

## 2024-02-02 PROCEDURE — 99214 OFFICE O/P EST MOD 30 MIN: CPT | Performed by: INTERNAL MEDICINE

## 2024-02-02 PROCEDURE — 36591 DRAW BLOOD OFF VENOUS DEVICE: CPT

## 2024-02-02 PROCEDURE — 85025 COMPLETE CBC W/AUTO DIFF WBC: CPT

## 2024-02-02 RX ORDER — SODIUM CHLORIDE 9 MG/ML
5-250 INJECTION, SOLUTION INTRAVENOUS PRN
Status: CANCELLED | OUTPATIENT
Start: 2024-02-02

## 2024-02-02 RX ORDER — DIPHENHYDRAMINE HYDROCHLORIDE 50 MG/ML
50 INJECTION INTRAMUSCULAR; INTRAVENOUS
Status: CANCELLED | OUTPATIENT
Start: 2024-02-02

## 2024-02-02 RX ORDER — SODIUM CHLORIDE 9 MG/ML
25 INJECTION, SOLUTION INTRAVENOUS PRN
OUTPATIENT
Start: 2024-02-02

## 2024-02-02 RX ORDER — SODIUM CHLORIDE 0.9 % (FLUSH) 0.9 %
5-40 SYRINGE (ML) INJECTION PRN
Status: CANCELLED | OUTPATIENT
Start: 2024-02-02

## 2024-02-02 RX ORDER — SODIUM CHLORIDE 0.9 % (FLUSH) 0.9 %
5-40 SYRINGE (ML) INJECTION PRN
OUTPATIENT
Start: 2024-02-02

## 2024-02-02 RX ORDER — ONDANSETRON 2 MG/ML
8 INJECTION INTRAMUSCULAR; INTRAVENOUS
Status: CANCELLED | OUTPATIENT
Start: 2024-02-02

## 2024-02-02 RX ORDER — FAMOTIDINE 10 MG/ML
20 INJECTION, SOLUTION INTRAVENOUS
Status: CANCELLED | OUTPATIENT
Start: 2024-02-02

## 2024-02-02 RX ORDER — EPINEPHRINE 1 MG/ML
0.3 INJECTION, SOLUTION, CONCENTRATE INTRAVENOUS PRN
Status: CANCELLED | OUTPATIENT
Start: 2024-02-02

## 2024-02-02 RX ORDER — HEPARIN 100 UNIT/ML
500 SYRINGE INTRAVENOUS PRN
Status: DISCONTINUED | OUTPATIENT
Start: 2024-02-02 | End: 2024-02-03 | Stop reason: HOSPADM

## 2024-02-02 RX ORDER — SODIUM CHLORIDE 0.9 % (FLUSH) 0.9 %
5-40 SYRINGE (ML) INJECTION PRN
Status: DISCONTINUED | OUTPATIENT
Start: 2024-02-02 | End: 2024-02-03 | Stop reason: HOSPADM

## 2024-02-02 RX ORDER — HEPARIN 100 UNIT/ML
500 SYRINGE INTRAVENOUS PRN
OUTPATIENT
Start: 2024-02-02

## 2024-02-02 RX ORDER — MEPERIDINE HYDROCHLORIDE 50 MG/ML
12.5 INJECTION INTRAMUSCULAR; INTRAVENOUS; SUBCUTANEOUS PRN
Status: CANCELLED | OUTPATIENT
Start: 2024-02-02

## 2024-02-02 RX ORDER — HEPARIN SODIUM (PORCINE) LOCK FLUSH IV SOLN 100 UNIT/ML 100 UNIT/ML
500 SOLUTION INTRAVENOUS PRN
Status: CANCELLED | OUTPATIENT
Start: 2024-02-02

## 2024-02-02 RX ORDER — SODIUM CHLORIDE 9 MG/ML
5-250 INJECTION, SOLUTION INTRAVENOUS PRN
Status: DISCONTINUED | OUTPATIENT
Start: 2024-02-02 | End: 2024-02-03 | Stop reason: HOSPADM

## 2024-02-02 RX ORDER — SODIUM CHLORIDE 9 MG/ML
INJECTION, SOLUTION INTRAVENOUS CONTINUOUS
Status: CANCELLED | OUTPATIENT
Start: 2024-02-02

## 2024-02-02 RX ORDER — ACETAMINOPHEN 325 MG/1
650 TABLET ORAL
Status: CANCELLED | OUTPATIENT
Start: 2024-02-02

## 2024-02-02 RX ORDER — ALBUTEROL SULFATE 90 UG/1
4 AEROSOL, METERED RESPIRATORY (INHALATION) PRN
Status: CANCELLED | OUTPATIENT
Start: 2024-02-02

## 2024-02-02 RX ADMIN — SODIUM CHLORIDE 180 MG: 9 INJECTION, SOLUTION INTRAVENOUS at 11:43

## 2024-02-02 RX ADMIN — SODIUM CHLORIDE, PRESERVATIVE FREE 10 ML: 5 INJECTION INTRAVENOUS at 11:22

## 2024-02-02 RX ADMIN — SODIUM CHLORIDE 20 ML/HR: 9 INJECTION, SOLUTION INTRAVENOUS at 11:23

## 2024-02-02 RX ADMIN — SODIUM CHLORIDE, PRESERVATIVE FREE 10 ML: 5 INJECTION INTRAVENOUS at 12:54

## 2024-02-02 RX ADMIN — SODIUM CHLORIDE, PRESERVATIVE FREE 10 ML: 5 INJECTION INTRAVENOUS at 10:46

## 2024-02-02 RX ADMIN — SODIUM CHLORIDE 60 MG: 9 INJECTION, SOLUTION INTRAVENOUS at 12:22

## 2024-02-02 RX ADMIN — SODIUM CHLORIDE, PRESERVATIVE FREE 10 ML: 5 INJECTION INTRAVENOUS at 10:45

## 2024-02-02 RX ADMIN — HEPARIN 500 UNITS: 100 SYRINGE at 12:54

## 2024-02-02 NOTE — PROGRESS NOTES
Patient assessed for the following post chemotherapy:    Dizziness   No  Lightheadedness  No      Acute nausea/vomiting No  Headache   No  Chest pain/pressure  No  Rash/itching   No  Shortness of breath  No    Patient kept for 20 minutes observation post infusion chemotherapy.    Patient tolerated chemotherapy treatment Opdivo/Yervoy without any complications.    Last vital signs:   BP (!) 101/57   Pulse 66   Temp 98.1 °F (36.7 °C) (Oral)   Resp 16   Ht 1.727 m (5' 7.99\")   Wt 65.6 kg (144 lb 9.6 oz)   SpO2 97%   BMI 21.99 kg/m²     Patient instructed if experience any of the above symptoms following today's infusion, he is to notify MD immediately or go to the emergency department.    Discharge instructions given to patient. Verbalizes understanding. Ambulated off unit per self, accompanied by spouse, with belongings.
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
## 2024-02-02 NOTE — ONCOLOGY
Chemotherapy Administration    Pre-assessment Data: Antineoplastic Agents  Other:   See toxicity flow sheet for assessment [x]     Physician Notification of Concerns Related to Chemotherapy Administration:   Physician Notified /Date / Time of Notification      Interventions:   Lab work assessed  [x]   Height / Weight verified for dose [x]   Current MAR reviewed [x]   Emergency drugs available as appropriate [x]   Anaphylaxis assessment completed [x]   Pre-medications administered as ordered [x]   Blood return noted upon initiation of chemotherapy [x]   Blood return noted each 1-2ml of a vesicant medication if given IV push []   Blood return noted each 2-3ml of a non-vesicant medication if given IV push []   Monitor for signs / symptoms of hypersensitivity reaction [x]   Chemotherapy orders (drug/dose/rate) verified by 2 Chemo certified RN’s [x]   Monitor IV site and blood return throughout the infusion of the medication [x]   Document IV site checks on the IV assessment form [x]   Document chemotherapy teaching on the Patient Education tab [x]   Document patient verbalizes understanding of medications being administered [x]   If IV infiltration, see ONS Guidelines []   Other:      []

## 2024-02-02 NOTE — DISCHARGE INSTRUCTIONS
Please contact your Oncologist if you have any questions regarding your chemotherapy treatments. The name of the chemotherapy that you received today was opdivo and yervoy.    If you experience any of the following symptoms after  todays treatment, notify your physician immediately or go to the emergency department.    * dizziness/lightheadedness  * acute nausea/vomiting that is not relieved with medication  * headache that is not relieved from Tylenol or your usual pain medication  * chest pain or pressure  * rash/itching  * shortness of breath    Drink plenty of fluids; at least 48-64 ounces daily    Call if you develop any fever, chills, or other signs or symptoms of an infection.

## 2024-02-02 NOTE — PLAN OF CARE
Problem: Infection - Adult  Goal: Absence of infection at discharge  Outcome: Adequate for Discharge  Flowsheets (Taken 2/2/2024 1156)  Absence of infection at discharge:   Assess and monitor for signs and symptoms of infection   Monitor all insertion sites i.e., indwelling lines, tubes and drains  Note: Mediport site with no redness or warmth. Skin over port site intact with no signs of breakdown noted. Patient verbalizes signs/symptoms of port infection and when to notify the physician.     Problem: Safety - Adult  Goal: Free from fall injury  Outcome: Adequate for Discharge  Flowsheets (Taken 2/2/2024 1156)  Free From Fall Injury: Instruct family/caregiver on patient safety  Note: Patient free of falls this visit. Fall risks assessed. Precautions discussed.      Problem: Discharge Planning  Goal: Discharge to home or other facility with appropriate resources  Outcome: Adequate for Discharge  Flowsheets (Taken 2/2/2024 1156)  Discharge to home or other facility with appropriate resources:   Identify barriers to discharge with patient and caregiver   Identify discharge learning needs (meds, wound care, etc)  Note: Patient verbalizes understanding of discharge instructions, follow up appointment, and when to call physician if needed     Problem: Chronic Conditions and Co-morbidities  Goal: Patient's chronic conditions and co-morbidity symptoms are monitored and maintained or improved  Outcome: Adequate for Discharge  Flowsheets (Taken 2/2/2024 1156)  Care Plan - Patient's Chronic Conditions and Co-Morbidity Symptoms are Monitored and Maintained or Improved:   Monitor and assess patient's chronic conditions and comorbid symptoms for stability, deterioration, or improvement   Collaborate with multidisciplinary team to address chronic and comorbid conditions and prevent exacerbation or deterioration  Note: Patient verbalizes understanding to verbal information given on opdivo and yervoy, including action and possible  side effects. Aware to call MD if develop complications.     Chemotherapy Teaching     What is Chemotherapy   Drug action [x]   Method of Administration [x]   Handouts given []     Side Effects  Nausea/vomiting [x]   Diarrhea [x]   Fatigue [x]   Signs / Symptoms of infection [x]   Neutropenia [x]   Thrombocytopenia [x]   Alopecia [x]   neuropathy [x]   [place] diet &  the importance of fluids [x]       Micellaneous  Importance of nutrition [x]   Importance of oral hygiene [x]   When to call the MD [x]   Monitoring labs [x]   Use of supportive services []     Explanation of Drug Regimen / Frequency  Opdivo and yervoy     Comments  Verbalized understanding to drug,action,side effects and when to call MD      Care plan reviewed with patient.  Patient verbalizes understanding of the plan of care and contributes to goal setting.

## 2024-02-07 PROBLEM — R11.0 NAUSEA: Status: ACTIVE | Noted: 2024-02-07

## 2024-02-12 DIAGNOSIS — C7A.1 LARGE CELL NEUROENDOCRINE CARCINOMA (HCC): ICD-10-CM

## 2024-02-12 RX ORDER — OXYCODONE HYDROCHLORIDE 5 MG/1
5 TABLET ORAL EVERY 4 HOURS PRN
Qty: 82 TABLET | Refills: 0 | Status: SHIPPED | OUTPATIENT
Start: 2024-02-12 | End: 2024-02-26

## 2024-02-12 NOTE — TELEPHONE ENCOUNTER
Brother called requesting refill of pain medication. Medication pended pharmacy verified. Please advise.

## 2024-02-23 ENCOUNTER — CLINICAL DOCUMENTATION (OUTPATIENT)
Dept: CASE MANAGEMENT | Age: 69
End: 2024-02-23

## 2024-02-23 ENCOUNTER — HOSPITAL ENCOUNTER (OUTPATIENT)
Dept: INFUSION THERAPY | Age: 69
Discharge: HOME | End: 2024-02-23
Payer: MEDICARE

## 2024-02-23 ENCOUNTER — OFFICE VISIT (OUTPATIENT)
Dept: ONCOLOGY | Age: 69
End: 2024-02-23
Payer: MEDICARE

## 2024-02-23 VITALS
HEART RATE: 68 BPM | DIASTOLIC BLOOD PRESSURE: 59 MMHG | OXYGEN SATURATION: 98 % | TEMPERATURE: 97.8 F | SYSTOLIC BLOOD PRESSURE: 125 MMHG | HEIGHT: 70 IN | BODY MASS INDEX: 21.05 KG/M2 | WEIGHT: 147 LBS | RESPIRATION RATE: 18 BRPM

## 2024-02-23 VITALS
RESPIRATION RATE: 18 BRPM | WEIGHT: 147 LBS | OXYGEN SATURATION: 97 % | DIASTOLIC BLOOD PRESSURE: 62 MMHG | BODY MASS INDEX: 22.36 KG/M2 | TEMPERATURE: 97.8 F | HEART RATE: 70 BPM | SYSTOLIC BLOOD PRESSURE: 121 MMHG

## 2024-02-23 DIAGNOSIS — C79.9 METASTATIC MALIGNANT NEOPLASM, UNSPECIFIED SITE (HCC): ICD-10-CM

## 2024-02-23 DIAGNOSIS — E03.2 HYPOTHYROIDISM DUE TO MEDICATION: ICD-10-CM

## 2024-02-23 DIAGNOSIS — E27.3 ADRENAL INSUFFICIENCY DUE TO CANCER THERAPY (HCC): ICD-10-CM

## 2024-02-23 DIAGNOSIS — C7A.1 LARGE CELL NEUROENDOCRINE CARCINOMA (HCC): ICD-10-CM

## 2024-02-23 DIAGNOSIS — C79.9 METASTATIC MALIGNANT NEOPLASM, UNSPECIFIED SITE (HCC): Primary | ICD-10-CM

## 2024-02-23 DIAGNOSIS — C7A.1 LARGE CELL NEUROENDOCRINE CARCINOMA (HCC): Primary | ICD-10-CM

## 2024-02-23 LAB
ABSOLUTE IMMATURE GRANULOCYTE: 0 THOU/MM3 (ref 0–0.07)
ALBUMIN SERPL BCG-MCNC: 3.8 G/DL (ref 3.5–5.1)
ALP SERPL-CCNC: 118 U/L (ref 38–126)
ALT SERPL W/O P-5'-P-CCNC: 14 U/L (ref 11–66)
AST SERPL-CCNC: 24 U/L (ref 5–40)
BASOPHILS ABSOLUTE: 0 THOU/MM3 (ref 0–0.1)
BASOPHILS NFR BLD AUTO: 1 % (ref 0–3)
BILIRUB CONJ SERPL-MCNC: < 0.2 MG/DL (ref 0–0.3)
BILIRUB SERPL-MCNC: 0.4 MG/DL (ref 0.3–1.2)
BUN BLDP-MCNC: 11 MG/DL (ref 8–26)
CHLORIDE BLD-SCNC: 104 MEQ/L (ref 98–109)
CORTIS SERPL-MCNC: 7.17 UG/DL
CREAT BLD-MCNC: 0.6 MG/DL (ref 0.5–1.2)
EOSINOPHIL NFR BLD AUTO: 1 % (ref 0–4)
EOSINOPHILS ABSOLUTE: 0 THOU/MM3 (ref 0–0.4)
ERYTHROCYTE [DISTWIDTH] IN BLOOD BY AUTOMATED COUNT: 15.9 % (ref 11.5–14.5)
GFR SERPL CREATININE-BSD FRML MDRD: > 60 ML/MIN/1.73M2
GLUCOSE BLD-MCNC: 111 MG/DL (ref 70–108)
HCT VFR BLD AUTO: 39.3 % (ref 42–52)
HGB BLD-MCNC: 12.6 GM/DL (ref 14–18)
IMMATURE GRANULOCYTES: 0 %
IONIZED CALCIUM, WHOLE BLOOD: 1.16 MMOL/L (ref 1.12–1.32)
LYMPHOCYTES ABSOLUTE: 2.2 THOU/MM3 (ref 1–4.8)
LYMPHOCYTES NFR BLD AUTO: 35 % (ref 15–47)
MCH RBC QN AUTO: 31.6 PG (ref 26–33)
MCHC RBC AUTO-ENTMCNC: 32.1 GM/DL (ref 32.2–35.5)
MCV RBC AUTO: 99 FL (ref 80–94)
MONOCYTES ABSOLUTE: 0.5 THOU/MM3 (ref 0.4–1.3)
MONOCYTES NFR BLD AUTO: 8 % (ref 0–12)
NEUTROPHILS NFR BLD AUTO: 56 % (ref 43–75)
PLATELET # BLD AUTO: 180 THOU/MM3 (ref 130–400)
PMV BLD AUTO: 10.8 FL (ref 9.4–12.4)
POTASSIUM BLD-SCNC: 4.1 MEQ/L (ref 3.5–4.9)
PROT SERPL-MCNC: 7.9 G/DL (ref 6.1–8)
RBC # BLD AUTO: 3.99 MILL/MM3 (ref 4.7–6.1)
SEGMENTED NEUTROPHILS ABSOLUTE COUNT: 3.4 THOU/MM3 (ref 1.8–7.7)
SODIUM BLD-SCNC: 140 MEQ/L (ref 138–146)
TOTAL CO2, WHOLE BLOOD: 27 MEQ/L (ref 23–33)
TSH SERPL DL<=0.005 MIU/L-ACNC: 2 UIU/ML (ref 0.4–4.2)
WBC # BLD AUTO: 6.2 THOU/MM3 (ref 4.8–10.8)

## 2024-02-23 PROCEDURE — 99211 OFF/OP EST MAY X REQ PHY/QHP: CPT

## 2024-02-23 PROCEDURE — 80076 HEPATIC FUNCTION PANEL: CPT

## 2024-02-23 PROCEDURE — 1123F ACP DISCUSS/DSCN MKR DOCD: CPT | Performed by: INTERNAL MEDICINE

## 2024-02-23 PROCEDURE — 96413 CHEMO IV INFUSION 1 HR: CPT

## 2024-02-23 PROCEDURE — 6360000002 HC RX W HCPCS: Performed by: INTERNAL MEDICINE

## 2024-02-23 PROCEDURE — 36591 DRAW BLOOD OFF VENOUS DEVICE: CPT

## 2024-02-23 PROCEDURE — 3078F DIAST BP <80 MM HG: CPT | Performed by: INTERNAL MEDICINE

## 2024-02-23 PROCEDURE — 2580000003 HC RX 258: Performed by: INTERNAL MEDICINE

## 2024-02-23 PROCEDURE — 3074F SYST BP LT 130 MM HG: CPT | Performed by: INTERNAL MEDICINE

## 2024-02-23 PROCEDURE — 99214 OFFICE O/P EST MOD 30 MIN: CPT | Performed by: INTERNAL MEDICINE

## 2024-02-23 PROCEDURE — 82533 TOTAL CORTISOL: CPT

## 2024-02-23 PROCEDURE — 84443 ASSAY THYROID STIM HORMONE: CPT

## 2024-02-23 PROCEDURE — 85025 COMPLETE CBC W/AUTO DIFF WBC: CPT

## 2024-02-23 PROCEDURE — 96417 CHEMO IV INFUS EACH ADDL SEQ: CPT

## 2024-02-23 PROCEDURE — 80047 BASIC METABLC PNL IONIZED CA: CPT

## 2024-02-23 RX ORDER — ONDANSETRON 2 MG/ML
8 INJECTION INTRAMUSCULAR; INTRAVENOUS
Status: CANCELLED | OUTPATIENT
Start: 2024-02-23

## 2024-02-23 RX ORDER — ALBUTEROL SULFATE 90 UG/1
4 AEROSOL, METERED RESPIRATORY (INHALATION) PRN
Status: CANCELLED | OUTPATIENT
Start: 2024-02-23

## 2024-02-23 RX ORDER — HEPARIN 100 UNIT/ML
500 SYRINGE INTRAVENOUS PRN
Status: DISCONTINUED | OUTPATIENT
Start: 2024-02-23 | End: 2024-02-24 | Stop reason: HOSPADM

## 2024-02-23 RX ORDER — SODIUM CHLORIDE 9 MG/ML
5-250 INJECTION, SOLUTION INTRAVENOUS PRN
Status: CANCELLED | OUTPATIENT
Start: 2024-02-23

## 2024-02-23 RX ORDER — SODIUM CHLORIDE 9 MG/ML
INJECTION, SOLUTION INTRAVENOUS CONTINUOUS
Status: CANCELLED | OUTPATIENT
Start: 2024-02-23

## 2024-02-23 RX ORDER — SODIUM CHLORIDE 0.9 % (FLUSH) 0.9 %
5-40 SYRINGE (ML) INJECTION PRN
Status: DISCONTINUED | OUTPATIENT
Start: 2024-02-23 | End: 2024-02-24 | Stop reason: HOSPADM

## 2024-02-23 RX ORDER — SODIUM CHLORIDE 0.9 % (FLUSH) 0.9 %
5-40 SYRINGE (ML) INJECTION PRN
Status: CANCELLED | OUTPATIENT
Start: 2024-02-23

## 2024-02-23 RX ORDER — DIPHENHYDRAMINE HYDROCHLORIDE 50 MG/ML
50 INJECTION INTRAMUSCULAR; INTRAVENOUS
Status: CANCELLED | OUTPATIENT
Start: 2024-02-23

## 2024-02-23 RX ORDER — EPINEPHRINE 1 MG/ML
0.3 INJECTION, SOLUTION, CONCENTRATE INTRAVENOUS PRN
Status: CANCELLED | OUTPATIENT
Start: 2024-02-23

## 2024-02-23 RX ORDER — SODIUM CHLORIDE 9 MG/ML
5-250 INJECTION, SOLUTION INTRAVENOUS PRN
Status: DISCONTINUED | OUTPATIENT
Start: 2024-02-23 | End: 2024-02-24 | Stop reason: HOSPADM

## 2024-02-23 RX ORDER — MEPERIDINE HYDROCHLORIDE 50 MG/ML
12.5 INJECTION INTRAMUSCULAR; INTRAVENOUS; SUBCUTANEOUS PRN
Status: CANCELLED | OUTPATIENT
Start: 2024-02-23

## 2024-02-23 RX ORDER — ACETAMINOPHEN 325 MG/1
650 TABLET ORAL
Status: CANCELLED | OUTPATIENT
Start: 2024-02-23

## 2024-02-23 RX ORDER — HEPARIN SODIUM (PORCINE) LOCK FLUSH IV SOLN 100 UNIT/ML 100 UNIT/ML
500 SOLUTION INTRAVENOUS PRN
Status: CANCELLED | OUTPATIENT
Start: 2024-02-23

## 2024-02-23 RX ORDER — FAMOTIDINE 10 MG/ML
20 INJECTION, SOLUTION INTRAVENOUS
Status: CANCELLED | OUTPATIENT
Start: 2024-02-23

## 2024-02-23 RX ADMIN — HEPARIN 500 UNITS: 100 SYRINGE at 12:40

## 2024-02-23 RX ADMIN — SODIUM CHLORIDE 180 MG: 9 INJECTION, SOLUTION INTRAVENOUS at 11:34

## 2024-02-23 RX ADMIN — SODIUM CHLORIDE 60 MG: 9 INJECTION, SOLUTION INTRAVENOUS at 12:04

## 2024-02-23 RX ADMIN — SODIUM CHLORIDE 50 ML/HR: 9 INJECTION, SOLUTION INTRAVENOUS at 11:17

## 2024-02-23 RX ADMIN — SODIUM CHLORIDE, PRESERVATIVE FREE 10 ML: 5 INJECTION INTRAVENOUS at 12:40

## 2024-02-23 RX ADMIN — SODIUM CHLORIDE, PRESERVATIVE FREE 30 ML: 5 INJECTION INTRAVENOUS at 10:40

## 2024-02-23 ASSESSMENT — PAIN DESCRIPTION - DESCRIPTORS: DESCRIPTORS: CRAMPING

## 2024-02-23 ASSESSMENT — PAIN DESCRIPTION - LOCATION: LOCATION: ABDOMEN

## 2024-02-23 ASSESSMENT — PAIN SCALES - GENERAL: PAINLEVEL_OUTOF10: 7

## 2024-02-23 NOTE — PROGRESS NOTES
[facility] [place] [facility]  [facility]  [address]  [address]  [place] [id number]  Dept: [phone]  Loc: [phone]   Hematology/Oncology Consult (Clinic)        01/19/24       Chris Griffinall   1955     No ref. provider found   Donny Garnica MD       Reason:   Chief Complaint   Patient presents with    Follow-up     Large cell neuroendocrine carcinoma (HCC)          HPI:   68-year-old male who initially presented to GI and surgery for evaluation  secondary to newly diagnosed colonic mass that is near obstructing.  He had colonoscopy with Dr. Pemberton and they were Not able to get a scope through the area of concern.  Proximal transverse colon close to the hepatic flexure.  Patient admits to abdominal discomfort in the right upper quadrant.  Mild to moderate.  No severe sharp pain.  Admits to weight loss and fatigue.  Decreased appetite.  No significant nausea or vomiting.  Still passing flatus and having bowel movements.  No hematochezia.  No change in urination.  Headaches.  No lightheadedness.      CT abdomen/pelvis demonstrated colonic mass proximal transverse colon close to the hepatic flexure.  Patient denies any family or personal history of colorectal disease that he is aware of.  Small liver lesion identified on CT abdomen/pelvis.    Patient states he cannot proceed with an MRI even if it is a open MRI due to anxiety.  He is refusing to proceed with any further testing in regards to MRI.  Admits to smoking cigarettes but has been decreasing in number.  Less than half a pack a day.    Patient ended up having surgery right hemicolectomy with Dr. 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    Patient is here today with his brother and significant other. He is in a wheelchair today but said usually walks. Has been tired after the surgery, he did have some blood draining

## 2024-02-23 NOTE — PLAN OF CARE
Problem: Infection - Adult  Goal: Absence of infection at discharge  Outcome: Adequate for Discharge  Flowsheets (Taken 2/23/2024 1114)  Absence of infection at discharge:   Assess and monitor for signs and symptoms of infection   Monitor all insertion sites i.e., indwelling lines, tubes and drains  Note: Mediport site with no redness or warmth. Skin over port site intact with no signs of breakdown noted. Patient verbalizes signs/symptoms of port infection and when to notify the physician.     Problem: Safety - Adult  Goal: Free from fall injury  Outcome: Adequate for Discharge  Flowsheets (Taken 2/23/2024 1114)  Free From Fall Injury: Instruct family/caregiver on patient safety  Note: Patient free of falls this visit. Fall risks assessed. Precautions discussed.      Problem: Discharge Planning  Goal: Discharge to home or other facility with appropriate resources  Outcome: Adequate for Discharge  Flowsheets (Taken 2/23/2024 1114)  Discharge to home or other facility with appropriate resources:   Identify barriers to discharge with patient and caregiver   Identify discharge learning needs (meds, wound care, etc)  Note: Patient verbalizes understanding of discharge instructions, follow up appointment, and when to call physician if needed     Problem: Chronic Conditions and Co-morbidities  Goal: Patient's chronic conditions and co-morbidity symptoms are monitored and maintained or improved  Outcome: Adequate for Discharge  Flowsheets (Taken 2/23/2024 1114)  Care Plan - Patient's Chronic Conditions and Co-Morbidity Symptoms are Monitored and Maintained or Improved:   Monitor and assess patient's chronic conditions and comorbid symptoms for stability, deterioration, or improvement   Collaborate with multidisciplinary team to address chronic and comorbid conditions and prevent exacerbation or deterioration  Note: Patient verbalizes understanding to verbal information given on opdivo and yervoy, including action and  possible side effects. Aware to call MD if develop complications.      Care plan reviewed with patient.  Patient verbalizes understanding of the plan of care and contributes to goal setting.

## 2024-02-23 NOTE — PROGRESS NOTES
Name: Chrsi Perez  : 1955  MRN: [id number]    Oncology Navigation Follow-Up Note    Contact Type:  Medical Oncology    Subjective: appt with ONC; due for tx    Objective: Slight rash-going to try Eucerin; \"some stomach cramps\".    ONC POC:  -Labwork reviewed  -Proceed with tx today  -Use imodium for any diarrhea  -Plan repeat scan in April.  Return 3/15.    Assistance Needed: denies    Receptive to Advanced Care Planning / Palliative Care:  deferred    Referrals: N/A    Education: POC reiterated    Notes: Ernie following to assist as needed.    Electronically signed by Hannah Eugene RN on 2024 at 2:14 PM

## 2024-02-23 NOTE — PROGRESS NOTES
Patient assessed for the following post chemotherapy:    Dizziness   No  Lightheadedness  No      Acute nausea/vomiting No  Headache   No  Chest pain/pressure  No  Rash/itching   No  Shortness of breath  No    Patient kept for 20 minutes observation post infusion chemotherapy.    Patient tolerated chemotherapy treatment with opdivo and yervoy without any complications.    Last vital signs:   /62   Pulse 70   Temp 97.8 °F (36.6 °C) (Oral)   Resp 18   Wt 66.7 kg (147 lb)   SpO2 97%   BMI 22.36 kg/m²       Patient instructed if experience any of the above symptoms following today's infusion,he is to notify MD immediately or go to the emergency department.    Discharge instructions given to patient. Verbalizes understanding. Ambulated off unit with wife  with belongings.

## 2024-02-23 NOTE — DISCHARGE INSTRUCTIONS
Please contact your Oncologist if you have any questions regarding the chemotherapy optidvo and yervoy that you received today.      Patient instructed if experience any of the symptoms following today's chemotherapy / to notify MD immediately or go to emergency department.    * dizziness/lightheadedness  *acute nausea/vomiting - not relieved with medication  *headache - not relieved from Tylenol/pain medication  *chest pain/pressure  *rash/itching  *shortness of breath        Drink fluids - 48oz fluids daily  Call if develop fever/ chills/ signs or symptoms of infection

## 2024-02-26 ENCOUNTER — OFFICE VISIT (OUTPATIENT)
Dept: PALLATIVE CARE | Age: 69
End: 2024-02-26
Payer: MEDICARE

## 2024-02-26 VITALS
RESPIRATION RATE: 17 BRPM | BODY MASS INDEX: 20.62 KG/M2 | DIASTOLIC BLOOD PRESSURE: 60 MMHG | HEIGHT: 70 IN | SYSTOLIC BLOOD PRESSURE: 122 MMHG | HEART RATE: 71 BPM | OXYGEN SATURATION: 97 % | WEIGHT: 144 LBS

## 2024-02-26 DIAGNOSIS — T45.1X5A CHEMOTHERAPY-INDUCED PERIPHERAL NEUROPATHY (HCC): ICD-10-CM

## 2024-02-26 DIAGNOSIS — C18.9 MALIGNANT NEOPLASM OF COLON, UNSPECIFIED PART OF COLON (HCC): ICD-10-CM

## 2024-02-26 DIAGNOSIS — Z51.5 PALLIATIVE CARE PATIENT: ICD-10-CM

## 2024-02-26 DIAGNOSIS — G62.0 CHEMOTHERAPY-INDUCED PERIPHERAL NEUROPATHY (HCC): ICD-10-CM

## 2024-02-26 DIAGNOSIS — C7A.1 LARGE CELL NEUROENDOCRINE CARCINOMA (HCC): Primary | ICD-10-CM

## 2024-02-26 DIAGNOSIS — C78.00 MALIGNANT NEOPLASM METASTATIC TO LUNG, UNSPECIFIED LATERALITY (HCC): ICD-10-CM

## 2024-02-26 DIAGNOSIS — G89.3 CANCER RELATED PAIN: ICD-10-CM

## 2024-02-26 PROCEDURE — 3078F DIAST BP <80 MM HG: CPT | Performed by: STUDENT IN AN ORGANIZED HEALTH CARE EDUCATION/TRAINING PROGRAM

## 2024-02-26 PROCEDURE — 1123F ACP DISCUSS/DSCN MKR DOCD: CPT | Performed by: STUDENT IN AN ORGANIZED HEALTH CARE EDUCATION/TRAINING PROGRAM

## 2024-02-26 PROCEDURE — 3074F SYST BP LT 130 MM HG: CPT | Performed by: STUDENT IN AN ORGANIZED HEALTH CARE EDUCATION/TRAINING PROGRAM

## 2024-02-26 PROCEDURE — 99205 OFFICE O/P NEW HI 60 MIN: CPT | Performed by: STUDENT IN AN ORGANIZED HEALTH CARE EDUCATION/TRAINING PROGRAM

## 2024-02-26 RX ORDER — DULOXETIN HYDROCHLORIDE 30 MG/1
30 CAPSULE, DELAYED RELEASE ORAL DAILY
Qty: 30 CAPSULE | Refills: 1 | Status: SHIPPED | OUTPATIENT
Start: 2024-02-26

## 2024-02-26 RX ORDER — MORPHINE SULFATE 30 MG/1
30 TABLET, FILM COATED, EXTENDED RELEASE ORAL 2 TIMES DAILY
Qty: 60 TABLET | Refills: 0 | Status: SHIPPED | OUTPATIENT
Start: 2024-02-26 | End: 2024-03-27

## 2024-02-26 RX ORDER — OXYCODONE HYDROCHLORIDE 10 MG/1
10 TABLET ORAL EVERY 4 HOURS PRN
Qty: 180 TABLET | Refills: 0 | Status: SHIPPED | OUTPATIENT
Start: 2024-02-26 | End: 2024-03-27

## 2024-02-26 NOTE — PATIENT INSTRUCTIONS
Continue current plan of care for chronic diseases per primary and specialist teams  Start Morphine ER 30 mg twice a day scheduled for  cancer related pain  Continue Oxycodone IR 10 mg every 4 hours as needed for breakthrough cancer related pain  Start Duloxetine 30 mg every day scheduled for  chemotherapy induced peripheral neuropathy  We will review the POLST form at the next appointment  Palliative Care will continue to have goals of care discussions with the patient and/or family  Controlled substances agreement signed  PDMP reviewed  Please call the office if your symptoms worsen or if you were to develop new symptoms  Please call the palliative care office when you need refills

## 2024-02-26 NOTE — PROGRESS NOTES
His pain is relatively well-controlled on the oxycodone 10 mg every 4 hours.  We will start him on extended release morphine so hopefully he has better pain control and less frequent uses of breakthrough oxycodone.  We will also start Cymbalta for chemotherapy-induced peripheral neuropathy.  A pain contract was signed.  He will have a discussion with spiritual care regarding power of  paperwork.  He indicated that he wanted brother to be his power of .  He was provided a POLST form and he will continue to think about how he would like his care to proceed moving forward as far as it pertains to aggressive things like resuscitation and mechanical ventilation.  He understands that the treatments he has received so far have not worked to control his cancer and that his prognosis is not good.  He will follow-up in 2 months or earlier as needed.     Diagnosis Orders   1. Large cell neuroendocrine carcinoma (HCC)  morphine (MS CONTIN) 30 MG extended release tablet    oxyCODONE HCl (OXY-IR) 10 MG immediate release tablet      2. Malignant neoplasm of colon, unspecified part of colon (HCC)  morphine (MS CONTIN) 30 MG extended release tablet    oxyCODONE HCl (OXY-IR) 10 MG immediate release tablet      3. Malignant neoplasm metastatic to lung, unspecified laterality (HCC)  morphine (MS CONTIN) 30 MG extended release tablet    oxyCODONE HCl (OXY-IR) 10 MG immediate release tablet      4. Palliative care patient  morphine (MS CONTIN) 30 MG extended release tablet    oxyCODONE HCl (OXY-IR) 10 MG immediate release tablet    DULoxetine (CYMBALTA) 30 MG extended release capsule    [facility]      5. Cancer related pain  morphine (MS CONTIN) 30 MG extended release tablet    oxyCODONE HCl (OXY-IR) 10 MG immediate release tablet    DULoxetine (CYMBALTA) 30 MG extended release capsule      6. Chemotherapy-induced peripheral neuropathy (HCC)  DULoxetine (CYMBALTA) 30 MG extended release capsule

## 2024-02-29 ENCOUNTER — CLINICAL DOCUMENTATION (OUTPATIENT)
Dept: SPIRITUAL SERVICES | Facility: CLINIC | Age: 69
End: 2024-02-29

## 2024-02-29 NOTE — ACP (ADVANCE CARE PLANNING)
Advance Care Planning   Ambulatory ACP Specialist Patient Outreach    Date:  2/29/2024    ACP Specialist:  Lc June    Outreach call to patient in follow-up to ACP Specialist referral from:Dr. Talon Marshall - Palliative Care    [] PCP  [x] Provider   [] Ambulatory Care Management [] Other     For:                  [x] Advance Directive Assistance              [] Complete Portable DNR order              [] Complete POST/POLST/MOST              [] Code Status Discussion             [x] Discuss Goals of Care             [] Early ACP Decision-Making              [] Other (Specify)    Date Referral Received: 2/26/2024    Next Step:   [] ACP scheduled conversation  [x] Outreach again in one week               [] Email / Mail ACP Info Sheets  [] Email / Mail Advance Directive   [] Closing referral.  Routing closure to referring provider/staff and to ACP Specialist .    [] Closure letter mailed to patient with invitation to contact ACP Specialist if / when ready.   [] Other (Specify here):         [x] At this time, Healthcare Decision Maker Is:        [] Primary agent named in scanned advance directive.    [x] Legal Next of Kin.     [] Unable to determine legal decision maker at this time.         Outreaches:       [x] 1st -  Date:   2/29/2024               Intervention:  [] Spoke with Patient   [] Left Voice mail [] Email / Mail    [] Truf[place]art  [x] Other (Specify) :     Outcomes:  made an initial attempt to contact Chris via telephone call to offer support, if desired, for the completion of Advance Directives documents as part of an Advance Care Planning conversation.   No answer. No voicemail set up. No message could be left.    will follow-up.       Thank you for this referral.

## 2024-03-12 ENCOUNTER — CLINICAL DOCUMENTATION (OUTPATIENT)
Dept: SPIRITUAL SERVICES | Facility: CLINIC | Age: 69
End: 2024-03-12

## 2024-03-12 NOTE — ACP (ADVANCE CARE PLANNING)
telephone call to offer support, if desired, for the completion of Advance Directives documents as part of an Advance Care Planning conversation.   No answer. No voicemail set up. No message could be left.    will follow-up.         Thank you for this referral.

## 2024-03-14 DIAGNOSIS — C7A.1 LARGE CELL NEUROENDOCRINE CARCINOMA (HCC): Primary | ICD-10-CM

## 2024-03-14 DIAGNOSIS — R93.89 ABNORMAL FINDINGS ON DIAGNOSTIC IMAGING OF OTHER SPECIFIED BODY STRUCTURES: ICD-10-CM

## 2024-03-15 ENCOUNTER — HOSPITAL ENCOUNTER (OUTPATIENT)
Dept: INFUSION THERAPY | Age: 69
Discharge: HOME | End: 2024-03-15
Payer: MEDICARE

## 2024-03-15 ENCOUNTER — OFFICE VISIT (OUTPATIENT)
Dept: ONCOLOGY | Age: 69
End: 2024-03-15
Payer: MEDICARE

## 2024-03-15 VITALS
WEIGHT: 143 LBS | HEART RATE: 67 BPM | RESPIRATION RATE: 18 BRPM | OXYGEN SATURATION: 98 % | TEMPERATURE: 98.1 F | SYSTOLIC BLOOD PRESSURE: 134 MMHG | HEIGHT: 70 IN | BODY MASS INDEX: 20.47 KG/M2 | DIASTOLIC BLOOD PRESSURE: 63 MMHG

## 2024-03-15 VITALS
SYSTOLIC BLOOD PRESSURE: 134 MMHG | DIASTOLIC BLOOD PRESSURE: 63 MMHG | TEMPERATURE: 98.1 F | OXYGEN SATURATION: 98 % | HEART RATE: 67 BPM | WEIGHT: 143 LBS | BODY MASS INDEX: 20.47 KG/M2 | HEIGHT: 70 IN | RESPIRATION RATE: 18 BRPM

## 2024-03-15 DIAGNOSIS — C79.9 METASTATIC MALIGNANT NEOPLASM, UNSPECIFIED SITE (HCC): Primary | ICD-10-CM

## 2024-03-15 DIAGNOSIS — E03.2 HYPOTHYROIDISM DUE TO MEDICATION: ICD-10-CM

## 2024-03-15 DIAGNOSIS — C7A.1 LARGE CELL NEUROENDOCRINE CARCINOMA (HCC): ICD-10-CM

## 2024-03-15 DIAGNOSIS — C79.9 METASTATIC MALIGNANT NEOPLASM, UNSPECIFIED SITE (HCC): ICD-10-CM

## 2024-03-15 DIAGNOSIS — E27.3 ADRENAL INSUFFICIENCY DUE TO CANCER THERAPY (HCC): ICD-10-CM

## 2024-03-15 DIAGNOSIS — C7A.1 LARGE CELL NEUROENDOCRINE CARCINOMA (HCC): Primary | ICD-10-CM

## 2024-03-15 DIAGNOSIS — R93.89 ABNORMAL FINDINGS ON DIAGNOSTIC IMAGING OF OTHER SPECIFIED BODY STRUCTURES: ICD-10-CM

## 2024-03-15 LAB
ABSOLUTE IMMATURE GRANULOCYTE: 0.01 THOU/MM3 (ref 0–0.07)
ALBUMIN SERPL BCG-MCNC: 3.9 G/DL (ref 3.5–5.1)
ALP SERPL-CCNC: 126 U/L (ref 38–126)
ALT SERPL W/O P-5'-P-CCNC: 9 U/L (ref 11–66)
AST SERPL-CCNC: 19 U/L (ref 5–40)
BASOPHILS ABSOLUTE: 0 THOU/MM3 (ref 0–0.1)
BASOPHILS NFR BLD AUTO: 0 % (ref 0–3)
BILIRUB CONJ SERPL-MCNC: < 0.2 MG/DL (ref 0–0.3)
BILIRUB SERPL-MCNC: 0.4 MG/DL (ref 0.3–1.2)
BUN BLDP-MCNC: 11 MG/DL (ref 8–26)
CHLORIDE BLD-SCNC: 105 MEQ/L (ref 98–109)
CORTIS SERPL-MCNC: 11.59 UG/DL
CORTISOL COLLECTION INFO: NORMAL
CREAT BLD-MCNC: 0.7 MG/DL (ref 0.5–1.2)
EOSINOPHIL NFR BLD AUTO: 1 % (ref 0–4)
EOSINOPHILS ABSOLUTE: 0.1 THOU/MM3 (ref 0–0.4)
ERYTHROCYTE [DISTWIDTH] IN BLOOD BY AUTOMATED COUNT: 15.6 % (ref 11.5–14.5)
GFR SERPL CREATININE-BSD FRML MDRD: > 60 ML/MIN/1.73M2
GLUCOSE BLD-MCNC: 122 MG/DL (ref 70–108)
HCT VFR BLD AUTO: 40 % (ref 42–52)
HGB BLD-MCNC: 13.2 GM/DL (ref 14–18)
IMMATURE GRANULOCYTES: 0 %
IONIZED CALCIUM, WHOLE BLOOD: 1.17 MMOL/L (ref 1.12–1.32)
LYMPHOCYTES ABSOLUTE: 2.1 THOU/MM3 (ref 1–4.8)
LYMPHOCYTES NFR BLD AUTO: 28 % (ref 15–47)
MCH RBC QN AUTO: 32 PG (ref 26–33)
MCHC RBC AUTO-ENTMCNC: 33 GM/DL (ref 32.2–35.5)
MCV RBC AUTO: 97 FL (ref 80–94)
MONOCYTES ABSOLUTE: 0.6 THOU/MM3 (ref 0.4–1.3)
MONOCYTES NFR BLD AUTO: 9 % (ref 0–12)
NEUTROPHILS NFR BLD AUTO: 62 % (ref 43–75)
PLATELET # BLD AUTO: 159 THOU/MM3 (ref 130–400)
PMV BLD AUTO: 11.2 FL (ref 9.4–12.4)
POTASSIUM BLD-SCNC: 3.9 MEQ/L (ref 3.5–4.9)
PROT SERPL-MCNC: 8 G/DL (ref 6.1–8)
RBC # BLD AUTO: 4.12 MILL/MM3 (ref 4.7–6.1)
SEGMENTED NEUTROPHILS ABSOLUTE COUNT: 4.6 THOU/MM3 (ref 1.8–7.7)
SODIUM BLD-SCNC: 139 MEQ/L (ref 138–146)
TOTAL CO2, WHOLE BLOOD: 28 MEQ/L (ref 23–33)
TSH SERPL DL<=0.005 MIU/L-ACNC: 1.78 UIU/ML (ref 0.4–4.2)
WBC # BLD AUTO: 7.3 THOU/MM3 (ref 4.8–10.8)

## 2024-03-15 PROCEDURE — 3075F SYST BP GE 130 - 139MM HG: CPT | Performed by: INTERNAL MEDICINE

## 2024-03-15 PROCEDURE — 96417 CHEMO IV INFUS EACH ADDL SEQ: CPT

## 2024-03-15 PROCEDURE — 84443 ASSAY THYROID STIM HORMONE: CPT

## 2024-03-15 PROCEDURE — 6360000002 HC RX W HCPCS: Performed by: INTERNAL MEDICINE

## 2024-03-15 PROCEDURE — 85025 COMPLETE CBC W/AUTO DIFF WBC: CPT

## 2024-03-15 PROCEDURE — 96413 CHEMO IV INFUSION 1 HR: CPT

## 2024-03-15 PROCEDURE — 99214 OFFICE O/P EST MOD 30 MIN: CPT | Performed by: INTERNAL MEDICINE

## 2024-03-15 PROCEDURE — 2580000003 HC RX 258: Performed by: INTERNAL MEDICINE

## 2024-03-15 PROCEDURE — 80047 BASIC METABLC PNL IONIZED CA: CPT

## 2024-03-15 PROCEDURE — 1123F ACP DISCUSS/DSCN MKR DOCD: CPT | Performed by: INTERNAL MEDICINE

## 2024-03-15 PROCEDURE — 3078F DIAST BP <80 MM HG: CPT | Performed by: INTERNAL MEDICINE

## 2024-03-15 PROCEDURE — 36591 DRAW BLOOD OFF VENOUS DEVICE: CPT

## 2024-03-15 PROCEDURE — 82533 TOTAL CORTISOL: CPT

## 2024-03-15 PROCEDURE — 80076 HEPATIC FUNCTION PANEL: CPT

## 2024-03-15 PROCEDURE — 99211 OFF/OP EST MAY X REQ PHY/QHP: CPT

## 2024-03-15 RX ORDER — SODIUM CHLORIDE 0.9 % (FLUSH) 0.9 %
5-40 SYRINGE (ML) INJECTION PRN
Status: CANCELLED | OUTPATIENT
Start: 2024-03-15

## 2024-03-15 RX ORDER — MEPERIDINE HYDROCHLORIDE 50 MG/ML
12.5 INJECTION INTRAMUSCULAR; INTRAVENOUS; SUBCUTANEOUS PRN
Status: CANCELLED | OUTPATIENT
Start: 2024-03-15

## 2024-03-15 RX ORDER — EPINEPHRINE 1 MG/ML
0.3 INJECTION, SOLUTION, CONCENTRATE INTRAVENOUS PRN
Status: CANCELLED | OUTPATIENT
Start: 2024-03-15

## 2024-03-15 RX ORDER — SODIUM CHLORIDE 9 MG/ML
5-250 INJECTION, SOLUTION INTRAVENOUS PRN
Status: CANCELLED | OUTPATIENT
Start: 2024-03-15

## 2024-03-15 RX ORDER — ACETAMINOPHEN 325 MG/1
650 TABLET ORAL
Status: CANCELLED | OUTPATIENT
Start: 2024-03-15

## 2024-03-15 RX ORDER — SODIUM CHLORIDE 0.9 % (FLUSH) 0.9 %
5-40 SYRINGE (ML) INJECTION PRN
Status: DISCONTINUED | OUTPATIENT
Start: 2024-03-15 | End: 2024-03-16 | Stop reason: HOSPADM

## 2024-03-15 RX ORDER — FAMOTIDINE 10 MG/ML
20 INJECTION, SOLUTION INTRAVENOUS
Status: CANCELLED | OUTPATIENT
Start: 2024-03-15

## 2024-03-15 RX ORDER — SODIUM CHLORIDE 9 MG/ML
5-250 INJECTION, SOLUTION INTRAVENOUS PRN
Status: DISCONTINUED | OUTPATIENT
Start: 2024-03-15 | End: 2024-03-16 | Stop reason: HOSPADM

## 2024-03-15 RX ORDER — ONDANSETRON 2 MG/ML
8 INJECTION INTRAMUSCULAR; INTRAVENOUS
Status: CANCELLED | OUTPATIENT
Start: 2024-03-15

## 2024-03-15 RX ORDER — ALBUTEROL SULFATE 90 UG/1
4 AEROSOL, METERED RESPIRATORY (INHALATION) PRN
Status: CANCELLED | OUTPATIENT
Start: 2024-03-15

## 2024-03-15 RX ORDER — HEPARIN 100 UNIT/ML
500 SYRINGE INTRAVENOUS PRN
Status: DISCONTINUED | OUTPATIENT
Start: 2024-03-15 | End: 2024-03-16 | Stop reason: HOSPADM

## 2024-03-15 RX ORDER — SODIUM CHLORIDE 9 MG/ML
INJECTION, SOLUTION INTRAVENOUS CONTINUOUS
Status: CANCELLED | OUTPATIENT
Start: 2024-03-15

## 2024-03-15 RX ORDER — HEPARIN SODIUM (PORCINE) LOCK FLUSH IV SOLN 100 UNIT/ML 100 UNIT/ML
500 SOLUTION INTRAVENOUS PRN
Status: CANCELLED | OUTPATIENT
Start: 2024-03-15

## 2024-03-15 RX ORDER — DIPHENHYDRAMINE HYDROCHLORIDE 50 MG/ML
50 INJECTION INTRAMUSCULAR; INTRAVENOUS
Status: CANCELLED | OUTPATIENT
Start: 2024-03-15

## 2024-03-15 RX ADMIN — SODIUM CHLORIDE, PRESERVATIVE FREE 20 ML: 5 INJECTION INTRAVENOUS at 08:30

## 2024-03-15 RX ADMIN — SODIUM CHLORIDE 60 MG: 9 INJECTION, SOLUTION INTRAVENOUS at 10:00

## 2024-03-15 RX ADMIN — SODIUM CHLORIDE, PRESERVATIVE FREE 10 ML: 5 INJECTION INTRAVENOUS at 10:31

## 2024-03-15 RX ADMIN — SODIUM CHLORIDE 180 MG: 9 INJECTION, SOLUTION INTRAVENOUS at 09:25

## 2024-03-15 RX ADMIN — Medication 500 UNITS: at 10:31

## 2024-03-15 RX ADMIN — SODIUM CHLORIDE 20 ML/HR: 9 INJECTION, SOLUTION INTRAVENOUS at 09:12

## 2024-03-15 ASSESSMENT — PAIN DESCRIPTION - DESCRIPTORS
DESCRIPTORS: CRAMPING
DESCRIPTORS: CRAMPING

## 2024-03-15 ASSESSMENT — PAIN DESCRIPTION - PAIN TYPE
TYPE: CHRONIC PAIN
TYPE: CHRONIC PAIN

## 2024-03-15 ASSESSMENT — PAIN DESCRIPTION - LOCATION
LOCATION: ABDOMEN
LOCATION: ABDOMEN

## 2024-03-15 ASSESSMENT — PAIN SCALES - GENERAL: PAINLEVEL_OUTOF10: 8

## 2024-03-15 NOTE — PATIENT INSTRUCTIONS
Return in about 3 weeks (around 4/5/2024).  Orders Placed This Encounter   Procedures    PET CT SKULL BASE TO MID THIGH    CT HEAD W WO CONTRAST    CBC With Auto Differential    Comprehensive metabolic panel    CBC with Auto Differential    Hepatic Function Panel    POC PANEL BMP W/IOCA    TSH With Reflex Ft4    Cortisol Total    PET prior to next clinic visit.  CT head with and without contrast.  Labs reviewed, proceed with treatment.

## 2024-03-15 NOTE — PROGRESS NOTES
No edema.   Lymphadenopathy:      Cervical: No cervical adenopathy.   Skin:     Coloration: Skin is pale. Skin is not jaundiced.      Findings: No bruising or erythema.   Neurological:      General: No focal deficit present.      Mental Status: He is alert and oriented to person, place, and time.      Cranial Nerves: No cranial nerve deficit.      Sensory: No sensory deficit.      Motor: No weakness.      Coordination: Coordination normal.      Comments: No apparent focal deficits   Psychiatric:         Mood and Affect: Mood normal.          CBC with Differential:      Lab Results   Component Value Date/Time    WBC 5.3 01/19/2024 10:18 AM    RBC 3.53 01/19/2024 10:18 AM    HGB 11.1 01/19/2024 10:18 AM    HCT 35.7 01/19/2024 10:18 AM     01/19/2024 10:18 AM     01/19/2024 10:18 AM    MCH 31.4 01/19/2024 10:18 AM    MCHC 31.1 01/19/2024 10:18 AM    RDW 15.8 01/19/2024 10:18 AM    NRBC 0 09/25/2023 11:45 AM    SEGSPCT 83.3 09/25/2023 11:45 AM    MONOPCT 3.6 09/25/2023 11:45 AM    MONOSABS 0.4 01/19/2024 10:18 AM    LYMPHSABS 1.6 01/19/2024 10:18 AM    EOSABS 0.1 01/19/2024 10:18 AM    BASOSABS 0.0 01/19/2024 10:18 AM     Lab Results   Component Value Date/Time    SEGSABS 3.3 01/19/2024 10:18 AM       CMP:    Lab Results   Component Value Date/Time     01/19/2024 10:18 AM     09/24/2023 04:57 AM    K 4.1 01/19/2024 10:18 AM    K 4.0 09/24/2023 04:57 AM    K 4.0 07/25/2023 04:11 AM     09/24/2023 04:57 AM    CO2 28 09/24/2023 04:57 AM    BUN 14 09/24/2023 04:57 AM    CREATININE 0.5 01/19/2024 10:18 AM    CREATININE 0.5 09/24/2023 04:57 AM    LABGLOM >60 01/19/2024 10:18 AM    GLUCOSE 92 09/24/2023 04:57 AM    PROT 7.3 01/19/2024 10:18 AM    LABALBU 3.6 01/19/2024 10:18 AM    CALCIUM 8.4 09/24/2023 04:57 AM    BILITOT 0.4 01/19/2024 10:18 AM    ALKPHOS 157 01/19/2024 10:18 AM     01/19/2024 10:18 AM     01/19/2024 10:18 AM       BMP:    Lab Results   Component Value Date/Time

## 2024-03-15 NOTE — PLAN OF CARE
Care plan reviewed with patient.  Patient  verbalized understanding of the plan of care and contribute to goal setting.    Problem: Pain  Goal: Verbalizes/displays adequate comfort level or baseline comfort level  Outcome: Adequate for Discharge  Flowsheets (Taken 3/15/2024 1110)  Verbalizes/displays adequate comfort level or baseline comfort level:   Encourage patient to monitor pain and request assistance   Administer analgesics based on type and severity of pain and evaluate response  Note: Takes pain medications as prescribed     Problem: Safety - Adult  Goal: Free from fall injury  Outcome: Adequate for Discharge  Flowsheets (Taken 3/15/2024 1110)  Free From Fall Injury:   Instruct family/caregiver on patient safety   Based on caregiver fall risk screen, instruct family/caregiver to ask for assistance with transferring infant if caregiver noted to have fall risk factors  Note: Free from falls while in infusion center. Verbalized understanding of fall prevention and to ask for assistance with ambulation     Problem: Discharge Planning  Goal: Discharge to home or other facility with appropriate resources  Outcome: Adequate for Discharge  Flowsheets (Taken 3/15/2024 1110)  Discharge to home or other facility with appropriate resources:   Identify barriers to discharge with patient and caregiver   Identify discharge learning needs (meds, wound care, etc)  Note: Verbalized understanding of discharge instructions, follow ups and when to call doctor     Problem: Chronic Conditions and Co-morbidities  Goal: Patient's chronic conditions and co-morbidity symptoms are monitored and maintained or improved  Outcome: Adequate for Discharge  Flowsheets (Taken 3/15/2024 1110)  Care Plan - Patient's Chronic Conditions and Co-Morbidity Symptoms are Monitored and Maintained or Improved:   Monitor and assess patient's chronic conditions and comorbid symptoms for stability, deterioration, or improvement   Collaborate with  multidisciplinary team to address chronic and comorbid conditions and prevent exacerbation or deterioration  Note: Patient verbalizes understanding to verbal information given on opdivo and yervoy,action and possible side effects. Aware to call MD if develop complications.      Problem: Infection - Adult  Goal: Absence of infection at discharge  Outcome: Adequate for Discharge  Flowsheets (Taken 3/15/2024 1110)  Absence of infection at discharge:   Assess and monitor for signs and symptoms of infection   Monitor lab/diagnostic results   Monitor all insertion sites i.e., indwelling lines, tubes and drains  Note: Mediport site with no redness or warmth. Skin over port intact with no signs of breakdown noted. Patient verbalizes signs/symptoms of port infection and when to notify the physician.

## 2024-03-15 NOTE — PROGRESS NOTES
Patient assessed for the following post chemotherapy:    Dizziness   No  Lightheadedness  no      Acute nausea/vomiting No  Headache   No  Chest pain/pressure  No  Rash/itching   No  Shortness of breath  No    Patient kept for 20 minutes observation post infusion chemotherapy.    Patient tolerated chemotherapy treatment opdivo and yervoy without any complications.    Last vital signs:   /63   Pulse 67   Temp 98.1 °F (36.7 °C) (Oral)   Resp 18   Ht 1.778 m (5' 10\")   Wt 64.9 kg (143 lb)   SpO2 98%   BMI 20.52 kg/m²       Patient instructed if experience any of the above symptoms following today's infusion,he/she is to notify MD immediately or go to the emergency department.    Discharge instructions given to patient. Verbalizes understanding. Ambulated off unit -er self with belongings.

## 2024-03-27 ENCOUNTER — HOSPITAL ENCOUNTER (OUTPATIENT)
Dept: PET IMAGING | Age: 69
Discharge: HOME | End: 2024-03-27
Attending: INTERNAL MEDICINE

## 2024-03-27 ENCOUNTER — TELEPHONE (OUTPATIENT)
Dept: SPIRITUAL SERVICES | Facility: CLINIC | Age: 69
End: 2024-03-27

## 2024-03-27 DIAGNOSIS — C7A.1 LARGE CELL NEUROENDOCRINE CARCINOMA (HCC): ICD-10-CM

## 2024-03-27 DIAGNOSIS — T45.1X5A CHEMOTHERAPY-INDUCED PERIPHERAL NEUROPATHY (HCC): ICD-10-CM

## 2024-03-27 DIAGNOSIS — C18.9 MALIGNANT NEOPLASM OF COLON, UNSPECIFIED PART OF COLON (HCC): ICD-10-CM

## 2024-03-27 DIAGNOSIS — Z51.5 PALLIATIVE CARE PATIENT: ICD-10-CM

## 2024-03-27 DIAGNOSIS — G62.0 CHEMOTHERAPY-INDUCED PERIPHERAL NEUROPATHY (HCC): ICD-10-CM

## 2024-03-27 DIAGNOSIS — G89.3 CANCER RELATED PAIN: ICD-10-CM

## 2024-03-27 DIAGNOSIS — C78.00 MALIGNANT NEOPLASM METASTATIC TO LUNG, UNSPECIFIED LATERALITY (HCC): ICD-10-CM

## 2024-03-27 NOTE — TELEPHONE ENCOUNTER
made 3rd attempt to contact Chris via telephone call to offer support, if desired, for the completion of Advance Directives documents as part of an Advance Care Planning conversation. No answer. No voicemail set up. No message could be left.  will follow-up

## 2024-03-28 ENCOUNTER — HOSPITAL ENCOUNTER (OUTPATIENT)
Dept: PET IMAGING | Age: 69
Discharge: HOME | End: 2024-03-28
Attending: INTERNAL MEDICINE
Payer: MEDICARE

## 2024-03-28 PROCEDURE — 3430000000 HC RX DIAGNOSTIC RADIOPHARMACEUTICAL: Performed by: INTERNAL MEDICINE

## 2024-03-28 PROCEDURE — 78815 PET IMAGE W/CT SKULL-THIGH: CPT

## 2024-03-28 PROCEDURE — A9609 HC RX DIAGNOSTIC RADIOPHARMACEUTICAL: HCPCS | Performed by: INTERNAL MEDICINE

## 2024-03-28 RX ORDER — FLUDEOXYGLUCOSE F 18 200 MCI/ML
8.9 INJECTION, SOLUTION INTRAVENOUS
Status: COMPLETED | OUTPATIENT
Start: 2024-03-28 | End: 2024-03-28

## 2024-03-28 RX ORDER — OXYCODONE HYDROCHLORIDE 10 MG/1
10 TABLET ORAL EVERY 4 HOURS PRN
Qty: 180 TABLET | Refills: 0 | Status: SHIPPED | OUTPATIENT
Start: 2024-03-28 | End: 2024-04-27

## 2024-03-28 RX ORDER — MORPHINE SULFATE 30 MG/1
30 TABLET, FILM COATED, EXTENDED RELEASE ORAL 2 TIMES DAILY
Qty: 60 TABLET | Refills: 0 | Status: SHIPPED | OUTPATIENT
Start: 2024-03-28 | End: 2024-04-27

## 2024-03-28 RX ORDER — DULOXETIN HYDROCHLORIDE 30 MG/1
30 CAPSULE, DELAYED RELEASE ORAL DAILY
Qty: 30 CAPSULE | Refills: 1 | Status: SHIPPED | OUTPATIENT
Start: 2024-03-28

## 2024-03-28 RX ADMIN — FLUDEOXYGLUCOSE F 18 8.9 MILLICURIE: 200 INJECTION, SOLUTION INTRAVENOUS at 08:30

## 2024-04-05 ENCOUNTER — HOSPITAL ENCOUNTER (OUTPATIENT)
Dept: INFUSION THERAPY | Age: 69
Discharge: HOME | End: 2024-04-05
Payer: MEDICARE

## 2024-04-05 ENCOUNTER — OFFICE VISIT (OUTPATIENT)
Dept: ONCOLOGY | Age: 69
End: 2024-04-05
Payer: MEDICARE

## 2024-04-05 VITALS
RESPIRATION RATE: 18 BRPM | HEIGHT: 70 IN | BODY MASS INDEX: 20.9 KG/M2 | TEMPERATURE: 97.8 F | WEIGHT: 146 LBS | HEART RATE: 63 BPM | OXYGEN SATURATION: 95 %

## 2024-04-05 VITALS
BODY MASS INDEX: 20.9 KG/M2 | OXYGEN SATURATION: 95 % | WEIGHT: 146 LBS | HEART RATE: 63 BPM | RESPIRATION RATE: 18 BRPM | TEMPERATURE: 97.8 F | HEIGHT: 70 IN

## 2024-04-05 DIAGNOSIS — R93.89 ABNORMAL FINDINGS ON DIAGNOSTIC IMAGING OF OTHER SPECIFIED BODY STRUCTURES: ICD-10-CM

## 2024-04-05 DIAGNOSIS — C7A.1 LARGE CELL NEUROENDOCRINE CARCINOMA (HCC): Primary | ICD-10-CM

## 2024-04-05 DIAGNOSIS — C7A.1 LARGE CELL NEUROENDOCRINE CARCINOMA (HCC): ICD-10-CM

## 2024-04-05 DIAGNOSIS — C79.9 METASTATIC MALIGNANT NEOPLASM, UNSPECIFIED SITE (HCC): ICD-10-CM

## 2024-04-05 DIAGNOSIS — E03.2 HYPOTHYROIDISM DUE TO MEDICATION: ICD-10-CM

## 2024-04-05 DIAGNOSIS — E27.3 ADRENAL INSUFFICIENCY DUE TO CANCER THERAPY (HCC): ICD-10-CM

## 2024-04-05 DIAGNOSIS — C79.9 METASTATIC MALIGNANT NEOPLASM, UNSPECIFIED SITE (HCC): Primary | ICD-10-CM

## 2024-04-05 LAB
ALBUMIN SERPL BCG-MCNC: 3.6 G/DL (ref 3.5–5.1)
ALP SERPL-CCNC: 104 U/L (ref 38–126)
ALT SERPL W/O P-5'-P-CCNC: 25 U/L (ref 11–66)
AST SERPL-CCNC: 38 U/L (ref 5–40)
BASOPHILS ABSOLUTE: 0 THOU/MM3 (ref 0–0.1)
BASOPHILS NFR BLD AUTO: 0 % (ref 0–3)
BILIRUB CONJ SERPL-MCNC: < 0.2 MG/DL (ref 0–0.3)
BILIRUB SERPL-MCNC: 0.4 MG/DL (ref 0.3–1.2)
BUN BLDP-MCNC: 16 MG/DL (ref 8–26)
CHLORIDE BLD-SCNC: 103 MEQ/L (ref 98–109)
CORTIS SERPL-MCNC: 15.57 UG/DL
CORTISOL COLLECTION INFO: NORMAL
CREAT BLD-MCNC: 0.7 MG/DL (ref 0.5–1.2)
EOSINOPHIL NFR BLD AUTO: 1 % (ref 0–4)
EOSINOPHILS ABSOLUTE: 0.1 THOU/MM3 (ref 0–0.4)
ERYTHROCYTE [DISTWIDTH] IN BLOOD BY AUTOMATED COUNT: 16.2 % (ref 11.5–14.5)
GFR SERPL CREATININE-BSD FRML MDRD: > 90 ML/MIN/1.73M2
GLUCOSE BLD-MCNC: 114 MG/DL (ref 70–108)
HCT VFR BLD AUTO: 38.8 % (ref 42–52)
HGB BLD-MCNC: 12.7 GM/DL (ref 14–18)
IMMATURE GRANULOCYTES %: 0 %
IMMATURE GRANULOCYTES ABSOLUTE: 0.02 THOU/MM3 (ref 0–0.07)
IONIZED CALCIUM, WHOLE BLOOD: 1.13 MMOL/L (ref 1.12–1.32)
LYMPHOCYTES ABSOLUTE: 1.6 THOU/MM3 (ref 1–4.8)
LYMPHOCYTES NFR BLD AUTO: 19 % (ref 15–47)
MCH RBC QN AUTO: 31.8 PG (ref 26–33)
MCHC RBC AUTO-ENTMCNC: 32.7 GM/DL (ref 32.2–35.5)
MCV RBC AUTO: 97 FL (ref 80–94)
MONOCYTES ABSOLUTE: 0.6 THOU/MM3 (ref 0.4–1.3)
MONOCYTES NFR BLD AUTO: 7 % (ref 0–12)
NEUTROPHILS NFR BLD AUTO: 72 % (ref 43–75)
PLATELET # BLD AUTO: 161 THOU/MM3 (ref 130–400)
PMV BLD AUTO: 11.5 FL (ref 9.4–12.4)
POTASSIUM BLD-SCNC: 3.7 MEQ/L (ref 3.5–4.9)
PROT SERPL-MCNC: 7.2 G/DL (ref 6.1–8)
RBC # BLD AUTO: 4 MILL/MM3 (ref 4.7–6.1)
SEGMENTED NEUTROPHILS ABSOLUTE COUNT: 5.8 THOU/MM3 (ref 1.8–7.7)
SODIUM BLD-SCNC: 140 MEQ/L (ref 138–146)
TOTAL CO2, WHOLE BLOOD: 30 MEQ/L (ref 23–33)
TSH SERPL DL<=0.005 MIU/L-ACNC: 1.39 UIU/ML (ref 0.4–4.2)
WBC # BLD AUTO: 8.1 THOU/MM3 (ref 4.8–10.8)

## 2024-04-05 PROCEDURE — 82533 TOTAL CORTISOL: CPT

## 2024-04-05 PROCEDURE — 80047 BASIC METABLC PNL IONIZED CA: CPT

## 2024-04-05 PROCEDURE — 85025 COMPLETE CBC W/AUTO DIFF WBC: CPT

## 2024-04-05 PROCEDURE — 1123F ACP DISCUSS/DSCN MKR DOCD: CPT | Performed by: INTERNAL MEDICINE

## 2024-04-05 PROCEDURE — 6360000002 HC RX W HCPCS: Performed by: INTERNAL MEDICINE

## 2024-04-05 PROCEDURE — 84443 ASSAY THYROID STIM HORMONE: CPT

## 2024-04-05 PROCEDURE — 36591 DRAW BLOOD OFF VENOUS DEVICE: CPT

## 2024-04-05 PROCEDURE — 2580000003 HC RX 258: Performed by: INTERNAL MEDICINE

## 2024-04-05 PROCEDURE — 99214 OFFICE O/P EST MOD 30 MIN: CPT | Performed by: INTERNAL MEDICINE

## 2024-04-05 PROCEDURE — 80076 HEPATIC FUNCTION PANEL: CPT

## 2024-04-05 PROCEDURE — 99211 OFF/OP EST MAY X REQ PHY/QHP: CPT

## 2024-04-05 PROCEDURE — 96413 CHEMO IV INFUSION 1 HR: CPT

## 2024-04-05 PROCEDURE — 36415 COLL VENOUS BLD VENIPUNCTURE: CPT

## 2024-04-05 RX ORDER — EPINEPHRINE 1 MG/ML
0.3 INJECTION, SOLUTION, CONCENTRATE INTRAVENOUS PRN
Status: CANCELLED | OUTPATIENT
Start: 2024-04-05

## 2024-04-05 RX ORDER — ACETAMINOPHEN 325 MG/1
650 TABLET ORAL
Status: CANCELLED | OUTPATIENT
Start: 2024-04-05

## 2024-04-05 RX ORDER — SODIUM CHLORIDE 9 MG/ML
5-250 INJECTION, SOLUTION INTRAVENOUS PRN
Status: DISCONTINUED | OUTPATIENT
Start: 2024-04-05 | End: 2024-04-06 | Stop reason: HOSPADM

## 2024-04-05 RX ORDER — HEPARIN 100 UNIT/ML
500 SYRINGE INTRAVENOUS PRN
OUTPATIENT
Start: 2024-04-05

## 2024-04-05 RX ORDER — HEPARIN 100 UNIT/ML
500 SYRINGE INTRAVENOUS PRN
Status: DISCONTINUED | OUTPATIENT
Start: 2024-04-05 | End: 2024-04-06 | Stop reason: HOSPADM

## 2024-04-05 RX ORDER — SODIUM CHLORIDE 9 MG/ML
5-250 INJECTION, SOLUTION INTRAVENOUS PRN
Status: CANCELLED | OUTPATIENT
Start: 2024-04-05

## 2024-04-05 RX ORDER — ALPRAZOLAM 0.5 MG/1
0.5 TABLET ORAL 3 TIMES DAILY PRN
Qty: 3 TABLET | Refills: 0 | Status: SHIPPED | OUTPATIENT
Start: 2024-04-05 | End: 2024-05-05

## 2024-04-05 RX ORDER — SODIUM CHLORIDE 9 MG/ML
INJECTION, SOLUTION INTRAVENOUS CONTINUOUS
Status: CANCELLED | OUTPATIENT
Start: 2024-04-05

## 2024-04-05 RX ORDER — ONDANSETRON 2 MG/ML
8 INJECTION INTRAMUSCULAR; INTRAVENOUS
Status: CANCELLED | OUTPATIENT
Start: 2024-04-05

## 2024-04-05 RX ORDER — SODIUM CHLORIDE 0.9 % (FLUSH) 0.9 %
5-40 SYRINGE (ML) INJECTION PRN
Status: CANCELLED | OUTPATIENT
Start: 2024-04-05

## 2024-04-05 RX ORDER — ALBUTEROL SULFATE 90 UG/1
4 AEROSOL, METERED RESPIRATORY (INHALATION) PRN
Status: CANCELLED | OUTPATIENT
Start: 2024-04-05

## 2024-04-05 RX ORDER — SODIUM CHLORIDE 0.9 % (FLUSH) 0.9 %
5-40 SYRINGE (ML) INJECTION PRN
Status: DISCONTINUED | OUTPATIENT
Start: 2024-04-05 | End: 2024-04-06 | Stop reason: HOSPADM

## 2024-04-05 RX ORDER — HEPARIN SODIUM (PORCINE) LOCK FLUSH IV SOLN 100 UNIT/ML 100 UNIT/ML
500 SOLUTION INTRAVENOUS PRN
Status: CANCELLED | OUTPATIENT
Start: 2024-04-05

## 2024-04-05 RX ORDER — SODIUM CHLORIDE 0.9 % (FLUSH) 0.9 %
5-40 SYRINGE (ML) INJECTION PRN
OUTPATIENT
Start: 2024-04-05

## 2024-04-05 RX ORDER — FAMOTIDINE 10 MG/ML
20 INJECTION, SOLUTION INTRAVENOUS
Status: CANCELLED | OUTPATIENT
Start: 2024-04-05

## 2024-04-05 RX ORDER — SODIUM CHLORIDE 9 MG/ML
25 INJECTION, SOLUTION INTRAVENOUS PRN
OUTPATIENT
Start: 2024-04-05

## 2024-04-05 RX ORDER — DIPHENHYDRAMINE HYDROCHLORIDE 50 MG/ML
50 INJECTION INTRAMUSCULAR; INTRAVENOUS
Status: CANCELLED | OUTPATIENT
Start: 2024-04-05

## 2024-04-05 RX ORDER — MEPERIDINE HYDROCHLORIDE 50 MG/ML
12.5 INJECTION INTRAMUSCULAR; INTRAVENOUS; SUBCUTANEOUS PRN
Status: CANCELLED | OUTPATIENT
Start: 2024-04-05

## 2024-04-05 RX ADMIN — SODIUM CHLORIDE 25 ML/HR: 9 INJECTION, SOLUTION INTRAVENOUS at 10:09

## 2024-04-05 RX ADMIN — SODIUM CHLORIDE 480 MG: 9 INJECTION, SOLUTION INTRAVENOUS at 10:33

## 2024-04-05 RX ADMIN — SODIUM CHLORIDE, PRESERVATIVE FREE 20 ML: 5 INJECTION INTRAVENOUS at 09:05

## 2024-04-05 RX ADMIN — Medication 500 UNITS: at 11:04

## 2024-04-05 RX ADMIN — SODIUM CHLORIDE, PRESERVATIVE FREE 10 ML: 5 INJECTION INTRAVENOUS at 11:04

## 2024-04-05 ASSESSMENT — PAIN DESCRIPTION - DESCRIPTORS
DESCRIPTORS: CRAMPING
DESCRIPTORS: CRAMPING

## 2024-04-05 ASSESSMENT — PAIN SCALES - GENERAL
PAINLEVEL_OUTOF10: 8
PAINLEVEL_OUTOF10: 8

## 2024-04-05 ASSESSMENT — PAIN DESCRIPTION - LOCATION
LOCATION: ABDOMEN
LOCATION: ABDOMEN

## 2024-04-05 NOTE — PLAN OF CARE
Care plan reviewed with patient.  Patient  verbalized understanding of the plan of care and contribute to goal setting.   Problem: Pain  Goal: Verbalizes/displays adequate comfort level or baseline comfort level  Outcome: Adequate for Discharge  Flowsheets (Taken 4/5/2024 1138)  Verbalizes/displays adequate comfort level or baseline comfort level:   Administer analgesics based on type and severity of pain and evaluate response   Encourage patient to monitor pain and request assistance  Note: Taking prn and scheduled pain meds     Problem: Safety - Adult  Goal: Free from fall injury  Outcome: Adequate for Discharge  Flowsheets (Taken 4/5/2024 1138)  Free From Fall Injury:   Based on caregiver fall risk screen, instruct family/caregiver to ask for assistance with transferring infant if caregiver noted to have fall risk factors   Instruct family/caregiver on patient safety  Note: Free from falls while in infusion center. Verbalized understanding of fall prevention and to ask for assistance with ambulation     Problem: Discharge Planning  Goal: Discharge to home or other facility with appropriate resources  Outcome: Adequate for Discharge  Flowsheets (Taken 4/5/2024 1138)  Discharge to home or other facility with appropriate resources:   Identify barriers to discharge with patient and caregiver   Identify discharge learning needs (meds, wound care, etc)   Arrange for needed discharge resources and transportation as appropriate  Note: Verbalized understanding of discharge instructions, follow ups and when to call doctor     Problem: Chronic Conditions and Co-morbidities  Goal: Patient's chronic conditions and co-morbidity symptoms are monitored and maintained or improved  Outcome: Adequate for Discharge  Flowsheets (Taken 4/5/2024 1138)  Care Plan - Patient's Chronic Conditions and Co-Morbidity Symptoms are Monitored and Maintained or Improved:   Monitor and assess patient's chronic conditions and comorbid symptoms for

## 2024-04-05 NOTE — PROGRESS NOTES
Patient assessed for the following post chemotherapy:    Dizziness   No  Lightheadedness  No      Acute nausea/vomiting No  Headache   No  Chest pain/pressure  No  Rash/itching   No  Shortness of breath  No    Patient kept for 20 minutes observation post infusion chemotherapy.    Patient tolerated chemotherapy treatment opdivo without any complications.    Last vital signs:   Pulse 63   Temp 97.8 °F (36.6 °C) (Oral)   Resp 18   Ht 1.778 m (5' 10\")   Wt 66.2 kg (146 lb)   SpO2 95%   BMI 20.95 kg/m²       Patient instructed if experience any of the above symptoms following today's infusion,he/she is to notify MD immediately or go to the emergency department.    Discharge instructions given to patient. Verbalizes understanding. Ambulated off unit per self with belongings.

## 2024-04-05 NOTE — DISCHARGE INSTRUCTIONS
Please contact your Oncologist if you have any questions regarding the chemotherapy opdivo that you received today.      Patient instructed if experience any of the symptoms following today's chemotherapy / to notify MD immediately or go to emergency department.    * dizziness/lightheadedness  *acute nausea/vomiting - not relieved with medication  *headache - not relieved from Tylenol/pain medication  *chest pain/pressure  *rash/itching  *shortness of breath        Drink fluids - 48oz fluids daily  Call if develop fever/ chills/ signs or symptoms of infection

## 2024-04-05 NOTE — PATIENT INSTRUCTIONS
Orders Placed This Encounter   Procedures    MRI PELVIS W WO CONTRAST    CBC With Auto Differential    Comprehensive metabolic panel    TSH without Reflex    CBC with Auto Differential    Hepatic Function Panel    POC PANEL BMP W/IOCA    TSH With Reflex Ft4    Cortisol Total   Return in about 4 weeks (around 5/3/2024).MD visit+labs + treatment visit.  MRI pelvis ordered.  Xanax prescription sent to [facility].

## 2024-04-05 NOTE — PROGRESS NOTES
[facility] [place] [facility]  [facility]  [address]  [address]  [place] [id number]  Dept: [phone]  Loc: [phone]   Hematology/Oncology Consult (Clinic)        01/19/24       Chris Griffinall   1955     No ref. provider found   Donny Garnica MD       Reason:   Chief Complaint   Patient presents with    Follow-up     Large cell neuroendocrine carcinoma (HCC)          HPI:   68-year-old male who initially presented to GI and surgery for evaluation  secondary to newly diagnosed colonic mass that is near obstructing.  He had colonoscopy with Dr. Pemberton and they were Not able to get a scope through the area of concern.  Proximal transverse colon close to the hepatic flexure.  Patient admits to abdominal discomfort in the right upper quadrant.  Mild to moderate.  No severe sharp pain.  Admits to weight loss and fatigue.  Decreased appetite.  No significant nausea or vomiting.  Still passing flatus and having bowel movements.  No hematochezia.  No change in urination.  Headaches.  No lightheadedness.      CT abdomen/pelvis demonstrated colonic mass proximal transverse colon close to the hepatic flexure.  Patient denies any family or personal history of colorectal disease that he is aware of.  Small liver lesion identified on CT abdomen/pelvis.    Patient states he cannot proceed with an MRI even if it is a open MRI due to anxiety.  He is refusing to proceed with any further testing in regards to MRI.  Admits to smoking cigarettes but has been decreasing in number.  Less than half a pack a day.    Patient ended up having surgery right hemicolectomy with Dr. 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    Patient is here today with his brother and significant other. He is in a wheelchair today but said usually walks. Has been tired after the surgery, he did have some blood draining

## 2024-04-08 ENCOUNTER — HOSPITAL ENCOUNTER (OUTPATIENT)
Dept: CT IMAGING | Age: 69
Discharge: HOME | End: 2024-04-08
Attending: INTERNAL MEDICINE
Payer: MEDICARE

## 2024-04-08 DIAGNOSIS — C7A.1 LARGE CELL NEUROENDOCRINE CARCINOMA (HCC): ICD-10-CM

## 2024-04-08 PROCEDURE — 6360000004 HC RX CONTRAST MEDICATION: Performed by: INTERNAL MEDICINE

## 2024-04-08 PROCEDURE — 70470 CT HEAD/BRAIN W/O & W/DYE: CPT

## 2024-04-08 RX ADMIN — IOPAMIDOL 75 ML: 755 INJECTION, SOLUTION INTRAVENOUS at 09:41

## 2024-04-24 ENCOUNTER — HOSPITAL ENCOUNTER (OUTPATIENT)
Dept: MRI IMAGING | Age: 69
Discharge: HOME | End: 2024-04-24
Attending: INTERNAL MEDICINE
Payer: MEDICARE

## 2024-04-24 DIAGNOSIS — C79.9 METASTATIC MALIGNANT NEOPLASM, UNSPECIFIED SITE (HCC): ICD-10-CM

## 2024-04-24 PROCEDURE — 72197 MRI PELVIS W/O & W/DYE: CPT

## 2024-04-24 PROCEDURE — 6360000004 HC RX CONTRAST MEDICATION: Performed by: INTERNAL MEDICINE

## 2024-04-24 PROCEDURE — A9579 GAD-BASE MR CONTRAST NOS,1ML: HCPCS | Performed by: INTERNAL MEDICINE

## 2024-04-24 RX ADMIN — GADOTERIDOL 10 ML: 279.3 INJECTION, SOLUTION INTRAVENOUS at 13:13

## 2024-04-27 ENCOUNTER — APPOINTMENT (OUTPATIENT)
Dept: GENERAL RADIOLOGY | Age: 69
DRG: 641 | End: 2024-04-27
Payer: MEDICARE

## 2024-04-27 ENCOUNTER — HOSPITAL ENCOUNTER (INPATIENT)
Age: 69
LOS: 3 days | Discharge: HOME HEALTH | DRG: 641 | End: 2024-04-30
Attending: EMERGENCY MEDICINE | Admitting: INTERNAL MEDICINE
Payer: MEDICARE

## 2024-04-27 DIAGNOSIS — I95.9 HYPOTENSION, UNSPECIFIED HYPOTENSION TYPE: ICD-10-CM

## 2024-04-27 DIAGNOSIS — E86.0 DEHYDRATION: ICD-10-CM

## 2024-04-27 DIAGNOSIS — G89.3 CANCER RELATED PAIN: ICD-10-CM

## 2024-04-27 DIAGNOSIS — C78.00 MALIGNANT NEOPLASM METASTATIC TO LUNG, UNSPECIFIED LATERALITY (HCC): ICD-10-CM

## 2024-04-27 DIAGNOSIS — Z51.5 PALLIATIVE CARE PATIENT: ICD-10-CM

## 2024-04-27 DIAGNOSIS — R79.1 SUPRATHERAPEUTIC INR: ICD-10-CM

## 2024-04-27 DIAGNOSIS — C7A.1 LARGE CELL NEUROENDOCRINE CARCINOMA (HCC): ICD-10-CM

## 2024-04-27 DIAGNOSIS — R53.83 FATIGUE, UNSPECIFIED TYPE: Primary | ICD-10-CM

## 2024-04-27 DIAGNOSIS — C18.9 MALIGNANT NEOPLASM OF COLON, UNSPECIFIED PART OF COLON (HCC): ICD-10-CM

## 2024-04-27 DIAGNOSIS — N17.9 ACUTE KIDNEY INJURY (HCC): ICD-10-CM

## 2024-04-27 LAB
ALBUMIN SERPL BCG-MCNC: 3.4 G/DL (ref 3.5–5.1)
ALP SERPL-CCNC: 181 U/L (ref 38–126)
ALT SERPL W/O P-5'-P-CCNC: 139 U/L (ref 11–66)
AMPHETAMINES UR QL SCN: NEGATIVE
ANION GAP SERPL CALC-SCNC: 10 MEQ/L (ref 8–16)
ANION GAP SERPL CALC-SCNC: 16 MEQ/L (ref 8–16)
APAP SERPL-MCNC: < 5 UG/ML (ref 0–20)
APTT PPP: 116 SECONDS (ref 22–38)
AST SERPL-CCNC: 157 U/L (ref 5–40)
BACTERIA URNS QL MICRO: ABNORMAL /HPF
BARBITURATES UR QL SCN: NEGATIVE
BASOPHILS ABSOLUTE: 0 THOU/MM3 (ref 0–0.1)
BASOPHILS NFR BLD AUTO: 0.5 %
BENZODIAZ UR QL SCN: POSITIVE
BILIRUB CONJ SERPL-MCNC: < 0.2 MG/DL (ref 0–0.3)
BILIRUB SERPL-MCNC: 0.6 MG/DL (ref 0.3–1.2)
BILIRUB UR QL STRIP.AUTO: NEGATIVE
BUN SERPL-MCNC: 32 MG/DL (ref 7–22)
BUN SERPL-MCNC: 33 MG/DL (ref 7–22)
BZE UR QL SCN: NEGATIVE
CA-I BLD ISE-SCNC: 1.13 MMOL/L (ref 1.12–1.32)
CALCIUM SERPL-MCNC: 8.3 MG/DL (ref 8.5–10.5)
CALCIUM SERPL-MCNC: 9.2 MG/DL (ref 8.5–10.5)
CANNABINOIDS UR QL SCN: POSITIVE
CASTS #/AREA URNS LPF: ABNORMAL /LPF
CASTS 2: ABNORMAL /LPF
CHARACTER UR: CLEAR
CHLORIDE SERPL-SCNC: 100 MEQ/L (ref 98–111)
CHLORIDE SERPL-SCNC: 95 MEQ/L (ref 98–111)
CHOLEST SERPL-MCNC: 62 MG/DL (ref 100–199)
CO2 SERPL-SCNC: 18 MEQ/L (ref 23–33)
CO2 SERPL-SCNC: 22 MEQ/L (ref 23–33)
COLOR: YELLOW
CREAT SERPL-MCNC: 2.7 MG/DL (ref 0.4–1.2)
CREAT SERPL-MCNC: 2.8 MG/DL (ref 0.4–1.2)
CREAT UR-MCNC: 110.9 MG/DL
CRYSTALS URNS MICRO: ABNORMAL
DEPRECATED RDW RBC AUTO: 54.9 FL (ref 35–45)
EOSINOPHIL NFR BLD AUTO: 2.1 %
EOSINOPHILS ABSOLUTE: 0.2 THOU/MM3 (ref 0–0.4)
EPITHELIAL CELLS, UA: ABNORMAL /HPF
ERYTHROCYTE [DISTWIDTH] IN BLOOD BY AUTOMATED COUNT: 15.9 % (ref 11.5–14.5)
ETHANOL SERPL-MCNC: < 0.01 %
FENTANYL: NEGATIVE
FLUAV RNA RESP QL NAA+PROBE: NOT DETECTED
FLUBV RNA RESP QL NAA+PROBE: NOT DETECTED
GFR SERPL CREATININE-BSD FRML MDRD: 24 ML/MIN/1.73M2
GFR SERPL CREATININE-BSD FRML MDRD: 25 ML/MIN/1.73M2
GLUCOSE SERPL-MCNC: 100 MG/DL (ref 70–108)
GLUCOSE SERPL-MCNC: 91 MG/DL (ref 70–108)
GLUCOSE UR QL STRIP.AUTO: NEGATIVE MG/DL
HCT VFR BLD AUTO: 40.7 % (ref 42–52)
HDLC SERPL-MCNC: 16 MG/DL
HGB BLD-MCNC: 13.5 GM/DL (ref 14–18)
HGB UR QL STRIP.AUTO: NEGATIVE
IMM GRANULOCYTES # BLD AUTO: 0.02 THOU/MM3 (ref 0–0.07)
IMM GRANULOCYTES NFR BLD AUTO: 0.3 %
INR PPP: 13.36 (ref 0.85–1.13)
KETONES UR QL STRIP.AUTO: NEGATIVE
LDLC SERPL CALC-MCNC: 25 MG/DL
LIPASE SERPL-CCNC: 238.4 U/L (ref 5.6–51.3)
LYMPHOCYTES ABSOLUTE: 2.2 THOU/MM3 (ref 1–4.8)
LYMPHOCYTES NFR BLD AUTO: 28.9 %
MAGNESIUM SERPL-MCNC: 1.9 MG/DL (ref 1.6–2.4)
MCH RBC QN AUTO: 31.2 PG (ref 26–33)
MCHC RBC AUTO-ENTMCNC: 33.2 GM/DL (ref 32.2–35.5)
MCV RBC AUTO: 94 FL (ref 80–94)
MISCELLANEOUS 2: ABNORMAL
MONOCYTES ABSOLUTE: 0.9 THOU/MM3 (ref 0.4–1.3)
MONOCYTES NFR BLD AUTO: 12.2 %
NEUTROPHILS NFR BLD AUTO: 56 %
NITRITE UR QL STRIP: NEGATIVE
NRBC BLD AUTO-RTO: 0 /100 WBC
NT-PROBNP SERPL IA-MCNC: 1325 PG/ML (ref 0–124)
OPIATES UR QL SCN: NEGATIVE
OSMOLALITY SERPL CALC.SUM OF ELEC: 266.3 MOSMOL/KG (ref 275–300)
OSMOLALITY SERPL CALC.SUM OF ELEC: 271 MOSMOL/KG (ref 275–300)
OSMOLALITY UR: 252 MOSMOL/KG (ref 250–750)
OXYCODONE: POSITIVE
PCP UR QL SCN: NEGATIVE
PH BLDV: 7.26 [PH] (ref 7.31–7.41)
PH UR STRIP.AUTO: 5 [PH] (ref 5–9)
PLATELET # BLD AUTO: 166 THOU/MM3 (ref 130–400)
PMV BLD AUTO: 11.9 FL (ref 9.4–12.4)
POTASSIUM SERPL-SCNC: 4.9 MEQ/L (ref 3.5–5.2)
POTASSIUM SERPL-SCNC: 5.1 MEQ/L (ref 3.5–5.2)
PROT SERPL-MCNC: 7.2 G/DL (ref 6.1–8)
PROT UR STRIP.AUTO-MCNC: 30 MG/DL
RBC # BLD AUTO: 4.33 MILL/MM3 (ref 4.7–6.1)
RBC URINE: ABNORMAL /HPF
RENAL EPI CELLS #/AREA URNS HPF: ABNORMAL /[HPF]
SALICYLATES SERPL-MCNC: < 0.3 MG/DL (ref 2–10)
SARS-COV-2 RNA RESP QL NAA+PROBE: NOT DETECTED
SEGMENTED NEUTROPHILS ABSOLUTE COUNT: 4.2 THOU/MM3 (ref 1.8–7.7)
SODIUM SERPL-SCNC: 129 MEQ/L (ref 135–145)
SODIUM SERPL-SCNC: 132 MEQ/L (ref 135–145)
SODIUM UR-SCNC: 35 MEQ/L
SP GR UR REFRACT.AUTO: 1.01 (ref 1–1.03)
TRIGL SERPL-MCNC: 106 MG/DL (ref 0–199)
TROPONIN, HIGH SENSITIVITY: 23 NG/L (ref 0–12)
TSH SERPL DL<=0.005 MIU/L-ACNC: 2.27 UIU/ML (ref 0.4–4.2)
UROBILINOGEN, URINE: 1 EU/DL (ref 0–1)
WBC # BLD AUTO: 7.5 THOU/MM3 (ref 4.8–10.8)
WBC #/AREA URNS HPF: ABNORMAL /HPF
WBC #/AREA URNS HPF: NEGATIVE /[HPF]
YEAST LIKE FUNGI URNS QL MICRO: ABNORMAL

## 2024-04-27 PROCEDURE — 6360000002 HC RX W HCPCS: Performed by: EMERGENCY MEDICINE

## 2024-04-27 PROCEDURE — 36415 COLL VENOUS BLD VENIPUNCTURE: CPT

## 2024-04-27 PROCEDURE — 96374 THER/PROPH/DIAG INJ IV PUSH: CPT

## 2024-04-27 PROCEDURE — 2580000003 HC RX 258: Performed by: EMERGENCY MEDICINE

## 2024-04-27 PROCEDURE — 81001 URINALYSIS AUTO W/SCOPE: CPT

## 2024-04-27 PROCEDURE — 80074 ACUTE HEPATITIS PANEL: CPT

## 2024-04-27 PROCEDURE — 83930 ASSAY OF BLOOD OSMOLALITY: CPT

## 2024-04-27 PROCEDURE — 2500000003 HC RX 250 WO HCPCS: Performed by: EMERGENCY MEDICINE

## 2024-04-27 PROCEDURE — 83880 ASSAY OF NATRIURETIC PEPTIDE: CPT

## 2024-04-27 PROCEDURE — 82077 ASSAY SPEC XCP UR&BREATH IA: CPT

## 2024-04-27 PROCEDURE — 85610 PROTHROMBIN TIME: CPT

## 2024-04-27 PROCEDURE — 87641 MR-STAPH DNA AMP PROBE: CPT

## 2024-04-27 PROCEDURE — 96361 HYDRATE IV INFUSION ADD-ON: CPT

## 2024-04-27 PROCEDURE — 6370000000 HC RX 637 (ALT 250 FOR IP): Performed by: EMERGENCY MEDICINE

## 2024-04-27 PROCEDURE — 87186 SC STD MICRODIL/AGAR DIL: CPT

## 2024-04-27 PROCEDURE — 2000000000 HC ICU R&B

## 2024-04-27 PROCEDURE — 83690 ASSAY OF LIPASE: CPT

## 2024-04-27 PROCEDURE — 80307 DRUG TEST PRSMV CHEM ANLYZR: CPT

## 2024-04-27 PROCEDURE — 85025 COMPLETE CBC W/AUTO DIFF WBC: CPT

## 2024-04-27 PROCEDURE — 83735 ASSAY OF MAGNESIUM: CPT

## 2024-04-27 PROCEDURE — 80061 LIPID PANEL: CPT

## 2024-04-27 PROCEDURE — 83935 ASSAY OF URINE OSMOLALITY: CPT

## 2024-04-27 PROCEDURE — 82800 BLOOD PH: CPT

## 2024-04-27 PROCEDURE — 87086 URINE CULTURE/COLONY COUNT: CPT

## 2024-04-27 PROCEDURE — 84443 ASSAY THYROID STIM HORMONE: CPT

## 2024-04-27 PROCEDURE — 93005 ELECTROCARDIOGRAM TRACING: CPT | Performed by: EMERGENCY MEDICINE

## 2024-04-27 PROCEDURE — 87077 CULTURE AEROBIC IDENTIFY: CPT

## 2024-04-27 PROCEDURE — 84550 ASSAY OF BLOOD/URIC ACID: CPT

## 2024-04-27 PROCEDURE — 82248 BILIRUBIN DIRECT: CPT

## 2024-04-27 PROCEDURE — 80143 DRUG ASSAY ACETAMINOPHEN: CPT

## 2024-04-27 PROCEDURE — 3E033XZ INTRODUCTION OF VASOPRESSOR INTO PERIPHERAL VEIN, PERCUTANEOUS APPROACH: ICD-10-PCS | Performed by: EMERGENCY MEDICINE

## 2024-04-27 PROCEDURE — 85730 THROMBOPLASTIN TIME PARTIAL: CPT

## 2024-04-27 PROCEDURE — 84300 ASSAY OF URINE SODIUM: CPT

## 2024-04-27 PROCEDURE — 99285 EMERGENCY DEPT VISIT HI MDM: CPT

## 2024-04-27 PROCEDURE — 82533 TOTAL CORTISOL: CPT

## 2024-04-27 PROCEDURE — 80053 COMPREHEN METABOLIC PANEL: CPT

## 2024-04-27 PROCEDURE — 82330 ASSAY OF CALCIUM: CPT

## 2024-04-27 PROCEDURE — 71045 X-RAY EXAM CHEST 1 VIEW: CPT

## 2024-04-27 PROCEDURE — 82570 ASSAY OF URINE CREATININE: CPT

## 2024-04-27 PROCEDURE — 80179 DRUG ASSAY SALICYLATE: CPT

## 2024-04-27 PROCEDURE — 84484 ASSAY OF TROPONIN QUANT: CPT

## 2024-04-27 PROCEDURE — 87636 SARSCOV2 & INF A&B AMP PRB: CPT

## 2024-04-27 RX ORDER — ACETAMINOPHEN 325 MG/1
650 TABLET ORAL EVERY 6 HOURS PRN
Status: DISCONTINUED | OUTPATIENT
Start: 2024-04-27 | End: 2024-04-30 | Stop reason: HOSPADM

## 2024-04-27 RX ORDER — SODIUM CHLORIDE 9 MG/ML
INJECTION, SOLUTION INTRAVENOUS PRN
Status: DISCONTINUED | OUTPATIENT
Start: 2024-04-27 | End: 2024-04-30 | Stop reason: HOSPADM

## 2024-04-27 RX ORDER — 0.9 % SODIUM CHLORIDE 0.9 %
1000 INTRAVENOUS SOLUTION INTRAVENOUS ONCE
Status: COMPLETED | OUTPATIENT
Start: 2024-04-27 | End: 2024-04-27

## 2024-04-27 RX ORDER — ONDANSETRON 2 MG/ML
4 INJECTION INTRAMUSCULAR; INTRAVENOUS EVERY 6 HOURS PRN
Status: DISCONTINUED | OUTPATIENT
Start: 2024-04-27 | End: 2024-04-30 | Stop reason: HOSPADM

## 2024-04-27 RX ORDER — ENOXAPARIN SODIUM 100 MG/ML
30 INJECTION SUBCUTANEOUS DAILY
Status: DISCONTINUED | OUTPATIENT
Start: 2024-04-28 | End: 2024-04-28

## 2024-04-27 RX ORDER — ONDANSETRON 4 MG/1
4 TABLET, ORALLY DISINTEGRATING ORAL EVERY 8 HOURS PRN
Status: DISCONTINUED | OUTPATIENT
Start: 2024-04-27 | End: 2024-04-30 | Stop reason: HOSPADM

## 2024-04-27 RX ORDER — SODIUM CHLORIDE 0.9 % (FLUSH) 0.9 %
5-40 SYRINGE (ML) INJECTION EVERY 12 HOURS SCHEDULED
Status: DISCONTINUED | OUTPATIENT
Start: 2024-04-27 | End: 2024-04-30 | Stop reason: HOSPADM

## 2024-04-27 RX ORDER — ACETAMINOPHEN 650 MG/1
650 SUPPOSITORY RECTAL EVERY 6 HOURS PRN
Status: DISCONTINUED | OUTPATIENT
Start: 2024-04-27 | End: 2024-04-30 | Stop reason: HOSPADM

## 2024-04-27 RX ORDER — NICOTINE 21 MG/24HR
1 PATCH, TRANSDERMAL 24 HOURS TRANSDERMAL DAILY
Status: DISCONTINUED | OUTPATIENT
Start: 2024-04-27 | End: 2024-04-30 | Stop reason: HOSPADM

## 2024-04-27 RX ORDER — NOREPINEPHRINE BITARTRATE 0.06 MG/ML
1-100 INJECTION, SOLUTION INTRAVENOUS CONTINUOUS
Status: DISCONTINUED | OUTPATIENT
Start: 2024-04-27 | End: 2024-04-28

## 2024-04-27 RX ORDER — POLYETHYLENE GLYCOL 3350 17 G/17G
17 POWDER, FOR SOLUTION ORAL DAILY PRN
Status: DISCONTINUED | OUTPATIENT
Start: 2024-04-27 | End: 2024-04-30 | Stop reason: HOSPADM

## 2024-04-27 RX ORDER — SODIUM CHLORIDE 0.9 % (FLUSH) 0.9 %
5-40 SYRINGE (ML) INJECTION PRN
Status: DISCONTINUED | OUTPATIENT
Start: 2024-04-27 | End: 2024-04-30 | Stop reason: HOSPADM

## 2024-04-27 RX ADMIN — SODIUM CHLORIDE 1000 ML: 9 INJECTION, SOLUTION INTRAVENOUS at 21:27

## 2024-04-27 RX ADMIN — PHYTONADIONE 5 MG: 10 INJECTION, EMULSION INTRAMUSCULAR; INTRAVENOUS; SUBCUTANEOUS at 21:45

## 2024-04-27 RX ADMIN — SODIUM CHLORIDE 1000 ML: 9 INJECTION, SOLUTION INTRAVENOUS at 20:09

## 2024-04-27 RX ADMIN — Medication 5 MCG/MIN: at 22:13

## 2024-04-27 RX ADMIN — SODIUM CHLORIDE 1000 ML: 9 INJECTION, SOLUTION INTRAVENOUS at 20:08

## 2024-04-27 ASSESSMENT — PAIN SCALES - GENERAL: PAINLEVEL_OUTOF10: 8

## 2024-04-27 ASSESSMENT — PAIN - FUNCTIONAL ASSESSMENT
PAIN_FUNCTIONAL_ASSESSMENT: NONE - DENIES PAIN

## 2024-04-27 ASSESSMENT — PAIN DESCRIPTION - FREQUENCY: FREQUENCY: CONTINUOUS

## 2024-04-27 ASSESSMENT — PAIN DESCRIPTION - ORIENTATION: ORIENTATION: MID

## 2024-04-27 ASSESSMENT — PAIN DESCRIPTION - DESCRIPTORS: DESCRIPTORS: CRAMPING

## 2024-04-27 ASSESSMENT — PAIN DESCRIPTION - ONSET: ONSET: ON-GOING

## 2024-04-27 ASSESSMENT — PAIN DESCRIPTION - LOCATION: LOCATION: ABDOMEN

## 2024-04-27 ASSESSMENT — PAIN DESCRIPTION - PAIN TYPE: TYPE: ACUTE PAIN;CHRONIC PAIN

## 2024-04-27 NOTE — ED PROVIDER NOTES
[facility]  eMERGENCY dEPARTMENT eNCOUnter          CHIEF COMPLAINT       Chief Complaint   Patient presents with    Fatigue       Nurses Notes reviewed and I agree except as noted in the HPI.      HISTORY OF PRESENT ILLNESS    Chris Perez is a 69 y.o. male who presents for fatigue.  The patient states has not been eating and drinking very well.  Patient states that he has had no fever chills sweats.  No coughing.  No difficulty urinating.  Denies any drugs or alcohol.  Is on extensive narcotics.  Patient has a neuroendocrine tumor which has gone through his stomach and appears to metastasized to the bone.  Patient states that he feels fine now.  But he did not feel good earlier.  Patient states he had a recent MRI done, he was given Xanax, he states he has not been the same since.  Patient sees Dr. Ramirez, for hematology oncology.  Patient is otherwise resting comfortably on the cot no apparent distress no other physical complaints at this time.    REVIEW OF SYSTEMS     Review of Systems   Constitutional:  Positive for fatigue. Negative for activity change, appetite change, chills, diaphoresis, fever and unexpected weight change.   HENT:  Negative for congestion, ear discharge, ear pain, hearing loss, rhinorrhea, sinus pressure, sore throat, trouble swallowing and voice change.    Eyes:  Negative for photophobia, pain, discharge, redness and itching.   Respiratory:  Negative for apnea, cough, choking, chest tightness, shortness of breath, wheezing and stridor.    Cardiovascular:  Negative for chest pain, palpitations and leg swelling.   Gastrointestinal:  Negative for abdominal distention, abdominal pain, anal bleeding, blood in stool, constipation, diarrhea, nausea, rectal pain and vomiting.   Endocrine: Negative for polydipsia, polyphagia and polyuria.   Genitourinary:  Negative for decreased urine volume, difficulty urinating, dysuria, enuresis, frequency, hematuria, penile pain, penile

## 2024-04-27 NOTE — ED TRIAGE NOTES
Patient presents to ED from home with family who report patient has been weak and unsteady the past 4 days following an MRI and be premedicated with xanax. Patient reports a HX of stomach, lung, and intestinal cancer that he is receiving treatment for right now. Patient appears drowsy on arrival and Port accessed at this time.

## 2024-04-28 PROBLEM — R53.83 FATIGUE: Status: ACTIVE | Noted: 2024-04-28

## 2024-04-28 LAB
ALBUMIN SERPL BCG-MCNC: 3.2 G/DL (ref 3.5–5.1)
ALP SERPL-CCNC: 162 U/L (ref 38–126)
ALT SERPL W/O P-5'-P-CCNC: 115 U/L (ref 11–66)
ANION GAP SERPL CALC-SCNC: 12 MEQ/L (ref 8–16)
ANION GAP SERPL CALC-SCNC: 12 MEQ/L (ref 8–16)
AST SERPL-CCNC: 128 U/L (ref 5–40)
B-OH-BUTYR SERPL-MSCNC: 6.27 MG/DL (ref 0.2–2.81)
BASOPHILS ABSOLUTE: 0 THOU/MM3 (ref 0–0.1)
BASOPHILS ABSOLUTE: 0 THOU/MM3 (ref 0–0.1)
BASOPHILS NFR BLD AUTO: 0.5 %
BASOPHILS NFR BLD AUTO: 0.5 %
BILIRUB SERPL-MCNC: 0.9 MG/DL (ref 0.3–1.2)
BUN SERPL-MCNC: 28 MG/DL (ref 7–22)
BUN SERPL-MCNC: 30 MG/DL (ref 7–22)
CALCIUM SERPL-MCNC: 8.1 MG/DL (ref 8.5–10.5)
CALCIUM SERPL-MCNC: 8.5 MG/DL (ref 8.5–10.5)
CHLORIDE SERPL-SCNC: 102 MEQ/L (ref 98–111)
CHLORIDE SERPL-SCNC: 103 MEQ/L (ref 98–111)
CO2 SERPL-SCNC: 20 MEQ/L (ref 23–33)
CO2 SERPL-SCNC: 20 MEQ/L (ref 23–33)
CORTIS SERPL-MCNC: 2.51 UG/DL
CORTISOL COLLECTION INFO: NORMAL
CREAT SERPL-MCNC: 1.9 MG/DL (ref 0.4–1.2)
CREAT SERPL-MCNC: 2 MG/DL (ref 0.4–1.2)
DEPRECATED RDW RBC AUTO: 56.1 FL (ref 35–45)
DEPRECATED RDW RBC AUTO: 56.4 FL (ref 35–45)
EKG Q-T INTERVAL: 410 MS
EKG QRS DURATION: 78 MS
EKG QTC CALCULATION (BAZETT): 422 MS
EKG R AXIS: 78 DEGREES
EKG T AXIS: 51 DEGREES
EKG VENTRICULAR RATE: 64 BPM
EOSINOPHIL NFR BLD AUTO: 1.9 %
EOSINOPHIL NFR BLD AUTO: 2.1 %
EOSINOPHILS ABSOLUTE: 0.1 THOU/MM3 (ref 0–0.4)
EOSINOPHILS ABSOLUTE: 0.1 THOU/MM3 (ref 0–0.4)
ERYTHROCYTE [DISTWIDTH] IN BLOOD BY AUTOMATED COUNT: 15.9 % (ref 11.5–14.5)
ERYTHROCYTE [DISTWIDTH] IN BLOOD BY AUTOMATED COUNT: 15.9 % (ref 11.5–14.5)
GFR SERPL CREATININE-BSD FRML MDRD: 36 ML/MIN/1.73M2
GFR SERPL CREATININE-BSD FRML MDRD: 38 ML/MIN/1.73M2
GLUCOSE SERPL-MCNC: 84 MG/DL (ref 70–108)
GLUCOSE SERPL-MCNC: 89 MG/DL (ref 70–108)
HAV IGM SER QL: NEGATIVE
HBV CORE IGM SERPL QL IA: NEGATIVE
HBV SURFACE AG SERPL QL IA: NEGATIVE
HCT VFR BLD AUTO: 36.6 % (ref 42–52)
HCT VFR BLD AUTO: 38.3 % (ref 42–52)
HCV IGG SERPL QL IA: POSITIVE
HGB BLD-MCNC: 12.2 GM/DL (ref 14–18)
HGB BLD-MCNC: 13 GM/DL (ref 14–18)
IMM GRANULOCYTES # BLD AUTO: 0.01 THOU/MM3 (ref 0–0.07)
IMM GRANULOCYTES # BLD AUTO: 0.02 THOU/MM3 (ref 0–0.07)
IMM GRANULOCYTES NFR BLD AUTO: 0.2 %
IMM GRANULOCYTES NFR BLD AUTO: 0.3 %
INR PPP: 1.66 (ref 0.85–1.13)
INR PPP: 1.87 (ref 0.85–1.13)
INR PPP: 2.6 (ref 0.85–1.13)
INR PPP: 5.01 (ref 0.85–1.13)
LACTATE SERPL-SCNC: 0.7 MMOL/L (ref 0.5–2)
LYMPHOCYTES ABSOLUTE: 1.5 THOU/MM3 (ref 1–4.8)
LYMPHOCYTES ABSOLUTE: 1.8 THOU/MM3 (ref 1–4.8)
LYMPHOCYTES NFR BLD AUTO: 25.9 %
LYMPHOCYTES NFR BLD AUTO: 26.6 %
MAGNESIUM SERPL-MCNC: 1.6 MG/DL (ref 1.6–2.4)
MCH RBC QN AUTO: 32 PG (ref 26–33)
MCH RBC QN AUTO: 32.4 PG (ref 26–33)
MCHC RBC AUTO-ENTMCNC: 33.3 GM/DL (ref 32.2–35.5)
MCHC RBC AUTO-ENTMCNC: 33.9 GM/DL (ref 32.2–35.5)
MCV RBC AUTO: 95.5 FL (ref 80–94)
MCV RBC AUTO: 96.1 FL (ref 80–94)
MONOCYTES ABSOLUTE: 0.7 THOU/MM3 (ref 0.4–1.3)
MONOCYTES ABSOLUTE: 0.9 THOU/MM3 (ref 0.4–1.3)
MONOCYTES NFR BLD AUTO: 12.5 %
MONOCYTES NFR BLD AUTO: 13 %
MRSA DNA SPEC QL NAA+PROBE: NEGATIVE
NEUTROPHILS NFR BLD AUTO: 57.5 %
NEUTROPHILS NFR BLD AUTO: 59 %
NRBC BLD AUTO-RTO: 0 /100 WBC
NRBC BLD AUTO-RTO: 0 /100 WBC
OSMOLALITY SERPL: 284 MOSMOL/KG (ref 275–295)
PHOSPHATE SERPL-MCNC: 3.2 MG/DL (ref 2.4–4.7)
PLATELET # BLD AUTO: 118 THOU/MM3 (ref 130–400)
PLATELET # BLD AUTO: 135 THOU/MM3 (ref 130–400)
PMV BLD AUTO: 11.7 FL (ref 9.4–12.4)
PMV BLD AUTO: 11.9 FL (ref 9.4–12.4)
POTASSIUM SERPL-SCNC: 4.6 MEQ/L (ref 3.5–5.2)
POTASSIUM SERPL-SCNC: 4.7 MEQ/L (ref 3.5–5.2)
PROCALCITONIN SERPL IA-MCNC: 0.15 NG/ML (ref 0.01–0.09)
PROT SERPL-MCNC: 6.5 G/DL (ref 6.1–8)
RBC # BLD AUTO: 3.81 MILL/MM3 (ref 4.7–6.1)
RBC # BLD AUTO: 4.01 MILL/MM3 (ref 4.7–6.1)
SEGMENTED NEUTROPHILS ABSOLUTE COUNT: 3.4 THOU/MM3 (ref 1.8–7.7)
SEGMENTED NEUTROPHILS ABSOLUTE COUNT: 3.8 THOU/MM3 (ref 1.8–7.7)
SODIUM SERPL-SCNC: 134 MEQ/L (ref 135–145)
SODIUM SERPL-SCNC: 135 MEQ/L (ref 135–145)
URATE SERPL-MCNC: 3.9 MG/DL (ref 3.7–7)
WBC # BLD AUTO: 5.8 THOU/MM3 (ref 4.8–10.8)
WBC # BLD AUTO: 6.6 THOU/MM3 (ref 4.8–10.8)

## 2024-04-28 PROCEDURE — 6360000002 HC RX W HCPCS

## 2024-04-28 PROCEDURE — 2580000003 HC RX 258

## 2024-04-28 PROCEDURE — 84100 ASSAY OF PHOSPHORUS: CPT

## 2024-04-28 PROCEDURE — 6370000000 HC RX 637 (ALT 250 FOR IP): Performed by: EMERGENCY MEDICINE

## 2024-04-28 PROCEDURE — 6370000000 HC RX 637 (ALT 250 FOR IP)

## 2024-04-28 PROCEDURE — 82010 KETONE BODYS QUAN: CPT

## 2024-04-28 PROCEDURE — 93010 ELECTROCARDIOGRAM REPORT: CPT | Performed by: NUCLEAR MEDICINE

## 2024-04-28 PROCEDURE — 85610 PROTHROMBIN TIME: CPT

## 2024-04-28 PROCEDURE — 84145 PROCALCITONIN (PCT): CPT

## 2024-04-28 PROCEDURE — 83735 ASSAY OF MAGNESIUM: CPT

## 2024-04-28 PROCEDURE — 99291 CRITICAL CARE FIRST HOUR: CPT | Performed by: INTERNAL MEDICINE

## 2024-04-28 PROCEDURE — 83605 ASSAY OF LACTIC ACID: CPT

## 2024-04-28 PROCEDURE — 2580000003 HC RX 258: Performed by: INTERNAL MEDICINE

## 2024-04-28 PROCEDURE — 2500000003 HC RX 250 WO HCPCS

## 2024-04-28 PROCEDURE — 2060000000 HC ICU INTERMEDIATE R&B

## 2024-04-28 PROCEDURE — 80053 COMPREHEN METABOLIC PANEL: CPT

## 2024-04-28 PROCEDURE — 6370000000 HC RX 637 (ALT 250 FOR IP): Performed by: INTERNAL MEDICINE

## 2024-04-28 PROCEDURE — 85025 COMPLETE CBC W/AUTO DIFF WBC: CPT

## 2024-04-28 PROCEDURE — 36415 COLL VENOUS BLD VENIPUNCTURE: CPT

## 2024-04-28 RX ORDER — NOREPINEPHRINE BITARTRATE 0.06 MG/ML
1-100 INJECTION, SOLUTION INTRAVENOUS CONTINUOUS
Status: DISCONTINUED | OUTPATIENT
Start: 2024-04-28 | End: 2024-04-28

## 2024-04-28 RX ORDER — POTASSIUM CHLORIDE 7.45 MG/ML
10 INJECTION INTRAVENOUS PRN
Status: DISCONTINUED | OUTPATIENT
Start: 2024-04-28 | End: 2024-04-30 | Stop reason: HOSPADM

## 2024-04-28 RX ORDER — OXYCODONE HYDROCHLORIDE 5 MG/1
10 TABLET ORAL EVERY 4 HOURS PRN
Status: DISCONTINUED | OUTPATIENT
Start: 2024-04-28 | End: 2024-04-30 | Stop reason: HOSPADM

## 2024-04-28 RX ORDER — WARFARIN SODIUM 2.5 MG/1
2.5 TABLET ORAL
Status: COMPLETED | OUTPATIENT
Start: 2024-04-28 | End: 2024-04-28

## 2024-04-28 RX ORDER — FENTANYL CITRATE 50 UG/ML
50 INJECTION, SOLUTION INTRAMUSCULAR; INTRAVENOUS EVERY 4 HOURS PRN
Status: DISCONTINUED | OUTPATIENT
Start: 2024-04-28 | End: 2024-04-30 | Stop reason: HOSPADM

## 2024-04-28 RX ORDER — SODIUM CHLORIDE, SODIUM LACTATE, POTASSIUM CHLORIDE, CALCIUM CHLORIDE 600; 310; 30; 20 MG/100ML; MG/100ML; MG/100ML; MG/100ML
INJECTION, SOLUTION INTRAVENOUS CONTINUOUS
Status: DISCONTINUED | OUTPATIENT
Start: 2024-04-28 | End: 2024-04-30

## 2024-04-28 RX ORDER — DULOXETIN HYDROCHLORIDE 30 MG/1
30 CAPSULE, DELAYED RELEASE ORAL DAILY
Status: DISCONTINUED | OUTPATIENT
Start: 2024-04-28 | End: 2024-04-30 | Stop reason: HOSPADM

## 2024-04-28 RX ORDER — POTASSIUM CHLORIDE 20 MEQ/1
40 TABLET, EXTENDED RELEASE ORAL PRN
Status: DISCONTINUED | OUTPATIENT
Start: 2024-04-28 | End: 2024-04-30 | Stop reason: HOSPADM

## 2024-04-28 RX ADMIN — SODIUM CHLORIDE, PRESERVATIVE FREE 10 ML: 5 INJECTION INTRAVENOUS at 08:15

## 2024-04-28 RX ADMIN — DULOXETINE HYDROCHLORIDE 30 MG: 30 CAPSULE, DELAYED RELEASE ORAL at 08:13

## 2024-04-28 RX ADMIN — WARFARIN SODIUM 2.5 MG: 2.5 TABLET ORAL at 17:39

## 2024-04-28 RX ADMIN — Medication 1.5 MCG/MIN: at 02:24

## 2024-04-28 RX ADMIN — SODIUM CHLORIDE, POTASSIUM CHLORIDE, SODIUM LACTATE AND CALCIUM CHLORIDE: 600; 310; 30; 20 INJECTION, SOLUTION INTRAVENOUS at 14:50

## 2024-04-28 RX ADMIN — OXYCODONE 10 MG: 5 TABLET ORAL at 14:36

## 2024-04-28 RX ADMIN — WATER 60 MG: 1 INJECTION INTRAMUSCULAR; INTRAVENOUS; SUBCUTANEOUS at 12:04

## 2024-04-28 RX ADMIN — Medication 1.5 MCG/MIN: at 04:53

## 2024-04-28 RX ADMIN — ACETAMINOPHEN 650 MG: 325 TABLET ORAL at 23:54

## 2024-04-28 RX ADMIN — WATER 60 MG: 1 INJECTION INTRAMUSCULAR; INTRAVENOUS; SUBCUTANEOUS at 17:39

## 2024-04-28 ASSESSMENT — PAIN - FUNCTIONAL ASSESSMENT: PAIN_FUNCTIONAL_ASSESSMENT: ACTIVITIES ARE NOT PREVENTED

## 2024-04-28 ASSESSMENT — PAIN SCALES - GENERAL
PAINLEVEL_OUTOF10: 0
PAINLEVEL_OUTOF10: 8

## 2024-04-28 ASSESSMENT — PAIN DESCRIPTION - ONSET: ONSET: ON-GOING

## 2024-04-28 ASSESSMENT — PAIN DESCRIPTION - FREQUENCY: FREQUENCY: CONTINUOUS

## 2024-04-28 ASSESSMENT — PAIN DESCRIPTION - PAIN TYPE: TYPE: CHRONIC PAIN

## 2024-04-28 ASSESSMENT — PAIN DESCRIPTION - ORIENTATION: ORIENTATION: MID

## 2024-04-28 ASSESSMENT — PAIN DESCRIPTION - DESCRIPTORS: DESCRIPTORS: STABBING

## 2024-04-28 ASSESSMENT — PAIN DESCRIPTION - LOCATION: LOCATION: ABDOMEN

## 2024-04-28 NOTE — PLAN OF CARE
Plan/Assessment:  TROY, stage III, pre-renal,  improving: Baseline creatinine 0.7, creatinine on admission 2.8. FENa 0.6%, with noted poor PO intake with worsening diarrhea prior to arrival. Patient given 3L bolus in ED. Noted for improvement in creatinine level.  Encourage PO fluid intake, continue LR at 75 ml/hr  Continue to monitor BMP  Avoid nephrotoxic agents  Permanent atrial fibrillation: CHADSVASc 2. On Coumadin as home medication, follows with outpatient Coumadin clinic. Per prior notes, last dose was on 4/26, but noted for supratherapeutic INR of 13.3 on admission, given vitamin K, INR improving and within therapeutic range on 4/29.  Pharmacy following to dose Coumadin  Continue to monitor on telemetry  Acutely elevated liver enzymes, iso HCV: possibly 2/2 HCV flare or neuroendocrine cancer metastasis. No clinical findings of jaundice or scleral icterus at this time. Bilirubin not elevated.  Tobacco use history: noted  Goals of care: Dr. Marshall consulted to discuss goals of care with patient. Patient continued to be Full Code with pain management changes of oxycodone and fentanyl for breakthrough pain in addition to duloxetine for chemotherapy induced peripheral neuropathy.  Chronic:  Metastatic large cell neuroendocrine carcinoma: Sees Dr. Ramirez, hem/onc outpatient. Currently undergoing treatment with nivolumab and ipilimumab every 3 weeks, cycle started in January 2024.  Hypertension: on lisinopril at home. Holding 2/2 TROY at this time.  Hyperlipidemia:   HCV, 2/2 history of IV drug abuse: noted  Resolved  Hypotension  Hyponatremia

## 2024-04-28 NOTE — ED NOTES
Patient medicated per MAR at this time. Respirations even and unlabored at this time and patient updated on plan of care.

## 2024-04-28 NOTE — PLAN OF CARE
Problem: Discharge Planning  Goal: Discharge to home or other facility with appropriate resources  Outcome: Progressing  Flowsheets (Taken 4/27/2024 2305)  Discharge to home or other facility with appropriate resources: Identify barriers to discharge with patient and caregiver     Problem: Pain  Goal: Verbalizes/displays adequate comfort level or baseline comfort level  Outcome: Progressing  Flowsheets (Taken 4/27/2024 2328)  Verbalizes/displays adequate comfort level or baseline comfort level:   Encourage patient to monitor pain and request assistance   Assess pain using appropriate pain scale     Problem: Skin/Tissue Integrity - Adult  Goal: Skin integrity remains intact  Outcome: Progressing  Flowsheets (Taken 4/27/2024 2328)  Skin Integrity Remains Intact:   Monitor for areas of redness and/or skin breakdown   Every 4-6 hours minimum: Change oxygen saturation probe site   Assess vascular access sites hourly     Problem: Gastrointestinal - Adult  Goal: Minimal or absence of nausea and vomiting  Outcome: Progressing  Flowsheets (Taken 4/27/2024 2328)  Minimal or absence of nausea and vomiting:   Administer IV fluids as ordered to ensure adequate hydration   Advance diet as tolerated, if ordered   Nutrition consult to assist patient with adequate nutrition and appropriate food choices     Problem: Metabolic/Fluid and Electrolytes - Adult  Goal: Electrolytes maintained within normal limits  Outcome: Progressing  Flowsheets (Taken 4/27/2024 2328)  Electrolytes maintained within normal limits: Monitor labs and assess patient for signs and symptoms of electrolyte imbalances  Goal: Hemodynamic stability and optimal renal function maintained  Outcome: Progressing  Flowsheets (Taken 4/27/2024 2328)  Hemodynamic stability and optimal renal function maintained: Monitor labs and assess for signs and symptoms of volume excess or deficit     Problem: Skin/Tissue Integrity  Goal: Absence of new skin breakdown  Description: 1.

## 2024-04-28 NOTE — PROGRESS NOTES
CRITICAL CARE PROGRESS NOTE      Patient:   Chris Perez    Unit/Bed: 4D-07/007-A  YOB: 1955  MRN:  [id number]   PCP:  Donny Garnica MD  Date of Admission:  4/27/2024    Assessment and Plan:   TROY, stage III. ED Cr 2.8, baseline 0.7. FeNa 0.6%, prerenal.  Due to poor p.o. intake and diarrhea. S/p ED 3 L NS.   Monitor I/O.    BMP.  Atrial fibrillation, permanent, supratherapeutic INR. ED INR 13.3.  On Coumadin for atrial fibrillation, last dose 4/26. S/p ED Vitamin K ..  On Coumadin.  Pharmacy to dose coumadin for atrial fibrillation  Fall precautions.  Telemetry  Metastatic large cell neuroendocrine carcinoma. Following with Dr Ramirez for hematology oncology.  Currently undergoing immunotherapy Nivolumab and ipilimumab every 3 weeks for 4 cycle; started in January 2024.   Goals of Care. Dr. Marshall consulted. Patient follows palliative care outpatient.  Holding morphine for poor renal function  Continuing oxycodone and fentanyl for breakthrough pain  Duloxetine for chemotherapy-induced peripheral neuropathy.  Hypotension, resolved. History of hypertension. Secondary to poor p.o. intake preceding admission associated with recent diarrhea.  BP improved in ED s/p 3 L crystalloid bolus.  Levophed via subclavian port as opposed to placing central line. Port placed by Dr. Jiang August 2023. S/p levophed, ending 4/28.  Encourage p.o. intake  Zofran as needed for nausea vomiting.  Hyponatremia, resolved. ED Na 129.  Acuely elevated LFTs on Chronic Hepatitis C. Secondary to metastatic cancer vs flare of Hepatits C. No jaundice, no RUQ tenderness.  Monitor CMP.  Tobacco use.    Chief Complaint:  Fatigue    Interval History:  N/a    Review of Systems:  Patient denies headache, fever, shortness of breath, chest pain, Slava pain, right upper quadrant pain, urinary burning or frequency, diarrhea, constipation, nausea.  Endorsed vomiting, nausea, diarrhea prior to  oriented x3.  Affect appropriate  Lymph:  No supraclavicular adenopathy.  Neurologic:  No focal deficit. No seizures.    Data: (All radiographs, tracings, PFTs, and imaging are personally viewed and interpreted unless otherwise noted).     Bicarb 20, BUN 20 improving  Creatinine 1.9, improving  GFR 38  Procalcitonin 0.5, determined  Albumin 3.2  Alk phos, ALT, AST all improving  Hydroxybutyrate 6.27  UDS positive for benzos, cannabinoids  WBC 6.6, hemoglobin 13.0  INR 1.7  Hepatitis C antibody positive  Urine creatinine 110, urine sodium 35.    Case and plan discussed with Dr. Meliton Loera MD.  Electronically signed by Justin Ware DO, IM PGY-1

## 2024-04-28 NOTE — PROGRESS NOTES
Patient arrived to unit from ED via cot. Patient transferred to ICU bed and placed on continuous ICU bedside monitor. Patient admitted for Dehydration [E86.0]  Hypotension [I95.9]  Acute kidney injury (HCC) [N17.9]  Supratherapeutic INR [R79.1]  Hypotension, unspecified hypotension type [I95.9]  Fatigue, unspecified type [R53.83]. Vitals obtained. See flowsheets. Patient's IV access includes mediport right chest. Current infusions and rates of infusion include levophed @ 5 mL/hr, 0.9 bolus. Assessment completed by ISHAAN Alfaro RN. Two nurse skin assessment completed by ISHAAN Alfaro RN and ANGELY Correia RN. See flowsheets for assessment details. Policies and procedures of ICU able to be explained to patient at this time. Family member(s)/representative(s) present at time of admission include NA. Patient rights explained to family member(s)/representatives and patient, as able. Patient/patient's family member(s)/representative(s) Requested to have physician notified of their admission. All questions posed by patient's family member(s)/representative(s) and patient answered at this time.

## 2024-04-28 NOTE — H&P
Critical Care Progress Note      Patient:  Chris Perez    Unit/Bed:05/005A  MRN: [id number]   PCP: Donny Garnica MD  Date of Admission: 4/27/2024    Chief Complaint: Fatigue    Assessment and Plan (All pulmonary edema, renal failure, PE, and respiratory failure diagnoses are acute in nature unless otherwise specified):        Supratherapeutic INR: Initial INR 13.3.  Patient takes Coumadin at home for history of atrial fibrillation.  States that last dose was 4/26.  Holding all anticoagulation, vitamin K ordered in ED.  Will trend INR.  Fall precautions.  Hypotension: Likely due to poor p.o. intake as self-reported by patient.  BP improved in ED s/p 3 L crystalloid bolus.   Due to patient's end-stage cancer, decision not to place central line but run low-dose levo through a right subclavian port that was placed by Dr. Jiang August 2023.  Levo per right subclavian port-MAP goal > 65 mmHg.  Encourage p.o. intake, Zofran as needed for nausea vomiting.  Metastatic large cell neuroendocrine carcinoma: Following with Dr Ramirez for hematology oncology.  Patient also seen by [facility] outpatient for palliative care.  Palliative was consulted from emergency department to have further discussion on goals of care, end-stage disease.  Currently undergoing immunotherapy Nivolumab and ipilimumab every 3 weeks for 4 cycles.  This treatment was started in January 2024.   MRI-ABD/P-April 24, 2020 24-14 x 14 mm area of abnormal signal density adjacent to right femoral neck more suggestive of partial muscle tear or inflammatory process rather than tumor.  Palliative care consulted- Dr. Marshall - See his note for further details.  Pain management-holding morphine for poor renal function, continuing oxycodone and fentanyl for breakthrough pain  Duloxetine for chemotherapy-induced peripheral neuropathy.  TROY: Initial CR 2.8 from baseline of 0.7 as measured 3 weeks prior.  Likely due to poor p.o. intake.  IV  with patient to not place central line but rather uses right subclavian port to run the Levophed.    Patient also seen by Dr. Marshall outpatient for palliative care.  He was consulted from the ED for continuity of care and CODE STATUS discussion with the patient and family as they seem to not understand the severity of the patient's disease and would benefit from further discussions for patient's prognosis and goals of care.    ROS: Pertinent positives as noted in the HPI. All other systems reviewed and negative.  PMH: Atrial fibrillation, cancer, carotid artery stenosis, GERD, HLD, HTN, hyperuricemia  SHX: Abdominal surgery, cardiac catheterization , hand surgery, right hemicolectomy-Dr. Jiang , Dr. Jiang right lumen SmartPort inserted 2023  FHX: Mother ()-diabetes, stroke, arthritis, Heart disease, HTN.    Father ()-Alcohol abuse, emphysema  SOC: Every day smoker-15 TPY, never alcohol, endorses marijuana use  Allergies: Adhesive tape  Diet: ADULT DIET; Regular    MEDICATIONS:  Scheduled Meds:   nicotine  1 patch TransDERmal Daily    sodium chloride  1,000 mL IntraVENous Once    phytonadione (ADULT) (VITAMIN K) 5 mg in sodium chloride 0.9 % 100 mL IVPB  5 mg IntraVENous Once    sodium chloride flush  5-40 mL IntraVENous 2 times per day    [START ON 2024] enoxaparin  30 mg SubCUTAneous Daily     Continuous Infusions:   norepinephrine Stopped (24)    sodium chloride         VITAL SIGNS:   T 98.6, HR 68, /43, RR 21, SpO2 93, FiO2 room air,   I/O: Documented as -95 mL.  However patient received 3 L crystalloids in ED prior to arrival in ICU.    Body mass index is 21.9 kg/m².     RASS: 0  GCS: 15    PHYSICAL EXAMINATION:  General: Thin, acute on chronically ill  HEENT:  Normocephalic and atraumatic.  No scleral icterus. PERR  Neck: Supple.  No Thyromegaly.  Lungs: Clear to auscultation bilaterally.  No retractions  Cardiac: RRR.  No JVD.  Abdomen: Soft.

## 2024-04-28 NOTE — PROGRESS NOTES
Warfarin Pharmacy Consult Note    Chris Perez is a 69 y.o. male for whom pharmacy has been asked to manage warfarin therapy.     Consulting Provider: Dr. Loera  Warfarin Indication: Atrial fibrillation or Atrial flutter  Target INR range: 2.0-3.0   Warfarin dose prior to admission: 2.5 mg M & F; 5 mg on other days;   Outpatient warfarin provider: Dr. Eliseo Martinez ([facility])    Recent Labs     04/28/24  0020 04/28/24  0517 04/28/24  1120   INR 5.01* 2.60* 1.87*     Recent Labs     04/27/24  1925 04/28/24  0020 04/28/24  0517   HGB 13.5* 12.2* 13.0*    118* 135     Concurrent anticoagulants/antiplatelets: none  Significant warfarin drug-drug interactions: Solumedrol    Date INR Warfarin Dose   4/27/24 13.36 none   4/27/24  Vit. K 5 mg IV   4/28/24 1.87 2.5 mg                                   INR will be monitored routinely until therapeutic INR is achieved.    Pharmacy will continue to follow. Thank you for the consult,   David Julian [facility] 4/28/2024 2:12 PM

## 2024-04-28 NOTE — ED NOTES
ED to inpatient nurses report      Chief Complaint:  Chief Complaint   Patient presents with    Fatigue     Present to ED from: home    MOA:     LOC: alert and orientated to name, place, date  Mobility: Requires assistance * 1  Oxygen Baseline: 98% room air    Current needs required: none     Code Status:   Prior    What abnormal results were found and what did you give/do to treat them? See results/mar - 3 L fluids given   Any procedures or intervention occur? N/a    Mental Status:  Level of Consciousness: Alert (0)    Psych Assessment:        Vitals:  Patient Vitals for the past 24 hrs:   BP Temp Temp src Pulse Resp SpO2 Weight   04/27/24 2109 (!) 86/54 -- -- 77 27 98 % --   04/27/24 2105 (!) 80/48 -- -- 68 18 98 % --   04/27/24 2008 (!) 79/44 -- -- 57 14 -- --   04/27/24 1940 -- -- -- 64 18 95 % --   04/27/24 1906 (!) 71/50 97.3 °F (36.3 °C) Oral 71 18 97 % 65.3 kg (144 lb)        LDAs:      Ambulatory Status:  No data recorded    Diagnosis:  DISPOSITION Decision To Admit 04/27/2024 09:21:47 PM   Final diagnoses:   Fatigue, unspecified type   Hypotension, unspecified hypotension type   Supratherapeutic INR   Acute kidney injury (HCC)   Dehydration        Consults:  STR ED TO IP CONSULT  STR ED TO IP CONSULT     Pain Score:  Pain Assessment  Pain Assessment: None - Denies Pain    C-SSRS:   Risk of Suicide: No Risk    Sepsis Screening:  Sepsis Risk Score: 1.18    [place] Fall Risk:       Swallow Screening        Preferred Language:   English      ALLERGIES     Adhesive tape    SURGICAL HISTORY       Past Surgical History:   Procedure Laterality Date    ABDOMEN SURGERY      CARDIAC CATHETERIZATION  10/27/2014    HAND SURGERY Right     HEMICOLECTOMY Right 07/20/2023    ROBOTIC EXTENDED RIGHT COLON RESECTION, CHOLECYSTECTOMY performed by Jaleel Jiang MD at [facility] OR    PORT SURGERY Right 8/22/2023    Single Lumen Smartport Insertion performed by Jaleel Jiang MD at [facility] OR       PAST MEDICAL HISTORY       Past

## 2024-04-28 NOTE — PLAN OF CARE
Problem: Discharge Planning  Goal: Discharge to home or other facility with appropriate resources  4/28/2024 0833 by Ezra Verde RN  Outcome: Progressing  4/27/2024 2328 by Funmilayo Kwon RN  Outcome: Progressing  Flowsheets (Taken 4/27/2024 2305)  Discharge to home or other facility with appropriate resources: Identify barriers to discharge with patient and caregiver     Problem: Pain  Goal: Verbalizes/displays adequate comfort level or baseline comfort level  4/28/2024 0833 by Ezra Verde RN  Outcome: Progressing  Flowsheets (Taken 4/28/2024 0330 by Funmilayo Kwon RN)  Verbalizes/displays adequate comfort level or baseline comfort level:   Encourage patient to monitor pain and request assistance   Assess pain using appropriate pain scale  4/27/2024 2328 by Funmilayo Kwon RN  Outcome: Progressing  Flowsheets (Taken 4/27/2024 2328)  Verbalizes/displays adequate comfort level or baseline comfort level:   Encourage patient to monitor pain and request assistance   Assess pain using appropriate pain scale     Problem: Skin/Tissue Integrity - Adult  Goal: Skin integrity remains intact  4/28/2024 0833 by Ezra Verde RN  Outcome: Progressing  4/27/2024 2328 by Funmilayo Kwon RN  Outcome: Progressing  Flowsheets (Taken 4/27/2024 2328)  Skin Integrity Remains Intact:   Monitor for areas of redness and/or skin breakdown   Every 4-6 hours minimum: Change oxygen saturation probe site   Assess vascular access sites hourly     Problem: Gastrointestinal - Adult  Goal: Minimal or absence of nausea and vomiting  4/28/2024 0833 by Ezra Verde RN  Outcome: Progressing  4/27/2024 2328 by Funmilayo Kwon RN  Outcome: Progressing  Flowsheets (Taken 4/27/2024 2328)  Minimal or absence of nausea and vomiting:   Administer IV fluids as ordered to ensure adequate hydration   Advance diet as tolerated, if ordered   Nutrition consult to assist patient with adequate nutrition and appropriate food choices     Problem:  Metabolic/Fluid and Electrolytes - Adult  Goal: Electrolytes maintained within normal limits  4/28/2024 0833 by Ezra Verde RN  Outcome: Progressing  4/27/2024 2328 by Funmilayo Kwon RN  Outcome: Progressing  Flowsheets (Taken 4/27/2024 2328)  Electrolytes maintained within normal limits: Monitor labs and assess patient for signs and symptoms of electrolyte imbalances  Goal: Hemodynamic stability and optimal renal function maintained  4/28/2024 0833 by Ezra Verde RN  Outcome: Progressing  4/27/2024 2328 by Funmilayo Kwon RN  Outcome: Progressing  Flowsheets (Taken 4/27/2024 2328)  Hemodynamic stability and optimal renal function maintained: Monitor labs and assess for signs and symptoms of volume excess or deficit     Problem: Skin/Tissue Integrity  Goal: Absence of new skin breakdown  Description: 1.  Monitor for areas of redness and/or skin breakdown  2.  Assess vascular access sites hourly  3.  Every 4-6 hours minimum:  Change oxygen saturation probe site  4.  Every 4-6 hours:  If on nasal continuous positive airway pressure, respiratory therapy assess nares and determine need for appliance change or resting period.  4/28/2024 0833 by Ezra Verde RN  Outcome: Progressing  4/27/2024 2328 by Funmilayo wKon RN  Outcome: Progressing     Problem: Safety - Adult  Goal: Free from fall injury  Outcome: Progressing

## 2024-04-28 NOTE — PALLIATIVE CARE
Brief Physician Note        Patient:   Chris Perez  YOB: 1955  Age:  69 y.o.  Room:  05/[facility]  MRN:  [id number]   Acct: [id number]  PCP: Donny Garnica MD    Date of Admission:  4/27/2024  7:01 PM  Date of Service:  4/27/2024    Reason for Consult: Goals of Care, Symptom Management, End Stage Disease, and Continuity of Care                The palliative care team is aware of the consult for Chris Perez to assist with Goals of Care, Symptom Management, End Stage Disease, and Continuity of Care. Palliative care was consulted by Dr. Srinivasan in the emergency department.  Patient has metastatic large cell neuroendocrine carcinoma.  He established with the outpatient palliative care clinic in February 2023.  He was prescribed morphine extended release and oxycodone immediate release for relief of his cancer related pain.  He was provided a refill of these medications in March 2024.   His most recent follow-up with medical oncology was on April 5th 2024. CT head on April 8, 2024 showed no evidence of intracranial metastatic disease.  MRI of the abdomen pelvis on April 24, 2024 was reported as having a 14 x 14 mm area of abnormal signal intensity adjacent to the right femoral neck laterally suggestive in inflammatory process or partial muscle tear rather than tumor.  He is currently undergoing immunotherapy with the Nivolumab and ipilimumab every 3 weeks for 4 cycles.  This treatment was started in January 2024.  He was scheduled in outpatient palliative care clinic to follow-up on April 30, 2024.  During this appointment we were going to revisit his CODE STATUS as he was provided with a POLST form to review with his family.  He did not want to make any decisions on his CODE STATUS during his initial appointment and was agreeable to follow-up conversations at his next appointment.  He unfortunately presented to the emergency department on April 27,

## 2024-04-29 PROBLEM — N17.9 ACUTE KIDNEY INJURY (HCC): Status: ACTIVE | Noted: 2024-04-29

## 2024-04-29 PROBLEM — R79.1 SUPRATHERAPEUTIC INR: Status: ACTIVE | Noted: 2024-04-29

## 2024-04-29 LAB
ALBUMIN SERPL BCG-MCNC: 3.3 G/DL (ref 3.5–5.1)
ALP SERPL-CCNC: 151 U/L (ref 38–126)
ALT SERPL W/O P-5'-P-CCNC: 93 U/L (ref 11–66)
ANION GAP SERPL CALC-SCNC: 9 MEQ/L (ref 8–16)
AST SERPL-CCNC: 93 U/L (ref 5–40)
BACTERIA UR CULT: ABNORMAL
BILIRUB SERPL-MCNC: 0.7 MG/DL (ref 0.3–1.2)
BUN SERPL-MCNC: 23 MG/DL (ref 7–22)
CALCIUM SERPL-MCNC: 9 MG/DL (ref 8.5–10.5)
CHLORIDE SERPL-SCNC: 101 MEQ/L (ref 98–111)
CO2 SERPL-SCNC: 21 MEQ/L (ref 23–33)
CREAT SERPL-MCNC: 1.1 MG/DL (ref 0.4–1.2)
DEPRECATED RDW RBC AUTO: 54.5 FL (ref 35–45)
ERYTHROCYTE [DISTWIDTH] IN BLOOD BY AUTOMATED COUNT: 15.8 % (ref 11.5–14.5)
GFR SERPL CREATININE-BSD FRML MDRD: 73 ML/MIN/1.73M2
GLUCOSE SERPL-MCNC: 193 MG/DL (ref 70–108)
HCT VFR BLD AUTO: 35.8 % (ref 42–52)
HGB BLD-MCNC: 12 GM/DL (ref 14–18)
INR PPP: 1.35 (ref 0.85–1.13)
MCH RBC QN AUTO: 31.7 PG (ref 26–33)
MCHC RBC AUTO-ENTMCNC: 33.5 GM/DL (ref 32.2–35.5)
MCV RBC AUTO: 94.5 FL (ref 80–94)
ORGANISM: ABNORMAL
PLATELET # BLD AUTO: 117 THOU/MM3 (ref 130–400)
PMV BLD AUTO: 12 FL (ref 9.4–12.4)
POTASSIUM SERPL-SCNC: 4.8 MEQ/L (ref 3.5–5.2)
PROT SERPL-MCNC: 6.5 G/DL (ref 6.1–8)
RBC # BLD AUTO: 3.79 MILL/MM3 (ref 4.7–6.1)
SODIUM SERPL-SCNC: 131 MEQ/L (ref 135–145)
WBC # BLD AUTO: 3.5 THOU/MM3 (ref 4.8–10.8)

## 2024-04-29 PROCEDURE — 99223 1ST HOSP IP/OBS HIGH 75: CPT | Performed by: STUDENT IN AN ORGANIZED HEALTH CARE EDUCATION/TRAINING PROGRAM

## 2024-04-29 PROCEDURE — 2580000003 HC RX 258

## 2024-04-29 PROCEDURE — 2580000003 HC RX 258: Performed by: INTERNAL MEDICINE

## 2024-04-29 PROCEDURE — 85027 COMPLETE CBC AUTOMATED: CPT

## 2024-04-29 PROCEDURE — 2060000000 HC ICU INTERMEDIATE R&B

## 2024-04-29 PROCEDURE — 6370000000 HC RX 637 (ALT 250 FOR IP)

## 2024-04-29 PROCEDURE — 85610 PROTHROMBIN TIME: CPT

## 2024-04-29 PROCEDURE — 99232 SBSQ HOSP IP/OBS MODERATE 35: CPT | Performed by: INTERNAL MEDICINE

## 2024-04-29 PROCEDURE — 36415 COLL VENOUS BLD VENIPUNCTURE: CPT

## 2024-04-29 PROCEDURE — 6370000000 HC RX 637 (ALT 250 FOR IP): Performed by: EMERGENCY MEDICINE

## 2024-04-29 PROCEDURE — 80053 COMPREHEN METABOLIC PANEL: CPT

## 2024-04-29 PROCEDURE — 6360000002 HC RX W HCPCS

## 2024-04-29 RX ORDER — WARFARIN SODIUM 2.5 MG/1
2.5 TABLET ORAL
Status: COMPLETED | OUTPATIENT
Start: 2024-04-29 | End: 2024-04-29

## 2024-04-29 RX ORDER — CETIRIZINE HYDROCHLORIDE 10 MG/1
10 TABLET ORAL DAILY
COMMUNITY

## 2024-04-29 RX ORDER — COSYNTROPIN 0.25 MG/ML
250 INJECTION, POWDER, FOR SOLUTION INTRAMUSCULAR; INTRAVENOUS ONCE
Status: COMPLETED | OUTPATIENT
Start: 2024-04-30 | End: 2024-04-30

## 2024-04-29 RX ORDER — PANTOPRAZOLE SODIUM 40 MG/1
40 TABLET, DELAYED RELEASE ORAL
Status: DISCONTINUED | OUTPATIENT
Start: 2024-04-29 | End: 2024-04-30 | Stop reason: HOSPADM

## 2024-04-29 RX ORDER — HYDROCHLOROTHIAZIDE 12.5 MG/1
12.5 TABLET ORAL DAILY
Status: ON HOLD | COMMUNITY
End: 2024-04-30 | Stop reason: HOSPADM

## 2024-04-29 RX ADMIN — PANTOPRAZOLE SODIUM 40 MG: 40 TABLET, DELAYED RELEASE ORAL at 11:51

## 2024-04-29 RX ADMIN — WATER 40 MG: 1 INJECTION INTRAMUSCULAR; INTRAVENOUS; SUBCUTANEOUS at 11:52

## 2024-04-29 RX ADMIN — DULOXETINE HYDROCHLORIDE 30 MG: 30 CAPSULE, DELAYED RELEASE ORAL at 07:44

## 2024-04-29 RX ADMIN — OXYCODONE 10 MG: 5 TABLET ORAL at 07:44

## 2024-04-29 RX ADMIN — OXYCODONE 10 MG: 5 TABLET ORAL at 17:56

## 2024-04-29 RX ADMIN — SODIUM CHLORIDE, PRESERVATIVE FREE 10 ML: 5 INJECTION INTRAVENOUS at 07:44

## 2024-04-29 RX ADMIN — WARFARIN SODIUM 2.5 MG: 2.5 TABLET ORAL at 17:33

## 2024-04-29 RX ADMIN — SODIUM CHLORIDE, POTASSIUM CHLORIDE, SODIUM LACTATE AND CALCIUM CHLORIDE: 600; 310; 30; 20 INJECTION, SOLUTION INTRAVENOUS at 16:30

## 2024-04-29 RX ADMIN — WATER: 1 INJECTION INTRAMUSCULAR; INTRAVENOUS; SUBCUTANEOUS at 05:09

## 2024-04-29 RX ADMIN — WATER 60 MG: 1 INJECTION INTRAMUSCULAR; INTRAVENOUS; SUBCUTANEOUS at 00:14

## 2024-04-29 RX ADMIN — OXYCODONE 10 MG: 5 TABLET ORAL at 11:51

## 2024-04-29 ASSESSMENT — PAIN DESCRIPTION - DESCRIPTORS
DESCRIPTORS: STABBING
DESCRIPTORS: STABBING

## 2024-04-29 ASSESSMENT — PAIN - FUNCTIONAL ASSESSMENT
PAIN_FUNCTIONAL_ASSESSMENT: ACTIVITIES ARE NOT PREVENTED
PAIN_FUNCTIONAL_ASSESSMENT: ACTIVITIES ARE NOT PREVENTED

## 2024-04-29 ASSESSMENT — PAIN DESCRIPTION - FREQUENCY
FREQUENCY: CONTINUOUS
FREQUENCY: CONTINUOUS

## 2024-04-29 ASSESSMENT — PAIN SCALES - GENERAL
PAINLEVEL_OUTOF10: 0
PAINLEVEL_OUTOF10: 8

## 2024-04-29 ASSESSMENT — PAIN DESCRIPTION - LOCATION
LOCATION: ABDOMEN
LOCATION: ABDOMEN

## 2024-04-29 ASSESSMENT — PAIN DESCRIPTION - ORIENTATION
ORIENTATION: MID
ORIENTATION: MID

## 2024-04-29 ASSESSMENT — PAIN DESCRIPTION - ONSET
ONSET: ON-GOING
ONSET: ON-GOING

## 2024-04-29 ASSESSMENT — PAIN DESCRIPTION - PAIN TYPE
TYPE: CHRONIC PAIN
TYPE: CHRONIC PAIN

## 2024-04-29 NOTE — PROGRESS NOTES
Physician Consult Note        Patient:   Chris Perez  YOB: 1955  Age:  69 y.o.  Room:  [facility]02/002-A  MRN:  [id number]   Acct: [id number]  PCP: Donny Garnica MD    Date of Admission:  4/27/2024  7:01 PM  Date of Service:  4/29/2024    Reason for Consult: Goals of Care and Symptom Management             Subjective   Chief Complaint:-    Chief Complaint   Patient presents with    Fatigue        History Obtained From:-  Patient    History of Present Illness:-            Chris Perez is a 69 y.o. male who  has a past medical history of Atrial fibrillation (HCC), Cancer (HCC), Carotid artery stenosis, GERD (gastroesophageal reflux disease), Hepatitis C, Hyperlipidemia, Hypertension, Hyperuricemia, and Neuroendocrine carcinoma (HCC). They present to the hospital with Fatigue   and are admitted for Hypotension.  Palliative care was consulted for Goals of Care and Symptom Management.    Symptoms:  Pain: Location- Lower abdomen  Radiation- No radiation  Quality- sharp  Severity- 7 on scale of 1-10  Relieving factors: pain medication  Past Treatments: Morphine ER 30mg BID and Oxycodone IR 10mg Q4H PRN  Current Treatments: They have Oxycodone IR 10mg Q4H PRN for scheduled pain relief. From 8 AM yesterday to 8 AM today, they have used Oxycodone IR 10mg twice for as needed pain relief.  Effectiveness of Current Treatment: Their pain medications are working well to control their pain.  Shortness of breath: Patient denies shortness of breath or difficulty breathing.  Sleep: They are not having issues with sleep.  Fatigue/Drowsiness: Patient denies fatigue.   Appetite: Patient denied issues with appetite.  Wt. Loss: Patient's weight is stable.   Dry Mouth: Patient reports dry mouth.  Nausea/Vomiting: Patient reports nausea and vomiting. - Recent GI symptoms   Constipation/Diarrhea: Patient reports diarrhea. - Improved since admission   Depression: Patient  Exam  Constitutional:       Appearance: He is ill-appearing (chronically ill appearing).   HENT:      Head: Normocephalic and atraumatic.      Right Ear: External ear normal.      Left Ear: External ear normal.      Mouth/Throat:      Mouth: Mucous membranes are moist.   Eyes:      General:         Right eye: No discharge.         Left eye: No discharge.      Conjunctiva/sclera: Conjunctivae normal.   Cardiovascular:      Rate and Rhythm: Normal rate and regular rhythm.      Heart sounds: Normal heart sounds.   Pulmonary:      Effort: Pulmonary effort is normal.      Breath sounds: Wheezing present.   Abdominal:      General: Abdomen is flat. There is no distension.      Palpations: Abdomen is soft.      Tenderness: There is no abdominal tenderness.   Musculoskeletal:         General: Normal range of motion.   Skin:     General: Skin is warm.   Neurological:      General: No focal deficit present.      Mental Status: He is alert.         Data:-  CBC:   Recent Labs     04/28/24  0020 04/28/24  0517 04/29/24 0434   WBC 5.8 6.6 3.5*   HGB 12.2* 13.0* 12.0*   HCT 36.6* 38.3* 35.8*   MCV 96.1* 95.5* 94.5*   * 135 117*     BMP:    Recent Labs     04/28/24  0020 04/28/24  0517 04/29/24  0434   * 135 131*   K 4.6 4.7 4.8    103 101   CO2 20* 20* 21*   BUN 30* 28* 23*   CREATININE 2.0* 1.9* 1.1   GLUCOSE 89 84 193*     LIVER PROFILE:   Recent Labs     04/27/24  1925 04/28/24  0517 04/29/24  0434   * 128* 93*   * 115* 93*   LIPASE 238.4*  --   --    BILIDIR <0.2  --   --    BILITOT 0.6 0.9 0.7   ALKPHOS 181* 162* 151*     PT/INR:   Recent Labs     04/28/24  1120 04/28/24  1400 04/29/24  0434   INR 1.87* 1.66* 1.35*        XR CHEST PORTABLE   Final Result   Impression:   No acute cardiopulmonary abnormality.      This document has been electronically signed by: Adalberto Agudelo MD on    04/27/2024 09:58 PM           Palliative Performance Scale   70%  Ambulation reduced; unable to perform

## 2024-04-29 NOTE — CARE COORDINATION
Case Management Assessment Initial Evaluation    Date/Time of Evaluation: 4/29/2024 10:57 AM  Assessment Completed by: Charli Paez RN    If patient is discharged prior to next notation, then this note serves as note for discharge by case management.    Patient Name: Chris Perez                   YOB: 1955  Diagnosis: Dehydration [E86.0]  Hypotension [I95.9]  Acute kidney injury (HCC) [N17.9]  Supratherapeutic INR [R79.1]  Hypotension, unspecified hypotension type [I95.9]  Fatigue, unspecified type [R53.83]                   Date / Time: 4/27/2024  7:01 PM  Location: [facility]     Patient Admission Status: Inpatient   Readmission Risk Low 0-14, Mod 15-19), High > 20: Readmission Risk Score: 17.3    Current PCP: Donny Garnica MD    Additional Case Management Notes:   Hypotension/TROY  History: IVDA, Smoker, Marijuana Use, Hepatitis C, Neuroendocrine Cancer w GI-Lung Mets  IVF, IV Steroids  (+) UDS: Cannabis  INR 1.35; monitor      Patient Goals/Plan/Treatment Preferences: plans home w SO Sherlyn and brother/POA Haris, still drives, [place], has port, cane; Dr. Martinez doses Coumadin           04/29/24 1045   Service Assessment   Patient Orientation Alert and Oriented   Cognition Alert   History Provided By Patient;Medical Record   Primary Caregiver Self   Accompanied By/Relationship none   Support Systems Spouse/Significant Other   Patient's Healthcare Decision Maker is: Legal Next of Kin   PCP Verified by CM Yes   Last Visit to PCP Within last 3 months   Prior Functional Level Independent in ADLs/IADLs   Current Functional Level Independent in ADLs/IADLs   Can patient return to prior living arrangement Yes   Ability to make needs known: Good   Family able to assist with home care needs: Yes   Would you like for me to discuss the discharge plan with any other family members/significant others, and if so, who? No   Financial Resources Medicaid   Community Resources None

## 2024-04-29 NOTE — PROGRESS NOTES
Warfarin Pharmacy Consult Note    Warfarin Indication: Atrial fibrillation or Atrial flutter  Target INR: 2.0-3.0  Dose prior to admission: 2.5 mg MF; 5 mg all other days     Recent Labs     04/28/24  1120 04/28/24  1400 04/29/24  0434   INR 1.87* 1.66* 1.35*     Recent Labs     04/28/24  0020 04/28/24  0517 04/29/24  0434   HGB 12.2* 13.0* 12.0*   * 135 117*     Concurrent anticoagulants/antiplatelets: none  Significant warfarin drug-drug interactions: Solumedrol     Date INR Warfarin Dose   4/27/24 13.36 none   4/27/24   Vit. K 5 mg IV   4/28/24 1.66 2.5 mg    4/29/24  1.35   2.5 mg                     Monitoring:                   INR will be monitored daily until therapeutic INR is achieved.    **Please contact pharmacy for discharge instructions when indicated**    Renetta Fernandez, PharmD   Pharmacy Resident  4/29/2024 8:01 AM

## 2024-04-29 NOTE — PROGRESS NOTES
Internal Medicine Resident Progress Note    Name: Chris Perez, male, : 1955, MRN: [id number]    PCP: Donny Garnica MD    Date of Admission: 2024  Date of Service: Pt seen/examined on 24      Assessment/Plan:  TROY, stage III, pre-renal,  improving: Baseline creatinine 0.7, creatinine on admission 2.8. FENa 0.6%, with noted poor PO intake with worsening diarrhea prior to arrival. Patient given 3L bolus in ED. Noted for improvement in creatinine level.  Patient creatinine 1.1 on   Encourage PO fluid intake, continue LR at 75 ml/hr  Continue to monitor BMP  Avoid nephrotoxic agents  Expecting resolution of TROY . Will plan for discharge  if TROY resolved.  Permanent atrial fibrillation: CHADSVASc 2. On Coumadin as home medication, follows with outpatient Coumadin clinic. Per prior notes, last dose was on , but noted for supratherapeutic INR of 13.3 on admission, given vitamin K, INR improving and within therapeutic range on .  Pharmacy following to dose Coumadin  Patient INR now subtherapeutic, will resume Coumadin at this time  Continue to monitor on telemetry  Concern for adrenal insufficiency: Patient on methylprednisone with noted history of chemotherapy. Given association of chemotherapy and adrenal insufficiency will plan to hold methylprednisone at this time and perform Cosyntrop stimulation test  to assess for adrenal insufficiency.  Acutely elevated liver enzymes, iso HCV: possibly 2/2 HCV flare or neuroendocrine cancer metastasis. No clinical findings of jaundice or scleral icterus at this time. Bilirubin not elevated.  Tobacco use history: noted  Goals of care: Dr. Marshall consulted to discuss goals of care with patient. Patient continued to be Full Code with pain management changes of oxycodone and fentanyl for breakthrough pain in addition to duloxetine for chemotherapy induced peripheral neuropathy.  Chronic:  Metastatic large cell

## 2024-04-29 NOTE — PROGRESS NOTES
This  visited Chris, a 69 year old patient who requested help with ACP document. This  responded to the consult and assisted patient and completed the document.A copy was sent to Medical record, original given to patient and two extra copies were given to patient for his two his POA a decision makers. Patient listed his brother Mr. Haris Perez ([phone]) as his POA and his significant other ( Melissa Tierney [phone]) as his secondary Decision maker.    Spiritual Health chaplains will continue with spiritual support to patient and family as needed

## 2024-04-29 NOTE — PROGRESS NOTES
Pharmacy Medication History Note    List of current medications patient is taking is complete.    Source of information: patient/relative/pharmacy dispense report    Changes made to medication list:  Medications removed (include reason, ex. therapy complete or physician discontinued):  Alprazolam- therapy complete  Ondansetron- therapy complete    Medications added/doses adjusted:  Adjusted lisinopril- 5 mg 1 tablet daily  Adjusted metoprolol tartrate- 25 mg 1 tablet 2 times daily  Adjusted mometasone- 2 sprays daily    Added cetirizine 10 mg- 1 tablet daily  Added hydrochlorothiazide 12.5 mg- 1 tablet daily    Other notes (ex. Recent course of antibiotics, Coumadin dosing):  Patient [facility] with [facility] coumadin [facility]. He stated that his current dose is 2.5 mg MF and 5 mg the rest of the week.   Denies use of other OTC or herbal medications.    Allergies reviewed    Electronically signed by Kennedy Hare on 4/29/2024 at 11:07 AM

## 2024-04-29 NOTE — PLAN OF CARE
Problem: Discharge Planning  Goal: Discharge to home or other facility with appropriate resources  Outcome: Progressing  Flowsheets (Taken 4/29/2024 0939)  Discharge to home or other facility with appropriate resources:   Identify barriers to discharge with patient and caregiver   Identify discharge learning needs (meds, wound care, etc)   Refer to discharge planning if patient needs post-hospital services based on physician order or complex needs related to functional status, cognitive ability or social support system   Arrange for needed discharge resources and transportation as appropriate     Problem: Pain  Goal: Verbalizes/displays adequate comfort level or baseline comfort level  Outcome: Progressing  Flowsheets (Taken 4/29/2024 0939)  Verbalizes/displays adequate comfort level or baseline comfort level:   Administer analgesics based on type and severity of pain and evaluate response   Consider cultural and social influences on pain and pain management   Encourage patient to monitor pain and request assistance   Assess pain using appropriate pain scale   Implement non-pharmacological measures as appropriate and evaluate response     Problem: Skin/Tissue Integrity - Adult  Goal: Skin integrity remains intact  Outcome: Progressing  Flowsheets  Taken 4/29/2024 0939  Skin Integrity Remains Intact:   Monitor for areas of redness and/or skin breakdown   Every 4-6 hours minimum: Change oxygen saturation probe site   Assess vascular access sites hourly  Taken 4/29/2024 0938  Skin Integrity Remains Intact:   Monitor for areas of redness and/or skin breakdown   Assess vascular access sites hourly   Every 4-6 hours minimum: Change oxygen saturation probe site     Problem: Gastrointestinal - Adult  Goal: Minimal or absence of nausea and vomiting  Outcome: Progressing  Flowsheets (Taken 4/29/2024 0939)  Minimal or absence of nausea and vomiting:   Administer IV fluids as ordered to ensure adequate hydration   Nasogastric  tube to low intermittent suction as ordered   Provide nonpharmacologic comfort measures as appropriate   Nutrition consult to assist patient with adequate nutrition and appropriate food choices   Administer ordered antiemetic medications as needed   Advance diet as tolerated, if ordered     Problem: Metabolic/Fluid and Electrolytes - Adult  Goal: Electrolytes maintained within normal limits  Outcome: Progressing  Flowsheets (Taken 4/29/2024 0939)  Electrolytes maintained within normal limits:   Monitor labs and assess patient for signs and symptoms of electrolyte imbalances   Monitor response to electrolyte replacements, including repeat lab results as appropriate   Instruct patient on fluid and nutrition restrictions as appropriate   Administer electrolyte replacement as ordered  Goal: Hemodynamic stability and optimal renal function maintained  Outcome: Progressing  Flowsheets (Taken 4/29/2024 0939)  Hemodynamic stability and optimal renal function maintained:   Monitor labs and assess for signs and symptoms of volume excess or deficit   Monitor intake, output and patient weight   Encourage oral intake as appropriate   Instruct patient on fluid and nutrition restrictions as appropriate     Problem: Skin/Tissue Integrity  Goal: Absence of new skin breakdown  Description: 1.  Monitor for areas of redness and/or skin breakdown  2.  Assess vascular access sites hourly  3.  Every 4-6 hours minimum:  Change oxygen saturation probe site  4.  Every 4-6 hours:  If on nasal continuous positive airway pressure, respiratory therapy assess nares and determine need for appliance change or resting period.  Outcome: Progressing  Note: No new skin breakdown this shift.  Pt turned every 2 hours and as needed.         Problem: Safety - Adult  Goal: Free from fall injury  Outcome: Progressing  Flowsheets (Taken 4/29/2024 0939)  Free From Fall Injury:   Instruct family/caregiver on patient safety   Based on caregiver fall risk screen,

## 2024-04-29 NOTE — PROGRESS NOTES
Internal Medicine Resident Progress Note    Name: Chris Perez, male, : 1955, MRN: [id number]    PCP: Donny Garnica MD    Date of Admission: 2024  Date of Service: Pt seen/examined on 24      Assessment/Plan:  Learning Purpose only  TROY Stage III, pre-renal improving: Cr 2.8 on admission with baseline 0.7. Poor oral intake with possible excerebration  after him and his 2 brothers finished a 5th of hard liquor. 3L bolus given in ED, Pt responded well.  Cr 1.1   Continue LR 75 mL/hr  Afib: CHADSVASc 2. Home med Warfrin 2.5 mg, follows outpatient clinic. Last dose  and INR 13.3 upon ED arrival. Given 5 vitamin K.  INR 1.35   Palliative Care: Per Dr. Marshall, Pt full code w/ morphine 30 mg extended release and oxycodone 10 mg every 4 hr immediate release for breakthrough cancer pain. Duloxetine 30 mg for chemotherapy induced neuropathy.  Morphine held due to renal function  Elevated LFTs and Lipase: Likely secondary to malignancy as there is no change to his baseline. No jaundice or RUQ pain.  Chronic  HTN, hyperlipidemia, hyperuricemia, GERD, and HCV            ===================================================================      Chief Complaint: Fatigue    Hospital Course: Chris Perez 68 y/o male w/ PMHx of Afib, HTN, neuroendocrine carcinoma with metastasis, hyperlipidemia, hyperuricemia, GERD, and HCV. Smoking Hx of 1 pac per day for 60 years. Prior alcohol use disorder, stopped when he started chemotherapy. Immunotherapy includes Nivolumab and ipilimumab for 3 weeks for 4 cycles. He follows Dr. Ramirez outpatient 30 days after each completed cycle. Pt reports that he has completed 3 cycles. 24 he presented to [facility] with fatigue secondary to dehydration since . Patient states that  he went to the [facility] with his 2 brothers where they finished a 5th of hard liquor. He attributes this to his dehydration, he also does not  appearing. Nares normal. Oral mucosa moist.  Hearing intact.   Neck: Supple, with full range of motion. Trachea midline.  No gross JVD appreciated.  Respiratory:  Normal effort. Clear to auscultation, without rales or wheezes or rhonchi.  Cardiovascular: Normal rate, regular rhythm with normal S1/S2 without murmurs.    No lower extremity edema.   Abdomen: Soft, non-tender, non-distended with normal bowel sounds.  Musculoskeletal: No joint swelling or tenderness. Normal tone. No abnormal movements.   Skin: Warm and dry. No rashes or lesions.  Neurologic:  No focal sensory/motor deficits in the upper or lower extremities. Cranial nerves:  grossly non-focal 2-12.     Psychiatric: Alert and oriented, normal insight and thought content.   Capillary Refill: Brisk,< 3 seconds.  Peripheral Pulses: +2 palpable, equal bilaterally.       Labs:   Recent Labs     04/28/24  0020 04/28/24  0517 04/29/24  0434   WBC 5.8 6.6 3.5*   HGB 12.2* 13.0* 12.0*   HCT 36.6* 38.3* 35.8*   * 135 117*     Recent Labs     04/28/24  0020 04/28/24  0517 04/28/24  1152 04/29/24  0434   * 135  --  131*   K 4.6 4.7  --  4.8    103  --  101   CO2 20* 20*  --  21*   BUN 30* 28*  --  23*   CREATININE 2.0* 1.9*  --  1.1   CALCIUM 8.1* 8.5  --  9.0   PHOS  --   --  3.2  --      Recent Labs     04/27/24  1925 04/28/24  0517 04/29/24  0434   * 128* 93*   * 115* 93*   BILIDIR <0.2  --   --    BILITOT 0.6 0.9 0.7   ALKPHOS 181* 162* 151*     Recent Labs     04/28/24  1120 04/28/24  1400 04/29/24  0434   INR 1.87* 1.66* 1.35*     No results for input(s): \"TROPONINT\" in the last 72 hours.  Recent Labs     04/28/24  1152   PROCAL 0.15*      Lab Results   Component Value Date/Time    NITRU NEGATIVE 04/27/2024 09:45 PM    WBCUA 10-15 04/27/2024 09:45 PM    BACTERIA NONE SEEN 04/27/2024 09:45 PM    RBCUA 3-5 04/27/2024 09:45 PM    BLOODU NEGATIVE 04/27/2024 09:45 PM    GLUCOSEU NEGATIVE 04/27/2024 09:45 PM         Electronically

## 2024-04-30 VITALS
WEIGHT: 137.8 LBS | BODY MASS INDEX: 20.88 KG/M2 | RESPIRATION RATE: 16 BRPM | TEMPERATURE: 97.9 F | SYSTOLIC BLOOD PRESSURE: 123 MMHG | HEIGHT: 68 IN | OXYGEN SATURATION: 99 % | HEART RATE: 60 BPM | DIASTOLIC BLOOD PRESSURE: 65 MMHG

## 2024-04-30 LAB
ALBUMIN SERPL BCG-MCNC: 2.8 G/DL (ref 3.5–5.1)
ALP SERPL-CCNC: 119 U/L (ref 38–126)
ALT SERPL W/O P-5'-P-CCNC: 63 U/L (ref 11–66)
ANION GAP SERPL CALC-SCNC: 15 MEQ/L (ref 8–16)
AST SERPL-CCNC: 40 U/L (ref 5–40)
BASOPHILS ABSOLUTE: 0 THOU/MM3 (ref 0–0.1)
BASOPHILS NFR BLD AUTO: 0 %
BILIRUB SERPL-MCNC: 0.4 MG/DL (ref 0.3–1.2)
BUN SERPL-MCNC: 19 MG/DL (ref 7–22)
CALCIUM SERPL-MCNC: 8.6 MG/DL (ref 8.5–10.5)
CHLORIDE SERPL-SCNC: 103 MEQ/L (ref 98–111)
CO2 SERPL-SCNC: 20 MEQ/L (ref 23–33)
CORTIS SERPL-MCNC: 11.44 UG/DL
CORTIS SERPL-MCNC: 2.98 UG/DL
CORTIS SERPL-MCNC: 8.59 UG/DL
CORTISOL COLLECTION INFO: NORMAL
CREAT SERPL-MCNC: 0.8 MG/DL (ref 0.4–1.2)
DEPRECATED RDW RBC AUTO: 57.1 FL (ref 35–45)
EOSINOPHIL NFR BLD AUTO: 0 %
EOSINOPHILS ABSOLUTE: 0 THOU/MM3 (ref 0–0.4)
ERYTHROCYTE [DISTWIDTH] IN BLOOD BY AUTOMATED COUNT: 16.4 % (ref 11.5–14.5)
GFR SERPL CREATININE-BSD FRML MDRD: > 90 ML/MIN/1.73M2
GLUCOSE SERPL-MCNC: 252 MG/DL (ref 70–108)
HCT VFR BLD AUTO: 32.7 % (ref 42–52)
HGB BLD-MCNC: 11.1 GM/DL (ref 14–18)
IMM GRANULOCYTES # BLD AUTO: 0.06 THOU/MM3 (ref 0–0.07)
IMM GRANULOCYTES NFR BLD AUTO: 0.7 %
INR PPP: 1.81 (ref 0.85–1.13)
LYMPHOCYTES ABSOLUTE: 0.8 THOU/MM3 (ref 1–4.8)
LYMPHOCYTES NFR BLD AUTO: 8.9 %
MCH RBC QN AUTO: 32.1 PG (ref 26–33)
MCHC RBC AUTO-ENTMCNC: 33.9 GM/DL (ref 32.2–35.5)
MCV RBC AUTO: 94.5 FL (ref 80–94)
MONOCYTES ABSOLUTE: 0.5 THOU/MM3 (ref 0.4–1.3)
MONOCYTES NFR BLD AUTO: 5.4 %
NEUTROPHILS NFR BLD AUTO: 85 %
NRBC BLD AUTO-RTO: 0 /100 WBC
PLATELET # BLD AUTO: 119 THOU/MM3 (ref 130–400)
PMV BLD AUTO: 11.9 FL (ref 9.4–12.4)
POTASSIUM SERPL-SCNC: 3.9 MEQ/L (ref 3.5–5.2)
PROT SERPL-MCNC: 6.1 G/DL (ref 6.1–8)
RBC # BLD AUTO: 3.46 MILL/MM3 (ref 4.7–6.1)
SEGMENTED NEUTROPHILS ABSOLUTE COUNT: 7.7 THOU/MM3 (ref 1.8–7.7)
SODIUM SERPL-SCNC: 138 MEQ/L (ref 135–145)
WBC # BLD AUTO: 9.1 THOU/MM3 (ref 4.8–10.8)

## 2024-04-30 PROCEDURE — 6370000000 HC RX 637 (ALT 250 FOR IP)

## 2024-04-30 PROCEDURE — 85610 PROTHROMBIN TIME: CPT

## 2024-04-30 PROCEDURE — 94761 N-INVAS EAR/PLS OXIMETRY MLT: CPT

## 2024-04-30 PROCEDURE — 99239 HOSP IP/OBS DSCHRG MGMT >30: CPT | Performed by: INTERNAL MEDICINE

## 2024-04-30 PROCEDURE — 6360000002 HC RX W HCPCS

## 2024-04-30 PROCEDURE — 36415 COLL VENOUS BLD VENIPUNCTURE: CPT

## 2024-04-30 PROCEDURE — 6370000000 HC RX 637 (ALT 250 FOR IP): Performed by: EMERGENCY MEDICINE

## 2024-04-30 PROCEDURE — 36591 DRAW BLOOD OFF VENOUS DEVICE: CPT

## 2024-04-30 PROCEDURE — 80053 COMPREHEN METABOLIC PANEL: CPT

## 2024-04-30 PROCEDURE — 2580000003 HC RX 258

## 2024-04-30 PROCEDURE — 99233 SBSQ HOSP IP/OBS HIGH 50: CPT | Performed by: STUDENT IN AN ORGANIZED HEALTH CARE EDUCATION/TRAINING PROGRAM

## 2024-04-30 PROCEDURE — 85025 COMPLETE CBC W/AUTO DIFF WBC: CPT

## 2024-04-30 PROCEDURE — 82533 TOTAL CORTISOL: CPT

## 2024-04-30 PROCEDURE — 80400 ACTH STIMULATION PANEL: CPT

## 2024-04-30 RX ORDER — HEPARIN 100 UNIT/ML
500 SYRINGE INTRAVENOUS ONCE
Status: COMPLETED | OUTPATIENT
Start: 2024-04-30 | End: 2024-04-30

## 2024-04-30 RX ORDER — MORPHINE SULFATE 30 MG/1
30 TABLET, FILM COATED, EXTENDED RELEASE ORAL 2 TIMES DAILY
Qty: 60 TABLET | Refills: 0
Start: 2024-04-30 | End: 2024-05-30

## 2024-04-30 RX ORDER — WARFARIN SODIUM 1 MG/1
1 TABLET ORAL
Status: COMPLETED | OUTPATIENT
Start: 2024-04-30 | End: 2024-04-30

## 2024-04-30 RX ORDER — WARFARIN SODIUM 1 MG/1
1 TABLET ORAL
Status: DISCONTINUED | OUTPATIENT
Start: 2024-04-30 | End: 2024-04-30

## 2024-04-30 RX ORDER — OXYCODONE HYDROCHLORIDE 10 MG/1
10 TABLET ORAL EVERY 4 HOURS PRN
Qty: 180 TABLET | Refills: 0
Start: 2024-04-30 | End: 2024-05-01 | Stop reason: SDUPTHER

## 2024-04-30 RX ADMIN — HEPARIN 500 UNITS: 100 SYRINGE at 11:36

## 2024-04-30 RX ADMIN — WARFARIN SODIUM 1 MG: 1 TABLET ORAL at 12:17

## 2024-04-30 RX ADMIN — OXYCODONE 10 MG: 5 TABLET ORAL at 00:16

## 2024-04-30 RX ADMIN — DULOXETINE HYDROCHLORIDE 30 MG: 30 CAPSULE, DELAYED RELEASE ORAL at 09:17

## 2024-04-30 RX ADMIN — SODIUM CHLORIDE, PRESERVATIVE FREE 10 ML: 5 INJECTION INTRAVENOUS at 08:22

## 2024-04-30 RX ADMIN — OXYCODONE 10 MG: 5 TABLET ORAL at 10:47

## 2024-04-30 RX ADMIN — COSYNTROPIN 250 MCG: 0.25 INJECTION, POWDER, LYOPHILIZED, FOR SOLUTION INTRAMUSCULAR; INTRAVENOUS at 08:12

## 2024-04-30 RX ADMIN — OXYCODONE 10 MG: 5 TABLET ORAL at 04:23

## 2024-04-30 RX ADMIN — PANTOPRAZOLE SODIUM 40 MG: 40 TABLET, DELAYED RELEASE ORAL at 06:14

## 2024-04-30 ASSESSMENT — PAIN DESCRIPTION - LOCATION
LOCATION: ABDOMEN
LOCATION: BACK;ABDOMEN

## 2024-04-30 ASSESSMENT — PAIN DESCRIPTION - ORIENTATION: ORIENTATION: MID

## 2024-04-30 ASSESSMENT — PAIN SCALES - GENERAL
PAINLEVEL_OUTOF10: 0
PAINLEVEL_OUTOF10: 8
PAINLEVEL_OUTOF10: 7
PAINLEVEL_OUTOF10: 0

## 2024-04-30 ASSESSMENT — PAIN DESCRIPTION - DESCRIPTORS: DESCRIPTORS: ACHING

## 2024-04-30 NOTE — PROGRESS NOTES
Physician Progress Note        Patient:   Chris Perez  YOB: 1955  Age:  69 y.o.  Room:  [facility]/002-A  MRN:  [id number]   Acct: [id number]  PCP: Donny Garnica MD    Date of Admission:  4/27/2024  7:01 PM  Date of Service:  4/30/2024    Reason for Consult: Goals of Care, Symptom Management, and Continuity of Care.    History Obtained From:-  Patient, Electronic Medical Record, Patient's primary nurse             Subjective   Chris Perez was seen in their room today for follow up with the palliative care team. There was family present at the bedside. He is doing well this morning.  He states that the pain is still there, however the pain medications are working well to control his pain. They have Duloxetine 30 mg daily  for scheduled symptom relief. From 8 AM yesterday to 8 AM today, they have used Oxycodone IR 10 mg 4 times  for as needed symptom relief. Their medications are working well to control their symptoms. They did get a good night sleep. They are eating their meals. The patient did have a bowel movement in the last 24 hours. He denied having any nausea, vomiting, diarrhea or constipation.  He had no other complaints for me this morning.     Objective   Vitals:-    /65   Pulse 60   Temp 97.9 °F (36.6 °C) (Oral)   Resp 16   Ht 1.727 m (5' 8\")   Wt 62.5 kg (137 lb 12.8 oz)   SpO2 99%   BMI 20.95 kg/m²     Physical Exam  Vitals and nursing note reviewed.   Constitutional:       General: He is awake. He is not in acute distress.     Appearance: He is ill-appearing (Chronically).   HENT:      Head: Normocephalic and atraumatic.      Right Ear: External ear normal.      Left Ear: External ear normal.      Nose: No rhinorrhea.      Mouth/Throat:      Mouth: Mucous membranes are moist.   Eyes:      General:         Right eye: No discharge.         Left eye: No discharge.   Cardiovascular:      Rate and Rhythm: Normal rate.

## 2024-04-30 NOTE — PROGRESS NOTES
Clinical Pharmacy Note                                               Warfarin Discharge Recommendations    Patient discharged from [facility] today on warfarin.    Warfarin indication: Atrial fibrillation or Atrial flutter  INR goal during admission: 2.0-3.0  Previous home warfarin regimen: 2.5mg MF, 5mg on others  Interacting medications at discharge: cymbalta, oxycodone  Coumadin 5 mg tabs    Hospital Warfarin Doses & INR Results  Concurrent anticoagulants/antiplatelets: none  Significant warfarin drug-drug interactions: Solumedrol - discontinued yesterday      Date INR Warfarin Dose   4/27/24 13.36 none   4/27/24   Vit. K 5 mg IV   4/28/24 1.66 2.5 mg    4/29/24  1.35   2.5 mg   4/30/24   1.81  1 mg      Recent INRs:  Recent Labs     04/30/24  0400   INR 1.81*     Warfarin Discharge Instructions:   Date Warfarin Dose   5/1/24 (tomorrow) 2.5 mg (1/2 tablet)   5/2/24 2.5 mg (1/2 tablet)   5/3/24 INR check at appt @ 10:15 am     Provider dosing warfarin: Dr. Martinez  Next INR date: 5/3/24 @ 10:15 am    Senthil RochaD   Pharmacy Resident  4/30/2024 11:55 AM

## 2024-04-30 NOTE — CARE COORDINATION
4/30/24, 10:21 AM EDT    Patient goals/plan/ treatment preferences discussed by  and .  Patient goals/plan/ treatment preferences reviewed with patient/ family.  Patient/ family verbalize understanding of discharge plan and are in agreement with goal/plan/treatment preferences.  Understanding was demonstrated using the teach back method.  AVS provided by RN at time of discharge, which includes all necessary medical information pertaining to the patients current course of illness, treatment, post-discharge goals of care, and treatment preferences.     Services At/After Discharge: None: plans home lia [place] when medically cleared, still drives, [place], has port, cane; Dr. Martinez doses Coumadin

## 2024-04-30 NOTE — DISCHARGE SUMMARY
neuropathy.  Patient only to resume morphine ER only if oxycodone use reaches 60mg daily  Chronic:  Metastatic large cell neuroendocrine carcinoma: Sees Dr. Ramirez, hem/onc outpatient. Currently undergoing treatment with nivolumab and ipilimumab every 3 weeks, cycle started in January 2024.  Hypertension: on lisinopril at home. Holding 2/2 TROY at this time.  Hyperlipidemia:   HCV, 2/2 history of IV drug abuse: noted  Resolved  Hypotension  Hyponatremia      Hospital Course:   Patient is a 69-yearr-old male, medical history of Afib, neuroendocrine cancer with metastasis to bone and liver, hyperlipidemia, hypertension, HCV 2/2 history of IV drug use, tobacco and alcohol abuse history, who was admitted to [facility] on 4/27 for abdominal pain, nausea, and dry-heaving. On initial evaluation, patient noted for TROY likely 2/2 poor oral intake resulting from proceeding increased alcohol consumption. Patient was also noted for supratherapeutic INR of 13.3 at that time, was given vitamin K in ED. On initial vitals, patient was hypotensive with BP of 71/50, transferred to ICU for pressor support, eventually weaned off pressors and transferred to stepdown on 4/28.     Patient is currently following Dr. Ramirez, hematology/oncology, for treatment of his neuroendocrine cancer. Patient is undergoing treatment with nivolumab and ipilimumab, undergoes treatment every 3 weeks for 4 cycles and then follows up with Dr. Ramirez after 30 days for follow up.      Patient also follows with Dr. Marshall outpatient for palliative care. Patient seen by Dr. Marshall for goals of care discussion on 4/27, and noted for desire to remain full code. Patient morphine stopped 2/2 current TROY, but remains on oxycodone, fentanyl, and duloxetine.      4/29: Patient seen and examined at bedside. Patient noted for improvement in blood pressure at this time. Creatinine continues to improve, recommend continued PO fluid intake in addition to IVF at this time.  and time. Bring medications, a photo ID, and insurance card.    Eliseo Martinez,   [address]  [place] [address]  [phone]    Follow up on 5/3/2024  INR follow up with Dr. Martinez at his [facility] Location - use Door Number 3. 5/3/24 at 1015 AM         Discharge Medications:      Medication List        CONTINUE taking these medications      albuterol sulfate  (90 Base) MCG/ACT inhaler  Commonly known as: Ventolin HFA  Inhale 2 puffs into the lungs 4 times daily as needed for Wheezing     allopurinol 100 MG tablet  Commonly known as: ZYLOPRIM     atorvastatin 20 MG tablet  Commonly known as: LIPITOR     cetirizine 10 MG tablet  Commonly known as: ZYRTEC     DULoxetine 30 MG extended release capsule  Commonly known as: Cymbalta  Take 1 capsule by mouth daily     famotidine 20 MG tablet  Commonly known as: PEPCID     loperamide 2 MG capsule  Commonly known as: IMODIUM  Take 1 capsule by mouth 4 times daily as needed for Diarrhea     MOMETASONE FUROATE NA     morphine 30 MG extended release tablet  Commonly known as: MS CONTIN  Take 1 tablet by mouth 2 times daily for 30 days. Max Daily Amount: 60 mg     omeprazole 40 MG delayed release capsule  Commonly known as: PRILOSEC     oxyCODONE HCl 10 MG immediate release tablet  Commonly known as: OXY-IR  Take 1 tablet by mouth every 4 hours as needed for Pain for up to 30 days. Intended supply: 30 days Max Daily Amount: 60 mg     potassium chloride 20 MEQ extended release tablet  Commonly known as: KLOR-CON M     sennosides-docusate sodium 8.6-50 MG tablet  Commonly known as: SENOKOT-S  Take 1 tablet by mouth daily     warfarin 5 MG tablet  Commonly known as: COUMADIN            STOP taking these medications      furosemide 20 MG tablet  Commonly known as: LASIX     hydroCHLOROthiazide 12.5 MG tablet     lisinopril 5 MG tablet  Commonly known as: PRINIVIL;ZESTRIL     metoprolol tartrate 25 MG tablet  Commonly known as: LOPRESSOR          Medication

## 2024-04-30 NOTE — PROGRESS NOTES
Warfarin Pharmacy Consult Note    Warfarin Indication: Atrial fibrillation or Atrial flutter  Target INR: 2.0-3.0  Dose prior to admission: 2.5mg MF, 5mg all other days     Recent Labs     04/28/24  1400 04/29/24  0434 04/30/24  0400   INR 1.66* 1.35* 1.81*     Recent Labs     04/28/24  0517 04/29/24  0434 04/30/24  0400   HGB 13.0* 12.0* 11.1*    117* 119*     Concurrent anticoagulants/antiplatelets: none  Significant warfarin drug-drug interactions: Solumedrol - discontinued yesterday      Date INR Warfarin Dose   4/27/24 13.36 none   4/27/24   Vit. K 5 mg IV   4/28/24 1.66 2.5 mg    4/29/24  1.35   2.5 mg   4/30/24   1.81  1 mg              Monitoring:                   INR will be monitored daily until therapeutic INR is achieved.    **Please contact pharmacy for discharge instructions when indicated**    Renetta Fernandez, PharmD   Pharmacy Resident  4/30/2024 9:31 AM

## 2024-04-30 NOTE — PROGRESS NOTES
Discharge teaching and instructions for diagnosis/procedure of hypotension completed with patient using teachback method. AVS reviewed. Printed prescriptions given to patient. Patient voiced understanding regarding prescriptions, follow up appointments, and care of self at home. Discharged in a wheelchair to  home with support per family

## 2024-04-30 NOTE — DISCHARGE INSTRUCTIONS
Will stop blood pressure medications at this time (furosemide, hydrochlorothiazide, lisinopril, and metoprolol) until seen by primary care doctor to optimize medications.    Follow up with Dr. Ramirez as previously scheduled    Follow up with Dr. Marshall in 4 weeks and restart Morphine ER 30 mg twice a day once you are taking the oxycodone 6 times per day

## 2024-04-30 NOTE — PROGRESS NOTES
CLINICAL PHARMACY: DISCHARGE MED RECONCILIATION/REVIEW    [facility] Select Patient?: No  Total # of Interventions Recommended: 0  Total # Interventions Accepted: 0  Intervention Severity:   - Level 1 Intervention Present?: No   - Level 2 #: 0   - Level 3 #: 0   Time Spent (min): 15    Renetta Fernandez PharmD   Pharmacy Resident  4/30/2024 12:01 PM

## 2024-04-30 NOTE — DISCHARGE INSTR - PHARMACY
Warfarin Discharge Instructions:   Date Warfarin Dose   5/1/24 (tomorrow) 2.5 mg (1/2 tablet)   5/2/24 2.5 mg (1/2 tablet)   5/3/24 INR check at appt @ 10:15 am     Provider dosing warfarin: Dr. Martinez  Next INR date: 5/3/24 @ 10:15 am

## 2024-05-01 DIAGNOSIS — C18.9 MALIGNANT NEOPLASM OF COLON, UNSPECIFIED PART OF COLON (HCC): ICD-10-CM

## 2024-05-01 DIAGNOSIS — C7A.1 LARGE CELL NEUROENDOCRINE CARCINOMA (HCC): ICD-10-CM

## 2024-05-01 DIAGNOSIS — C78.00 MALIGNANT NEOPLASM METASTATIC TO LUNG, UNSPECIFIED LATERALITY (HCC): ICD-10-CM

## 2024-05-01 DIAGNOSIS — Z51.5 PALLIATIVE CARE PATIENT: ICD-10-CM

## 2024-05-01 DIAGNOSIS — G89.3 CANCER RELATED PAIN: ICD-10-CM

## 2024-05-01 RX ORDER — OXYCODONE HYDROCHLORIDE 10 MG/1
10 TABLET ORAL EVERY 4 HOURS PRN
Qty: 180 TABLET | Refills: 0 | Status: SHIPPED | OUTPATIENT
Start: 2024-05-01 | End: 2024-05-31

## 2024-05-01 NOTE — TELEPHONE ENCOUNTER
Pt's brother Haris Called states that the pt just got out of the hospital and is needing refills, Oxycodone 10mg IR.     Audrey Butt verified.

## 2024-05-03 ENCOUNTER — OFFICE VISIT (OUTPATIENT)
Dept: ONCOLOGY | Age: 69
End: 2024-05-03
Payer: MEDICARE

## 2024-05-03 ENCOUNTER — HOSPITAL ENCOUNTER (OUTPATIENT)
Dept: INFUSION THERAPY | Age: 69
Discharge: HOME | End: 2024-05-03
Payer: MEDICARE

## 2024-05-03 VITALS
RESPIRATION RATE: 16 BRPM | HEART RATE: 80 BPM | BODY MASS INDEX: 21.98 KG/M2 | OXYGEN SATURATION: 96 % | DIASTOLIC BLOOD PRESSURE: 58 MMHG | HEIGHT: 68 IN | TEMPERATURE: 98 F | SYSTOLIC BLOOD PRESSURE: 125 MMHG | WEIGHT: 145 LBS

## 2024-05-03 VITALS
HEART RATE: 80 BPM | WEIGHT: 145 LBS | RESPIRATION RATE: 16 BRPM | DIASTOLIC BLOOD PRESSURE: 58 MMHG | OXYGEN SATURATION: 96 % | TEMPERATURE: 98 F | SYSTOLIC BLOOD PRESSURE: 125 MMHG | BODY MASS INDEX: 22.05 KG/M2

## 2024-05-03 DIAGNOSIS — R93.89 ABNORMAL FINDINGS ON DIAGNOSTIC IMAGING OF OTHER SPECIFIED BODY STRUCTURES: ICD-10-CM

## 2024-05-03 DIAGNOSIS — C7A.1 LARGE CELL NEUROENDOCRINE CARCINOMA (HCC): Primary | ICD-10-CM

## 2024-05-03 LAB
ALBUMIN SERPL BCG-MCNC: 3.2 G/DL (ref 3.5–5.1)
ALP SERPL-CCNC: 118 U/L (ref 38–126)
ALT SERPL W/O P-5'-P-CCNC: 39 U/L (ref 11–66)
AST SERPL-CCNC: 33 U/L (ref 5–40)
BASOPHILS ABSOLUTE: 0 THOU/MM3 (ref 0–0.1)
BASOPHILS NFR BLD AUTO: 0 % (ref 0–3)
BILIRUB CONJ SERPL-MCNC: < 0.2 MG/DL (ref 0–0.3)
BILIRUB SERPL-MCNC: 0.4 MG/DL (ref 0.3–1.2)
BUN BLDP-MCNC: 10 MG/DL (ref 8–26)
CHLORIDE BLD-SCNC: 104 MEQ/L (ref 98–109)
CORTIS SERPL-MCNC: 0.95 UG/DL
CORTISOL COLLECTION INFO: NORMAL
CREAT BLD-MCNC: 0.6 MG/DL (ref 0.5–1.2)
EOSINOPHIL NFR BLD AUTO: 3 % (ref 0–4)
EOSINOPHILS ABSOLUTE: 0.2 THOU/MM3 (ref 0–0.4)
ERYTHROCYTE [DISTWIDTH] IN BLOOD BY AUTOMATED COUNT: 16.5 % (ref 11.5–14.5)
GFR SERPL CREATININE-BSD FRML MDRD: > 90 ML/MIN/1.73M2
GLUCOSE BLD-MCNC: 84 MG/DL (ref 70–108)
HCT VFR BLD AUTO: 34.9 % (ref 42–52)
HGB BLD-MCNC: 11.8 GM/DL (ref 14–18)
IMMATURE GRANULOCYTES %: 0 %
IMMATURE GRANULOCYTES ABSOLUTE: 0.01 THOU/MM3 (ref 0–0.07)
IONIZED CALCIUM, WHOLE BLOOD: 1.2 MMOL/L (ref 1.12–1.32)
LYMPHOCYTES ABSOLUTE: 2.5 THOU/MM3 (ref 1–4.8)
LYMPHOCYTES NFR BLD AUTO: 38 % (ref 15–47)
MCH RBC QN AUTO: 32.4 PG (ref 26–33)
MCHC RBC AUTO-ENTMCNC: 33.8 GM/DL (ref 32.2–35.5)
MCV RBC AUTO: 96 FL (ref 80–94)
MONOCYTES ABSOLUTE: 0.5 THOU/MM3 (ref 0.4–1.3)
MONOCYTES NFR BLD AUTO: 8 % (ref 0–12)
NEUTROPHILS ABSOLUTE: 3.3 THOU/MM3 (ref 1.8–7.7)
NEUTROPHILS NFR BLD AUTO: 51 % (ref 43–75)
PLATELET # BLD AUTO: 166 THOU/MM3 (ref 130–400)
PMV BLD AUTO: 10.9 FL (ref 9.4–12.4)
POTASSIUM BLD-SCNC: 3.9 MEQ/L (ref 3.5–4.9)
PROT SERPL-MCNC: 6.3 G/DL (ref 6.1–8)
RBC # BLD AUTO: 3.64 MILL/MM3 (ref 4.7–6.1)
SODIUM BLD-SCNC: 140 MEQ/L (ref 138–146)
TOTAL CO2, WHOLE BLOOD: 30 MEQ/L (ref 23–33)
TSH SERPL DL<=0.005 MIU/L-ACNC: 3.8 UIU/ML (ref 0.4–4.2)
WBC # BLD AUTO: 6.5 THOU/MM3 (ref 4.8–10.8)

## 2024-05-03 PROCEDURE — 80076 HEPATIC FUNCTION PANEL: CPT

## 2024-05-03 PROCEDURE — 36591 DRAW BLOOD OFF VENOUS DEVICE: CPT

## 2024-05-03 PROCEDURE — 6360000002 HC RX W HCPCS: Performed by: INTERNAL MEDICINE

## 2024-05-03 PROCEDURE — 80047 BASIC METABLC PNL IONIZED CA: CPT

## 2024-05-03 PROCEDURE — 2580000003 HC RX 258: Performed by: INTERNAL MEDICINE

## 2024-05-03 PROCEDURE — 3078F DIAST BP <80 MM HG: CPT | Performed by: INTERNAL MEDICINE

## 2024-05-03 PROCEDURE — 3074F SYST BP LT 130 MM HG: CPT | Performed by: INTERNAL MEDICINE

## 2024-05-03 PROCEDURE — 1123F ACP DISCUSS/DSCN MKR DOCD: CPT | Performed by: INTERNAL MEDICINE

## 2024-05-03 PROCEDURE — 99211 OFF/OP EST MAY X REQ PHY/QHP: CPT

## 2024-05-03 PROCEDURE — 85025 COMPLETE CBC W/AUTO DIFF WBC: CPT

## 2024-05-03 PROCEDURE — 82533 TOTAL CORTISOL: CPT

## 2024-05-03 PROCEDURE — 99214 OFFICE O/P EST MOD 30 MIN: CPT | Performed by: INTERNAL MEDICINE

## 2024-05-03 PROCEDURE — 84443 ASSAY THYROID STIM HORMONE: CPT

## 2024-05-03 RX ORDER — HYDROCORTISONE 10 MG/1
TABLET ORAL
Qty: 90 TABLET | Refills: 2 | Status: SHIPPED | OUTPATIENT
Start: 2024-05-03

## 2024-05-03 RX ORDER — HEPARIN 100 UNIT/ML
500 SYRINGE INTRAVENOUS PRN
Status: DISCONTINUED | OUTPATIENT
Start: 2024-05-03 | End: 2024-05-04 | Stop reason: HOSPADM

## 2024-05-03 RX ORDER — SODIUM CHLORIDE 9 MG/ML
25 INJECTION, SOLUTION INTRAVENOUS PRN
OUTPATIENT
Start: 2024-05-03

## 2024-05-03 RX ORDER — SODIUM CHLORIDE 0.9 % (FLUSH) 0.9 %
5-40 SYRINGE (ML) INJECTION PRN
OUTPATIENT
Start: 2024-05-03

## 2024-05-03 RX ORDER — SODIUM CHLORIDE 0.9 % (FLUSH) 0.9 %
5-40 SYRINGE (ML) INJECTION PRN
Status: DISCONTINUED | OUTPATIENT
Start: 2024-05-03 | End: 2024-05-04 | Stop reason: HOSPADM

## 2024-05-03 RX ORDER — HEPARIN 100 UNIT/ML
500 SYRINGE INTRAVENOUS PRN
OUTPATIENT
Start: 2024-05-03

## 2024-05-03 RX ADMIN — SODIUM CHLORIDE, PRESERVATIVE FREE 10 ML: 5 INJECTION INTRAVENOUS at 09:36

## 2024-05-03 RX ADMIN — SODIUM CHLORIDE, PRESERVATIVE FREE 20 ML: 5 INJECTION INTRAVENOUS at 09:37

## 2024-05-03 RX ADMIN — Medication 500 UNITS: at 10:48

## 2024-05-03 RX ADMIN — SODIUM CHLORIDE, PRESERVATIVE FREE 10 ML: 5 INJECTION INTRAVENOUS at 10:48

## 2024-05-03 ASSESSMENT — PAIN SCALES - GENERAL
PAINLEVEL_OUTOF10: 7
PAINLEVEL_OUTOF10: 7

## 2024-05-03 ASSESSMENT — PAIN DESCRIPTION - LOCATION
LOCATION: ABDOMEN
LOCATION: ABDOMEN

## 2024-05-03 ASSESSMENT — PAIN DESCRIPTION - DESCRIPTORS
DESCRIPTORS: CRAMPING
DESCRIPTORS: CRAMPING

## 2024-05-03 NOTE — PLAN OF CARE
Problem: Discharge Planning  Goal: Discharge to home or other facility with appropriate resources  Outcome: Adequate for Discharge  Flowsheets (Taken 5/3/2024 1251)  Discharge to home or other facility with appropriate resources:   Identify barriers to discharge with patient and caregiver   Identify discharge learning needs (meds, wound care, etc)  Note: Verbalize understanding of discharge instructions, follow up appointments, and when to call Physician.     Problem: Infection - Adult  Goal: Absence of infection at discharge  Outcome: Adequate for Discharge  Flowsheets (Taken 5/3/2024 1251)  Absence of infection at discharge:   Assess and monitor for signs and symptoms of infection   Monitor all insertion sites i.e., indwelling lines, tubes and drains  Note: Mediport site with no redness or warmth. Skin over port site intact with no signs of breakdown noted. Patient verbalizes signs/symptoms of port infection and when to notify the physician..   problem: Safety - Adult  Goal: Free from fall injury  Outcome: Adequate for Discharge  Flowsheets (Taken 5/3/2024 1251)  Free From Fall Injury:   Instruct family/caregiver on patient safety   Based on caregiver fall risk screen, instruct family/caregiver to ask for assistance with transferring infant if caregiver noted to have fall risk factors  Note: Free from falls while in O.P. Oncology.     Care plan reviewed with patient.  Patient verbalize understanding of the plan of care and contribute to goal setting.

## 2024-05-03 NOTE — PROGRESS NOTES
Patient tolerated lab draw via medi-port without any complications. Treatment held today. Discharge instructions given to patient-verbalizes understanding. Ambulated off unit per self with belongings.

## 2024-05-03 NOTE — DISCHARGE INSTRUCTIONS
You had labs drawn via [facility] while in Outpatient Oncology clinic, Please call us at [phone] if you have any questions or concerns about your visit or medications that you received today. It is important that you drink 48-64 ounces of water everyday.

## 2024-05-03 NOTE — PATIENT INSTRUCTIONS
Orders Placed This Encounter   Procedures    CBC with Auto Differential    Hepatic Function Panel    POC PANEL BMP W/IOCA    TSH With Reflex Ft4    Cortisol Total    Amb External Referral To Endocrinology     No treatment today.  Pt will maintain a blood pressure log for the next 2 weeks.  Orders Placed This Encounter   Medications    hydrocortisone (CORTEF) 10 MG tablet     Si mg p.o q am and 10 mg p.o qpm( at 4 pm)     Dispense:  90 tablet     Refill:  2

## 2024-05-04 NOTE — PROGRESS NOTES
changes are present in the thoracic spine without evidence of hypermetabolic osseous metastatic disease.    Abdomen/pelvis: Physiologic activity is present in the liver, spleen, urinary collecting system and gastrointestinal tract. Surgical changes in the colon are stable. Atherosclerotic calcifications are present in the abdominal aorta without evidence of  aneurysm. The prostate gland is enlarged. A nonenlarged mesenteric lymph node in the mid abdomen now has a maximum SUV of 2.8 (image 156; prior maximum SUV 6.2). A nonenlarged para-aortic retroperitoneal lymph node in the upper abdomen has a maximum SUV  of 4.0 (image 144). There is no FDG avid pelvic or inguinal lymphadenopathy. Degenerative changes are present in the lumbar spine and pelvis without evidence of hypermetabolic osseous metastatic disease.    Impression  1. FDG avid pulmonary nodules highly suspicious for metastatic disease.  2. FDG avid retrocrural, mesenteric and retroperitoneal lymphadenopathy highly suspicious for metastatic disease.    Final report electronically signed by Dr. Gopi Jarvis on 10/5/2023 11:45 AM.      3/27/24:  IMPRESSION:  1. Decrease in size, number and activity in small pulmonary nodules consistent with response to therapy.  2. Improving and resolved FDG avid retrocrural, mesenteric and retroperitoneal lymphadenopathy consistent with response to therapy.  3. New area of FDG avid soft tissue thickening adjacent to the right femoral neck of indeterminant etiology. A metastatic deposit cannot be excluded.        ASSESSMENT/PLAN:    ## metastatic Large bowel , large cell neuroendocrine tumor :   Poorly differentiated, large cell neuroendocrine tumor:   S/p right hemicolectomy 7/20/2023 : Pathologic stage: pT4a, pN0.     Ki 67: 90%.  MMR stable.  Metastatic in abdominal lymph nodes and lung lesions.       PET scan with uptake in lung lesion and abdominal lymph nodes consistent with metastatic disease.   No mets on CT head with

## 2024-05-13 ENCOUNTER — APPOINTMENT (OUTPATIENT)
Dept: CT IMAGING | Age: 69
DRG: 871 | End: 2024-05-13
Payer: MEDICARE

## 2024-05-13 ENCOUNTER — HOSPITAL ENCOUNTER (INPATIENT)
Age: 69
LOS: 6 days | Discharge: HOME | DRG: 871 | End: 2024-05-19
Attending: EMERGENCY MEDICINE
Payer: MEDICARE

## 2024-05-13 ENCOUNTER — APPOINTMENT (OUTPATIENT)
Dept: GENERAL RADIOLOGY | Age: 69
DRG: 871 | End: 2024-05-13
Payer: MEDICARE

## 2024-05-13 DIAGNOSIS — A41.9 SEPTICEMIA (HCC): ICD-10-CM

## 2024-05-13 DIAGNOSIS — I48.21 PERMANENT ATRIAL FIBRILLATION (HCC): ICD-10-CM

## 2024-05-13 DIAGNOSIS — R06.02 SHORTNESS OF BREATH: ICD-10-CM

## 2024-05-13 DIAGNOSIS — J18.9 PNEUMONIA OF BOTH LOWER LOBES DUE TO INFECTIOUS ORGANISM: Primary | ICD-10-CM

## 2024-05-13 PROBLEM — J15.9 COMMUNITY ACQUIRED BACTERIAL PNEUMONIA: Status: ACTIVE | Noted: 2024-05-13

## 2024-05-13 LAB
ALBUMIN SERPL BCG-MCNC: 3.6 G/DL (ref 3.5–5.1)
ALP SERPL-CCNC: 162 U/L (ref 38–126)
ALT SERPL W/O P-5'-P-CCNC: 20 U/L (ref 11–66)
ANION GAP SERPL CALC-SCNC: 22 MEQ/L (ref 8–16)
ANION GAP SERPL CALC-SCNC: 7 MEQ/L (ref 8–16)
ANISOCYTOSIS BLD QL SMEAR: PRESENT
ARTERIAL PATENCY WRIST A: ABNORMAL
AST SERPL-CCNC: 27 U/L (ref 5–40)
BASE EXCESS BLDA CALC-SCNC: -1.5 MMOL/L (ref -2.5–2.5)
BASE EXCESS BLDA CALC-SCNC: -6.5 MMOL/L (ref -2–3)
BASOPHILS ABSOLUTE: 0 THOU/MM3 (ref 0–0.1)
BASOPHILS NFR BLD AUTO: 0.3 %
BDY SITE: ABNORMAL
BILIRUB SERPL-MCNC: 0.6 MG/DL (ref 0.3–1.2)
BUN SERPL-MCNC: 12 MG/DL (ref 7–22)
BUN SERPL-MCNC: 12 MG/DL (ref 7–22)
BURR CELLS BLD QL SMEAR: ABNORMAL
CALCIUM SERPL-MCNC: 8.4 MG/DL (ref 8.5–10.5)
CALCIUM SERPL-MCNC: 8.8 MG/DL (ref 8.5–10.5)
CHLORIDE SERPL-SCNC: 100 MEQ/L (ref 98–111)
CHLORIDE SERPL-SCNC: 105 MEQ/L (ref 98–111)
CO2 SERPL-SCNC: 18 MEQ/L (ref 23–33)
CO2 SERPL-SCNC: 23 MEQ/L (ref 23–33)
COLLECTED BY:: ABNORMAL
COLLECTED BY:: ABNORMAL
CREAT SERPL-MCNC: 0.8 MG/DL (ref 0.4–1.2)
CREAT SERPL-MCNC: 0.9 MG/DL (ref 0.4–1.2)
DEPRECATED RDW RBC AUTO: 69.3 FL (ref 35–45)
DEVICE: ABNORMAL
DEVICE: ABNORMAL
EKG Q-T INTERVAL: 272 MS
EKG QRS DURATION: 70 MS
EKG QTC CALCULATION (BAZETT): 371 MS
EKG R AXIS: 82 DEGREES
EKG T AXIS: -107 DEGREES
EKG VENTRICULAR RATE: 112 BPM
EOSINOPHIL NFR BLD AUTO: 0.1 %
EOSINOPHILS ABSOLUTE: 0 THOU/MM3 (ref 0–0.4)
ERYTHROCYTE [DISTWIDTH] IN BLOOD BY AUTOMATED COUNT: 18.4 % (ref 11.5–14.5)
FIO2 ON VENT O2 ANALYZER: 28 %
FIO2 ON VENT O2 ANALYZER: 4 %
FLUAV RNA RESP QL NAA+PROBE: NOT DETECTED
FLUBV RNA RESP QL NAA+PROBE: NOT DETECTED
GFR SERPL CREATININE-BSD FRML MDRD: > 90 ML/MIN/1.73M2
GFR SERPL CREATININE-BSD FRML MDRD: > 90 ML/MIN/1.73M2
GLUCOSE SERPL-MCNC: 196 MG/DL (ref 70–108)
GLUCOSE SERPL-MCNC: 208 MG/DL (ref 70–108)
HCO3 BLDA-SCNC: 20 MMOL/L (ref 23–28)
HCO3 BLDA-SCNC: 21 MMOL/L (ref 23–28)
HCT VFR BLD AUTO: 38.5 % (ref 42–52)
HGB BLD-MCNC: 12.2 GM/DL (ref 14–18)
IMM GRANULOCYTES # BLD AUTO: 0.06 THOU/MM3 (ref 0–0.07)
IMM GRANULOCYTES NFR BLD AUTO: 0.4 %
INR PPP: 2.58 (ref 0.85–1.13)
LACTATE SERPL-SCNC: 3.1 MMOL/L (ref 0.5–2)
LACTATE SERPL-SCNC: 4.5 MMOL/L (ref 0.5–2)
LACTATE SERPL-SCNC: 4.8 MMOL/L (ref 0.5–2)
LACTIC ACID, SEPSIS: 2.6 MMOL/L (ref 0.5–1.9)
LYMPHOCYTES ABSOLUTE: 5.1 THOU/MM3 (ref 1–4.8)
LYMPHOCYTES NFR BLD AUTO: 33.3 %
MAGNESIUM SERPL-MCNC: 2.3 MG/DL (ref 1.6–2.4)
MCH RBC QN AUTO: 32.6 PG (ref 26–33)
MCHC RBC AUTO-ENTMCNC: 31.7 GM/DL (ref 32.2–35.5)
MCV RBC AUTO: 102.9 FL (ref 80–94)
MONOCYTES ABSOLUTE: 1 THOU/MM3 (ref 0.4–1.3)
MONOCYTES NFR BLD AUTO: 6.6 %
NEUTROPHILS ABSOLUTE: 9 THOU/MM3 (ref 1.8–7.7)
NEUTROPHILS NFR BLD AUTO: 59.3 %
NRBC BLD AUTO-RTO: 0 /100 WBC
NT-PROBNP SERPL IA-MCNC: 3159 PG/ML (ref 0–124)
OSMOLALITY SERPL CALC.SUM OF ELEC: 285.2 MOSMOL/KG (ref 275–300)
PCO2 BLDA: 28 MMHG (ref 35–45)
PCO2 TEMP ADJ BLDMV: 41 MMHG (ref 41–51)
PH BLDA: 7.48 [PH] (ref 7.35–7.45)
PH BLDMV: 7.29 [PH] (ref 7.31–7.41)
PLATELET # BLD AUTO: 203 THOU/MM3 (ref 130–400)
PLATELET BLD QL SMEAR: ADEQUATE
PMV BLD AUTO: 11.6 FL (ref 9.4–12.4)
PO2 BLDA: 76 MMHG (ref 71–104)
PO2 BLDMV: 26 MMHG (ref 25–40)
POIKILOCYTES: ABNORMAL
POTASSIUM SERPL-SCNC: 3.7 MEQ/L (ref 3.5–5.2)
POTASSIUM SERPL-SCNC: 5.6 MEQ/L (ref 3.5–5.2)
PROT SERPL-MCNC: 6.9 G/DL (ref 6.1–8)
RBC # BLD AUTO: 3.74 MILL/MM3 (ref 4.7–6.1)
SAO2 % BLDA: 96 %
SAO2 % BLDMV: 40 %
SARS-COV-2 RNA RESP QL NAA+PROBE: NOT DETECTED
SCAN OF BLOOD SMEAR: NORMAL
SITE: ABNORMAL
SODIUM SERPL-SCNC: 135 MEQ/L (ref 135–145)
SODIUM SERPL-SCNC: 140 MEQ/L (ref 135–145)
T4 FREE SERPL-MCNC: 1.03 NG/DL (ref 0.93–1.68)
TROPONIN, HIGH SENSITIVITY: 24 NG/L (ref 0–12)
TSH SERPL DL<=0.005 MIU/L-ACNC: 1.36 UIU/ML (ref 0.4–4.2)
WBC # BLD AUTO: 15.2 THOU/MM3 (ref 4.8–10.8)

## 2024-05-13 PROCEDURE — 71045 X-RAY EXAM CHEST 1 VIEW: CPT

## 2024-05-13 PROCEDURE — 80053 COMPREHEN METABOLIC PANEL: CPT

## 2024-05-13 PROCEDURE — 2580000003 HC RX 258

## 2024-05-13 PROCEDURE — 93005 ELECTROCARDIOGRAM TRACING: CPT | Performed by: EMERGENCY MEDICINE

## 2024-05-13 PROCEDURE — 6370000000 HC RX 637 (ALT 250 FOR IP)

## 2024-05-13 PROCEDURE — 87636 SARSCOV2 & INF A&B AMP PRB: CPT

## 2024-05-13 PROCEDURE — 6360000004 HC RX CONTRAST MEDICATION: Performed by: EMERGENCY MEDICINE

## 2024-05-13 PROCEDURE — 83880 ASSAY OF NATRIURETIC PEPTIDE: CPT

## 2024-05-13 PROCEDURE — 2580000003 HC RX 258: Performed by: EMERGENCY MEDICINE

## 2024-05-13 PROCEDURE — 94761 N-INVAS EAR/PLS OXIMETRY MLT: CPT

## 2024-05-13 PROCEDURE — 2700000000 HC OXYGEN THERAPY PER DAY

## 2024-05-13 PROCEDURE — 93010 ELECTROCARDIOGRAM REPORT: CPT | Performed by: INTERNAL MEDICINE

## 2024-05-13 PROCEDURE — 84439 ASSAY OF FREE THYROXINE: CPT

## 2024-05-13 PROCEDURE — 71275 CT ANGIOGRAPHY CHEST: CPT

## 2024-05-13 PROCEDURE — 6370000000 HC RX 637 (ALT 250 FOR IP): Performed by: EMERGENCY MEDICINE

## 2024-05-13 PROCEDURE — 99285 EMERGENCY DEPT VISIT HI MDM: CPT

## 2024-05-13 PROCEDURE — 83735 ASSAY OF MAGNESIUM: CPT

## 2024-05-13 PROCEDURE — 85610 PROTHROMBIN TIME: CPT

## 2024-05-13 PROCEDURE — 87040 BLOOD CULTURE FOR BACTERIA: CPT

## 2024-05-13 PROCEDURE — 94640 AIRWAY INHALATION TREATMENT: CPT

## 2024-05-13 PROCEDURE — 36600 WITHDRAWAL OF ARTERIAL BLOOD: CPT

## 2024-05-13 PROCEDURE — 1200000003 HC TELEMETRY R&B

## 2024-05-13 PROCEDURE — 96367 TX/PROPH/DG ADDL SEQ IV INF: CPT

## 2024-05-13 PROCEDURE — 99222 1ST HOSP IP/OBS MODERATE 55: CPT | Performed by: INTERNAL MEDICINE

## 2024-05-13 PROCEDURE — 6360000002 HC RX W HCPCS: Performed by: EMERGENCY MEDICINE

## 2024-05-13 PROCEDURE — 85025 COMPLETE CBC W/AUTO DIFF WBC: CPT

## 2024-05-13 PROCEDURE — 36415 COLL VENOUS BLD VENIPUNCTURE: CPT

## 2024-05-13 PROCEDURE — 96365 THER/PROPH/DIAG IV INF INIT: CPT

## 2024-05-13 PROCEDURE — 82803 BLOOD GASES ANY COMBINATION: CPT

## 2024-05-13 PROCEDURE — 84443 ASSAY THYROID STIM HORMONE: CPT

## 2024-05-13 PROCEDURE — 83605 ASSAY OF LACTIC ACID: CPT

## 2024-05-13 PROCEDURE — 84484 ASSAY OF TROPONIN QUANT: CPT

## 2024-05-13 RX ORDER — POTASSIUM CHLORIDE 7.45 MG/ML
10 INJECTION INTRAVENOUS PRN
Status: DISCONTINUED | OUTPATIENT
Start: 2024-05-13 | End: 2024-05-19 | Stop reason: HOSPADM

## 2024-05-13 RX ORDER — 0.9 % SODIUM CHLORIDE 0.9 %
1000 INTRAVENOUS SOLUTION INTRAVENOUS ONCE
Status: COMPLETED | OUTPATIENT
Start: 2024-05-13 | End: 2024-05-13

## 2024-05-13 RX ORDER — LOPERAMIDE HYDROCHLORIDE 2 MG/1
2 CAPSULE ORAL 4 TIMES DAILY PRN
Status: DISCONTINUED | OUTPATIENT
Start: 2024-05-13 | End: 2024-05-19 | Stop reason: HOSPADM

## 2024-05-13 RX ORDER — PANTOPRAZOLE SODIUM 40 MG/1
40 TABLET, DELAYED RELEASE ORAL
Status: DISCONTINUED | OUTPATIENT
Start: 2024-05-14 | End: 2024-05-19 | Stop reason: HOSPADM

## 2024-05-13 RX ORDER — ATORVASTATIN CALCIUM 20 MG/1
20 TABLET, FILM COATED ORAL DAILY
Status: DISCONTINUED | OUTPATIENT
Start: 2024-05-13 | End: 2024-05-19 | Stop reason: HOSPADM

## 2024-05-13 RX ORDER — WARFARIN SODIUM 2.5 MG/1
2.5 TABLET ORAL ONCE
Status: COMPLETED | OUTPATIENT
Start: 2024-05-13 | End: 2024-05-13

## 2024-05-13 RX ORDER — ACETAMINOPHEN 325 MG/1
650 TABLET ORAL EVERY 6 HOURS PRN
Status: DISCONTINUED | OUTPATIENT
Start: 2024-05-13 | End: 2024-05-19 | Stop reason: HOSPADM

## 2024-05-13 RX ORDER — CETIRIZINE HYDROCHLORIDE 10 MG/1
10 TABLET ORAL DAILY
Status: DISCONTINUED | OUTPATIENT
Start: 2024-05-13 | End: 2024-05-19 | Stop reason: HOSPADM

## 2024-05-13 RX ORDER — SODIUM CHLORIDE 0.9 % (FLUSH) 0.9 %
5-40 SYRINGE (ML) INJECTION EVERY 12 HOURS SCHEDULED
Status: DISCONTINUED | OUTPATIENT
Start: 2024-05-13 | End: 2024-05-19 | Stop reason: HOSPADM

## 2024-05-13 RX ORDER — ONDANSETRON 2 MG/ML
4 INJECTION INTRAMUSCULAR; INTRAVENOUS EVERY 6 HOURS PRN
Status: DISCONTINUED | OUTPATIENT
Start: 2024-05-13 | End: 2024-05-19 | Stop reason: HOSPADM

## 2024-05-13 RX ORDER — IPRATROPIUM BROMIDE AND ALBUTEROL SULFATE 2.5; .5 MG/3ML; MG/3ML
2 SOLUTION RESPIRATORY (INHALATION) ONCE
Status: COMPLETED | OUTPATIENT
Start: 2024-05-13 | End: 2024-05-13

## 2024-05-13 RX ORDER — ALBUTEROL SULFATE 90 UG/1
2 AEROSOL, METERED RESPIRATORY (INHALATION) 4 TIMES DAILY PRN
Status: DISCONTINUED | OUTPATIENT
Start: 2024-05-13 | End: 2024-05-19 | Stop reason: HOSPADM

## 2024-05-13 RX ORDER — MOMETASONE FUROATE 50 UG/1
1 SPRAY, METERED NASAL DAILY
Status: DISCONTINUED | OUTPATIENT
Start: 2024-05-13 | End: 2024-05-13 | Stop reason: RX

## 2024-05-13 RX ORDER — POTASSIUM CHLORIDE 20 MEQ/1
20 TABLET, EXTENDED RELEASE ORAL DAILY
Status: DISCONTINUED | OUTPATIENT
Start: 2024-05-13 | End: 2024-05-19 | Stop reason: HOSPADM

## 2024-05-13 RX ORDER — ACETAMINOPHEN 650 MG/1
650 SUPPOSITORY RECTAL EVERY 6 HOURS PRN
Status: DISCONTINUED | OUTPATIENT
Start: 2024-05-13 | End: 2024-05-19 | Stop reason: HOSPADM

## 2024-05-13 RX ORDER — SODIUM CHLORIDE, SODIUM LACTATE, POTASSIUM CHLORIDE, CALCIUM CHLORIDE 600; 310; 30; 20 MG/100ML; MG/100ML; MG/100ML; MG/100ML
INJECTION, SOLUTION INTRAVENOUS CONTINUOUS
Status: DISCONTINUED | OUTPATIENT
Start: 2024-05-13 | End: 2024-05-16

## 2024-05-13 RX ORDER — ENOXAPARIN SODIUM 100 MG/ML
40 INJECTION SUBCUTANEOUS DAILY
Status: DISCONTINUED | OUTPATIENT
Start: 2024-05-13 | End: 2024-05-13 | Stop reason: ALTCHOICE

## 2024-05-13 RX ORDER — POTASSIUM CHLORIDE 20 MEQ/1
40 TABLET, EXTENDED RELEASE ORAL PRN
Status: DISCONTINUED | OUTPATIENT
Start: 2024-05-13 | End: 2024-05-19 | Stop reason: HOSPADM

## 2024-05-13 RX ORDER — DULOXETIN HYDROCHLORIDE 30 MG/1
30 CAPSULE, DELAYED RELEASE ORAL DAILY
Status: DISCONTINUED | OUTPATIENT
Start: 2024-05-13 | End: 2024-05-19 | Stop reason: HOSPADM

## 2024-05-13 RX ORDER — ALLOPURINOL 100 MG/1
100 TABLET ORAL 3 TIMES DAILY
Status: DISCONTINUED | OUTPATIENT
Start: 2024-05-13 | End: 2024-05-19 | Stop reason: HOSPADM

## 2024-05-13 RX ORDER — NICOTINE 21 MG/24HR
1 PATCH, TRANSDERMAL 24 HOURS TRANSDERMAL EVERY 24 HOURS
Status: DISCONTINUED | OUTPATIENT
Start: 2024-05-13 | End: 2024-05-19 | Stop reason: HOSPADM

## 2024-05-13 RX ORDER — FAMOTIDINE 20 MG/1
20 TABLET, FILM COATED ORAL 2 TIMES DAILY
Status: DISCONTINUED | OUTPATIENT
Start: 2024-05-13 | End: 2024-05-19 | Stop reason: HOSPADM

## 2024-05-13 RX ORDER — POLYETHYLENE GLYCOL 3350 17 G/17G
17 POWDER, FOR SOLUTION ORAL DAILY PRN
Status: DISCONTINUED | OUTPATIENT
Start: 2024-05-13 | End: 2024-05-19 | Stop reason: HOSPADM

## 2024-05-13 RX ORDER — SODIUM CHLORIDE 9 MG/ML
INJECTION, SOLUTION INTRAVENOUS PRN
Status: DISCONTINUED | OUTPATIENT
Start: 2024-05-13 | End: 2024-05-19 | Stop reason: HOSPADM

## 2024-05-13 RX ORDER — MAGNESIUM SULFATE IN WATER 40 MG/ML
2000 INJECTION, SOLUTION INTRAVENOUS PRN
Status: DISCONTINUED | OUTPATIENT
Start: 2024-05-13 | End: 2024-05-19 | Stop reason: HOSPADM

## 2024-05-13 RX ORDER — HYDROCORTISONE 10 MG/1
20 TABLET ORAL DAILY
Status: DISCONTINUED | OUTPATIENT
Start: 2024-05-14 | End: 2024-05-14

## 2024-05-13 RX ORDER — ONDANSETRON 4 MG/1
4 TABLET, ORALLY DISINTEGRATING ORAL EVERY 8 HOURS PRN
Status: DISCONTINUED | OUTPATIENT
Start: 2024-05-13 | End: 2024-05-19 | Stop reason: HOSPADM

## 2024-05-13 RX ORDER — FLUTICASONE PROPIONATE 50 MCG
1 SPRAY, SUSPENSION (ML) NASAL DAILY
Status: DISCONTINUED | OUTPATIENT
Start: 2024-05-14 | End: 2024-05-19 | Stop reason: HOSPADM

## 2024-05-13 RX ORDER — HYDROCORTISONE 10 MG/1
10 TABLET ORAL
Status: DISCONTINUED | OUTPATIENT
Start: 2024-05-13 | End: 2024-05-14

## 2024-05-13 RX ORDER — LANOLIN ALCOHOL/MO/W.PET/CERES
3 CREAM (GRAM) TOPICAL NIGHTLY PRN
Status: DISCONTINUED | OUTPATIENT
Start: 2024-05-13 | End: 2024-05-19 | Stop reason: HOSPADM

## 2024-05-13 RX ORDER — OXYCODONE HYDROCHLORIDE 5 MG/1
10 TABLET ORAL EVERY 4 HOURS PRN
Status: DISCONTINUED | OUTPATIENT
Start: 2024-05-13 | End: 2024-05-19 | Stop reason: HOSPADM

## 2024-05-13 RX ORDER — SODIUM CHLORIDE 0.9 % (FLUSH) 0.9 %
5-40 SYRINGE (ML) INJECTION PRN
Status: DISCONTINUED | OUTPATIENT
Start: 2024-05-13 | End: 2024-05-19 | Stop reason: HOSPADM

## 2024-05-13 RX ADMIN — ATORVASTATIN CALCIUM 20 MG: 20 TABLET, FILM COATED ORAL at 20:43

## 2024-05-13 RX ADMIN — SODIUM CHLORIDE, PRESERVATIVE FREE 10 ML: 5 INJECTION INTRAVENOUS at 20:53

## 2024-05-13 RX ADMIN — POTASSIUM CHLORIDE 20 MEQ: 1500 TABLET, EXTENDED RELEASE ORAL at 20:50

## 2024-05-13 RX ADMIN — SODIUM CHLORIDE, POTASSIUM CHLORIDE, SODIUM LACTATE AND CALCIUM CHLORIDE: 600; 310; 30; 20 INJECTION, SOLUTION INTRAVENOUS at 12:52

## 2024-05-13 RX ADMIN — SODIUM CHLORIDE 1000 ML: 9 INJECTION, SOLUTION INTRAVENOUS at 06:38

## 2024-05-13 RX ADMIN — WARFARIN SODIUM 2.5 MG: 2.5 TABLET ORAL at 20:43

## 2024-05-13 RX ADMIN — ALLOPURINOL 100 MG: 100 TABLET ORAL at 20:43

## 2024-05-13 RX ADMIN — IOPAMIDOL 80 ML: 755 INJECTION, SOLUTION INTRAVENOUS at 08:08

## 2024-05-13 RX ADMIN — IPRATROPIUM BROMIDE AND ALBUTEROL SULFATE 2 DOSE: .5; 3 SOLUTION RESPIRATORY (INHALATION) at 06:34

## 2024-05-13 RX ADMIN — HYDROCORTISONE 10 MG: 10 TABLET ORAL at 20:43

## 2024-05-13 RX ADMIN — SODIUM CHLORIDE 1000 ML: 9 INJECTION, SOLUTION INTRAVENOUS at 11:05

## 2024-05-13 RX ADMIN — SODIUM CHLORIDE, POTASSIUM CHLORIDE, SODIUM LACTATE AND CALCIUM CHLORIDE: 600; 310; 30; 20 INJECTION, SOLUTION INTRAVENOUS at 21:01

## 2024-05-13 RX ADMIN — CEFTRIAXONE SODIUM 1000 MG: 1 INJECTION, POWDER, FOR SOLUTION INTRAMUSCULAR; INTRAVENOUS at 09:10

## 2024-05-13 RX ADMIN — FAMOTIDINE 20 MG: 20 TABLET, FILM COATED ORAL at 20:50

## 2024-05-13 RX ADMIN — AZITHROMYCIN MONOHYDRATE 500 MG: 500 INJECTION, POWDER, LYOPHILIZED, FOR SOLUTION INTRAVENOUS at 09:41

## 2024-05-13 RX ADMIN — VANCOMYCIN HYDROCHLORIDE 1000 MG: 1 INJECTION, POWDER, LYOPHILIZED, FOR SOLUTION INTRAVENOUS at 12:30

## 2024-05-13 ASSESSMENT — PAIN SCALES - GENERAL: PAINLEVEL_OUTOF10: 7

## 2024-05-13 ASSESSMENT — PAIN DESCRIPTION - LOCATION: LOCATION: ABDOMEN

## 2024-05-13 NOTE — H&P
Internal Medicine Resident H&P note      Patient:  Chris Perez    Unit/Bed:01/001A  YOB: 1955  MRN: [id number]   Acct: [id number]   PCP: Donny Garnica MD  Date of Admission: 5/13/2024  Date of Service: Pt seen/examined on 05/13/24      Assessment/Plan:  CAP: Patient with shortness of breath, leukocytosis, cough.  No evidence of PE on CTA. CT chest showing opacities in the right upper and lower lobes. Rocephin x 5 days, azithromycin x 3 days.  Adjust based on sensitivities.  I-S/Acapella.  Follow-up sputum PCR and culture if able to produce sputum sample.  HAGMA: 2/2 lactic acidosis.  Gap 22.  Delta gap 4 indicative of no concomitant metabolic derangement.  Continue IV fluids.  Trend lactic acid repeat BMP tomorrow a.m.  Hx hepatitis C: Recent elevated viral load.  LFTs WNL sent for alk phos of 162.  Noted.  Repeat LFTs tomorrow.  Metastatic large cell neuroendocrine carcinoma: Follows with heme-onc as OP.  S/p hemicolectomy.  Has been undergoing treatment with Nivolumab and ipilimumab q3 weeks.  Chronic pain is on Cymbalta 30 mg oral daily will continue.  Also on oxycodone 10 mg every 4 hours as needed.  Likely adrenal insufficiency: Has been on Cortef tablets 20 mg in the morning and 10 mg in the evening.  Likely was contributing to borderline hypotension prior to admission.  Continue home steroid dosage.  Permanent A-fib: [id number] 2.  On Coumadin.  INR on arrival 2.56.  Pharmacy consult for warfarin management.   Hx HTN: BP was not high enough on last admission to resume antihypertensives on prior discharge.  HLD: Continues statin  Gout: continue allopurinol.   Active smoker: continues to smoke 1 ppd. 75 pack year history.  CT chest showing centrilobar emphysema..  Albuterol as needed. counseled on smoking cessation.  Nicotine patch.   Hx of IV drug abuse.        Expected discharge date: TBD.    Disposition:   [x] Home  [] Inpatient Rehab  []

## 2024-05-13 NOTE — ED NOTES
**This is a Medical Student Note and is charted for educational purposes. The non-physician staff attested note is not to be used for billing purposes or to guide patient care. Please see the physician modifications/ attestation for treatment plan/suggestions. This note has been reviewed and feedback has been provided to the student. **    [facility]  EMERGENCY DEPARTMENT - [facility]ett  Supervising physician, Dr. Carrington  ED visit Note    Pt Name: Chris Perez  Medical Record Number: [id number]  Date of Birth 1955   Today's Date: 5/13/2024    CHIEF COMPLAINT:     Chief Complaint   Patient presents with    Shortness of Breath       HISTORY OF PRESENT ILLNESS     Chris is a 69 y.o. male with PMHx of nemetastatic large cell nuroendocrine carcinoma on Nivolumab & Ipilimumab q3w (started in Jan 2024), atrial fibrillation on coumadin, HCV 2/2 IVDA, HTN, HLD & tobacco abuse who presents to the ED via EMS c/o SOB. Patient c/o constant SOB for the past x1.5 hours. He has never experienced this before. On route to the ED, EMS gave patient a dose of duoneb & solumedrol, which patient reports provided mild improvement. Patient denies any fever, chills, CP, palpitations, leg swelling, calf pain, nausea, vomiting, abdominal pain, lightheadedness, dizziness, or syncope. Follows with Dr. Martinez for A-Fib. Pt was recently admitted to [facility] from 04/27/24-04/30/24 for TROY 2/2 dehydration & concerns for adrenal insufficiency.       REVIEW OF SYSTEMS:    Review of Systems   Constitutional:  Negative for chills and fever.   HENT:  Negative for sore throat and trouble swallowing.    Eyes:  Negative for photophobia and visual disturbance.   Respiratory:  Positive for chest tightness and shortness of breath.    Cardiovascular:  Negative for chest pain and palpitations.   Gastrointestinal:  Negative for abdominal pain, nausea and vomiting.   Genitourinary:  Negative for difficulty urinating and 
 **This is a Medical Student Note and is charted for educational purposes. The non-physician staff attested note is not to be used for billing purposes or to guide patient care. Please see the physician modifications/ attestation for treatment plan/suggestions. This note has been reviewed and feedback has been provided to the student. **    [facility]  EMERGENCY DEPARTMENT - [facility]  Supervising physician, Dr. Castañeda  ED visit Note  Pt Name: Chris Perez  Medical Record Number: [id number]  Date of Birth 1955   Today's Date: 5/13/2024    CHIEF COMPLAINT:     Chief Complaint   Patient presents with    Shortness of Breath       HISTORY OF PRESENT ILLNESS   Chris is a 69 y.o. male with PMHx of nemetastatic large cell nuroendocrine carcinoma on Nivolumab & Ipilimumab q3w (started in Jan 2024), atrial fibrillation on coumadin, HCV 2/2 IVDA, HTN, HLD & tobacco abuse who presents to the ED via EMS c/o SOB. Patient c/o constant SOB for the past x1.5 hours. He has never experienced this before. On route to the ED, EMS gave patient a dose of duoneb & solumedrol, which patient reports provided mild improvement. Patient denies any fever, chills, CP, palpitations, leg swelling, calf pain, nausea, vomiting, abdominal pain, lightheadedness, dizziness, or syncope. Follows with Dr. Martinez for A-Fib. Pt was recently admitted to [facility] from 04/27/24-04/30/24 for TROY 2/2 dehydration & concerns for adrenal insufficiency.       REVIEW OF SYSTEMS:    Review of Systems   Constitutional:  Negative for chills and fever.   HENT:  Negative for sore throat and trouble swallowing.    Eyes:  Negative for photophobia and visual disturbance.   Respiratory:  Positive for chest tightness and shortness of breath.    Cardiovascular:  Negative for chest pain and palpitations.   Gastrointestinal:  Negative for abdominal pain, nausea and vomiting.   Genitourinary:  Negative for difficulty urinating and dysuria. 
Dr Wiggins at bedside for pt admission assessment. O2 lowered to 2L nasal cannula. Patient resting in bed. Respirations easy and unlabored. No distress noted. Call light within reach. IV fluids infusing. Family at bedside.  
ED nurse-to-nurse bedside report    Chief Complaint   Patient presents with    Shortness of Breath      LOC: alert and orientated to name, place, date  Vital signs   Vitals:    05/13/24 0548 05/13/24 0604 05/13/24 0753   BP: 125/85  91/64   Pulse: (!) 118  (!) 112   Resp:  26 27   Temp:  98.8 °F (37.1 °C)    TempSrc:  Oral    SpO2: 100% 96% 94%      Pain:    Pain Interventions: NA  Pain Goal: 0  Oxygen: Yes    Current needs required 4L   Telemetry: Yes  LDAs:   Peripheral IV 05/13/24 Left Antecubital (Active)     Continuous Infusions:   Mobility: Requires assistance * 1  Arnold Fall Risk Score:       8/17/2023     2:11 PM   Fall Risk   2 or more falls in past year? no   Fall with injury in past year? no     Fall Interventions: call light in reach, bed in low position with wheels locked, side rails up x2  Report given to: RL Cuadra    
Family updated on bed status. Patient resting in bed lying right side. Respirations easy and unlabored. No distress noted. Call light within reach. VSS.  
Medications administered per MAR. Patient resting in bed. Respirations easy and unlabored. No distress noted. Call light within reach.  
Pallative care contacted   
Patient resting in bed. Respirations easy and unlabored. No distress noted. Call light within reach. Family at bedside.  
Pt port accessed per family request. Blood work drawn. IV fluids infusing. Pt tolerated well. O2 weaned to 1L nasal cannula. No needs requested at this time. Patient resting in bed. Respirations easy and unlabored. No distress noted. Call light within reach.  
Pt transported to [facility] on cart in stable condition. Floor contacted before transport.  
Report from RL Garner. Pt resting on cot. Lights off. Call light in reach.   
Report received from RL Mello. IV antibiotics administered per MAR. Patient resting in bed. Respirations easy and unlabored. No distress noted. Call light within reach. Family at bedside.  
(HCC)            Electronically signed by Venecia Correia RN on 5/13/2024 at 9:19 AM

## 2024-05-13 NOTE — ED TRIAGE NOTES
Patient to ED via EMS for SOB that awoke him in the middle of the night. 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 Pt hx of afib. Arrives on 15L NRB by EMS. EMS gave duoneb and 125mg solu medrol en route.

## 2024-05-13 NOTE — ED PROVIDER NOTES

## 2024-05-13 NOTE — ED PROVIDER NOTES
[facility] EMERGENCY DEPT    EMERGENCY MEDICINE      Pt Name: Chris Perez  MRN: [id number]  Birthdate 1955  Date of evaluation: 5/13/2024  Treating Physician: Dr. Castañeda  Resident Physician: Remington Macedo MD    CHIEF COMPLAINT       Chief Complaint   Patient presents with    Shortness of Breath     History obtained from chart review and the patient.    HISTORY OF PRESENT ILLNESS    HPI    Chris Perez is a 69 y.o. male with PMH of A-fib, HTN, HLD, GERD, neuroendocrine carcinoma presents to the emergency department for evaluation of shortness of breath.  Patient stated that he woke up at 5 AM this morning with acute onset shortness of breath.  He stated that he felt normal before going to bed last night.  He is denying any pain at any time.  Patient is denying any chest pain, fever, cough, nausea, vomiting, lightheadedness, dizziness, trauma, swelling.    The patient has no other acute complaints at this time.    PAST MEDICAL AND SURGICAL HISTORY     Past Medical History:   Diagnosis Date    Atrial fibrillation (HCC)     Cancer (HCC)     Carotid artery stenosis     GERD (gastroesophageal reflux disease)     Hepatitis C     Hyperlipidemia     Hypertension     Hyperuricemia     Neuroendocrine carcinoma (HCC)        Past Surgical History:   Procedure Laterality Date    ABDOMEN SURGERY      CARDIAC CATHETERIZATION  10/27/2014    HAND SURGERY Right     HEMICOLECTOMY Right 07/20/2023    ROBOTIC EXTENDED RIGHT COLON RESECTION, CHOLECYSTECTOMY performed by Jaleel Jiang MD at [facility] OR    PORT SURGERY Right 8/22/2023    Single Lumen Smartport Insertion performed by Jaleel Jiang MD at [facility] OR       CURRENT MEDICATIONS     Previous Medications    ALBUTEROL SULFATE HFA (VENTOLIN HFA) 108 (90 BASE) MCG/ACT INHALER    Inhale 2 puffs into the lungs 4 times daily as needed for Wheezing    ALLOPURINOL (ZYLOPRIM) 100 MG TABLET    take 1 tablet by mouth three times a day for 90 DAYS    ATORVASTATIN

## 2024-05-14 ENCOUNTER — APPOINTMENT (OUTPATIENT)
Age: 69
DRG: 871 | End: 2024-05-14
Payer: MEDICARE

## 2024-05-14 ENCOUNTER — APPOINTMENT (OUTPATIENT)
Dept: GENERAL RADIOLOGY | Age: 69
DRG: 871 | End: 2024-05-14
Payer: MEDICARE

## 2024-05-14 PROBLEM — J96.01 ACUTE HYPOXEMIC RESPIRATORY FAILURE (HCC): Status: ACTIVE | Noted: 2024-05-14

## 2024-05-14 LAB
ACINETOBACTER CALCOACETICUS-BAUMANNII BY PCR: NOT DETECTED
ANION GAP SERPL CALC-SCNC: 17 MEQ/L (ref 8–16)
ANION GAP SERPL CALC-SCNC: 8 MEQ/L (ref 8–16)
ANISOCYTOSIS BLD QL SMEAR: PRESENT
ARTERIAL PATENCY WRIST A: POSITIVE
BACTERIA URNS QL MICRO: ABNORMAL /HPF
BASE EXCESS BLDA CALC-SCNC: -15.1 MMOL/L (ref -2.5–2.5)
BASOPHILS ABSOLUTE: 0 THOU/MM3 (ref 0–0.1)
BASOPHILS ABSOLUTE: 0 THOU/MM3 (ref 0–0.1)
BASOPHILS NFR BLD AUTO: 0.1 %
BASOPHILS NFR BLD AUTO: 0.1 %
BDY SITE: ABNORMAL
BILIRUB UR QL STRIP.AUTO: NEGATIVE
BLACTX-M ISLT/SPM QL: NOT DETECTED
BLAIMP ISLT/SPM QL: NOT DETECTED
BLAKPC ISLT/SPM QL: NOT DETECTED
BLAOXA-48-LIKE ISLT/SPM QL: ABNORMAL
BLAVIM ISLT/SPM QL: NOT DETECTED
BUN SERPL-MCNC: 15 MG/DL (ref 7–22)
BUN SERPL-MCNC: 17 MG/DL (ref 7–22)
C PNEUM DNA LOWER RESP QL NAA+NON-PROBE: NOT DETECTED
CA-I BLD ISE-SCNC: 1.17 MMOL/L (ref 1.12–1.32)
CALCIUM SERPL-MCNC: 8.4 MG/DL (ref 8.5–10.5)
CALCIUM SERPL-MCNC: 8.5 MG/DL (ref 8.5–10.5)
CASTS #/AREA URNS LPF: ABNORMAL /LPF
CASTS 2: ABNORMAL /LPF
CHARACTER UR: ABNORMAL
CHLORIDE BLD-SCNC: 111 MEQ/L (ref 98–109)
CHLORIDE SERPL-SCNC: 104 MEQ/L (ref 98–111)
CHLORIDE SERPL-SCNC: 105 MEQ/L (ref 98–111)
CO2 SERPL-SCNC: 17 MEQ/L (ref 23–33)
CO2 SERPL-SCNC: 22 MEQ/L (ref 23–33)
COLLECTED BY:: ABNORMAL
COLOR: YELLOW
CREAT SERPL-MCNC: 0.6 MG/DL (ref 0.4–1.2)
CREAT SERPL-MCNC: 0.8 MG/DL (ref 0.4–1.2)
CRYSTALS URNS MICRO: ABNORMAL
DEPRECATED RDW RBC AUTO: 63.6 FL (ref 35–45)
DEPRECATED RDW RBC AUTO: 66.4 FL (ref 35–45)
DEVICE: ABNORMAL
ECHO AR MAX VEL PISA: 3.4 M/S
ECHO AV PEAK GRADIENT: 15 MMHG
ECHO AV PEAK VELOCITY: 1.9 M/S
ECHO AV REGURGITANT PHT: 286 MS
ECHO AV VELOCITY RATIO: 0.26
ECHO BSA: 1.78 M2
ECHO EST RA PRESSURE: 15 MMHG
ECHO LA AREA 2C: 17.4 CM2
ECHO LA AREA 4C: 21.7 CM2
ECHO LA DIAMETER INDEX: 2.36 CM/M2
ECHO LA DIAMETER: 4.2 CM
ECHO LA MAJOR AXIS: 5.8 CM
ECHO LA MINOR AXIS: 5.4 CM
ECHO LA VOL BP: 57 ML (ref 18–58)
ECHO LA VOL MOD A2C: 48 ML (ref 18–58)
ECHO LA VOL MOD A4C: 65 ML (ref 18–58)
ECHO LA VOL/BSA BIPLANE: 32 ML/M2 (ref 16–34)
ECHO LA VOLUME INDEX MOD A2C: 27 ML/M2 (ref 16–34)
ECHO LA VOLUME INDEX MOD A4C: 37 ML/M2 (ref 16–34)
ECHO LV EDV A2C: 117 ML
ECHO LV EDV A4C: 84 ML
ECHO LV EDV INDEX A4C: 47 ML/M2
ECHO LV EDV NDEX A2C: 66 ML/M2
ECHO LV EJECTION FRACTION A2C: 29 %
ECHO LV EJECTION FRACTION A4C: 28 %
ECHO LV EJECTION FRACTION BIPLANE: 29 % (ref 55–100)
ECHO LV ESV A2C: 83 ML
ECHO LV ESV A4C: 61 ML
ECHO LV ESV INDEX A2C: 47 ML/M2
ECHO LV ESV INDEX A4C: 34 ML/M2
ECHO LV FRACTIONAL SHORTENING: 15 % (ref 28–44)
ECHO LV INTERNAL DIMENSION DIASTOLE INDEX: 2.64 CM/M2
ECHO LV INTERNAL DIMENSION DIASTOLIC: 4.7 CM (ref 4.2–5.9)
ECHO LV INTERNAL DIMENSION SYSTOLIC INDEX: 2.25 CM/M2
ECHO LV INTERNAL DIMENSION SYSTOLIC: 4 CM
ECHO LV IVSD: 0.8 CM (ref 0.6–1)
ECHO LV MASS 2D: 122.3 G (ref 88–224)
ECHO LV MASS INDEX 2D: 68.7 G/M2 (ref 49–115)
ECHO LV POSTERIOR WALL DIASTOLIC: 0.8 CM (ref 0.6–1)
ECHO LV RELATIVE WALL THICKNESS RATIO: 0.34
ECHO LVOT PEAK GRADIENT: 1 MMHG
ECHO LVOT PEAK VELOCITY: 0.5 M/S
ECHO MV A VELOCITY: 0.3 M/S
ECHO MV E DECELERATION TIME (DT): 183 MS
ECHO MV E VELOCITY: 0.69 M/S
ECHO MV E/A RATIO: 2.3
ECHO MV REGURGITANT PEAK GRADIENT: 64 MMHG
ECHO MV REGURGITANT PEAK VELOCITY: 4 M/S
ECHO PV MAX VELOCITY: 0.4 M/S
ECHO PV PEAK GRADIENT: 1 MMHG
ECHO RIGHT VENTRICULAR SYSTOLIC PRESSURE (RVSP): 50 MMHG
ECHO RV INTERNAL DIMENSION: 2.9 CM
ECHO TV E WAVE: 0.5 M/S
ECHO TV REGURGITANT MAX VELOCITY: 2.97 M/S
ECHO TV REGURGITANT PEAK GRADIENT: 35 MMHG
EKG ATRIAL RATE: 80 BPM
EKG Q-T INTERVAL: 388 MS
EKG QRS DURATION: 76 MS
EKG QTC CALCULATION (BAZETT): 577 MS
EKG R AXIS: 76 DEGREES
EKG T AXIS: -132 DEGREES
EKG VENTRICULAR RATE: 133 BPM
ENTEROBACTER CLOACAE COMPLEX BY PCR: NOT DETECTED
EOSINOPHIL NFR BLD AUTO: 0 %
EOSINOPHIL NFR BLD AUTO: 0 %
EOSINOPHILS ABSOLUTE: 0 THOU/MM3 (ref 0–0.4)
EOSINOPHILS ABSOLUTE: 0 THOU/MM3 (ref 0–0.4)
EPITHELIAL CELLS, UA: ABNORMAL /HPF
ERYTHROCYTE [DISTWIDTH] IN BLOOD BY AUTOMATED COUNT: 18 % (ref 11.5–14.5)
ERYTHROCYTE [DISTWIDTH] IN BLOOD BY AUTOMATED COUNT: 18.1 % (ref 11.5–14.5)
ESCHERICHIA COLI BY PCR: NOT DETECTED
FIO2 ON VENT O2 ANALYZER: 100 %
FLUAV RNA LOWER RESP QL NAA+NON-PROBE: NOT DETECTED
FLUBV RNA LOWER RESP QL NAA+NON-PROBE: NOT DETECTED
GFR SERPL CREATININE-BSD FRML MDRD: > 90 ML/MIN/1.73M2
GFR SERPL CREATININE-BSD FRML MDRD: > 90 ML/MIN/1.73M2
GLUCOSE BLD-MCNC: 206 MG/DL (ref 70–108)
GLUCOSE SERPL-MCNC: 154 MG/DL (ref 70–108)
GLUCOSE SERPL-MCNC: 172 MG/DL (ref 70–108)
GLUCOSE UR QL STRIP.AUTO: NEGATIVE MG/DL
HADV DNA LOWER RESP QL NAA+NON-PROBE: NOT DETECTED
HAEMOPHILUS INFLUENZAE BY PCR: DETECTED
HCO3 BLDA-SCNC: 12 MMOL/L (ref 23–28)
HCOV RNA LOWER RESP QL NAA+NON-PROBE: ABNORMAL
HCT VFR BLD AUTO: 33.4 % (ref 42–52)
HCT VFR BLD AUTO: 35.8 % (ref 42–52)
HGB BLD-MCNC: 11.4 GM/DL (ref 14–18)
HGB BLD-MCNC: 11.4 GM/DL (ref 14–18)
HGB UR QL STRIP.AUTO: NEGATIVE
HMPV RNA LOWER RESP QL NAA+NON-PROBE: NOT DETECTED
HPIV RNA LOWER RESP QL NAA+NON-PROBE: NOT DETECTED
IMM GRANULOCYTES # BLD AUTO: 0.06 THOU/MM3 (ref 0–0.07)
IMM GRANULOCYTES # BLD AUTO: 0.14 THOU/MM3 (ref 0–0.07)
IMM GRANULOCYTES NFR BLD AUTO: 0.5 %
IMM GRANULOCYTES NFR BLD AUTO: 1 %
INR PPP: 2.54 (ref 0.85–1.13)
KETONES UR QL STRIP.AUTO: NEGATIVE
KLEBSIELLA AEROGENES BY PCR: NOT DETECTED
KLEBSIELLA OXYTOCA BY PCR: NOT DETECTED
KLEBSIELLA PNEUMONIAE GROUP BY PCR: NOT DETECTED
L PNEUMO DNA LOWER RESP QL NAA+NON-PROBE: NOT DETECTED
LACTATE BLD-SCNC: 14.8 MMOL/L (ref 0.5–1.9)
LACTATE SERPL-SCNC: 2 MMOL/L (ref 0.5–2)
LACTATE SERPL-SCNC: 2.1 MMOL/L (ref 0.5–2)
LACTATE SERPL-SCNC: 2.4 MMOL/L (ref 0.5–2)
LACTATE SERPL-SCNC: 2.8 MMOL/L (ref 0.5–2)
LACTATE SERPL-SCNC: 2.8 MMOL/L (ref 0.5–2)
LACTATE SERPL-SCNC: 7.1 MMOL/L (ref 0.5–2)
LACTIC ACID, SEPSIS: 2.2 MMOL/L (ref 0.5–1.9)
LACTIC ACID, SEPSIS: 2.3 MMOL/L (ref 0.5–1.9)
LYMPHOCYTES ABSOLUTE: 0.6 THOU/MM3 (ref 1–4.8)
LYMPHOCYTES ABSOLUTE: 1 THOU/MM3 (ref 1–4.8)
LYMPHOCYTES NFR BLD AUTO: 4.5 %
LYMPHOCYTES NFR BLD AUTO: 8.2 %
M PNEUMO DNA LOWER RESP QL NAA+NON-PROBE: NOT DETECTED
MCH RBC QN AUTO: 31.9 PG (ref 26–33)
MCH RBC QN AUTO: 33 PG (ref 26–33)
MCHC RBC AUTO-ENTMCNC: 31.8 GM/DL (ref 32.2–35.5)
MCHC RBC AUTO-ENTMCNC: 34.1 GM/DL (ref 32.2–35.5)
MCV RBC AUTO: 100.3 FL (ref 80–94)
MCV RBC AUTO: 96.8 FL (ref 80–94)
MISCELLANEOUS 2: ABNORMAL
MONOCYTES ABSOLUTE: 0.8 THOU/MM3 (ref 0.4–1.3)
MONOCYTES ABSOLUTE: 0.8 THOU/MM3 (ref 0.4–1.3)
MONOCYTES NFR BLD AUTO: 5.9 %
MONOCYTES NFR BLD AUTO: 6.1 %
MORAXELLA CATARRHALIS BY PCR: NOT DETECTED
MRSA DNA SPEC QL NAA+PROBE: NEGATIVE
NEUTROPHILS ABSOLUTE: 10.5 THOU/MM3 (ref 1.8–7.7)
NEUTROPHILS ABSOLUTE: 12 THOU/MM3 (ref 1.8–7.7)
NEUTROPHILS NFR BLD AUTO: 85.1 %
NEUTROPHILS NFR BLD AUTO: 88.5 %
NITRITE UR QL STRIP: NEGATIVE
NRBC BLD AUTO-RTO: 0 /100 WBC
NRBC BLD AUTO-RTO: 0 /100 WBC
PCO2 BLDA: 30 MMHG (ref 35–45)
PEEP SETTING VENT: 6 MMHG
PH BLDA: 7.2 [PH] (ref 7.35–7.45)
PH UR STRIP.AUTO: 5 [PH] (ref 5–9)
PIP: 18 CMH2O
PLATELET # BLD AUTO: 154 THOU/MM3 (ref 130–400)
PLATELET # BLD AUTO: 181 THOU/MM3 (ref 130–400)
PMV BLD AUTO: 11.7 FL (ref 9.4–12.4)
PMV BLD AUTO: 11.9 FL (ref 9.4–12.4)
PO2 BLDA: 262 MMHG (ref 71–104)
POC CREATININE WHOLE BLOOD: 0.8 MG/DL (ref 0.5–1.2)
POTASSIUM BLD-SCNC: 4.8 MEQ/L (ref 3.5–4.9)
POTASSIUM SERPL-SCNC: 4.3 MEQ/L (ref 3.5–5.2)
POTASSIUM SERPL-SCNC: 4.9 MEQ/L (ref 3.5–5.2)
PROCALCITONIN SERPL IA-MCNC: 0.53 NG/ML (ref 0.01–0.09)
PROT UR STRIP.AUTO-MCNC: ABNORMAL MG/DL
PROTEUS SPECIES BY PCR: NOT DETECTED
PSEUDOMONAS AERUGINOSA BY PCR: DETECTED
RBC # BLD AUTO: 3.45 MILL/MM3 (ref 4.7–6.1)
RBC # BLD AUTO: 3.57 MILL/MM3 (ref 4.7–6.1)
RBC URINE: ABNORMAL /HPF
RENAL EPI CELLS #/AREA URNS HPF: ABNORMAL /[HPF]
RESISTANT GENE MECA/C & MREJ BY PCR: ABNORMAL
RESISTANT GENE NDM BY PCR: NOT DETECTED
RSV RNA LOWER RESP QL NAA+NON-PROBE: NOT DETECTED
RV+EV RNA LOWER RESP QL NAA+NON-PROBE: DETECTED
SAO2 % BLDA: 100 %
SERRATIA MARCESCENS BY PCR: NOT DETECTED
SODIUM BLD-SCNC: 141 MEQ/L (ref 138–146)
SODIUM SERPL-SCNC: 135 MEQ/L (ref 135–145)
SODIUM SERPL-SCNC: 138 MEQ/L (ref 135–145)
SOURCE: ABNORMAL
SP GR UR REFRACT.AUTO: 1.02 (ref 1–1.03)
SPECIMEN ACCEPTABILITY: ABNORMAL
STAPH AUREUS BY PCR: NOT DETECTED
STREP AGALACTIAE BY PCR: NOT DETECTED
STREP PNEUMONIAE BY PCR: DETECTED
STREP PYOGENES BY PCR: NOT DETECTED
UROBILINOGEN, URINE: 1 EU/DL (ref 0–1)
WBC # BLD AUTO: 12.3 THOU/MM3 (ref 4.8–10.8)
WBC # BLD AUTO: 13.6 THOU/MM3 (ref 4.8–10.8)
WBC #/AREA URNS HPF: ABNORMAL /HPF
WBC #/AREA URNS HPF: NEGATIVE /[HPF]
YEAST LIKE FUNGI URNS QL MICRO: ABNORMAL

## 2024-05-14 PROCEDURE — 87641 MR-STAPH DNA AMP PROBE: CPT

## 2024-05-14 PROCEDURE — 87631 RESP VIRUS 3-5 TARGETS: CPT

## 2024-05-14 PROCEDURE — 2580000003 HC RX 258

## 2024-05-14 PROCEDURE — 80048 BASIC METABOLIC PNL TOTAL CA: CPT

## 2024-05-14 PROCEDURE — 94761 N-INVAS EAR/PLS OXIMETRY MLT: CPT

## 2024-05-14 PROCEDURE — 36415 COLL VENOUS BLD VENIPUNCTURE: CPT

## 2024-05-14 PROCEDURE — 2500000003 HC RX 250 WO HCPCS

## 2024-05-14 PROCEDURE — 71045 X-RAY EXAM CHEST 1 VIEW: CPT

## 2024-05-14 PROCEDURE — 94002 VENT MGMT INPAT INIT DAY: CPT

## 2024-05-14 PROCEDURE — 93306 TTE W/DOPPLER COMPLETE: CPT | Performed by: INTERNAL MEDICINE

## 2024-05-14 PROCEDURE — 31500 INSERT EMERGENCY AIRWAY: CPT

## 2024-05-14 PROCEDURE — 87205 SMEAR GRAM STAIN: CPT

## 2024-05-14 PROCEDURE — 82565 ASSAY OF CREATININE: CPT

## 2024-05-14 PROCEDURE — 87798 DETECT AGENT NOS DNA AMP: CPT

## 2024-05-14 PROCEDURE — 82947 ASSAY GLUCOSE BLOOD QUANT: CPT

## 2024-05-14 PROCEDURE — 84295 ASSAY OF SERUM SODIUM: CPT

## 2024-05-14 PROCEDURE — 85610 PROTHROMBIN TIME: CPT

## 2024-05-14 PROCEDURE — 81001 URINALYSIS AUTO W/SCOPE: CPT

## 2024-05-14 PROCEDURE — 87581 M.PNEUMON DNA AMP PROBE: CPT

## 2024-05-14 PROCEDURE — 89220 SPUTUM SPECIMEN COLLECTION: CPT

## 2024-05-14 PROCEDURE — 5A1945Z RESPIRATORY VENTILATION, 24-96 CONSECUTIVE HOURS: ICD-10-PCS

## 2024-05-14 PROCEDURE — 94640 AIRWAY INHALATION TREATMENT: CPT

## 2024-05-14 PROCEDURE — 6360000002 HC RX W HCPCS: Performed by: NURSE PRACTITIONER

## 2024-05-14 PROCEDURE — 2500000003 HC RX 250 WO HCPCS: Performed by: INTERNAL MEDICINE

## 2024-05-14 PROCEDURE — 82803 BLOOD GASES ANY COMBINATION: CPT

## 2024-05-14 PROCEDURE — 87077 CULTURE AEROBIC IDENTIFY: CPT

## 2024-05-14 PROCEDURE — 93005 ELECTROCARDIOGRAM TRACING: CPT | Performed by: NURSE PRACTITIONER

## 2024-05-14 PROCEDURE — 87486 CHLMYD PNEUM DNA AMP PROBE: CPT

## 2024-05-14 PROCEDURE — 94660 CPAP INITIATION&MGMT: CPT

## 2024-05-14 PROCEDURE — 83605 ASSAY OF LACTIC ACID: CPT

## 2024-05-14 PROCEDURE — 0BH17EZ INSERTION OF ENDOTRACHEAL AIRWAY INTO TRACHEA, VIA NATURAL OR ARTIFICIAL OPENING: ICD-10-PCS

## 2024-05-14 PROCEDURE — 74018 RADEX ABDOMEN 1 VIEW: CPT

## 2024-05-14 PROCEDURE — 82330 ASSAY OF CALCIUM: CPT

## 2024-05-14 PROCEDURE — 3E033XZ INTRODUCTION OF VASOPRESSOR INTO PERIPHERAL VEIN, PERCUTANEOUS APPROACH: ICD-10-PCS

## 2024-05-14 PROCEDURE — 93010 ELECTROCARDIOGRAM REPORT: CPT | Performed by: INTERNAL MEDICINE

## 2024-05-14 PROCEDURE — 93306 TTE W/DOPPLER COMPLETE: CPT

## 2024-05-14 PROCEDURE — 6370000000 HC RX 637 (ALT 250 FOR IP): Performed by: PHARMACIST

## 2024-05-14 PROCEDURE — 87541 LEGION PNEUMO DNA AMP PROB: CPT

## 2024-05-14 PROCEDURE — 87186 SC STD MICRODIL/AGAR DIL: CPT

## 2024-05-14 PROCEDURE — 84145 PROCALCITONIN (PCT): CPT

## 2024-05-14 PROCEDURE — 99291 CRITICAL CARE FIRST HOUR: CPT | Performed by: INTERNAL MEDICINE

## 2024-05-14 PROCEDURE — 84132 ASSAY OF SERUM POTASSIUM: CPT

## 2024-05-14 PROCEDURE — 87070 CULTURE OTHR SPECIMN AEROBIC: CPT

## 2024-05-14 PROCEDURE — 99223 1ST HOSP IP/OBS HIGH 75: CPT | Performed by: STUDENT IN AN ORGANIZED HEALTH CARE EDUCATION/TRAINING PROGRAM

## 2024-05-14 PROCEDURE — 6360000002 HC RX W HCPCS

## 2024-05-14 PROCEDURE — 6370000000 HC RX 637 (ALT 250 FOR IP)

## 2024-05-14 PROCEDURE — 2700000000 HC OXYGEN THERAPY PER DAY

## 2024-05-14 PROCEDURE — 2000000000 HC ICU R&B

## 2024-05-14 PROCEDURE — 87150 DNA/RNA AMPLIFIED PROBE: CPT

## 2024-05-14 PROCEDURE — 82435 ASSAY OF BLOOD CHLORIDE: CPT

## 2024-05-14 PROCEDURE — 5A09357 ASSISTANCE WITH RESPIRATORY VENTILATION, LESS THAN 24 CONSECUTIVE HOURS, CONTINUOUS POSITIVE AIRWAY PRESSURE: ICD-10-PCS

## 2024-05-14 PROCEDURE — 6370000000 HC RX 637 (ALT 250 FOR IP): Performed by: NURSE PRACTITIONER

## 2024-05-14 PROCEDURE — 85025 COMPLETE CBC W/AUTO DIFF WBC: CPT

## 2024-05-14 RX ORDER — MIDAZOLAM HYDROCHLORIDE 1 MG/ML
INJECTION INTRAMUSCULAR; INTRAVENOUS
Status: COMPLETED | OUTPATIENT
Start: 2024-05-14 | End: 2024-05-14

## 2024-05-14 RX ORDER — NOREPINEPHRINE BITARTRATE 0.06 MG/ML
1-100 INJECTION, SOLUTION INTRAVENOUS CONTINUOUS
Status: DISCONTINUED | OUTPATIENT
Start: 2024-05-14 | End: 2024-05-16

## 2024-05-14 RX ORDER — PROPOFOL 10 MG/ML
INJECTION, EMULSION INTRAVENOUS CONTINUOUS PRN
Status: COMPLETED | OUTPATIENT
Start: 2024-05-14 | End: 2024-05-14

## 2024-05-14 RX ORDER — ALBUTEROL SULFATE 2.5 MG/3ML
5 SOLUTION RESPIRATORY (INHALATION) EVERY 4 HOURS PRN
Status: DISCONTINUED | OUTPATIENT
Start: 2024-05-14 | End: 2024-05-19 | Stop reason: HOSPADM

## 2024-05-14 RX ORDER — PROPOFOL 10 MG/ML
INJECTION, EMULSION INTRAVENOUS
Status: COMPLETED
Start: 2024-05-14 | End: 2024-05-14

## 2024-05-14 RX ORDER — DEXMEDETOMIDINE HYDROCHLORIDE 4 UG/ML
.1-1.5 INJECTION, SOLUTION INTRAVENOUS CONTINUOUS
Status: DISCONTINUED | OUTPATIENT
Start: 2024-05-14 | End: 2024-05-16

## 2024-05-14 RX ORDER — 0.9 % SODIUM CHLORIDE 0.9 %
500 INTRAVENOUS SOLUTION INTRAVENOUS ONCE
Status: COMPLETED | OUTPATIENT
Start: 2024-05-14 | End: 2024-05-14

## 2024-05-14 RX ORDER — NOREPINEPHRINE BITARTRATE 0.06 MG/ML
INJECTION, SOLUTION INTRAVENOUS
Status: DISPENSED
Start: 2024-05-14 | End: 2024-05-14

## 2024-05-14 RX ORDER — DILTIAZEM HYDROCHLORIDE 5 MG/ML
10 INJECTION INTRAVENOUS ONCE
Status: DISCONTINUED | OUTPATIENT
Start: 2024-05-14 | End: 2024-05-14

## 2024-05-14 RX ORDER — FENTANYL CITRATE-0.9 % NACL/PF 10 MCG/ML
25-200 PLASTIC BAG, INJECTION (ML) INTRAVENOUS CONTINUOUS
Status: DISCONTINUED | OUTPATIENT
Start: 2024-05-14 | End: 2024-05-16

## 2024-05-14 RX ORDER — LORAZEPAM 2 MG/ML
INJECTION INTRAMUSCULAR
Status: DISPENSED
Start: 2024-05-14 | End: 2024-05-14

## 2024-05-14 RX ORDER — WARFARIN SODIUM 5 MG/1
5 TABLET ORAL ONCE
Status: COMPLETED | OUTPATIENT
Start: 2024-05-14 | End: 2024-05-14

## 2024-05-14 RX ORDER — FUROSEMIDE 10 MG/ML
40 INJECTION INTRAMUSCULAR; INTRAVENOUS ONCE
Status: DISCONTINUED | OUTPATIENT
Start: 2024-05-14 | End: 2024-05-14

## 2024-05-14 RX ORDER — PROPOFOL 10 MG/ML
5-50 INJECTION, EMULSION INTRAVENOUS CONTINUOUS
Status: DISCONTINUED | OUTPATIENT
Start: 2024-05-14 | End: 2024-05-14

## 2024-05-14 RX ORDER — NOREPINEPHRINE BITARTRATE 0.06 MG/ML
INJECTION, SOLUTION INTRAVENOUS
Status: COMPLETED
Start: 2024-05-14 | End: 2024-05-14

## 2024-05-14 RX ORDER — IPRATROPIUM BROMIDE AND ALBUTEROL SULFATE 2.5; .5 MG/3ML; MG/3ML
1 SOLUTION RESPIRATORY (INHALATION)
Status: DISCONTINUED | OUTPATIENT
Start: 2024-05-14 | End: 2024-05-14

## 2024-05-14 RX ORDER — IPRATROPIUM BROMIDE AND ALBUTEROL SULFATE 2.5; .5 MG/3ML; MG/3ML
1 SOLUTION RESPIRATORY (INHALATION)
Status: DISCONTINUED | OUTPATIENT
Start: 2024-05-15 | End: 2024-05-15

## 2024-05-14 RX ORDER — MIDAZOLAM HYDROCHLORIDE 1 MG/ML
1-10 INJECTION, SOLUTION INTRAVENOUS CONTINUOUS
Status: DISCONTINUED | OUTPATIENT
Start: 2024-05-14 | End: 2024-05-14

## 2024-05-14 RX ORDER — LORAZEPAM 2 MG/ML
0.5 INJECTION INTRAMUSCULAR ONCE
Status: COMPLETED | OUTPATIENT
Start: 2024-05-14 | End: 2024-05-14

## 2024-05-14 RX ADMIN — LORAZEPAM 0.5 MG: 2 INJECTION INTRAMUSCULAR; INTRAVENOUS at 07:14

## 2024-05-14 RX ADMIN — NOREPINEPHRINE BITARTRATE 5 MCG/MIN: 0.06 INJECTION, SOLUTION INTRAVENOUS at 07:41

## 2024-05-14 RX ADMIN — PIPERACILLIN AND TAZOBACTAM 3375 MG: 3; .375 INJECTION, POWDER, LYOPHILIZED, FOR SOLUTION INTRAVENOUS at 16:21

## 2024-05-14 RX ADMIN — PROPOFOL 20 MG: 10 INJECTION, EMULSION INTRAVENOUS at 07:37

## 2024-05-14 RX ADMIN — PROPOFOL 40 MCG/KG/MIN: 10 INJECTION, EMULSION INTRAVENOUS at 07:38

## 2024-05-14 RX ADMIN — FAMOTIDINE 20 MG: 20 TABLET, FILM COATED ORAL at 20:36

## 2024-05-14 RX ADMIN — SODIUM CHLORIDE, POTASSIUM CHLORIDE, SODIUM LACTATE AND CALCIUM CHLORIDE: 600; 310; 30; 20 INJECTION, SOLUTION INTRAVENOUS at 05:11

## 2024-05-14 RX ADMIN — MIDAZOLAM 4 MG: 1 INJECTION INTRAMUSCULAR; INTRAVENOUS at 07:36

## 2024-05-14 RX ADMIN — PIPERACILLIN AND TAZOBACTAM 3375 MG: 3; .375 INJECTION, POWDER, LYOPHILIZED, FOR SOLUTION INTRAVENOUS at 22:39

## 2024-05-14 RX ADMIN — ALLOPURINOL 100 MG: 100 TABLET ORAL at 20:36

## 2024-05-14 RX ADMIN — SODIUM CHLORIDE, PRESERVATIVE FREE 10 ML: 5 INJECTION INTRAVENOUS at 09:28

## 2024-05-14 RX ADMIN — FAMOTIDINE 20 MG: 20 TABLET, FILM COATED ORAL at 09:28

## 2024-05-14 RX ADMIN — Medication 5 MCG/MIN: at 07:41

## 2024-05-14 RX ADMIN — CETIRIZINE HYDROCHLORIDE 10 MG: 10 TABLET, FILM COATED ORAL at 09:28

## 2024-05-14 RX ADMIN — HYDROCORTISONE SODIUM SUCCINATE 50 MG: 100 INJECTION, POWDER, FOR SOLUTION INTRAMUSCULAR; INTRAVENOUS at 18:36

## 2024-05-14 RX ADMIN — Medication 2 MG/HR: at 08:51

## 2024-05-14 RX ADMIN — Medication 0.2 MCG/KG/HR: at 12:38

## 2024-05-14 RX ADMIN — ATORVASTATIN CALCIUM 20 MG: 20 TABLET, FILM COATED ORAL at 09:28

## 2024-05-14 RX ADMIN — DULOXETINE HYDROCHLORIDE 30 MG: 30 CAPSULE, DELAYED RELEASE ORAL at 09:28

## 2024-05-14 RX ADMIN — HYDROCORTISONE SODIUM SUCCINATE 50 MG: 100 INJECTION, POWDER, FOR SOLUTION INTRAMUSCULAR; INTRAVENOUS at 22:35

## 2024-05-14 RX ADMIN — HYDROCORTISONE SODIUM SUCCINATE 50 MG: 100 INJECTION, POWDER, FOR SOLUTION INTRAMUSCULAR; INTRAVENOUS at 10:23

## 2024-05-14 RX ADMIN — ALLOPURINOL 100 MG: 100 TABLET ORAL at 09:28

## 2024-05-14 RX ADMIN — SODIUM CHLORIDE, POTASSIUM CHLORIDE, SODIUM LACTATE AND CALCIUM CHLORIDE: 600; 310; 30; 20 INJECTION, SOLUTION INTRAVENOUS at 20:40

## 2024-05-14 RX ADMIN — SODIUM CHLORIDE 500 ML: 9 INJECTION, SOLUTION INTRAVENOUS at 08:21

## 2024-05-14 RX ADMIN — IPRATROPIUM BROMIDE AND ALBUTEROL SULFATE 1 DOSE: .5; 3 SOLUTION RESPIRATORY (INHALATION) at 07:06

## 2024-05-14 RX ADMIN — PROPOFOL 50 MG: 10 INJECTION, EMULSION INTRAVENOUS at 07:36

## 2024-05-14 RX ADMIN — WARFARIN SODIUM 5 MG: 5 TABLET ORAL at 20:36

## 2024-05-14 RX ADMIN — Medication 50 MCG/HR: at 16:19

## 2024-05-14 RX ADMIN — SODIUM CHLORIDE, POTASSIUM CHLORIDE, SODIUM LACTATE AND CALCIUM CHLORIDE: 600; 310; 30; 20 INJECTION, SOLUTION INTRAVENOUS at 16:06

## 2024-05-14 RX ADMIN — IPRATROPIUM BROMIDE AND ALBUTEROL SULFATE 1 DOSE: .5; 3 SOLUTION RESPIRATORY (INHALATION) at 12:10

## 2024-05-14 RX ADMIN — PIPERACILLIN AND TAZOBACTAM 4500 MG: 4; .5 INJECTION, POWDER, LYOPHILIZED, FOR SOLUTION INTRAVENOUS at 10:30

## 2024-05-14 ASSESSMENT — PULMONARY FUNCTION TESTS
PIF_VALUE: 20
PIF_VALUE: 20
PIF_VALUE: 18
PIF_VALUE: 18

## 2024-05-14 NOTE — SIGNIFICANT EVENT
CODE STATUS changed to LIMITED x3 (yes to intubation, no to everything else) per POA.    Jaleel Lew DO, PGY-3

## 2024-05-14 NOTE — RT PROTOCOL NOTE
equivalent RT bronchodilator orders with one order with BID Frequency and one order with Frequency of every 4 hours PRN wheezing or increased work of breathing using Per Protocol order mode.        7-10 - enter or revise RT Bronchodilator order(s) to two equivalent RT bronchodilator orders with one order with TID Frequency and one order with Frequency of every 4 hours PRN wheezing or increased work of breathing using Per Protocol order mode.       11-13 - enter or revise RT Bronchodilator order(s) to one equivalent RT bronchodilator order with QID Frequency and an Albuterol order with Frequency of every 4 hours PRN wheezing or increased work of breathing using Per Protocol order mode.      Greater than 13 - enter or revise RT Bronchodilator order(s) to one equivalent RT bronchodilator order with every 4 hours Frequency and an Albuterol order with Frequency of every 2 hours PRN wheezing or increased work of breathing using Per Protocol order mode.     RT to enter RT Home Evaluation for COPD & MDI Assessment order using Per Protocol order mode.    Electronically signed by Farhana Borja RCP on 5/14/2024 at 4:35 PM

## 2024-05-14 NOTE — CARE COORDINATION
05/14/24 1300   Readmission Assessment   Number of Days since last admission? 8-30 days   Previous Disposition Home with Family   Who is being Interviewed Caregiver  (brother Haris)   What was the patient's/caregiver's perception as to why they think they needed to return back to the hospital? Other (Comment)  (SOB)   Did you visit your Primary Care Physician after you left the hospital, before you returned this time? Yes   Did you see a specialist, such as Cardiac, Pulmonary, Orthopedic Physician, etc. after you left the hospital? Yes   Who advised the patient to return to the hospital? Self-referral   Does the patient report anything that got in the way of taking their medications? No   In our efforts to provide the best possible care to you and others like you, can you think of anything that we could have done to help you after you left the hospital the first time, so that you might not have needed to return so soon? Other (Comment)  (\"not that I know of\")      states he is scheduled to meet with Dr Marshall at 1500 today

## 2024-05-14 NOTE — SIGNIFICANT EVENT
RN notified me via [employer] regarding patient with increased wob, Afib RVR rate 130s upon awakening this morning. Supplemental O2 increased from2L to 4L NC. Stat EKG ordered.  On arrival to bedside, patient alert, oriented, in overt respiratory distress. States \"I just can't get enough air\". Wet cough. Diaphoretic. SBP 170s, O2 sat 96% on 4L NC. EKG difficult to obtain. Lungs with diffuse wheezing and rales. Chart briefly reviewed, labs from this morning with no significant abnormalities, leukocytosis and lactic acidosis improving. RT at bedside, med neb started. Ativan, cardizem bolus/gtt, pCXR, ABG ordered. Due to overt wob, BiPAP ordered. CXR reviewed at bedside, remarkable for pulmonary vascular congestion. Lasix 40 mg IVP ordered. Respiratory distress persisted and patient noted to have moments of confusion/less responsive. ABG Istat showed metabolic acidosis with pH 7.1 with bicarb 11. ICU team notified. Patient transferred to ICU.   Attending provider updated via [employer].   Appreciate nursing and ICU team assistance.

## 2024-05-14 NOTE — CARE COORDINATION
Case Management Assessment Initial Evaluation    Date/Time of Evaluation: 2024 8:58 AM  Assessment Completed by: Zehra Blair RN    If patient is discharged prior to next notation, then this note serves as note for discharge by case management.    Patient Name: Chris Perez                   YOB: 1955  Diagnosis: Septicemia (HCC) [A41.9]  Community acquired bacterial pneumonia [J15.9]  Pneumonia of both lower lobes due to infectious organism [J18.9]                   Date / Time: 2024  5:36 AM  Location: [address] [place] [address]     Patient Admission Status: Inpatient   Readmission Risk Low 0-14, Mod 15-19), High > 20: Readmission Risk Score: 21.1    Current PCP: Donny Garnica MD    Additional Case Management Notes: Admit from ER with SOB. Pneumonia. Palliative care consulted. Had increased work of breathing, RVR. ABGs completed, placed on Bipap. Then transferred to ICU for intubation.   Therapy ordered.    Procedures:  Intubation     Imagin/13 CXR: New right middle lobe pneumonia. Early infiltrates in the left lung base.    CTA Chest W WO: 1. No filling defects are noted within the pulmonary arterial vasculature to suggest the presence of pulmonary embolus. Multifocal airspace opacities most pronounced in the right lung suggests pneumonia in the appropriate clinical setting. Small/trace   bilateral pleural effusions are observed.   2. The known liver lesion is difficult to visualize/characterized without IV contrast there may be larger. Chronic findings are discussed.    CXR: 1. Pulmonary edema.   2. Small lateral pleural effusions.   3. More focal areas of opacification in the right lung base could represent a developing infiltrate.    CXR: 1. Mild blunting of the costophrenic angles bilaterally may represent small   bilateral pleural effusions.   2. Confluent opacities are seen overlying the right infrahilar region as well as   the dependent right lung

## 2024-05-14 NOTE — H&P
CRITICAL CARE PROGRESS NOTE      Patient:  Chris Perez    Unit/Bed:4D-09/009-A  YOB: 1955  MRN: [id number]   PCP: Donny Garnica MD  Date of Admission: 5/13/2024  Chief Complaint:-Shortness of breath.    Assessment and Plan:    Acute hypoxic respiratory failure.  Morning of 5/14, rapid response called secondary to patient's increased work of breathing, increased supplemental oxygen needs.  Transferred to the ICU and placed on ventilator with failure to oxygenate and ventilate.  Failure secondary to #2 below.   Sepsis secondary to pneumonia:  CXR with pneumonia.   Vancomycin (5/13) discontinue as MRSA negative.  Ceftriaxone and azithromycin (5/13) expanded to Zosyn (5/14 - current)  Steroid - solu-medrol 50mg Q6H  Pressors - Levo 5.  ANC 12,000  Respiratory culture   Moderate segmented neutrophils.   Pneumonia panel:  Haemophilus influenza  Pseudomonas aeruginosa  Strep pneumonia  Rhinovirus enterovirus  Lactic acidosis: Trend 3.1 - multiple 2s to a 7.1. Most recent back down to 2.8.  Anion gap: 17.  Delta gap does not indicate concurrent NAGMA.  Metastatic large cell neuroendocrine carcinoma: Follows with hematology oncology as an outpatient.  He undergoes treatment with nivolumab and ipilimumab every 3 weeks.  Patient is on Cymbalta 30 mg for chronic pain and Oxy 10 mg every 4 hours as needed.  Anti-PD1, anti-ctla4.   History of hepatitis C:  Recent elevated viral load. LFTs WNL sent for alk phos of 162. Noted.   Permanent A-fib:  CHADS2-VASc 2.  Patient on Coumadin PTA, INR 2.56 on arrival.  Goal is 2 -3  Hypertension:  BP was not high enough on last admission to resume antihypertensives on prior discharge.   Hyperlipidemia:  Continue statin  Gout:  Continue allopurinol  Tobacco use:  Active smoker, nicotine patch.  Centrilobular emphysema: Seen on chest CT.  Counseled on smoking cessation.  History of IV drug use.        INITIAL H AND P AND ICU COURSE:  Chris Perez is

## 2024-05-15 ENCOUNTER — APPOINTMENT (OUTPATIENT)
Dept: GENERAL RADIOLOGY | Age: 69
DRG: 871 | End: 2024-05-15
Payer: MEDICARE

## 2024-05-15 LAB
ANION GAP SERPL CALC-SCNC: 11 MEQ/L (ref 8–16)
ANISOCYTOSIS BLD QL SMEAR: PRESENT
BASOPHILS ABSOLUTE: 0 THOU/MM3 (ref 0–0.1)
BASOPHILS NFR BLD AUTO: 0.1 %
BUN SERPL-MCNC: 19 MG/DL (ref 7–22)
CALCIUM SERPL-MCNC: 8.3 MG/DL (ref 8.5–10.5)
CHLORIDE SERPL-SCNC: 107 MEQ/L (ref 98–111)
CO2 SERPL-SCNC: 22 MEQ/L (ref 23–33)
CREAT SERPL-MCNC: 0.7 MG/DL (ref 0.4–1.2)
DEPRECATED RDW RBC AUTO: 66 FL (ref 35–45)
EOSINOPHIL NFR BLD AUTO: 0 %
EOSINOPHILS ABSOLUTE: 0 THOU/MM3 (ref 0–0.4)
ERYTHROCYTE [DISTWIDTH] IN BLOOD BY AUTOMATED COUNT: 18.3 % (ref 11.5–14.5)
GFR SERPL CREATININE-BSD FRML MDRD: > 90 ML/MIN/1.73M2
GLUCOSE SERPL-MCNC: 157 MG/DL (ref 70–108)
HCT VFR BLD AUTO: 32.7 % (ref 42–52)
HGB BLD-MCNC: 10.9 GM/DL (ref 14–18)
IMM GRANULOCYTES # BLD AUTO: 0.06 THOU/MM3 (ref 0–0.07)
IMM GRANULOCYTES NFR BLD AUTO: 0.5 %
INR PPP: 3.63 (ref 0.85–1.13)
LACTATE SERPL-SCNC: 1.9 MMOL/L (ref 0.5–2)
LACTATE SERPL-SCNC: 2.2 MMOL/L (ref 0.5–2)
LACTATE SERPL-SCNC: 2.6 MMOL/L (ref 0.5–2)
LACTATE SERPL-SCNC: 2.7 MMOL/L (ref 0.5–2)
LYMPHOCYTES ABSOLUTE: 0.7 THOU/MM3 (ref 1–4.8)
LYMPHOCYTES NFR BLD AUTO: 5.5 %
MCH RBC QN AUTO: 33 PG (ref 26–33)
MCHC RBC AUTO-ENTMCNC: 33.3 GM/DL (ref 32.2–35.5)
MCV RBC AUTO: 99.1 FL (ref 80–94)
MONOCYTES ABSOLUTE: 0.5 THOU/MM3 (ref 0.4–1.3)
MONOCYTES NFR BLD AUTO: 4.4 %
NEUTROPHILS ABSOLUTE: 10.9 THOU/MM3 (ref 1.8–7.7)
NEUTROPHILS NFR BLD AUTO: 89.5 %
NRBC BLD AUTO-RTO: 0 /100 WBC
PLATELET # BLD AUTO: 153 THOU/MM3 (ref 130–400)
PMV BLD AUTO: 12.1 FL (ref 9.4–12.4)
POTASSIUM SERPL-SCNC: 4.7 MEQ/L (ref 3.5–5.2)
RBC # BLD AUTO: 3.3 MILL/MM3 (ref 4.7–6.1)
SODIUM SERPL-SCNC: 140 MEQ/L (ref 135–145)
WBC # BLD AUTO: 12.2 THOU/MM3 (ref 4.8–10.8)

## 2024-05-15 PROCEDURE — 6360000002 HC RX W HCPCS

## 2024-05-15 PROCEDURE — 80048 BASIC METABOLIC PNL TOTAL CA: CPT

## 2024-05-15 PROCEDURE — 2500000003 HC RX 250 WO HCPCS

## 2024-05-15 PROCEDURE — 85025 COMPLETE CBC W/AUTO DIFF WBC: CPT

## 2024-05-15 PROCEDURE — 2580000003 HC RX 258

## 2024-05-15 PROCEDURE — 2500000003 HC RX 250 WO HCPCS: Performed by: INTERNAL MEDICINE

## 2024-05-15 PROCEDURE — 71045 X-RAY EXAM CHEST 1 VIEW: CPT

## 2024-05-15 PROCEDURE — 99291 CRITICAL CARE FIRST HOUR: CPT | Performed by: INTERNAL MEDICINE

## 2024-05-15 PROCEDURE — 97110 THERAPEUTIC EXERCISES: CPT

## 2024-05-15 PROCEDURE — 85610 PROTHROMBIN TIME: CPT

## 2024-05-15 PROCEDURE — 6370000000 HC RX 637 (ALT 250 FOR IP): Performed by: NURSE PRACTITIONER

## 2024-05-15 PROCEDURE — 97162 PT EVAL MOD COMPLEX 30 MIN: CPT

## 2024-05-15 PROCEDURE — 36415 COLL VENOUS BLD VENIPUNCTURE: CPT

## 2024-05-15 PROCEDURE — 51701 INSERT BLADDER CATHETER: CPT

## 2024-05-15 PROCEDURE — 83605 ASSAY OF LACTIC ACID: CPT

## 2024-05-15 PROCEDURE — 6370000000 HC RX 637 (ALT 250 FOR IP)

## 2024-05-15 PROCEDURE — 94640 AIRWAY INHALATION TREATMENT: CPT

## 2024-05-15 PROCEDURE — 51798 US URINE CAPACITY MEASURE: CPT

## 2024-05-15 PROCEDURE — 1200000003 HC TELEMETRY R&B

## 2024-05-15 PROCEDURE — 94003 VENT MGMT INPAT SUBQ DAY: CPT

## 2024-05-15 RX ADMIN — Medication 100 MCG/HR: at 01:10

## 2024-05-15 RX ADMIN — HYDROCORTISONE SODIUM SUCCINATE 50 MG: 100 INJECTION, POWDER, FOR SOLUTION INTRAMUSCULAR; INTRAVENOUS at 08:58

## 2024-05-15 RX ADMIN — SODIUM CHLORIDE, POTASSIUM CHLORIDE, SODIUM LACTATE AND CALCIUM CHLORIDE: 600; 310; 30; 20 INJECTION, SOLUTION INTRAVENOUS at 02:30

## 2024-05-15 RX ADMIN — SODIUM CHLORIDE, PRESERVATIVE FREE 10 ML: 5 INJECTION INTRAVENOUS at 08:54

## 2024-05-15 RX ADMIN — FAMOTIDINE 20 MG: 20 TABLET, FILM COATED ORAL at 21:01

## 2024-05-15 RX ADMIN — ALLOPURINOL 100 MG: 100 TABLET ORAL at 16:05

## 2024-05-15 RX ADMIN — ALLOPURINOL 100 MG: 100 TABLET ORAL at 08:54

## 2024-05-15 RX ADMIN — ATORVASTATIN CALCIUM 20 MG: 20 TABLET, FILM COATED ORAL at 08:54

## 2024-05-15 RX ADMIN — AZITHROMYCIN MONOHYDRATE 500 MG: 500 INJECTION, POWDER, LYOPHILIZED, FOR SOLUTION INTRAVENOUS at 09:03

## 2024-05-15 RX ADMIN — CETIRIZINE HYDROCHLORIDE 10 MG: 10 TABLET, FILM COATED ORAL at 08:54

## 2024-05-15 RX ADMIN — PIPERACILLIN AND TAZOBACTAM 3375 MG: 3; .375 INJECTION, POWDER, LYOPHILIZED, FOR SOLUTION INTRAVENOUS at 16:34

## 2024-05-15 RX ADMIN — PIPERACILLIN AND TAZOBACTAM 3375 MG: 3; .375 INJECTION, POWDER, LYOPHILIZED, FOR SOLUTION INTRAVENOUS at 07:04

## 2024-05-15 RX ADMIN — Medication 0.4 MCG/KG/HR: at 04:48

## 2024-05-15 RX ADMIN — ALLOPURINOL 100 MG: 100 TABLET ORAL at 21:01

## 2024-05-15 RX ADMIN — HYDROCORTISONE SODIUM SUCCINATE 50 MG: 100 INJECTION, POWDER, FOR SOLUTION INTRAMUSCULAR; INTRAVENOUS at 21:01

## 2024-05-15 RX ADMIN — SODIUM CHLORIDE, POTASSIUM CHLORIDE, SODIUM LACTATE AND CALCIUM CHLORIDE: 600; 310; 30; 20 INJECTION, SOLUTION INTRAVENOUS at 07:42

## 2024-05-15 RX ADMIN — HYDROCORTISONE SODIUM SUCCINATE 50 MG: 100 INJECTION, POWDER, FOR SOLUTION INTRAMUSCULAR; INTRAVENOUS at 04:42

## 2024-05-15 RX ADMIN — PIPERACILLIN AND TAZOBACTAM 3375 MG: 3; .375 INJECTION, POWDER, LYOPHILIZED, FOR SOLUTION INTRAVENOUS at 23:18

## 2024-05-15 RX ADMIN — SODIUM CHLORIDE, POTASSIUM CHLORIDE, SODIUM LACTATE AND CALCIUM CHLORIDE: 600; 310; 30; 20 INJECTION, SOLUTION INTRAVENOUS at 14:38

## 2024-05-15 RX ADMIN — IPRATROPIUM BROMIDE AND ALBUTEROL SULFATE 1 DOSE: .5; 3 SOLUTION RESPIRATORY (INHALATION) at 07:49

## 2024-05-15 RX ADMIN — HYDROCORTISONE SODIUM SUCCINATE 50 MG: 100 INJECTION, POWDER, FOR SOLUTION INTRAMUSCULAR; INTRAVENOUS at 16:03

## 2024-05-15 RX ADMIN — FAMOTIDINE 20 MG: 20 TABLET, FILM COATED ORAL at 08:53

## 2024-05-15 RX ADMIN — FLUTICASONE PROPIONATE 1 SPRAY: 50 SPRAY, METERED NASAL at 09:08

## 2024-05-15 ASSESSMENT — PAIN SCALES - GENERAL
PAINLEVEL_OUTOF10: 0

## 2024-05-15 ASSESSMENT — PULMONARY FUNCTION TESTS
PIF_VALUE: 17
PIF_VALUE: 17
PIF_VALUE: 16
PIF_VALUE: 23

## 2024-05-15 NOTE — PROCEDURES
PROCEDURE NOTE  Date: 5/15/2024   Name: Chris Perez  YOB: 1955    Intubation    Date/Time: 5/14/2024 8:20 AM    Performed by: Jose Browne MD  Authorized by: Jose Browne MD  Consent: The procedure was performed in an emergent situation.  Indications: respiratory failure  Intubation method: video-assisted  Patient status: sedated  Preoxygenation: BVM  Sedatives: midazolam  Tube size: 7.5 mm  Tube type: cuffed  Number of attempts: 1  Cords visualized: yes  Post-procedure assessment: chest rise and ETCO2 monitor  Breath sounds: equal  Cuff inflated: yes  ETT to lip (cm): 20, advanced to 23 later.  Tube secured with: ETT caicedo  Chest x-ray interpreted by me.  Chest x-ray findings: endotracheal tube in appropriate position  Patient tolerance: patient tolerated the procedure well with no immediate complications

## 2024-05-16 LAB
ANION GAP SERPL CALC-SCNC: 14 MEQ/L (ref 8–16)
BACTERIA SPEC RESP CULT: ABNORMAL
BACTERIA SPEC RESP CULT: ABNORMAL
BUN SERPL-MCNC: 23 MG/DL (ref 7–22)
CALCIUM SERPL-MCNC: 8.6 MG/DL (ref 8.5–10.5)
CHLORIDE SERPL-SCNC: 105 MEQ/L (ref 98–111)
CO2 SERPL-SCNC: 21 MEQ/L (ref 23–33)
CREAT SERPL-MCNC: 0.8 MG/DL (ref 0.4–1.2)
DEPRECATED RDW RBC AUTO: 66.8 FL (ref 35–45)
ERYTHROCYTE [DISTWIDTH] IN BLOOD BY AUTOMATED COUNT: 18.2 % (ref 11.5–14.5)
GFR SERPL CREATININE-BSD FRML MDRD: > 90 ML/MIN/1.73M2
GLUCOSE SERPL-MCNC: 169 MG/DL (ref 70–108)
GRAM STN SPEC: ABNORMAL
HCT VFR BLD AUTO: 35.6 % (ref 42–52)
HGB BLD-MCNC: 11.5 GM/DL (ref 14–18)
INR PPP: 6.22 (ref 0.85–1.13)
MCH RBC QN AUTO: 32.5 PG (ref 26–33)
MCHC RBC AUTO-ENTMCNC: 32.3 GM/DL (ref 32.2–35.5)
MCV RBC AUTO: 100.6 FL (ref 80–94)
ORGANISM: ABNORMAL
PLATELET # BLD AUTO: 147 THOU/MM3 (ref 130–400)
PMV BLD AUTO: 12.2 FL (ref 9.4–12.4)
POTASSIUM SERPL-SCNC: 4 MEQ/L (ref 3.5–5.2)
RBC # BLD AUTO: 3.54 MILL/MM3 (ref 4.7–6.1)
SCAN OF BLOOD SMEAR: NORMAL
SODIUM SERPL-SCNC: 140 MEQ/L (ref 135–145)
WBC # BLD AUTO: 10.8 THOU/MM3 (ref 4.8–10.8)

## 2024-05-16 PROCEDURE — 97110 THERAPEUTIC EXERCISES: CPT

## 2024-05-16 PROCEDURE — 6360000002 HC RX W HCPCS

## 2024-05-16 PROCEDURE — 97166 OT EVAL MOD COMPLEX 45 MIN: CPT

## 2024-05-16 PROCEDURE — 99223 1ST HOSP IP/OBS HIGH 75: CPT | Performed by: INTERNAL MEDICINE

## 2024-05-16 PROCEDURE — 51798 US URINE CAPACITY MEASURE: CPT

## 2024-05-16 PROCEDURE — 6370000000 HC RX 637 (ALT 250 FOR IP): Performed by: PHYSICIAN ASSISTANT

## 2024-05-16 PROCEDURE — 6360000002 HC RX W HCPCS: Performed by: PHYSICIAN ASSISTANT

## 2024-05-16 PROCEDURE — 6370000000 HC RX 637 (ALT 250 FOR IP)

## 2024-05-16 PROCEDURE — 1200000003 HC TELEMETRY R&B

## 2024-05-16 PROCEDURE — 99233 SBSQ HOSP IP/OBS HIGH 50: CPT | Performed by: PHYSICIAN ASSISTANT

## 2024-05-16 PROCEDURE — 94669 MECHANICAL CHEST WALL OSCILL: CPT

## 2024-05-16 PROCEDURE — 2580000003 HC RX 258

## 2024-05-16 PROCEDURE — 36415 COLL VENOUS BLD VENIPUNCTURE: CPT

## 2024-05-16 PROCEDURE — 97530 THERAPEUTIC ACTIVITIES: CPT

## 2024-05-16 PROCEDURE — 80048 BASIC METABOLIC PNL TOTAL CA: CPT

## 2024-05-16 PROCEDURE — 85610 PROTHROMBIN TIME: CPT

## 2024-05-16 PROCEDURE — 85027 COMPLETE CBC AUTOMATED: CPT

## 2024-05-16 RX ORDER — PHYTONADIONE 5 MG/1
2.5 TABLET ORAL ONCE
Status: COMPLETED | OUTPATIENT
Start: 2024-05-16 | End: 2024-05-16

## 2024-05-16 RX ORDER — METOPROLOL SUCCINATE 25 MG/1
25 TABLET, EXTENDED RELEASE ORAL DAILY
Status: DISCONTINUED | OUTPATIENT
Start: 2024-05-16 | End: 2024-05-19 | Stop reason: HOSPADM

## 2024-05-16 RX ADMIN — OXYCODONE 10 MG: 5 TABLET ORAL at 14:39

## 2024-05-16 RX ADMIN — FLUTICASONE PROPIONATE 1 SPRAY: 50 SPRAY, METERED NASAL at 09:33

## 2024-05-16 RX ADMIN — FAMOTIDINE 20 MG: 20 TABLET, FILM COATED ORAL at 09:36

## 2024-05-16 RX ADMIN — ACETAMINOPHEN 650 MG: 325 TABLET ORAL at 19:41

## 2024-05-16 RX ADMIN — ALLOPURINOL 100 MG: 100 TABLET ORAL at 09:36

## 2024-05-16 RX ADMIN — ATORVASTATIN CALCIUM 20 MG: 20 TABLET, FILM COATED ORAL at 09:36

## 2024-05-16 RX ADMIN — OXYCODONE 10 MG: 5 TABLET ORAL at 07:41

## 2024-05-16 RX ADMIN — HYDROCORTISONE SODIUM SUCCINATE 50 MG: 100 INJECTION, POWDER, FOR SOLUTION INTRAMUSCULAR; INTRAVENOUS at 16:28

## 2024-05-16 RX ADMIN — FAMOTIDINE 20 MG: 20 TABLET, FILM COATED ORAL at 19:37

## 2024-05-16 RX ADMIN — ALLOPURINOL 100 MG: 100 TABLET ORAL at 19:37

## 2024-05-16 RX ADMIN — ALLOPURINOL 100 MG: 100 TABLET ORAL at 14:39

## 2024-05-16 RX ADMIN — PIPERACILLIN AND TAZOBACTAM 3375 MG: 3; .375 INJECTION, POWDER, LYOPHILIZED, FOR SOLUTION INTRAVENOUS at 23:36

## 2024-05-16 RX ADMIN — PHYTONADIONE 2.5 MG: 5 TABLET ORAL at 14:39

## 2024-05-16 RX ADMIN — Medication 3 MG: at 19:41

## 2024-05-16 RX ADMIN — OXYCODONE 10 MG: 5 TABLET ORAL at 19:40

## 2024-05-16 RX ADMIN — DULOXETINE HYDROCHLORIDE 30 MG: 30 CAPSULE, DELAYED RELEASE ORAL at 09:36

## 2024-05-16 RX ADMIN — SODIUM CHLORIDE, PRESERVATIVE FREE 10 ML: 5 INJECTION INTRAVENOUS at 19:37

## 2024-05-16 RX ADMIN — PIPERACILLIN AND TAZOBACTAM 3375 MG: 3; .375 INJECTION, POWDER, LYOPHILIZED, FOR SOLUTION INTRAVENOUS at 14:42

## 2024-05-16 RX ADMIN — PANTOPRAZOLE SODIUM 40 MG: 40 TABLET, DELAYED RELEASE ORAL at 05:22

## 2024-05-16 RX ADMIN — PIPERACILLIN AND TAZOBACTAM 3375 MG: 3; .375 INJECTION, POWDER, LYOPHILIZED, FOR SOLUTION INTRAVENOUS at 06:10

## 2024-05-16 RX ADMIN — HYDROCORTISONE SODIUM SUCCINATE 50 MG: 100 INJECTION, POWDER, FOR SOLUTION INTRAMUSCULAR; INTRAVENOUS at 05:22

## 2024-05-16 RX ADMIN — METOPROLOL SUCCINATE 25 MG: 25 TABLET, FILM COATED, EXTENDED RELEASE ORAL at 16:28

## 2024-05-16 RX ADMIN — HYDROCORTISONE SODIUM SUCCINATE 50 MG: 100 INJECTION, POWDER, FOR SOLUTION INTRAMUSCULAR; INTRAVENOUS at 11:44

## 2024-05-16 RX ADMIN — POTASSIUM CHLORIDE 20 MEQ: 1500 TABLET, EXTENDED RELEASE ORAL at 09:36

## 2024-05-16 RX ADMIN — CETIRIZINE HYDROCHLORIDE 10 MG: 10 TABLET, FILM COATED ORAL at 09:36

## 2024-05-16 RX ADMIN — HYDROCORTISONE SODIUM SUCCINATE 50 MG: 100 INJECTION, POWDER, FOR SOLUTION INTRAMUSCULAR; INTRAVENOUS at 23:36

## 2024-05-16 ASSESSMENT — PAIN DESCRIPTION - LOCATION
LOCATION: ABDOMEN
LOCATION: ABDOMEN

## 2024-05-16 ASSESSMENT — PAIN DESCRIPTION - DESCRIPTORS
DESCRIPTORS: ACHING;DISCOMFORT
DESCRIPTORS: ACHING;DISCOMFORT

## 2024-05-16 ASSESSMENT — PAIN DESCRIPTION - ONSET: ONSET: ON-GOING

## 2024-05-16 ASSESSMENT — PAIN SCALES - GENERAL
PAINLEVEL_OUTOF10: 7
PAINLEVEL_OUTOF10: 8
PAINLEVEL_OUTOF10: 8
PAINLEVEL_OUTOF10: 7

## 2024-05-16 ASSESSMENT — PAIN DESCRIPTION - ORIENTATION
ORIENTATION: LOWER;MID
ORIENTATION: LOWER;MID

## 2024-05-16 ASSESSMENT — PAIN - FUNCTIONAL ASSESSMENT: PAIN_FUNCTIONAL_ASSESSMENT: ACTIVITIES ARE NOT PREVENTED

## 2024-05-16 ASSESSMENT — PAIN DESCRIPTION - FREQUENCY: FREQUENCY: CONTINUOUS

## 2024-05-16 NOTE — CONSULTS
chest was reported as having multifocal airspace opacities most pronounced in the right lung field suggesting of pneumonia.  He was given a fluid bolus and was started on empiric antibiotics.  He was then admitted to [facility] for further care.  Early in the morning of May 14 he became tachycardic and had increased work of breathing.  He was found to be tachypneic and his supplemental oxygen was increased to 4 L.  He was then given a one-time dose of Ativan and started on diltiazem.  He is started on BiPAP and ABG was ordered which came back with acidosis with a pH of 7.198.  Chest x-ray was reported as having pulmonary edema, small bilateral pleural effusions and more focal areas of opacification of the right lung base.  He was then intubated and transferred to the ICU for further care. Palliative care was consulted for Goals of Care, Symptom Management, and Continuity of Care.    Symptoms:  Pain: Patient is unable to report pain.  Shortness of breath: They are unable to report if they are having any shortness of breath, difficulty breathing or cough.  Sleep: Unable to report issues with sleep  Fatigue/Drowsiness: Unable to report  Appetite: The patient is unable to report issues with appetite.  Wt. Loss: Patient's weight is stable.   Dry Mouth: Patient unable to report dry mouth.  Nausea/Vomiting: The patient is unable to report nausea and/or vomiting.  Constipation/Diarrhea:  Unable to report  Depression: Patient is unable to report depression.  Anxiety: The patient is unable to report anxiety.    Serious Illness Conversation:  Please refer to the previous outpatient or inpatient palliative care notes and/or Palliative Care RN note for full goals of care discussion. Briefly, Patient's brother would like to continue with the current plan of care.  He expresses his hope that his brother will be able to make a quick recovery.  He would like palliative care to continue to follow the patient while he 
\"CKMBINDEX\", \"TROPONINI\" in the last 72 hours.  PT/INR:   Recent Labs     05/14/24  0427 05/15/24  0514 05/16/24  0530   INR 2.54* 3.63* 6.22*     APTT: No results for input(s): \"APTT\" in the last 72 hours.  Liver Profile:  Lab Results   Component Value Date/Time    AST 27 05/13/2024 05:49 AM    ALT 20 05/13/2024 05:49 AM    BILIDIR <0.2 05/03/2024 09:39 AM    BILITOT 0.6 05/13/2024 05:49 AM    ALKPHOS 162 05/13/2024 05:49 AM    PROTEIN 6.9 05/13/2024 05:49 AM     Lab Results   Component Value Date/Time    CHOL 62 04/27/2024 09:56 PM    HDL 16 04/27/2024 09:56 PM    TRIG 106 04/27/2024 09:56 PM     TSH:   Lab Results   Component Value Date/Time    TSH 1.360 05/13/2024 05:49 AM     UA:   Lab Results   Component Value Date/Time    COLORU YELLOW 05/14/2024 09:30 PM    PHUR 5.0 05/14/2024 09:30 PM    PHUR 5.0 04/27/2024 09:45 PM    WBCUA 0-2 05/14/2024 09:30 PM    RBCUA 0-2 05/14/2024 09:30 PM    YEAST NONE SEEN 05/14/2024 09:30 PM    BACTERIA NONE SEEN 05/14/2024 09:30 PM    LEUKOCYTESUR NEGATIVE 05/14/2024 09:30 PM    UROBILINOGEN 1.0 05/14/2024 09:30 PM    BILIRUBINUR NEGATIVE 05/14/2024 09:30 PM    BLOODU NEGATIVE 05/14/2024 09:30 PM    GLUCOSEU NEGATIVE 05/14/2024 09:30 PM         Physical Exam:  Vitals:    05/16/24 0741   BP:    Pulse:    Resp: 19   Temp:    SpO2:       Intake/Output Summary (Last 24 hours) at 5/16/2024 0907  Last data filed at 5/16/2024 0516  Gross per 24 hour   Intake 2093.36 ml   Output 1090 ml   Net 1003.36 ml      General:  No acute distress  Neck: Supple  Heart: Regularly irregular rhythm. no rubs, murmurs or gallops  Lungs: Wheezing and rhonchi are present. no rales  Abdomen: positive bowel sounds, soft, non-tender, non-distended, no bruits, no masses  Extremities:no clubbing, cyanosis or edema  Neurologic: alert and oriented x 3, cranial nerves 2-12 grossly intact, motor and sensory intact, moving all extremities  Skin: No rashes    Assessment:  Community acquired bacterial

## 2024-05-17 ENCOUNTER — HOSPITAL ENCOUNTER (OUTPATIENT)
Dept: INFUSION THERAPY | Age: 69
End: 2024-05-17

## 2024-05-17 LAB
ANION GAP SERPL CALC-SCNC: 14 MEQ/L (ref 8–16)
BUN SERPL-MCNC: 21 MG/DL (ref 7–22)
CALCIUM SERPL-MCNC: 8.8 MG/DL (ref 8.5–10.5)
CHLORIDE SERPL-SCNC: 102 MEQ/L (ref 98–111)
CO2 SERPL-SCNC: 21 MEQ/L (ref 23–33)
CREAT SERPL-MCNC: 1 MG/DL (ref 0.4–1.2)
DEPRECATED RDW RBC AUTO: 65.9 FL (ref 35–45)
ERYTHROCYTE [DISTWIDTH] IN BLOOD BY AUTOMATED COUNT: 17.9 % (ref 11.5–14.5)
GFR SERPL CREATININE-BSD FRML MDRD: 82 ML/MIN/1.73M2
GLUCOSE SERPL-MCNC: 198 MG/DL (ref 70–108)
HCT VFR BLD AUTO: 39.1 % (ref 42–52)
HGB BLD-MCNC: 12.4 GM/DL (ref 14–18)
INR PPP: 3.48 (ref 0.85–1.13)
MCH RBC QN AUTO: 32.1 PG (ref 26–33)
MCHC RBC AUTO-ENTMCNC: 31.7 GM/DL (ref 32.2–35.5)
MCV RBC AUTO: 101.3 FL (ref 80–94)
PLATELET # BLD AUTO: 164 THOU/MM3 (ref 130–400)
PMV BLD AUTO: 12.4 FL (ref 9.4–12.4)
POTASSIUM SERPL-SCNC: 4.1 MEQ/L (ref 3.5–5.2)
RBC # BLD AUTO: 3.86 MILL/MM3 (ref 4.7–6.1)
SODIUM SERPL-SCNC: 137 MEQ/L (ref 135–145)
WBC # BLD AUTO: 10.6 THOU/MM3 (ref 4.8–10.8)

## 2024-05-17 PROCEDURE — 94669 MECHANICAL CHEST WALL OSCILL: CPT

## 2024-05-17 PROCEDURE — 97116 GAIT TRAINING THERAPY: CPT

## 2024-05-17 PROCEDURE — 1200000003 HC TELEMETRY R&B

## 2024-05-17 PROCEDURE — 85610 PROTHROMBIN TIME: CPT

## 2024-05-17 PROCEDURE — 6370000000 HC RX 637 (ALT 250 FOR IP)

## 2024-05-17 PROCEDURE — 97110 THERAPEUTIC EXERCISES: CPT

## 2024-05-17 PROCEDURE — 6360000002 HC RX W HCPCS

## 2024-05-17 PROCEDURE — 6370000000 HC RX 637 (ALT 250 FOR IP): Performed by: PHYSICIAN ASSISTANT

## 2024-05-17 PROCEDURE — 99232 SBSQ HOSP IP/OBS MODERATE 35: CPT | Performed by: NURSE PRACTITIONER

## 2024-05-17 PROCEDURE — 2580000003 HC RX 258

## 2024-05-17 PROCEDURE — 6360000002 HC RX W HCPCS: Performed by: PHYSICIAN ASSISTANT

## 2024-05-17 PROCEDURE — 36415 COLL VENOUS BLD VENIPUNCTURE: CPT

## 2024-05-17 PROCEDURE — 94640 AIRWAY INHALATION TREATMENT: CPT

## 2024-05-17 PROCEDURE — 85027 COMPLETE CBC AUTOMATED: CPT

## 2024-05-17 PROCEDURE — 80048 BASIC METABOLIC PNL TOTAL CA: CPT

## 2024-05-17 RX ADMIN — HYDROCORTISONE SODIUM SUCCINATE 50 MG: 100 INJECTION, POWDER, FOR SOLUTION INTRAMUSCULAR; INTRAVENOUS at 15:35

## 2024-05-17 RX ADMIN — FAMOTIDINE 20 MG: 20 TABLET, FILM COATED ORAL at 08:38

## 2024-05-17 RX ADMIN — CETIRIZINE HYDROCHLORIDE 10 MG: 10 TABLET, FILM COATED ORAL at 08:36

## 2024-05-17 RX ADMIN — SODIUM CHLORIDE, PRESERVATIVE FREE 10 ML: 5 INJECTION INTRAVENOUS at 21:27

## 2024-05-17 RX ADMIN — ATORVASTATIN CALCIUM 20 MG: 20 TABLET, FILM COATED ORAL at 08:36

## 2024-05-17 RX ADMIN — OXYCODONE 10 MG: 5 TABLET ORAL at 08:37

## 2024-05-17 RX ADMIN — PIPERACILLIN AND TAZOBACTAM 3375 MG: 3; .375 INJECTION, POWDER, LYOPHILIZED, FOR SOLUTION INTRAVENOUS at 15:39

## 2024-05-17 RX ADMIN — ALLOPURINOL 100 MG: 100 TABLET ORAL at 21:26

## 2024-05-17 RX ADMIN — HYDROCORTISONE SODIUM SUCCINATE 50 MG: 100 INJECTION, POWDER, FOR SOLUTION INTRAMUSCULAR; INTRAVENOUS at 08:39

## 2024-05-17 RX ADMIN — METOPROLOL SUCCINATE 25 MG: 25 TABLET, FILM COATED, EXTENDED RELEASE ORAL at 08:36

## 2024-05-17 RX ADMIN — POTASSIUM CHLORIDE 20 MEQ: 1500 TABLET, EXTENDED RELEASE ORAL at 08:38

## 2024-05-17 RX ADMIN — PIPERACILLIN AND TAZOBACTAM 3375 MG: 3; .375 INJECTION, POWDER, LYOPHILIZED, FOR SOLUTION INTRAVENOUS at 05:40

## 2024-05-17 RX ADMIN — ALLOPURINOL 100 MG: 100 TABLET ORAL at 08:36

## 2024-05-17 RX ADMIN — HYDROCORTISONE SODIUM SUCCINATE 50 MG: 100 INJECTION, POWDER, FOR SOLUTION INTRAMUSCULAR; INTRAVENOUS at 03:26

## 2024-05-17 RX ADMIN — DULOXETINE HYDROCHLORIDE 30 MG: 30 CAPSULE, DELAYED RELEASE ORAL at 08:36

## 2024-05-17 RX ADMIN — ONDANSETRON 4 MG: 2 INJECTION INTRAMUSCULAR; INTRAVENOUS at 03:25

## 2024-05-17 RX ADMIN — ALLOPURINOL 100 MG: 100 TABLET ORAL at 15:35

## 2024-05-17 RX ADMIN — Medication 3 MG: at 21:26

## 2024-05-17 RX ADMIN — FLUTICASONE PROPIONATE 1 SPRAY: 50 SPRAY, METERED NASAL at 08:39

## 2024-05-17 RX ADMIN — FAMOTIDINE 20 MG: 20 TABLET, FILM COATED ORAL at 21:26

## 2024-05-17 RX ADMIN — OXYCODONE 10 MG: 5 TABLET ORAL at 17:36

## 2024-05-17 RX ADMIN — OXYCODONE 10 MG: 5 TABLET ORAL at 21:26

## 2024-05-17 ASSESSMENT — PAIN DESCRIPTION - DESCRIPTORS
DESCRIPTORS: DISCOMFORT;ACHING
DESCRIPTORS: ACHING;DISCOMFORT

## 2024-05-17 ASSESSMENT — PAIN SCALES - GENERAL
PAINLEVEL_OUTOF10: 8
PAINLEVEL_OUTOF10: 5
PAINLEVEL_OUTOF10: 0
PAINLEVEL_OUTOF10: 8
PAINLEVEL_OUTOF10: 8
PAINLEVEL_OUTOF10: 5
PAINLEVEL_OUTOF10: 3

## 2024-05-17 ASSESSMENT — PAIN DESCRIPTION - ORIENTATION
ORIENTATION: LEFT;MID
ORIENTATION: LOWER;MID

## 2024-05-17 ASSESSMENT — PAIN DESCRIPTION - LOCATION
LOCATION: ABDOMEN
LOCATION: ABDOMEN

## 2024-05-17 ASSESSMENT — PAIN DESCRIPTION - PAIN TYPE: TYPE: ACUTE PAIN

## 2024-05-17 ASSESSMENT — PAIN DESCRIPTION - FREQUENCY: FREQUENCY: CONTINUOUS

## 2024-05-17 ASSESSMENT — PAIN DESCRIPTION - ONSET: ONSET: ON-GOING

## 2024-05-18 LAB
ANION GAP SERPL CALC-SCNC: 15 MEQ/L (ref 8–16)
BACTERIA BLD AEROBE CULT: NORMAL
BACTERIA BLD AEROBE CULT: NORMAL
BUN SERPL-MCNC: 25 MG/DL (ref 7–22)
CALCIUM SERPL-MCNC: 8.7 MG/DL (ref 8.5–10.5)
CHLORIDE SERPL-SCNC: 103 MEQ/L (ref 98–111)
CO2 SERPL-SCNC: 21 MEQ/L (ref 23–33)
CREAT SERPL-MCNC: 0.9 MG/DL (ref 0.4–1.2)
DEPRECATED RDW RBC AUTO: 64.6 FL (ref 35–45)
ERYTHROCYTE [DISTWIDTH] IN BLOOD BY AUTOMATED COUNT: 18 % (ref 11.5–14.5)
GFR SERPL CREATININE-BSD FRML MDRD: > 90 ML/MIN/1.73M2
GLUCOSE SERPL-MCNC: 179 MG/DL (ref 70–108)
HCT VFR BLD AUTO: 37.5 % (ref 42–52)
HGB BLD-MCNC: 12 GM/DL (ref 14–18)
INR PPP: 2.56 (ref 0.85–1.13)
MCH RBC QN AUTO: 31.7 PG (ref 26–33)
MCHC RBC AUTO-ENTMCNC: 32 GM/DL (ref 32.2–35.5)
MCV RBC AUTO: 99.2 FL (ref 80–94)
PLATELET # BLD AUTO: 175 THOU/MM3 (ref 130–400)
PMV BLD AUTO: 12.5 FL (ref 9.4–12.4)
POTASSIUM SERPL-SCNC: 4.7 MEQ/L (ref 3.5–5.2)
RBC # BLD AUTO: 3.78 MILL/MM3 (ref 4.7–6.1)
SODIUM SERPL-SCNC: 139 MEQ/L (ref 135–145)
WBC # BLD AUTO: 10.1 THOU/MM3 (ref 4.8–10.8)

## 2024-05-18 PROCEDURE — 2580000003 HC RX 258

## 2024-05-18 PROCEDURE — 1200000003 HC TELEMETRY R&B

## 2024-05-18 PROCEDURE — 36415 COLL VENOUS BLD VENIPUNCTURE: CPT

## 2024-05-18 PROCEDURE — 99232 SBSQ HOSP IP/OBS MODERATE 35: CPT

## 2024-05-18 PROCEDURE — 6360000002 HC RX W HCPCS

## 2024-05-18 PROCEDURE — 6370000000 HC RX 637 (ALT 250 FOR IP)

## 2024-05-18 PROCEDURE — 80048 BASIC METABOLIC PNL TOTAL CA: CPT

## 2024-05-18 PROCEDURE — 6360000002 HC RX W HCPCS: Performed by: PHYSICIAN ASSISTANT

## 2024-05-18 PROCEDURE — 6370000000 HC RX 637 (ALT 250 FOR IP): Performed by: PHARMACIST

## 2024-05-18 PROCEDURE — 85027 COMPLETE CBC AUTOMATED: CPT

## 2024-05-18 PROCEDURE — 85610 PROTHROMBIN TIME: CPT

## 2024-05-18 PROCEDURE — 6370000000 HC RX 637 (ALT 250 FOR IP): Performed by: PHYSICIAN ASSISTANT

## 2024-05-18 PROCEDURE — 94640 AIRWAY INHALATION TREATMENT: CPT

## 2024-05-18 RX ORDER — WARFARIN SODIUM 2.5 MG/1
2.5 TABLET ORAL
Status: COMPLETED | OUTPATIENT
Start: 2024-05-18 | End: 2024-05-18

## 2024-05-18 RX ADMIN — PIPERACILLIN AND TAZOBACTAM 3375 MG: 3; .375 INJECTION, POWDER, LYOPHILIZED, FOR SOLUTION INTRAVENOUS at 16:13

## 2024-05-18 RX ADMIN — Medication 3 MG: at 20:51

## 2024-05-18 RX ADMIN — WARFARIN SODIUM 2.5 MG: 2.5 TABLET ORAL at 17:46

## 2024-05-18 RX ADMIN — FAMOTIDINE 20 MG: 20 TABLET, FILM COATED ORAL at 08:42

## 2024-05-18 RX ADMIN — PIPERACILLIN AND TAZOBACTAM 3375 MG: 3; .375 INJECTION, POWDER, LYOPHILIZED, FOR SOLUTION INTRAVENOUS at 00:19

## 2024-05-18 RX ADMIN — PANTOPRAZOLE SODIUM 40 MG: 40 TABLET, DELAYED RELEASE ORAL at 05:30

## 2024-05-18 RX ADMIN — HYDROCORTISONE SODIUM SUCCINATE 50 MG: 100 INJECTION, POWDER, FOR SOLUTION INTRAMUSCULAR; INTRAVENOUS at 23:36

## 2024-05-18 RX ADMIN — HYDROCORTISONE SODIUM SUCCINATE 50 MG: 100 INJECTION, POWDER, FOR SOLUTION INTRAMUSCULAR; INTRAVENOUS at 00:18

## 2024-05-18 RX ADMIN — METOPROLOL SUCCINATE 25 MG: 25 TABLET, FILM COATED, EXTENDED RELEASE ORAL at 08:42

## 2024-05-18 RX ADMIN — PIPERACILLIN AND TAZOBACTAM 3375 MG: 3; .375 INJECTION, POWDER, LYOPHILIZED, FOR SOLUTION INTRAVENOUS at 23:35

## 2024-05-18 RX ADMIN — TIOTROPIUM BROMIDE AND OLODATEROL 2 PUFF: 3.124; 2.736 SPRAY, METERED RESPIRATORY (INHALATION) at 10:00

## 2024-05-18 RX ADMIN — HYDROCORTISONE SODIUM SUCCINATE 50 MG: 100 INJECTION, POWDER, FOR SOLUTION INTRAMUSCULAR; INTRAVENOUS at 16:14

## 2024-05-18 RX ADMIN — HYDROCORTISONE SODIUM SUCCINATE 50 MG: 100 INJECTION, POWDER, FOR SOLUTION INTRAMUSCULAR; INTRAVENOUS at 08:43

## 2024-05-18 RX ADMIN — POTASSIUM CHLORIDE 20 MEQ: 1500 TABLET, EXTENDED RELEASE ORAL at 08:43

## 2024-05-18 RX ADMIN — ALLOPURINOL 100 MG: 100 TABLET ORAL at 20:52

## 2024-05-18 RX ADMIN — FAMOTIDINE 20 MG: 20 TABLET, FILM COATED ORAL at 20:51

## 2024-05-18 RX ADMIN — PIPERACILLIN AND TAZOBACTAM 3375 MG: 3; .375 INJECTION, POWDER, LYOPHILIZED, FOR SOLUTION INTRAVENOUS at 05:32

## 2024-05-18 RX ADMIN — ATORVASTATIN CALCIUM 20 MG: 20 TABLET, FILM COATED ORAL at 08:42

## 2024-05-18 RX ADMIN — ALLOPURINOL 100 MG: 100 TABLET ORAL at 08:42

## 2024-05-18 RX ADMIN — OXYCODONE 10 MG: 5 TABLET ORAL at 20:51

## 2024-05-18 RX ADMIN — CETIRIZINE HYDROCHLORIDE 10 MG: 10 TABLET, FILM COATED ORAL at 08:42

## 2024-05-18 RX ADMIN — OXYCODONE 10 MG: 5 TABLET ORAL at 16:12

## 2024-05-18 RX ADMIN — ALLOPURINOL 100 MG: 100 TABLET ORAL at 17:45

## 2024-05-18 RX ADMIN — OXYCODONE 10 MG: 5 TABLET ORAL at 05:30

## 2024-05-18 RX ADMIN — DULOXETINE HYDROCHLORIDE 30 MG: 30 CAPSULE, DELAYED RELEASE ORAL at 08:42

## 2024-05-18 RX ADMIN — HYDROCORTISONE SODIUM SUCCINATE 50 MG: 100 INJECTION, POWDER, FOR SOLUTION INTRAMUSCULAR; INTRAVENOUS at 05:31

## 2024-05-18 ASSESSMENT — PAIN SCALES - GENERAL
PAINLEVEL_OUTOF10: 8
PAINLEVEL_OUTOF10: 0
PAINLEVEL_OUTOF10: 0
PAINLEVEL_OUTOF10: 6

## 2024-05-18 ASSESSMENT — PAIN DESCRIPTION - LOCATION: LOCATION: BACK

## 2024-05-18 ASSESSMENT — PAIN - FUNCTIONAL ASSESSMENT: PAIN_FUNCTIONAL_ASSESSMENT: ACTIVITIES ARE NOT PREVENTED

## 2024-05-18 ASSESSMENT — PAIN DESCRIPTION - DESCRIPTORS: DESCRIPTORS: ACHING;DISCOMFORT

## 2024-05-19 VITALS
WEIGHT: 153.22 LBS | TEMPERATURE: 98.4 F | SYSTOLIC BLOOD PRESSURE: 145 MMHG | RESPIRATION RATE: 18 BRPM | HEIGHT: 68 IN | OXYGEN SATURATION: 97 % | HEART RATE: 101 BPM | BODY MASS INDEX: 23.22 KG/M2 | DIASTOLIC BLOOD PRESSURE: 81 MMHG

## 2024-05-19 PROCEDURE — 6370000000 HC RX 637 (ALT 250 FOR IP): Performed by: PHYSICIAN ASSISTANT

## 2024-05-19 PROCEDURE — 6360000002 HC RX W HCPCS

## 2024-05-19 PROCEDURE — 2580000003 HC RX 258

## 2024-05-19 PROCEDURE — 6370000000 HC RX 637 (ALT 250 FOR IP)

## 2024-05-19 PROCEDURE — 6360000002 HC RX W HCPCS: Performed by: PHYSICIAN ASSISTANT

## 2024-05-19 PROCEDURE — 99239 HOSP IP/OBS DSCHRG MGMT >30: CPT

## 2024-05-19 RX ORDER — LEVOFLOXACIN 750 MG/1
750 TABLET, FILM COATED ORAL DAILY
Qty: 3 TABLET | Refills: 0 | Status: SHIPPED | OUTPATIENT
Start: 2024-05-19 | End: 2024-05-22

## 2024-05-19 RX ORDER — HEPARIN 100 UNIT/ML
500 SYRINGE INTRAVENOUS ONCE
Status: COMPLETED | OUTPATIENT
Start: 2024-05-19 | End: 2024-05-19

## 2024-05-19 RX ORDER — HYDROCORTISONE 10 MG/1
TABLET ORAL
Qty: 30 TABLET | Refills: 0 | Status: SHIPPED | OUTPATIENT
Start: 2024-05-19 | End: 2024-05-22

## 2024-05-19 RX ORDER — HYDROCORTISONE 10 MG/1
TABLET ORAL
Qty: 90 TABLET | Refills: 2
Start: 2024-05-21

## 2024-05-19 RX ORDER — METOPROLOL SUCCINATE 25 MG/1
25 TABLET, EXTENDED RELEASE ORAL DAILY
Qty: 30 TABLET | Refills: 1 | Status: SHIPPED | OUTPATIENT
Start: 2024-05-20

## 2024-05-19 RX ADMIN — OXYCODONE 10 MG: 5 TABLET ORAL at 07:51

## 2024-05-19 RX ADMIN — ALLOPURINOL 100 MG: 100 TABLET ORAL at 07:51

## 2024-05-19 RX ADMIN — ATORVASTATIN CALCIUM 20 MG: 20 TABLET, FILM COATED ORAL at 07:51

## 2024-05-19 RX ADMIN — SODIUM CHLORIDE, PRESERVATIVE FREE 10 ML: 5 INJECTION INTRAVENOUS at 11:14

## 2024-05-19 RX ADMIN — HYDROCORTISONE SODIUM SUCCINATE 50 MG: 100 INJECTION, POWDER, FOR SOLUTION INTRAMUSCULAR; INTRAVENOUS at 03:28

## 2024-05-19 RX ADMIN — PANTOPRAZOLE SODIUM 40 MG: 40 TABLET, DELAYED RELEASE ORAL at 03:27

## 2024-05-19 RX ADMIN — HEPARIN 500 UNITS: 100 SYRINGE at 11:15

## 2024-05-19 RX ADMIN — CETIRIZINE HYDROCHLORIDE 10 MG: 10 TABLET, FILM COATED ORAL at 07:51

## 2024-05-19 RX ADMIN — FAMOTIDINE 20 MG: 20 TABLET, FILM COATED ORAL at 07:51

## 2024-05-19 RX ADMIN — DULOXETINE HYDROCHLORIDE 30 MG: 30 CAPSULE, DELAYED RELEASE ORAL at 07:51

## 2024-05-19 RX ADMIN — PIPERACILLIN AND TAZOBACTAM 3375 MG: 3; .375 INJECTION, POWDER, LYOPHILIZED, FOR SOLUTION INTRAVENOUS at 07:54

## 2024-05-19 RX ADMIN — HYDROCORTISONE SODIUM SUCCINATE 25 MG: 100 INJECTION, POWDER, FOR SOLUTION INTRAMUSCULAR; INTRAVENOUS at 10:44

## 2024-05-19 RX ADMIN — METOPROLOL SUCCINATE 25 MG: 25 TABLET, FILM COATED, EXTENDED RELEASE ORAL at 07:51

## 2024-05-19 RX ADMIN — OXYCODONE 10 MG: 5 TABLET ORAL at 03:27

## 2024-05-19 RX ADMIN — POTASSIUM CHLORIDE 20 MEQ: 1500 TABLET, EXTENDED RELEASE ORAL at 07:51

## 2024-05-19 ASSESSMENT — PAIN SCALES - GENERAL: PAINLEVEL_OUTOF10: 8

## 2024-05-19 NOTE — PROGRESS NOTES
Physician Progress Note        Patient:   Chris Perez  YOB: 1955  Age:  69 y.o.  Room:  -33/033-A  MRN:  [id number]   Acct: [id number]  PCP: Donny Garnica MD    Date of Admission:  5/13/2024  5:36 AM  Date of Service:  5/16/2024    Reason for Consult: Goals of Care, Symptom Management, and Continuity of Care.    History Obtained From:-  Patient, Electronic Medical Record, Patient's primary nurse             Subjective   Chris Perez was seen in their room today for follow up with the palliative care team. There was family present at the bedside. Patient is complaining of pain. Patient denies shortness of breath, nausea, vomiting, constipation, and diarrhea. They have Duloxetine 30 mg daily for scheduled symptom relief. From 8 AM yesterday to 8 AM today, they have used Oxycodone IR 10 mg once for as needed symptom relief.  Their comfort medications are working well to control their symptoms.  They did get a good night sleep last night. The patient is eating or receiving tube feeds. They have had a bowel movement in the last 24 hours.     Objective   Vitals:-    /77   Pulse (!) 104   Temp 97.9 °F (36.6 °C) (Oral)   Resp 19   Ht 1.727 m (5' 8\")   Wt 69.5 kg (153 lb 3.5 oz)   SpO2 100%   BMI 23.30 kg/m²     Physical Exam  Vitals and nursing note reviewed.   Constitutional:       General: He is awake. He is not in acute distress.     Appearance: He is ill-appearing.   HENT:      Head: Normocephalic and atraumatic.      Right Ear: External ear normal.      Left Ear: External ear normal.      Nose: No rhinorrhea.      Mouth/Throat:      Mouth: Mucous membranes are moist.   Eyes:      General:         Right eye: No discharge.         Left eye: No discharge.   Cardiovascular:      Rate and Rhythm: Tachycardia present.   Pulmonary:      Effort: Pulmonary effort is normal. No tachypnea, accessory muscle usage or respiratory distress. 
                             Physician Progress Note        Patient:   Chris Perez  YOB: 1955  Age:  69 y.o.  Room:  -33/033-A  MRN:  [id number]   Acct: [id number]  PCP: Donny Garnica MD    Date of Admission:  5/13/2024  5:36 AM  Date of Service:  5/17/2024    Reason for Consult: Goals of Care and Symptom Management.    History Obtained From:-  Patient, Electronic Medical Record             Subjective   Chris Perez was seen in their room today for follow up with the palliative care team. There was family present at the bedside. Patient is complaining of pain. Patient denies shortness of breath, cough, nausea, and vomiting. They have Duloxetine 30 mg daily for scheduled symptom relief. From 8 AM yesterday to 8 AM today, they have used Oxycodone IR 10 mg twice, Tylenol 650 mg once, and Zofran 4 mg once for as needed symptom relief.  Their comfort medications are working well to control their symptoms.  They did not get a good night sleep last night. The patient is eating or receiving tube feeds. They have had a bowel movement in the last 24 hours.     Objective   Vitals:-    /76   Pulse 96   Temp 97.5 °F (36.4 °C) (Oral)   Resp 16   Ht 1.727 m (5' 8\")   Wt 69.5 kg (153 lb 3.5 oz)   SpO2 95%   BMI 23.30 kg/m²     Physical Exam  Vitals and nursing note reviewed.   Constitutional:       General: He is awake. He is not in acute distress.     Appearance: He is ill-appearing.   HENT:      Head: Normocephalic and atraumatic.      Right Ear: External ear normal.      Left Ear: External ear normal.      Nose: No rhinorrhea.      Mouth/Throat:      Mouth: Mucous membranes are moist.   Eyes:      General:         Right eye: No discharge.         Left eye: No discharge.   Cardiovascular:      Rate and Rhythm: Normal rate.   Pulmonary:      Effort: Pulmonary effort is normal. No tachypnea, accessory muscle usage or respiratory distress.   Musculoskeletal:      Cervical 
              Hospitalist Progress Note    Patient:  Chris Perez      Unit/Bed:8B-33/033-A    YOB: 1955    MRN: [id number]       Acct: [id number]     PCP: Donny Garnica MD    Date of Admission: 5/13/2024    Assessment/Plan:    Acute hypoxic respiratory failure secondary to pneumonia  Required intubation.  Extubated morning of 5/15  Currently weaned to room air   Continue to treat pneumonia  Supratherapeutic INR -improved  5/16 was elevated at 6.  No signs of bleeding.  Vitamin K 2.5 mg once given due to risk factors  Pharmacy to dose   Sepsis secondary to pneumonia-resolved  Required pressor therapy while in ICU  Lactic acidosis resolved  Bacterial pneumonia, present on arrival  Chest x-ray with consolidations right mid and lower lung zones and left lung base  Ceftriaxone and azithromycin (5/13) expanded to Zosyn (5/14 - current)   Continue steroids every 6 hours  Given due to septic shock.  Recommend 5-day course with taper  Respiratory culture-with gram-negative bacilli  Pneumonia panel with H. influenzae, Pseudomonas, strep pneumo, rhino virus  Heart failure with reduced ejection fraction  Echo this admission with LVEF 20 to 25%, severe tricuspid regurgitation, right atrium dilated relation  Previous echo with LVEF of 40%  Consulted cardiology- no further workup as patient does not want lifevest or to pursue cath  No signs of fluid overload-monitor closely  GDMT: Start BB- defer additional to cardio at this time   Strick I and Os, daily weight   Metastatic large cell neuroendocrine carcinoma  nivolumab and ipilimumab every 3 weeks. Patient is on Cymbalta 30 mg for chronic pain and Oxy 10 mg every 4 hours as needed.    oliguria, resolved  Creatinine at baseline  History of hepatitis C  Recent elevated viral load.  LFTs stable  Follow-up outpatient  Permanent A-fib  [id number] 2  Patient on Coumadin-pharmacy to dose  Hypertension  Currently on no antihypertensives 
              Hospitalist Progress Note    Patient:  Chris Perez      Unit/Bed:8B-33/033-A    YOB: 1955    MRN: [id number]       Acct: [id number]     PCP: Donny Garnica MD    Date of Admission: 5/13/2024    Assessment/Plan:    Acute hypoxic respiratory failure secondary to pneumonia  Required intubation.  Extubated morning of 5/15  Currently on 2 L nasal cannula satting 97%.  Continue to wean as tolerated  Continue to treat pneumonia  Supratherapeutic INR  Noted today at 6.  No signs of bleeding.  Vitamin K 2.5 mg once given due to risk factors  INR tomorrow  Sepsis secondary to pneumonia-resolved  Required pressor therapy while in ICU  Lactic acidosis resolved  Bacterial pneumonia, present on arrival  Chest x-ray with consolidations right mid and lower lung zones and left lung base  Ceftriaxone and azithromycin (5/13) expanded to Zosyn (5/14 - current)   Continue steroids every 6 hours  Given due to septic shock.  Recommend 5-day course with taper  Respiratory culture-with gram-negative bacilli  Pneumonia panel with H. influenzae, Pseudomonas, strep pneumo, rhino virus  Heart failure with reduced ejection fraction  Echo this admission with LVEF 20 to 25%, severe tricuspid regurgitation, right atrium dilated relation  Previous echo with LVEF of 40%  Consulted cardiology  No signs of fluid overload-monitor closely  GDMT: Start BB- defer additional to cardio at this time   Strick I and Os, daily weight   Metastatic large cell neuroendocrine carcinoma  nivolumab and ipilimumab every 3 weeks. Patient is on Cymbalta 30 mg for chronic pain and Oxy 10 mg every 4 hours as needed.    oliguria, improved  1000 cc output yesterday  Creatinine at baseline  History of hepatitis C  Recent elevated viral load.  LFTs stable  Follow-up outpatient  Permanent A-fib  [id number] 2  Patient on Coumadin-pharmacy to dose  Hypertension  Currently on no antihypertensives outpatient  Gout:  Continue 
  CRITICAL CARE PROGRESS NOTE      Patient:  Chris Perez    Unit/Bed:4D-09/009-A  YOB: 1955  MRN: [id number]   PCP: Donny Garnica MD  Date of Admission: 5/13/2024  Chief Complaint:-Shortness of breath.    Assessment and Plan:    Acute hypoxic respiratory failure.  Morning of 5/14, rapid response called secondary to patient's increased work of breathing, increased supplemental oxygen needs.  Transferred to the ICU and placed on ventilator with failure to oxygenate and ventilate.  Failure secondary to #2 below.   Able to be extubated morning of 5/15 to NC.   Sepsis secondary to pneumonia:  CXR with pneumonia.  Patient on day PD-1, anti-CTL A4 medications every 3 weeks.  Not neutropenic.    Vancomycin (5/13) discontinue as MRSA negative.  Ceftriaxone and azithromycin (5/13) expanded to Zosyn (5/14 - current)  Steroid - solu-medrol 50mg Q6H  Pressor support with levophed.  Respiratory culture   Moderate segmented neutrophils.   Pneumonia panel:  Haemophilus influenza  Pseudomonas aeruginosa  Strep pneumonia  Rhinovirus enterovirus  Lactic acidosis: Trending towards clearance.  Heart failure with reduced ejection fraction,   Per TTE report: Severely reduced left ventricular systolic function with EF of 20 to 25%, severe tricuspid regurgitation, right atrium dilation. Previously identified reduced ejection fraction to 40%.   Metastatic large cell neuroendocrine carcinoma: Follows with hematology oncology as an outpatient.  He undergoes treatment with nivolumab and ipilimumab every 3 weeks.  Patient is on Cymbalta 30 mg for chronic pain and Oxy 10 mg every 4 hours as needed.  Oliguria.  Patient noted to have < 500 mL total urine output documented despite 7L IV Input.    Creatinine is 0.7 which is baseline for patient.  Closely monitor kidney function.  History of hepatitis C:  Recent elevated viral load. LFTs WNL sent for alk phos of 162. Noted.   Permanent A-fib:  CHADS2-VASc 2.  
 [facility]  INPATIENT PHYSICAL THERAPY  DAILY NOTE  [facility] MED SURG 8AB - 8B-33/033-A    Time In: 08  Time Out: 09  Timed Code Treatment Minutes: 28 Minutes  Minutes: 28          Date: 2024  Patient Name: Chris Perez,  Gender:  male        MRN: [id number]  : 1955  (69 y.o.)     Referring Practitioner: Blue Wiggins DO  Diagnosis: Community acquired bacterial pneumonia  Additional Pertinent Hx: This is a 61-year-old male with PMH of permanent A-fib, neuroendocrine cancer with metastasis to bone and liver, HLD, HTN, HCV 2/2 Hx of IV drug use, tobacco and alcohol abuse history.  He presents to [facility] with CC of SOB.     Patient notes that he woke around 5 AM this morning, use the restroom and began to have a episode of coughing.  He notes that during this episode he was very short of breath and was unable to catch his breath.  At the time of my evaluation he denies current shortness of breath.  He denies having a cough or any shortness of breath prior to this episode.  He denies any aspiration events or choking. he denies any fevers or chills.  Denies any sputum production.  He denies headache, vision changes, abdominal pain, nausea/vomiting,/diarrhea.  Denies lower extremity edema or orthopnea.  Notes he does have chronic pain for which he follows with palliative care.     Prior Level of Function:  Lives With: Significant other  Type of Home: House  Home Layout: One level  Home Access: Level entry  Entrance Stairs - Number of Steps: 3-4 steps with 1 rail  Home Equipment: Walker - Rolling   Bathroom Shower/Tub: Tub/Shower unit  Bathroom Toilet: Handicap height  Bathroom Equipment: Shower chair    ADL Assistance: Independent  Homemaking Assistance: Needs assistance (some cleaning, folding laundry otherwise SO & other family completes)  Ambulation Assistance: Independent  Transfer Assistance: Independent  Active : Yes  Additional Comments: Pt reports using cane all the time, 
1158 patient extubated per order and placed on nasal cannula 4 liters , patient has strong cough, and able to talk  
5ml heparin instilled in right sided mediport, and port de-accessed at this time.   
At 6:30 am the patient start being tachycardic at 130-135, on Afib , /90, Sat 98 on O2 2L NC,   Called resource nurse , and message the hospitalist , an ECG stat was ordered , came back as Afib w/RVR,   The patient was breathing hard , Tachypnea 24/min , we put the O2 at 4L, Sat at 96,  Resource nurse at the bedside , the provider at the bedside , at 7:00 order Ativan 0.5mg , and Diltiazem ,  The patient breathing faster, starting BiPAP, ABG ordered came back with Acidosis Ph 7.198 order to transfer the patient to ICU.  /85 , Sat 100 on BiPAP,  Afib   
[facility]   Pharmacy Pharmacokinetic Monitoring Service - Vancomycin     Chris Perez is a 69 y.o. male starting on vancomycin therapy for CAP. Pharmacy consulted by Dr. Piña for monitoring and adjustment.    Target Concentration: Goal AUC/LORAINE 400-600 mg*hr/L    Additional Antimicrobials: azithromycin, zosyn    Pertinent Laboratory Values:   Wt Readings from Last 1 Encounters:   05/13/24 65.8 kg (145 lb)     Temp Readings from Last 1 Encounters:   05/14/24 97.8 °F (36.6 °C) (Oral)     Estimated Creatinine Clearance: 110 mL/min (based on SCr of 0.6 mg/dL).  Recent Labs     05/13/24  0549 05/13/24  1909 05/14/24  0427   CREATININE 0.9 0.8 0.6   BUN 12 12 15   WBC 15.2*  --  12.3*     Pertinent Cultures:  Date Source Results   5/14 BCx2 sent   5/14 SC sent   MRSA Nasal Swab: was ordered by provider, awaiting results.    Plan:  Dosing recommendations based on Bayesian software  Start vancomycin 1500mg q18h  Anticipated AUC of 513 and trough concentration of 11.1 at steady state  Renal labs as indicated   Vancomycin concentration ordered for 5/15 @ 1200   Pharmacy will continue to monitor patient and adjust therapy as indicated    Thank you for the consult,  Hannah Whittaker, Pharm.D., BCPS, [facility] 5/14/2024 8:19 AM        
Comprehensive Nutrition Assessment    Type and Reason for Visit: Reassess    Nutrition Recommendations/Plan:   Continue diet as ordered.   Recommend MVI and continue at discharge.      Malnutrition Assessment:  Malnutrition Status: At risk for malnutrition (Comment) (metastatic neuroendocrine carcinoma receiving treatment)  Context: Acute Illness       Findings of the 6 clinical characteristics of malnutrition:  Energy Intake:  No significant decrease in energy intake  Weight Loss:  No significant weight loss     Body Fat Loss:  Unable to assess     Muscle Mass Loss:  Unable to assess    Fluid Accumulation:  No significant fluid accumulation     Strength:  Not Performed    Nutrition Assessment:    Pt improving from a nutritional standpoint AEB tolerating an oral diet with 100% PO intakes at lunch today. At risk for further nutrition compromise r/t septic shock, pneumonia. Patient was intubated on 5/14 and extubated on 5/15 and transferred out of ICU on 5/16. Palliative following for GOC, plan to continue cancer treatments. Noted underlying medical condition (metastatic large cell neuroendocrine carcinoma, s/p hemicolectomy 7/20/23, note patient seen 9/28/23 by outpatient RD to promote wt gain, gout, HTN, HLD, smoker, hepatitis C, hx IV drug abuse).       Nutrition Related Findings:    Patient/ Family Comments: Per patient his appetite is pretty good. He ate 100% of his lunch today. He denies any nausea/ vomiting/ diarrhea/ constipation. He denies wanting any nutrition supplements or snacks at this time.   Edema: none,per flowsheet  GI Function: LBM  5/16  per flowsheet  Wound: None       Labs:   Lab Results   Component Value Date    LABA1C 6.2 07/21/2023    LABA1C 5.6 09/02/2014     Recent Labs     05/15/24  0514 05/16/24  1232 05/17/24  0539    140 137   K 4.7 4.0 4.1    105 102   GLUCOSE 157* 169* 198*   BUN 19 23* 21   CREATININE 0.7 0.8 1.0     Medications: metoprolol, allopurinol, statin, 
[facility]  OCCUPATIONAL THERAPY MISSED TREATMENT NOTE  STRZ ICU 4D  4D-009-A      Date: 2024  Patient Name: Chris Perez        CSN: [id number]   : 1955  (69 y.o.)  Gender: male                REASON FOR MISSED TREATMENT:  Attempt x2, pt sedated and per RN with dec sedation gets agitated so not early mobility candidate at this time. Will check back as able.                
ETT advanced from 20 cm to 23 cm per Dr. Browne verbal order at bedside.  
Echo done bedside tech called Dr. Nevarez with preliminary results.  
[facility]  INPATIENT PHYSICAL THERAPY  EVALUATION  [facility] ICU 4D - 4D-09/009-A    Time In: 1418  Time Out: 1443  Timed Code Treatment Minutes: 15 Minutes  Minutes: 25          Date: 5/15/2024  Patient Name: Chris ePrez,  Gender:  male        MRN: [id number]  : 1955  (69 y.o.)      Referring Practitioner: Blue Wiggins DO  Diagnosis: Community acquired bacterial pneumonia  Additional Pertinent Hx: This is a 61-year-old male with PMH of permanent A-fib, neuroendocrine cancer with metastasis to bone and liver, HLD, HTN, HCV 2/2 Hx of IV drug use, tobacco and alcohol abuse history.  He presents to [facility] with CC of SOB.     Patient notes that he woke around 5 AM this morning, use the restroom and began to have a episode of coughing.  He notes that during this episode he was very short of breath and was unable to catch his breath.  At the time of my evaluation he denies current shortness of breath.  He denies having a cough or any shortness of breath prior to this episode.  He denies any aspiration events or choking. he denies any fevers or chills.  Denies any sputum production.  He denies headache, vision changes, abdominal pain, nausea/vomiting,/diarrhea.  Denies lower extremity edema or orthopnea.  Notes he does have chronic pain for which he follows with palliative care.     Restrictions/Precautions:  Restrictions/Precautions: Fall Risk, General Precautions    Subjective:  Chart Reviewed: Yes  Patient assessed for rehabilitation services?: Yes  Subjective: Pt resting in bed with RN present and approving session with plan to get up to chair.    General:     Vision: Within Functional Limits  Hearing: Within functional limits       Pain: denied      Vitals:  ICU monitoring - stable throughout    Social/Functional History:    Lives With: Spouse  Type of Home: House  Home Layout: One level  Home Access: Stairs to enter with rails  Entrance Stairs - Number of Steps: 3-4 steps with 1 rail  Home 
[facility]   PROGRESS NOTE      Patient: Chris Perez  Room #: 4D-09/009-A            YOB: 1955  Age: 69 y.o.  Gender: male            Admit Date & Time: 5/13/2024  5:36 AM    Assessment:    The patient was asleep at the time of this attempted visit.     Interventions:  The patient was provided silent prayer.    Outcomes:  The patient remains sleeping at the end of the visit.     Plan:  1.Spiritual care will continue to follow the patient according to [facility] spiritual care SOP.       Electronically signed by Chaplain Monica, on 5/15/2024 at 2:33 PM.  Spiritual Care Department  [facility]  [phone]     05/15/24 1431   Encounter Summary   Encounter Overview/Reason Follow-up   Service Provided For Patient   Referral/Consult From [facility]   Support System Significant other;Family members   Last Encounter  05/15/24  (NR)   Complexity of Encounter Low   Begin Time 1335   End Time  1340   Total Time Calculated 5 min   Spiritual/Emotional needs   Type Spiritual Support   Assessment/Intervention/Outcome   Assessment Unable to assess   Intervention Prayer (assurance of)/[place]   Outcome Did not respond       
[facility]  OCCUPATIONAL THERAPY MISSED TREATMENT NOTE  STR[facility] ICU 4D  4D-09/009-A      Date: 5/15/2024  Patient Name: Chris Perez        CSN: [id number]   : 1955  (69 y.o.)  Gender: male                REASON FOR MISSED TREATMENT:  Attempt x 1, Pt still intubated, RN reports hoping to extubate later today. Attempt x 2, Pt extubated though PT just finished with Pt. Will check back as time allows.                 
Patient educated on how to use incentive spirometer. Patient verbalized understanding and demonstrated proper use. Emphasized importance and usage of device, with coughing and deep breathing every 4 hours while awake.        
Patient has been successfully weaned from Mechanical Ventilation.  RSBI before extubation was 38 with EtCO2 of na and SpO2 of 40 on 95% FiO2.  Patient extubated and placed on 4 liters/min via nasal cannula.  Post extubation SpO2 is 97% with HR  108 bpm and RR 23 breaths/min.  Patient had strong cough that was productive of white sputum.  Extubation Well tolerated by patient..  
[facility]   PROGRESS NOTE      Patient: Chris Perez  Room #: 8B-33/033-A            YOB: 1955  Age: 69 y.o.  Gender: male            Admit Date & Time: 5/13/2024  5:36 AM    Assessment:    The patient welcomed prayer after he shared his concerns of having pneumonia and also having a house mate who also has it. The patient and family shared that they lived together with many other disabled people of a similar age to pay bills. The patient's significant other was curious why she had not become sick though sharing a communal room with many other ill individuals.     Interventions:  The patient was provided personalized prayer.    Outcomes:  The patient was thankful for the provided prayer.     Plan:  1.Spiritual care will continue to follow the patient according to [facility] spiritual care SOP.       Electronically signed by Chaplain Monica, on 5/16/2024 at 4:10 PM.  Spiritual Care Department  [facility]  [phone]     05/16/24 1604   Encounter Summary   Encounter Overview/Reason Initial Encounter   Service Provided For Patient;Family;Patient and family together;Significant other   Referral/Consult From [facility]   Support System Significant other;Spouse;Family members;Friends/neighbors   Last Encounter  05/16/24   Complexity of Encounter Moderate   Begin Time 1505   End Time  1515   Total Time Calculated 10 min   Spiritual/Emotional needs   Type Spiritual Support   Assessment/Intervention/Outcome   Assessment Calm;Coping   Intervention Active listening;Discussed illness injury and it’s impact;Explored/Affirmed feelings, thoughts, concerns;Prayer (assurance of)/[place]   Outcome Comfort;Encouraged;Expressed Gratitude       
Pt was unresponsive but was blessed.    05/14/24 1442   Encounter Summary   Encounter Overview/Reason Initial Encounter   Service Provided For Patient   Referral/Consult From [facility]   Last Encounter  05/14/24  (NR)   Complexity of Encounter Low   Begin Time 1030   End Time  1035   Total Time Calculated 5 min   Spiritual/Emotional needs   Type Spiritual Support   Assessment/Intervention/Outcome   Assessment Unable to assess       
[facility]  PHYSICAL THERAPY MISSED TREATMENT NOTE  STRZ ICU 4D    Date: 2024  Patient Name: Chris Perez        MRN: [id number]   : 1955  (69 y.o.)  Gender: male                REASON FOR MISSED TREATMENT:  Missed Treat.      Attempt x2, pt sedated and per RN with dec sedation gets agitated so not early mobility candidate at this time. Will check back as able.    
Updated brother on transfer to ICU and intubation. States understanding. Brother states he will update Sherlyn.  
Warfarin Pharmacy Consult Note    Chris Perez is a 69 y.o. male for whom pharmacy has been asked to manage warfarin therapy.     Consulting Provider: Dr. Wiggins  Warfarin Indication: Atrial fibrillation or Atrial flutter  Target INR range: 2.0-3.0   Warfarin dose prior to admission: 2.5mg MF, 5mg all other days  Outpatient warfarin provider: Dr. Martinez    Recent Labs     05/13/24  0549   INR 2.58*     Recent Labs     05/13/24  0549   HGB 12.2*        Concurrent anticoagulants/antiplatelets: none  Significant warfarin drug-drug interactions: allopurinol, hydrocortisone (HM), azithromycin    Date INR Warfarin Dose   5/13/24 2.58 2.5 mg                                   INR will be monitored routinely until therapeutic INR is achieved.    Pharmacy will continue to follow. Thank you for the consult,   Gena CORRIGAN.Ph., BCPS., 5/13/2024,4:23 PM      
Warfarin Pharmacy Consult Note    Warfarin Indication: Atrial fibrillation or Atrial flutter  Target INR: 2.0-3.0  Dose prior to admission: 2.5mg MF, 5mg TWThSS    Recent Labs     05/13/24  0549 05/14/24  0427 05/15/24  0514   INR 2.58* 2.54* 3.63*     Recent Labs     05/14/24  0427 05/14/24  0815 05/15/24  0514   HGB 11.4* 11.4* 10.9*    181 153     Concurrent anticoagulants/antiplatelets: none  Significant warfarin drug-drug interactions: allopurinol, hydrocortisone (HM), azithromycin     Date INR Warfarin Dose   5/13/24 2.58 2.5 mg   5/14/24  2.54  5 mg     5/15/24  3.63 No warfarin              Marked increase in INR most likely due to DI with azithromycin +/- hydrocortisone. Last dose of azith today.    Monitoring:                   INR will be monitored daily until therapeutic INR is achieved.    **Please contact pharmacy for discharge instructions when indicated**    Hannah Whittaker, Pharm.D., BCPS, BCCCP 5/15/2024 8:48 AM        
Warfarin Pharmacy Consult Note    Warfarin Indication: Atrial fibrillation or Atrial flutter  Target INR: 2.0-3.0  Dose prior to admission: 2.5mg MF, 5mg all other days     Recent Labs     05/16/24  0530 05/17/24  0539 05/18/24  0616   INR 6.22* 3.48* 2.56*     Recent Labs     05/16/24  1232 05/17/24  0539 05/18/24  0616   HGB 11.5* 12.4* 12.0*    164 175     Concurrent anticoagulants/antiplatelets: none  Significant warfarin drug-drug interactions: allopurinol, hydrocortisone (HM)     Date INR Warfarin Dose   5/13/24 2.58 2.5 mg   5/14/24  2.54  5 mg     5/15/24  3.63 No warfarin     5/16/24 6.22  No warfarin, vit K 2.5mg PO   5/17/24 3.48 No warfarin    5/18/24  2.56 2.5 mg               Monitoring:                   INR will be monitored daily until therapeutic INR is achieved.    **Please contact pharmacy for discharge instructions when indicated**    Ernestina Desir, PharmD, BCPS 5/18/2024 1:40 PM     
Warfarin Pharmacy Consult Note    Warfarin Indication: Atrial fibrillation or Atrial flutter  Target INR: 2.0-3.0  Dose prior to admission: 2.5mg MF, 5mg all other days    Recent Labs     05/13/24  0549 05/14/24  0427   INR 2.58* 2.54*     Recent Labs     05/13/24  0549 05/14/24 0427   HGB 12.2* 11.4*    154     Concurrent anticoagulants/antiplatelets: none  Significant warfarin drug-drug interactions: allopurinol, hydrocortisone (HM), azithromycin     Date INR Warfarin Dose   5/13/24 2.58 2.5 mg   5/14/24  2.54  5 mg                                                Monitoring:                   INR will be monitored daily until therapeutic INR is achieved.    **Please contact pharmacy for discharge instructions when indicated**    Gena BROWNLEEPh., BCPS., 5/14/2024,7:20 AM      
Warfarin Pharmacy Consult Note    Warfarin Indication: Atrial fibrillation or Atrial flutter  Target INR: 2.0-3.0  Dose prior to admission: 2.5mg MF, 5mg all other days    Recent Labs     05/15/24  0514 05/16/24  0530 05/17/24  0539   INR 3.63* 6.22* 3.48*     Recent Labs     05/15/24  0514 05/16/24  1232 05/17/24  0539   HGB 10.9* 11.5* 12.4*    147 164     Concurrent anticoagulants/antiplatelets: none  Significant warfarin drug-drug interactions: allopurinol, hydrocortisone (HM), azithromycin     Date INR Warfarin Dose   5/13/24 2.58 2.5 mg   5/14/24  2.54  5 mg     5/15/24  3.63 No warfarin     5/16/24 6.22  No warfarin, vit K 2.5mg PO   5/17/24 3.48 No warfarin                 Monitoring:                   INR will be monitored daily until therapeutic INR is achieved.    **Please contact pharmacy for discharge instructions when indicated**    Gena CORRIGAN.Ph., BCPS., 5/17/2024,10:44 AM      
Warfarin Pharmacy Consult Note    Warfarin Indication: Atrial fibrillation or Atrial flutter  Target INR: 2.0-3.0  Dose prior to admission: Dr. Martinez    Recent Labs     05/14/24  0427 05/15/24  0514 05/16/24  0530   INR 2.54* 3.63* 6.22*     Recent Labs     05/14/24  0427 05/14/24  0815 05/15/24  0514   HGB 11.4* 11.4* 10.9*    181 153     Concurrent anticoagulants/antiplatelets: none  Significant warfarin drug-drug interactions: allopurinol, hydrocortisone (HM), azithromycin     Date INR Warfarin Dose   5/13/24 2.58 2.5 mg   5/14/24  2.54  5 mg     5/15/24  3.63 No warfarin     5/16/24 6.22  No warfarin        Monitoring:                   INR will be monitored daily until therapeutic INR is achieved.    **Please contact pharmacy for discharge instructions when indicated**    Gena CORRIGAN.Kenisha., BCPS., 5/16/2024,9:37 AM      
Physician  Goal TF & Flush Orders Provides: 1584 kcals, 99 grams protein, 146 grams CHO, 7 grams fiber, 1071ml free water in 1320ml total volume/ 24 hours    Anthropometric Measures:  Height: 172.7 cm (5' 8\")  Ideal Body Weight (IBW): 154 lbs (70 kg)    Admission Body Weight: 66.4 kg (146 lb 6.2 oz) (5/14; no edema)  Current Body Weight: 66.4 kg (146 lb 6.2 oz),   IBW.    Current BMI (kg/m2): 22.3  Usual Body Weight:  (per EMR: 8/8/23 143# 13oz, 11/20/23 142# 6oz, 4/30/24 137# 13oz)                       BMI Categories: Normal Weight (BMI 22.0 to 24.9) age over 65    Estimated Daily Nutrient Needs:  Energy Requirements Based On: Kcal/kg  Weight Used for Energy Requirements:  (66kgm on 5/14)  Energy (kcal/day): 6298-8338 kcals (20-25)  Weight Used for Protein Requirements:  (66kgm on 5/14)  Protein (g/day):  grams (1.5-2)  Method Used for Fluid Requirements: Other (Comment)  Fluid (ml/day): as per Physician    Nutrition Diagnosis:   Inadequate oral intake related to impaired respiratory function as evidenced by NPO or clear liquid status due to medical condition, intubation, nutrition support - enteral nutrition    Nutrition Interventions:   Food and/or Nutrient Delivery: Continue NPO, Start Tube Feeding  Nutrition Education/Counseling: No recommendation at this time  Coordination of Nutrition Care: Continue to monitor while inpatient, Interdisciplinary Rounds       Goals:     Goals: Initiate nutrition support, by next RD assessment       Nutrition Monitoring and Evaluation:      Food/Nutrient Intake Outcomes: Enteral Nutrition Intake/Tolerance  Physical Signs/Symptoms Outcomes: Biochemical Data, Nutrition Focused Physical Findings, Skin, Weight, GI Status, Fluid Status or Edema, Hemodynamic Status    Discharge Planning:    Too soon to determine     Temi Rodarte, RD, LD  Contact: [phone]     
then SO reports he does not use in the house. Pt later reports he lives with his brother. Inconsistencies in reports noted throughout session.    Restrictions/Precautions:  Restrictions/Precautions: Fall Risk, General Precautions  Position Activity Restriction  Other position/activity restrictions: [place]     SUBJECTIVE: Pt in bed upon arrival, and agrees to therapy, RN approved session, Pt A&O X 4/4, Pt pleasant and cooperative    PAIN: 8/10: stomach. RN gave pain meds prior to session    Vitals: Oxygen: pt on room air, SPO2 92%  Heart Rate: 105    OBJECTIVE:  Bed Mobility:  Supine to Sit: Stand By Assistance, with head of bed flat, with verbal cues , with increased time for completion  Sit to Supine: Stand By Assistance, with head of bed flat, with verbal cues , with increased time for completion   Scooting: Stand By Assistance, with verbal cues , with increased time for completion    Transfers:  Sit to Stand: Contact Guard Assistance, with increased time for completion, cues for hand placement, with verbal cues  Stand to Sit:Contact Guard Assistance, cues for hand placement, with verbal cues Provided cues for safe alignment to surface and safe use of AD    Ambulation:  Contact Guard Assistance, with verbal cues , with increased time for completion  Distance: 55ft  Surface: Level Tile  Device:Rolling Walker  Gait Deviations:  Forward Flexed Posture, Slow Aurelia, Decreased Step Length Bilaterally, Decreased Gait Speed, Decreased Heel Strike Bilaterally, and Decreased Foot Clearance    Exercise:  Patient was guided in 1 set(s) 10 reps of exercise to both lower extremities.  Ankle pumps, Glut sets, Quad sets, Heelslides, Hip abduction/adduction, and Straight leg raises.  Exercises were completed for increased independence with functional mobility.    Functional Outcome Measures: Completed  AM-PAC Inpatient Mobility Raw Score : 18  AM-PAC Inpatient T-Scale Score : 43.63  Modified [place] Scale:  Not 
AM    AST 27 05/13/2024 05:49 AM    BILITOT 0.6 05/13/2024 05:49 AM    BILIDIR <0.2 05/03/2024 09:39 AM       Magnesium:    Lab Results   Component Value Date/Time    MG 2.3 05/13/2024 05:49 AM       PT/INR:    Lab Results   Component Value Date/Time    INR 3.48 05/17/2024 05:39 AM       HgBA1c:    Lab Results   Component Value Date/Time    LABA1C 6.2 07/21/2023 12:21 PM       FLP:    Lab Results   Component Value Date/Time    TRIG 106 04/27/2024 09:56 PM    HDL 16 04/27/2024 09:56 PM       TSH:    Lab Results   Component Value Date/Time    TSH 1.360 05/13/2024 05:49 AM         Assessment:    Acute on chronic resp failure    - intubated and extubated     CAP    Chronic AFB CVR    - coumadin     Marginally low BP     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 New CDMP EF 20-25% / chronic chf     Mild - moderate AR   Mild MR  Severe TR        Hx   Tobacco abuse - current smoker   Adrenal insufficiency   Hx IV drug abuse   Hx ETOH abuse       DNR CCA - yes to intubation but no to defib , CPR and medications     Plan:  Hypotension hindering GDMT  No to LV as pt doesn't want defibrillated   Pt refused cath   Follow with primary cardiologist at [facility]   Cards will sign off - call for questions or concerns          Electronically signed by RICHELLE Vaughan CNP on 5/17/2024 at 2:46 PM            
complete mobility to/from bathroom with RW, S, & 0-2 vcs for safety  Short Term Goal 4: Pt will complete BADL routine with min A & good awareness of energy conservation strategies prn  Long Term Goals  Time Frame for Long Term Goals : No LTG set d/t short ELOS    AM-PAC Inpatient Daily Activity Raw Score: 16  AM-PAC Inpatient ADL T-Scale Score : 35.96    Following session, patient left in safe position with all fall risk precautions in place.                
contrast but appears larger measuring up to 4 cm (previously measured 2.9 cm). Musculoskeletal:  Multilevel degenerative disc disease is present. The visualized skeletal structures appear intact.     1. No filling defects are noted within the pulmonary arterial vasculature to suggest the presence of pulmonary embolus. Multifocal airspace opacities most pronounced in the right lung suggests pneumonia in the appropriate clinical setting. Small/trace bilateral pleural effusions are observed. 2. The known liver lesion is difficult to visualize/characterized without IV contrast there may be larger. Chronic findings are discussed. **This report has been created using voice recognition software.  It may contain minor errors which are inherent in voice recognition technology.** Final report electronically signed by Dr Temi Perez on 5/13/2024 8:26 AM    XR CHEST PORTABLE    Result Date: 5/13/2024  PROCEDURE: XR CHEST PORTABLE CLINICAL INFORMATION: shortness of breath. COMPARISON: Chest exam 4/27/2024. TECHNIQUE: AP upright view of the chest. FINDINGS: The heart size is normal. There is a Port-A-Cath entering from the right. The tip is in the SVC. Medial stomach contours are normal. There are new infiltrates in the right middle lobe suggesting pneumonia. There are mild infiltrates in the left lung base. There are no pleural effusions. The pulmonary vascularity is normal.     New right middle lobe pneumonia. Early infiltrates in the left lung base. **This report has been created using voice recognition software. It may contain minor errors which are inherent in voice recognition technology.** Final report electronically signed by Dr. Supriya Hauser on 5/13/2024 7:08 AM           Code Status: Limited        Active Hospital Problems    Diagnosis Date Noted    Acute hypoxemic respiratory failure (HCC) [J96.01] 05/14/2024    Community acquired bacterial pneumonia [J15.9] 05/13/2024    Pneumonia of both lower lobes due to

## 2024-05-19 NOTE — DISCHARGE INSTRUCTIONS
Follow-up with your primary cardiologist at [facility].  Follow-up with PCP this week.  Continue course of Levaquin for total of 7 days of antibiotics.  Your hydrocortisone steroid is being tapered down over the next 3 days.  On Wednesday the 22nd, you can you resume your usual scheduled doses of hydrocortisone.

## 2024-05-19 NOTE — DISCHARGE SUMMARY
Hospitalist Discharge Summary    Patient: Chris Perez  YOB: 1955  MRN: [id number]   Acct: [id number]    Primary Care Physician: Donny Garnica MD    Admit date  5/13/2024    Discharge date: 5/19/2024     Chief Complaint on presentation: SOB    Discharge Assessment and Plan:  Acute hypoxic respiratory failure secondary to pneumonia  Required intubation.  Extubated morning of 5/15  Currently weaned to room air   Continue to treat pneumonia.    Supratherapeutic INR -improved  5/16 was elevated at 6.  No signs of bleeding.  Vitamin K 2.5 mg once given due to risk factors  Pharmacy to dose   5/18: INR 2.56.  Continue Coumadin on discharge.  Sepsis secondary to pneumonia-resolved  Required pressor therapy while in ICU  Lactic acidosis resolved  Bacterial pneumonia, present on arrival  Chest x-ray with consolidations right mid and lower lung zones and left lung base  Ceftriaxone and azithromycin (5/13) expanded to Zosyn (5/14 - current)   Continue steroids every 6 hours  Given due to septic shock.  Recommend 5-day course with taper.  At discharge, will taper down over the next 3 days.  Patient may then resume his home hydrocortisone dose on 5/22.  Respiratory culture showing Pseudomonas, sensitive to Zosyn and Levaquin.  Completed almost 5 days of Zosyn.  Will order Levaquin for an additional 3 doses, to complete total 7-day course of antibiotics.  Pneumonia panel with H. influenzae, Pseudomonas, strep pneumo, rhino virus.  Follow-up with PCP.  Heart failure with reduced ejection fraction  Echo this admission with LVEF 20 to 25%, severe tricuspid regurgitation, right atrium dilated relation  Previous echo with LVEF of 40%  Consulted cardiology- no further workup as patient does not want lifevest or to pursue cath.  No signs of fluid overload-monitor closely  GDMT: Start BB  Continue beta-blocker at discharge.  Patient refused LifeVest or cardiac cath.  Follow-up with primary

## 2024-05-19 NOTE — PLAN OF CARE
Problem: Discharge Planning  Goal: Discharge to home or other facility with appropriate resources  5/16/2024 1122 by Luz Elena Freedman, RN  Outcome: Progressing  5/15/2024 2309 by Pinky Aguero, RN  Outcome: Progressing     Problem: Safety - Adult  Goal: Free from fall injury  5/16/2024 1122 by Luz Elena Freedman, RN  Outcome: Progressing  5/15/2024 2309 by Pinky Aguero, RN  Outcome: Progressing     Problem: Pain  Goal: Verbalizes/displays adequate comfort level or baseline comfort level  5/16/2024 1122 by Luz Elena Freedman, RN  Outcome: Progressing  5/15/2024 2309 by Pinky Aguero, RN  Outcome: Progressing     
  Problem: Discharge Planning  Goal: Discharge to home or other facility with appropriate resources  Outcome: Adequate for Discharge     Problem: Safety - Adult  Goal: Free from fall injury  Outcome: Adequate for Discharge     Problem: Pain  Goal: Verbalizes/displays adequate comfort level or baseline comfort level  Outcome: Adequate for Discharge     Problem: Respiratory - Adult  Goal: Achieves optimal ventilation and oxygenation  Outcome: Adequate for Discharge     Problem: Cardiovascular - Adult  Goal: Maintains optimal cardiac output and hemodynamic stability  Outcome: Adequate for Discharge  Goal: Absence of cardiac dysrhythmias or at baseline  Outcome: Adequate for Discharge     Problem: Gastrointestinal - Adult  Goal: Minimal or absence of nausea and vomiting  Outcome: Adequate for Discharge  Goal: Maintains or returns to baseline bowel function  Outcome: Adequate for Discharge     Problem: Genitourinary - Adult  Goal: Absence of urinary retention  Outcome: Adequate for Discharge     Problem: Metabolic/Fluid and Electrolytes - Adult  Goal: Electrolytes maintained within normal limits  Outcome: Adequate for Discharge  Goal: Hemodynamic stability and optimal renal function maintained  Outcome: Adequate for Discharge     Problem: Skin/Tissue Integrity - Adult  Goal: Skin integrity remains intact  Outcome: Adequate for Discharge     Problem: Hematologic - Adult  Goal: Maintains hematologic stability  Outcome: Adequate for Discharge     Problem: Musculoskeletal - Adult  Goal: Return mobility to safest level of function  Outcome: Adequate for Discharge     Problem: Nutrition Deficit:  Goal: Optimize nutritional status  Outcome: Adequate for Discharge     Problem: Skin/Tissue Integrity  Goal: Absence of new skin breakdown  Description: 1.  Monitor for areas of redness and/or skin breakdown  2.  Assess vascular access sites hourly  3.  Every 4-6 hours minimum:  Change oxygen saturation probe site  4.  Every 4-6 
  Problem: Discharge Planning  Goal: Discharge to home or other facility with appropriate resources  Outcome: Progressing  Flowsheets (Taken 5/14/2024 1950)  Discharge to home or other facility with appropriate resources:   Identify barriers to discharge with patient and caregiver   Arrange for needed discharge resources and transportation as appropriate   Identify discharge learning needs (meds, wound care, etc)     Problem: Safety - Adult  Goal: Free from fall injury  Outcome: Progressing  Flowsheets (Taken 5/14/2024 1958)  Free From Fall Injury:   Instruct family/caregiver on patient safety   Based on caregiver fall risk screen, instruct family/caregiver to ask for assistance with transferring infant if caregiver noted to have fall risk factors     Problem: Pain  Goal: Verbalizes/displays adequate comfort level or baseline comfort level  Outcome: Progressing  Flowsheets (Taken 5/14/2024 2001)  Verbalizes/displays adequate comfort level or baseline comfort level:   Encourage patient to monitor pain and request assistance   Administer analgesics based on type and severity of pain and evaluate response     Problem: Respiratory - Adult  Goal: Achieves optimal ventilation and oxygenation  5/14/2024 2001 by Therese Sharma RN  Outcome: Progressing  Flowsheets  Taken 5/14/2024 1950 by Therese Sharma, RN  Achieves optimal ventilation and oxygenation:   Assess for changes in respiratory status   Assess for changes in mentation and behavior   Position to facilitate oxygenation and minimize respiratory effort   Oxygen supplementation based on oxygen saturation or arterial blood gases    Problem: Cardiovascular - Adult  Goal: Maintains optimal cardiac output and hemodynamic stability  Outcome: Progressing  Flowsheets (Taken 5/14/2024 1950)  Maintains optimal cardiac output and hemodynamic stability:   Monitor blood pressure and heart rate   Monitor urine output and notify Licensed Independent Practitioner for values 
  Problem: Respiratory - Adult  Goal: Achieves optimal ventilation and oxygenation  5/15/2024 0543 by Gt Steele RCP  Outcome: Progressing                                                  Patient Weaning Progress    The patient's vent settings was able to be weaned this shift.      Ventilator settings that were weaned              [x] Mode   [x] Pressure support weaned   [] Fio2 weaned   [] Peep weaned      Spontaneous weaning trial  was attempted.     The patient was able to tolerate SBT.   RSBI  was 25 with SpO2 of 96 on 30% FiO2.  Spontanteous VT was 507 and RR 13 breaths/min.  The patient is currently on SBT    Evac tube was  hooked up with continuous low suction(20-30mmHg)      Cuff was not  deflated to determine cuff leak. A leak  was not detected.     Unable to get agreement for goals because no family is present and patient cannot respond.     
  Problem: Respiratory - Adult  Goal: Achieves optimal ventilation and oxygenation  5/15/2024 1609 by Farhana Borja RCP  Outcome: Progressing                                                Patient Weaning Progress    The patient's vent settings was able to be weaned this shift. Patient tolerated well and was extubated.      Ventilator settings that were weaned              [x] Mode   [] Pressure support weaned   [] Fio2 weaned   [] Peep weaned      Spontaneous weaning trial  was attempted.       Reason that defined ventilator parameters for SBT was not met              [] Patient condition requires increased ventilator settings  [] Requires increased sedation   [] Settings not within weaning range   [] SAT not completed   [] Physician orders    The patient was able to tolerate SBT.   Unable to get agreement for goals because no family is present and patient cannot respond.     
  Problem: Respiratory - Adult  Goal: Achieves optimal ventilation and oxygenation  Outcome: Progressing                                               Patient Weaning Progress    The patient's vent settings was able to be weaned this shift.      Ventilator settings that were weaned              [] Mode   [x] Pressure support weaned   [x] Fio2 weaned   [] Peep weaned      Spontaneous weaning trial  was not attempted.     due to defined parameters for SBT (spontaneous breathing trial) not being met.     *Results of SBT documented under SBTOUTCOME note.    Reason that defined ventilator parameters for SBT was not met              [] Patient condition requires increased ventilator settings  [] Requires increased sedation   [x] Settings not within weaning range   [] SAT not completed   [] Physician orders    Evac tube was  hooked up with continuous low suction(20-30mmHg)      Unable to get agreement for goals because no family is present and patient cannot respond.     
  Problem: Respiratory - Adult  Goal: Achieves optimal ventilation and oxygenation  Outcome: Progressing  Flowsheets (Taken 5/17/2024 1007)  Achieves optimal ventilation and oxygenation:   Respiratory therapy support as indicated   Assess and instruct to report shortness of breath or any respiratory difficulty   Assess for changes in respiratory status     
  Problem: Safety - Medical Restraint  Goal: Remains free of injury from restraints (Restraint for Interference with Medical Device)  Description: INTERVENTIONS:  1. Determine that other, less restrictive measures have been tried or would not be effective before applying the restraint  2. Evaluate the patient's condition at the time of restraint application  3. Inform patient/family regarding the reason for restraint  4. Q2H: Monitor safety, psychosocial status, comfort, nutrition and hydration  5/15/2024 1232 by Lonnie Del Rio, RN  Outcome: Completed  Note: Patient extubated and no longer needs restraints  5/15/2024 0943 by Lonnie Del Rio, RN  Outcome: Progressing  Flowsheets (Taken 5/15/2024 0010 by Therese Sharma, RN)  Remains free of injury from restraints (restraint for interference with medical device):   Determine that other, less restrictive measures have been tried or would not be effective before applying the restraint   Evaluate the patient's condition at the time of restraint application   Inform patient/family regarding the reason for restraint   Every 2 hours: Monitor safety, psychosocial status, comfort, nutrition and hydration     
Patient to be transferred out of the ICU to hospitalist service.   Extubated earlier today - currently satting 99% on 2L .  Pressors were discontinued around 1300- current BP is 96/66  Precedex stopped around 1300 as well.     New reduction in EF to 20-25% with severe tricuspid regurgitation and right atrium dilation.  Cardiology consult placed          
diet recommendations to maintain good blood pressure.   Goal: Absence of cardiac dysrhythmias or at baseline  Outcome: Progressing  Flowsheets (Taken 5/18/2024 0102)  Absence of cardiac dysrhythmias or at baseline:   Monitor cardiac rate and rhythm   Administer antiarrhythmia medication and electrolyte replacement as ordered   Assess for signs of decreased cardiac output  Note: Encourage medication compliance to maintain good blood pressure.  Encourage good diet recommendations to maintain good blood pressure.      Problem: Gastrointestinal - Adult  Goal: Minimal or absence of nausea and vomiting  Outcome: Progressing  Flowsheets (Taken 5/18/2024 0102)  Minimal or absence of nausea and vomiting:   Administer IV fluids as ordered to ensure adequate hydration   Administer ordered antiemetic medications as needed   Advance diet as tolerated, if ordered   Nutrition consult to assist patient with adequate nutrition and appropriate food choices   Provide nonpharmacologic comfort measures as appropriate  Goal: Maintains or returns to baseline bowel function  Outcome: Progressing  Flowsheets (Taken 5/18/2024 0102)  Maintains or returns to baseline bowel function:   Assess bowel function   Administer IV fluids as ordered to ensure adequate hydration   Encourage mobilization and activity   Encourage oral fluids to ensure adequate hydration   Administer ordered medications as needed   Nutrition consult to assist patient with appropriate food choices     Problem: Genitourinary - Adult  Goal: Absence of urinary retention  Outcome: Progressing  Flowsheets (Taken 5/18/2024 0102)  Absence of urinary retention:   Assess patient’s ability to void and empty bladder   Discuss catheterization for long term situations as appropriate   Monitor intake/output and perform bladder scan as needed     Problem: Metabolic/Fluid and Electrolytes - Adult  Goal: Electrolytes maintained within normal limits  Outcome: Progressing  Flowsheets (Taken 
RN  Outcome: Progressing  Flowsheets (Taken 5/14/2024 1950 by Therese Sharma RN)  Maintains hematologic stability:   Assess for signs and symptoms of bleeding or hemorrhage   Monitor labs for bleeding or clotting disorders     Problem: Musculoskeletal - Adult  Goal: Return mobility to safest level of function  5/15/2024 2309 by Pinky Aguero RN  Outcome: Progressing  5/15/2024 0943 by Lonnie Del Rio RN  Outcome: Progressing  Flowsheets (Taken 5/14/2024 1950 by Therese Sharma RN)  Return Mobility to Safest Level of Function:   Assess patient stability and activity tolerance for standing, transferring and ambulating with or without assistive devices   Assist with transfers and ambulation using safe patient handling equipment as needed   Ensure adequate protection for wounds/incisions during mobilization     Problem: Nutrition Deficit:  Goal: Optimize nutritional status  5/15/2024 2309 by Pinky Aguero RN  Outcome: Progressing  5/15/2024 0943 by Lonnie Del Rio RN  Outcome: Progressing  Flowsheets (Taken 5/14/2024 1411 by Temi Rodarte, RD, LD)  Nutrient intake appropriate for improving, restoring, or maintaining nutritional needs: Assess nutritional status and recommend course of action     Problem: Skin/Tissue Integrity  Goal: Absence of new skin breakdown  Description: 1.  Monitor for areas of redness and/or skin breakdown  2.  Assess vascular access sites hourly  3.  Every 4-6 hours minimum:  Change oxygen saturation probe site  4.  Every 4-6 hours:  If on nasal continuous positive airway pressure, respiratory therapy assess nares and determine need for appliance change or resting period.  5/15/2024 2309 by Pinky Aguero RN  Outcome: Progressing  5/15/2024 0943 by Lonnie Del Rio RN  Outcome: Progressing  Note: Turn and reposition every 2 hours and low air loss mattress      Problem: ABCDS Injury Assessment  Goal: Absence of physical injury  5/15/2024 2309 by Pinky Aguero RN  Outcome: 
RL Saleh)  Hemodynamic stability and optimal renal function maintained:   Monitor labs and assess for signs and symptoms of volume excess or deficit   Monitor intake, output and patient weight   Monitor urine specific gravity, serum osmolarity and serum sodium as indicated or ordered  5/14/2024 2001 by Therese Sharma RN  Outcome: Progressing  Flowsheets (Taken 5/14/2024 1950)  Hemodynamic stability and optimal renal function maintained:   Monitor labs and assess for signs and symptoms of volume excess or deficit   Monitor intake, output and patient weight   Monitor urine specific gravity, serum osmolarity and serum sodium as indicated or ordered     Problem: Skin/Tissue Integrity - Adult  Goal: Skin integrity remains intact  5/15/2024 0943 by Lonnie Del Rio RN  Outcome: Progressing  Flowsheets (Taken 5/14/2024 1950 by Therese Sharma RN)  Skin Integrity Remains Intact:   Monitor for areas of redness and/or skin breakdown   Assess vascular access sites hourly  5/14/2024 2001 by Therese Sharma RN  Outcome: Progressing  Flowsheets (Taken 5/14/2024 1950)  Skin Integrity Remains Intact:   Monitor for areas of redness and/or skin breakdown   Assess vascular access sites hourly     Problem: Hematologic - Adult  Goal: Maintains hematologic stability  5/15/2024 0943 by Lonnie Del Rio RN  Outcome: Progressing  Flowsheets (Taken 5/14/2024 1950 by Therese Sharma RN)  Maintains hematologic stability:   Assess for signs and symptoms of bleeding or hemorrhage   Monitor labs for bleeding or clotting disorders  5/14/2024 2001 by Therese Sharma RN  Outcome: Progressing  Flowsheets (Taken 5/14/2024 1950)  Maintains hematologic stability:   Assess for signs and symptoms of bleeding or hemorrhage   Monitor labs for bleeding or clotting disorders     Problem: Musculoskeletal - Adult  Goal: Return mobility to safest level of function  5/15/2024 0943 by Lonnie Del Rio RN  Outcome: Progressing  Flowsheets (Taken

## 2024-05-23 ENCOUNTER — TELEPHONE (OUTPATIENT)
Dept: ONCOLOGY | Age: 69
End: 2024-05-23

## 2024-05-23 NOTE — TELEPHONE ENCOUNTER
Lexii from [facility] wanting clarification on referral for patient, if it's a second opinion or if they are to manage his cortisol for his neuroendocrine carcinoma.    Please advise.

## 2024-06-05 ENCOUNTER — OFFICE VISIT (OUTPATIENT)
Dept: PALLATIVE CARE | Age: 69
End: 2024-06-05
Payer: MEDICARE

## 2024-06-05 VITALS
HEART RATE: 88 BPM | DIASTOLIC BLOOD PRESSURE: 62 MMHG | RESPIRATION RATE: 17 BRPM | OXYGEN SATURATION: 96 % | BODY MASS INDEX: 21.22 KG/M2 | WEIGHT: 140 LBS | SYSTOLIC BLOOD PRESSURE: 114 MMHG | HEIGHT: 68 IN

## 2024-06-05 DIAGNOSIS — C18.9 MALIGNANT NEOPLASM OF COLON, UNSPECIFIED PART OF COLON (HCC): ICD-10-CM

## 2024-06-05 DIAGNOSIS — C7A.1 LARGE CELL NEUROENDOCRINE CARCINOMA (HCC): Primary | ICD-10-CM

## 2024-06-05 DIAGNOSIS — C78.00 MALIGNANT NEOPLASM METASTATIC TO LUNG, UNSPECIFIED LATERALITY (HCC): ICD-10-CM

## 2024-06-05 DIAGNOSIS — G62.0 CHEMOTHERAPY-INDUCED PERIPHERAL NEUROPATHY (HCC): ICD-10-CM

## 2024-06-05 DIAGNOSIS — Z51.5 PALLIATIVE CARE PATIENT: ICD-10-CM

## 2024-06-05 DIAGNOSIS — G89.3 CANCER RELATED PAIN: ICD-10-CM

## 2024-06-05 DIAGNOSIS — T45.1X5A CHEMOTHERAPY-INDUCED PERIPHERAL NEUROPATHY (HCC): ICD-10-CM

## 2024-06-05 DIAGNOSIS — Z79.899 LONG-TERM USE OF HIGH-RISK MEDICATION: ICD-10-CM

## 2024-06-05 LAB
ALCOHOL URINE: NORMAL
AMPHETAMINE SCREEN, URINE: NORMAL
BARBITURATE SCREEN, URINE: NORMAL
BENZODIAZEPINE SCREEN, URINE: NORMAL
BUPRENORPHINE URINE: NORMAL
COCAINE METABOLITE SCREEN URINE: NORMAL
FENTANYL SCREEN, URINE: NORMAL
GABAPENTIN SCREEN, URINE: NORMAL
MDMA URINE: NORMAL
METHADONE SCREEN, URINE: NORMAL
METHAMPHETAMINE, URINE: NORMAL
OPIATE SCREEN URINE: NORMAL
OXYCODONE SCREEN URINE: POSITIVE
PHENCYCLIDINE SCREEN URINE: NORMAL
PROPOXYPHENE SCREEN, URINE: NORMAL
SYNTHETIC CANNABINOIDS(K2) SCREEN, URINE: NORMAL
THC SCREEN, URINE: POSITIVE
TRAMADOL SCREEN URINE: NORMAL
TRICYCLIC ANTIDEPRESSANTS, UR: NORMAL

## 2024-06-05 PROCEDURE — 99214 OFFICE O/P EST MOD 30 MIN: CPT | Performed by: STUDENT IN AN ORGANIZED HEALTH CARE EDUCATION/TRAINING PROGRAM

## 2024-06-05 PROCEDURE — 3074F SYST BP LT 130 MM HG: CPT | Performed by: STUDENT IN AN ORGANIZED HEALTH CARE EDUCATION/TRAINING PROGRAM

## 2024-06-05 PROCEDURE — 3078F DIAST BP <80 MM HG: CPT | Performed by: STUDENT IN AN ORGANIZED HEALTH CARE EDUCATION/TRAINING PROGRAM

## 2024-06-05 PROCEDURE — 1123F ACP DISCUSS/DSCN MKR DOCD: CPT | Performed by: STUDENT IN AN ORGANIZED HEALTH CARE EDUCATION/TRAINING PROGRAM

## 2024-06-05 PROCEDURE — 80305 DRUG TEST PRSMV DIR OPT OBS: CPT | Performed by: STUDENT IN AN ORGANIZED HEALTH CARE EDUCATION/TRAINING PROGRAM

## 2024-06-05 RX ORDER — FUROSEMIDE 40 MG/1
40 TABLET ORAL DAILY
COMMUNITY
Start: 2024-05-22

## 2024-06-05 RX ORDER — HYDROCHLOROTHIAZIDE 12.5 MG/1
TABLET ORAL
COMMUNITY
Start: 2024-05-26

## 2024-06-05 RX ORDER — OXYCODONE HYDROCHLORIDE 10 MG/1
10 TABLET ORAL EVERY 4 HOURS PRN
Qty: 180 TABLET | Refills: 0 | Status: SHIPPED | OUTPATIENT
Start: 2024-06-05 | End: 2024-07-05

## 2024-06-05 NOTE — PATIENT INSTRUCTIONS
Continue current plan of care for chronic diseases per primary and specialist teams  Continue Oxycodone IR 10 mg every 4 hours as needed for breakthrough cancer related pain  Continue Duloxetine 30 mg every day scheduled for  chemotherapy induced peripheral neuropathy  Controlled substances agreement signed  PDMP reviewed  Please call the office if your symptoms worsen or if you were to develop new symptoms  Please call the palliative care office when you need refills

## 2024-06-05 NOTE — PROGRESS NOTES
PM    Parts of this note may have been dictated by use of voice recognition software and electronically transcribed. The note may contain errors not detected in proofreading.

## 2024-06-14 ENCOUNTER — APPOINTMENT (OUTPATIENT)
Dept: GENERAL RADIOLOGY | Age: 69
DRG: 280 | End: 2024-06-14
Payer: MEDICARE

## 2024-06-14 ENCOUNTER — HOSPITAL ENCOUNTER (INPATIENT)
Age: 69
LOS: 4 days | Discharge: HOME | DRG: 280 | End: 2024-06-18
Attending: EMERGENCY MEDICINE | Admitting: HOSPITALIST
Payer: MEDICARE

## 2024-06-14 DIAGNOSIS — R79.89 ELEVATED TROPONIN: Primary | ICD-10-CM

## 2024-06-14 DIAGNOSIS — I65.23 ATHEROSCLEROSIS OF BOTH CAROTID ARTERIES: ICD-10-CM

## 2024-06-14 PROBLEM — I21.4 NSTEMI (NON-ST ELEVATED MYOCARDIAL INFARCTION) (HCC): Status: ACTIVE | Noted: 2024-06-14

## 2024-06-14 LAB
ALBUMIN SERPL BCG-MCNC: 3.5 G/DL (ref 3.5–5.1)
ALP SERPL-CCNC: 220 U/L (ref 38–126)
ALT SERPL W/O P-5'-P-CCNC: 41 U/L (ref 11–66)
AMMONIA PLAS-MCNC: 28 UMOL/L (ref 11–60)
ANION GAP SERPL CALC-SCNC: 17 MEQ/L (ref 8–16)
APTT PPP: 46.2 SECONDS (ref 22–38)
AST SERPL-CCNC: 73 U/L (ref 5–40)
BASOPHILS ABSOLUTE: 0 THOU/MM3 (ref 0–0.1)
BASOPHILS NFR BLD AUTO: 0.6 %
BILIRUB CONJ SERPL-MCNC: 1 MG/DL (ref 0.1–13.8)
BILIRUB SERPL-MCNC: 1.9 MG/DL (ref 0.3–1.2)
BUN SERPL-MCNC: 28 MG/DL (ref 7–22)
CALCIUM SERPL-MCNC: 9.4 MG/DL (ref 8.5–10.5)
CHLORIDE SERPL-SCNC: 89 MEQ/L (ref 98–111)
CO2 SERPL-SCNC: 25 MEQ/L (ref 23–33)
CREAT SERPL-MCNC: 1.1 MG/DL (ref 0.4–1.2)
DEPRECATED RDW RBC AUTO: 60.3 FL (ref 35–45)
EKG Q-T INTERVAL: 428 MS
EKG QRS DURATION: 80 MS
EKG QTC CALCULATION (BAZETT): 497 MS
EKG R AXIS: 80 DEGREES
EKG T AXIS: 114 DEGREES
EKG VENTRICULAR RATE: 81 BPM
EOSINOPHIL NFR BLD AUTO: 0.1 %
EOSINOPHILS ABSOLUTE: 0 THOU/MM3 (ref 0–0.4)
ERYTHROCYTE [DISTWIDTH] IN BLOOD BY AUTOMATED COUNT: 16.5 % (ref 11.5–14.5)
FLUAV RNA RESP QL NAA+PROBE: NOT DETECTED
FLUBV RNA RESP QL NAA+PROBE: NOT DETECTED
GFR SERPL CREATININE-BSD FRML MDRD: 73 ML/MIN/1.73M2
GLUCOSE SERPL-MCNC: 67 MG/DL (ref 70–108)
HAV IGM SER QL: NEGATIVE
HBV CORE IGM SERPL QL IA: NEGATIVE
HBV SURFACE AG SERPL QL IA: NEGATIVE
HCT VFR BLD AUTO: 45.9 % (ref 42–52)
HCV IGG SERPL QL IA: POSITIVE
HEPARIN UNFRACTIONATED: 0.42 U/ML (ref 0.3–0.7)
HEPARIN UNFRACTIONATED: < 0.04 U/ML (ref 0.3–0.7)
HGB BLD-MCNC: 14.9 GM/DL (ref 14–18)
IMM GRANULOCYTES # BLD AUTO: 0.03 THOU/MM3 (ref 0–0.07)
IMM GRANULOCYTES NFR BLD AUTO: 0.4 %
INR PPP: 1.87 (ref 0.85–1.13)
LACTATE SERPL-SCNC: 1.3 MMOL/L (ref 0.5–2)
LYMPHOCYTES ABSOLUTE: 1.6 THOU/MM3 (ref 1–4.8)
LYMPHOCYTES NFR BLD AUTO: 19 %
MAGNESIUM SERPL-MCNC: 1.9 MG/DL (ref 1.6–2.4)
MCH RBC QN AUTO: 32 PG (ref 26–33)
MCHC RBC AUTO-ENTMCNC: 32.5 GM/DL (ref 32.2–35.5)
MCV RBC AUTO: 98.5 FL (ref 80–94)
MONOCYTES ABSOLUTE: 1.1 THOU/MM3 (ref 0.4–1.3)
MONOCYTES NFR BLD AUTO: 13.6 %
NEUTROPHILS ABSOLUTE: 5.4 THOU/MM3 (ref 1.8–7.7)
NEUTROPHILS NFR BLD AUTO: 66.3 %
NRBC BLD AUTO-RTO: 0 /100 WBC
NT-PROBNP SERPL IA-MCNC: 1845 PG/ML (ref 0–124)
OSMOLALITY SERPL CALC.SUM OF ELEC: 266.4 MOSMOL/KG (ref 275–300)
PLATELET # BLD AUTO: 208 THOU/MM3 (ref 130–400)
PMV BLD AUTO: 10.8 FL (ref 9.4–12.4)
POTASSIUM SERPL-SCNC: 4.3 MEQ/L (ref 3.5–5.2)
PROT SERPL-MCNC: 7.6 G/DL (ref 6.1–8)
RBC # BLD AUTO: 4.66 MILL/MM3 (ref 4.7–6.1)
REASON FOR REJECTION: NORMAL
REJECTED TEST: NORMAL
SARS-COV-2 RNA RESP QL NAA+PROBE: NOT DETECTED
SODIUM SERPL-SCNC: 131 MEQ/L (ref 135–145)
TROPONIN, HIGH SENSITIVITY: 104 NG/L (ref 0–12)
TROPONIN, HIGH SENSITIVITY: 75 NG/L (ref 0–12)
TROPONIN, HIGH SENSITIVITY: 76 NG/L (ref 0–12)
WBC # BLD AUTO: 8.2 THOU/MM3 (ref 4.8–10.8)

## 2024-06-14 PROCEDURE — 2580000003 HC RX 258: Performed by: HOSPITALIST

## 2024-06-14 PROCEDURE — 2140000000 HC CCU INTERMEDIATE R&B

## 2024-06-14 PROCEDURE — 6360000002 HC RX W HCPCS

## 2024-06-14 PROCEDURE — 71045 X-RAY EXAM CHEST 1 VIEW: CPT

## 2024-06-14 PROCEDURE — 83735 ASSAY OF MAGNESIUM: CPT

## 2024-06-14 PROCEDURE — 96375 TX/PRO/DX INJ NEW DRUG ADDON: CPT

## 2024-06-14 PROCEDURE — 36415 COLL VENOUS BLD VENIPUNCTURE: CPT

## 2024-06-14 PROCEDURE — 93005 ELECTROCARDIOGRAM TRACING: CPT

## 2024-06-14 PROCEDURE — 83605 ASSAY OF LACTIC ACID: CPT

## 2024-06-14 PROCEDURE — 85520 HEPARIN ASSAY: CPT

## 2024-06-14 PROCEDURE — 99223 1ST HOSP IP/OBS HIGH 75: CPT | Performed by: HOSPITALIST

## 2024-06-14 PROCEDURE — 85730 THROMBOPLASTIN TIME PARTIAL: CPT

## 2024-06-14 PROCEDURE — 83880 ASSAY OF NATRIURETIC PEPTIDE: CPT

## 2024-06-14 PROCEDURE — 85610 PROTHROMBIN TIME: CPT

## 2024-06-14 PROCEDURE — 80074 ACUTE HEPATITIS PANEL: CPT

## 2024-06-14 PROCEDURE — 84484 ASSAY OF TROPONIN QUANT: CPT

## 2024-06-14 PROCEDURE — 2580000003 HC RX 258: Performed by: EMERGENCY MEDICINE

## 2024-06-14 PROCEDURE — 6370000000 HC RX 637 (ALT 250 FOR IP): Performed by: HOSPITALIST

## 2024-06-14 PROCEDURE — 80053 COMPREHEN METABOLIC PANEL: CPT

## 2024-06-14 PROCEDURE — 6360000002 HC RX W HCPCS: Performed by: HOSPITALIST

## 2024-06-14 PROCEDURE — 82248 BILIRUBIN DIRECT: CPT

## 2024-06-14 PROCEDURE — 85025 COMPLETE CBC W/AUTO DIFF WBC: CPT

## 2024-06-14 PROCEDURE — 96374 THER/PROPH/DIAG INJ IV PUSH: CPT

## 2024-06-14 PROCEDURE — 93010 ELECTROCARDIOGRAM REPORT: CPT | Performed by: INTERNAL MEDICINE

## 2024-06-14 PROCEDURE — 87636 SARSCOV2 & INF A&B AMP PRB: CPT

## 2024-06-14 PROCEDURE — 82140 ASSAY OF AMMONIA: CPT

## 2024-06-14 PROCEDURE — 99285 EMERGENCY DEPT VISIT HI MDM: CPT

## 2024-06-14 RX ORDER — ACETAMINOPHEN 325 MG/1
650 TABLET ORAL EVERY 6 HOURS PRN
Status: DISCONTINUED | OUTPATIENT
Start: 2024-06-14 | End: 2024-06-18 | Stop reason: HOSPADM

## 2024-06-14 RX ORDER — POLYETHYLENE GLYCOL 3350 17 G/17G
17 POWDER, FOR SOLUTION ORAL DAILY PRN
Status: DISCONTINUED | OUTPATIENT
Start: 2024-06-14 | End: 2024-06-18 | Stop reason: HOSPADM

## 2024-06-14 RX ORDER — HEPARIN SODIUM 1000 [USP'U]/ML
30 INJECTION, SOLUTION INTRAVENOUS; SUBCUTANEOUS PRN
Status: DISCONTINUED | OUTPATIENT
Start: 2024-06-14 | End: 2024-06-15

## 2024-06-14 RX ORDER — PANTOPRAZOLE SODIUM 40 MG/1
40 TABLET, DELAYED RELEASE ORAL
Status: DISCONTINUED | OUTPATIENT
Start: 2024-06-15 | End: 2024-06-18 | Stop reason: HOSPADM

## 2024-06-14 RX ORDER — HEPARIN SODIUM 10000 [USP'U]/100ML
5-30 INJECTION, SOLUTION INTRAVENOUS CONTINUOUS
Status: DISCONTINUED | OUTPATIENT
Start: 2024-06-14 | End: 2024-06-15

## 2024-06-14 RX ORDER — POTASSIUM CHLORIDE 20 MEQ/1
40 TABLET, EXTENDED RELEASE ORAL PRN
Status: DISCONTINUED | OUTPATIENT
Start: 2024-06-14 | End: 2024-06-18 | Stop reason: HOSPADM

## 2024-06-14 RX ORDER — ONDANSETRON 4 MG/1
4 TABLET, ORALLY DISINTEGRATING ORAL EVERY 8 HOURS PRN
Status: DISCONTINUED | OUTPATIENT
Start: 2024-06-14 | End: 2024-06-18 | Stop reason: HOSPADM

## 2024-06-14 RX ORDER — ONDANSETRON 2 MG/ML
4 INJECTION INTRAMUSCULAR; INTRAVENOUS ONCE
Status: COMPLETED | OUTPATIENT
Start: 2024-06-14 | End: 2024-06-14

## 2024-06-14 RX ORDER — 0.9 % SODIUM CHLORIDE 0.9 %
500 INTRAVENOUS SOLUTION INTRAVENOUS ONCE
Status: COMPLETED | OUTPATIENT
Start: 2024-06-14 | End: 2024-06-14

## 2024-06-14 RX ORDER — ACETAMINOPHEN 650 MG/1
650 SUPPOSITORY RECTAL EVERY 6 HOURS PRN
Status: DISCONTINUED | OUTPATIENT
Start: 2024-06-14 | End: 2024-06-18 | Stop reason: HOSPADM

## 2024-06-14 RX ORDER — DULOXETIN HYDROCHLORIDE 30 MG/1
30 CAPSULE, DELAYED RELEASE ORAL DAILY
Status: DISCONTINUED | OUTPATIENT
Start: 2024-06-14 | End: 2024-06-18 | Stop reason: HOSPADM

## 2024-06-14 RX ORDER — MAGNESIUM SULFATE IN WATER 40 MG/ML
2000 INJECTION, SOLUTION INTRAVENOUS PRN
Status: DISCONTINUED | OUTPATIENT
Start: 2024-06-14 | End: 2024-06-18 | Stop reason: HOSPADM

## 2024-06-14 RX ORDER — SODIUM CHLORIDE 9 MG/ML
INJECTION, SOLUTION INTRAVENOUS PRN
Status: DISCONTINUED | OUTPATIENT
Start: 2024-06-14 | End: 2024-06-18 | Stop reason: HOSPADM

## 2024-06-14 RX ORDER — ONDANSETRON 2 MG/ML
4 INJECTION INTRAMUSCULAR; INTRAVENOUS EVERY 6 HOURS PRN
Status: DISCONTINUED | OUTPATIENT
Start: 2024-06-14 | End: 2024-06-18 | Stop reason: HOSPADM

## 2024-06-14 RX ORDER — METOPROLOL SUCCINATE 25 MG/1
25 TABLET, EXTENDED RELEASE ORAL DAILY
Status: DISCONTINUED | OUTPATIENT
Start: 2024-06-14 | End: 2024-06-18 | Stop reason: HOSPADM

## 2024-06-14 RX ORDER — ATORVASTATIN CALCIUM 20 MG/1
20 TABLET, FILM COATED ORAL NIGHTLY
Status: DISCONTINUED | OUTPATIENT
Start: 2024-06-14 | End: 2024-06-18 | Stop reason: HOSPADM

## 2024-06-14 RX ORDER — FUROSEMIDE 40 MG/1
40 TABLET ORAL DAILY
Status: DISCONTINUED | OUTPATIENT
Start: 2024-06-14 | End: 2024-06-18 | Stop reason: HOSPADM

## 2024-06-14 RX ORDER — HEPARIN SODIUM 1000 [USP'U]/ML
60 INJECTION, SOLUTION INTRAVENOUS; SUBCUTANEOUS PRN
Status: DISCONTINUED | OUTPATIENT
Start: 2024-06-14 | End: 2024-06-15

## 2024-06-14 RX ORDER — SODIUM CHLORIDE 0.9 % (FLUSH) 0.9 %
5-40 SYRINGE (ML) INJECTION EVERY 12 HOURS SCHEDULED
Status: DISCONTINUED | OUTPATIENT
Start: 2024-06-14 | End: 2024-06-18 | Stop reason: HOSPADM

## 2024-06-14 RX ORDER — HEPARIN SODIUM 1000 [USP'U]/ML
60 INJECTION, SOLUTION INTRAVENOUS; SUBCUTANEOUS ONCE
Status: COMPLETED | OUTPATIENT
Start: 2024-06-14 | End: 2024-06-14

## 2024-06-14 RX ORDER — SODIUM CHLORIDE 0.9 % (FLUSH) 0.9 %
5-40 SYRINGE (ML) INJECTION PRN
Status: DISCONTINUED | OUTPATIENT
Start: 2024-06-14 | End: 2024-06-18 | Stop reason: HOSPADM

## 2024-06-14 RX ORDER — POTASSIUM CHLORIDE 7.45 MG/ML
10 INJECTION INTRAVENOUS PRN
Status: DISCONTINUED | OUTPATIENT
Start: 2024-06-14 | End: 2024-06-18 | Stop reason: HOSPADM

## 2024-06-14 RX ADMIN — ONDANSETRON 4 MG: 2 INJECTION INTRAMUSCULAR; INTRAVENOUS at 13:18

## 2024-06-14 RX ADMIN — SODIUM CHLORIDE 500 ML: 9 INJECTION, SOLUTION INTRAVENOUS at 13:19

## 2024-06-14 RX ADMIN — HEPARIN SODIUM 3800 UNITS: 1000 INJECTION INTRAVENOUS; SUBCUTANEOUS at 16:57

## 2024-06-14 RX ADMIN — HEPARIN SODIUM 12 UNITS/KG/HR: 10000 INJECTION, SOLUTION INTRAVENOUS at 17:07

## 2024-06-14 RX ADMIN — SODIUM CHLORIDE, PRESERVATIVE FREE 10 ML: 5 INJECTION INTRAVENOUS at 22:40

## 2024-06-14 RX ADMIN — HYDROCORTISONE SODIUM SUCCINATE 100 MG: 100 INJECTION, POWDER, FOR SOLUTION INTRAMUSCULAR; INTRAVENOUS at 22:39

## 2024-06-14 RX ADMIN — DULOXETINE HYDROCHLORIDE 30 MG: 30 CAPSULE, DELAYED RELEASE ORAL at 22:39

## 2024-06-14 RX ADMIN — ATORVASTATIN CALCIUM 20 MG: 20 TABLET, FILM COATED ORAL at 22:39

## 2024-06-14 NOTE — ED NOTES
During triage, patient noted to desat to 89% on room air.  Oxygen applied at 2L via nasal cannula.

## 2024-06-14 NOTE — ED NOTES
Pt to be transferred to [facility]. Pt in stable condition at this time. Spoke with  staff prior to transport.

## 2024-06-14 NOTE — H&P
History & Physical    Patient:  Chris Perez  YOB: 1955  Date of Service: 6/14/2024  MRN: [id number]   Acct:  [id number]   Primary Care Physician: Donny Garnica MD    Chief Complaint: Weakness and fatigue    History of Present Illness:     The patient is a 69 y.o. male who presents with history of ischemic cardiomyopathy with an ejection fraction of 20 to 25% and neuroendocrine tumor.  History obtained from the patient and significant other.  Patient with history of neuroendocrine tumor was receiving immunotherapy but is supposed to be reevaluated at [facility] prior to any further treatment was brought in by significant other for fatigue and malaise.  Patient is along his significant other to provide history.  She reports that he has been sleeping all week.  She also states that he has been vomiting for last 2 days.  Patient states he does not have any specific complaints but does not feel right.  He denies any chest pain, shortness of breath, dizziness or lightheadedness.  Patient is coughing during the interview, he states it is not productive.  Significant other states that he lays in bed and smokes all day.  Decreased appetite.  No reported fevers or chills, constipation or diarrhea    Past Medical History:        Diagnosis Date    Atrial fibrillation (HCC)     Cancer (HCC)     Carotid artery stenosis     GERD (gastroesophageal reflux disease)     Hepatitis C     Hyperlipidemia     Hypertension     Hyperuricemia     Neuroendocrine carcinoma (HCC)        Past Surgical History:        Procedure Laterality Date    ABDOMEN SURGERY      CARDIAC CATHETERIZATION  10/27/2014    HAND SURGERY Right     HEMICOLECTOMY Right 07/20/2023    ROBOTIC EXTENDED RIGHT COLON RESECTION, CHOLECYSTECTOMY performed by Jaleel Jiang MD at [facility] OR    PORT SURGERY Right 8/22/2023    Single Lumen Smartport Insertion performed by Jaleel Jiang MD at [facility] OR       Home Medications:   No current  is normal. No suspicious osseous lesions are present.     1. Port-A-Cath overlying the right hemithorax. 2. Otherwise negative chest x-ray.. **This report has been created using voice recognition software. It may contain minor errors which are inherent in voice recognition technology.** Electronically signed by Dr. Dionisio Taylor       EKG: A fib    Principal Problem:    NSTEMI (non-ST elevated myocardial infarction) (HCC)  Resolved Problems:    * No resolved hospital problems. *      Assessment and Plan:    NSTEMI: Elevated troponin of 103, will trend troponins.  Twelve-lead EKG shows A-fib.  Heparin drip initiated in ED.  Cardiology consulted.  Will repeat limited echo.  History of neuroendocrine tumor: Was receiving immunotherapy.  Patient must follow-up with [facility] prior to returning to local oncologist for further treatment.    History of adrenal insufficiency patient is on oral Cortef at home, will start patient on IV hydrocortisone for stress dosing.    Heart failure with reduced ejection fraction: Most recent ejection fraction 20 to 25% patient is currently on room air, does not have signs of volume overload.  Will resume p.o. Lasix at this time.  GDMT  Permanent A-fib: Patient is on Coumadin at home, being started on heparin drip.  Continue with metoprolol for rate control  Hypertension: BP well-controlled, he is on metoprolol for rate control.  Monitor blood pressures.  COPD with exacerbation: Patient is on steroids, will start on nebulizer treatments.  GERD: PPI  Gout: Continue with allopurinol      DVT prophylaxis: [] Lovenox                                 [] SCDs                                 [] SQ Heparin                                 [] Encourage ambulation, low risk for DVT, no chemical or mechanical prophylaxis necessary              [x] Already on Anticoagulation                Anticipated Disposition upon discharge: [] Home                                                                         []

## 2024-06-14 NOTE — ED NOTES
ED to inpatient nurses report      Chief Complaint:  Chief Complaint   Patient presents with    Fatigue     Present to ED from: home    MOA:     LOC: alert and orientated to name, place, date  Mobility: Requires assistance * 1  Oxygen Baseline: 90% RA    Current needs required: 2 L     Code Status:   Prior    What abnormal results were found and what did you give/do to treat them? Elevated troponin, heparin bolus and infusion.    Any procedures or intervention occur? See MAR.    Mental Status:  Level of Consciousness: Alert (0)    Psych Assessment:        Vitals:  Patient Vitals for the past 24 hrs:   BP Temp Temp src Pulse Resp SpO2 Height Weight   06/14/24 1710 110/76 -- -- 78 16 95 % -- --   06/14/24 1615 112/72 -- -- -- -- 97 % -- --   06/14/24 1538 -- -- -- -- -- 95 % -- --   06/14/24 1530 115/69 -- -- -- -- 94 % -- --   06/14/24 1445 109/70 -- -- -- -- 91 % -- --   06/14/24 1342 124/76 -- -- 79 -- 93 % -- --   06/14/24 1214 118/73 98.3 °F (36.8 °C) Oral 87 18 92 % 1.727 m (5' 8\") 63.5 kg (140 lb)        LDAs:      Ambulatory Status:  Presents to emergency department  because of falls (Syncope, seizure, or loss of consciousness): No, Nursing Judgement: Yes    Diagnosis:  DISPOSITION Admitted 06/14/2024 05:04:19 PM   Final diagnoses:   Elevated troponin        Consults:  None     Pain Score:       C-SSRS:   Risk of Suicide: No Risk    Sepsis Screening:  Sepsis Risk Score: 0.93    [place] Fall Risk:  [place] 1 Fall Risk  Presents to emergency department  because of falls (Syncope, seizure, or loss of consciousness): No  Nursing Judgement: Yes    Swallow Screening        Preferred Language:   English      ALLERGIES     Adhesive tape    SURGICAL HISTORY       Past Surgical History:   Procedure Laterality Date    ABDOMEN SURGERY      CARDIAC CATHETERIZATION  10/27/2014    HAND SURGERY Right     HEMICOLECTOMY Right 07/20/2023    ROBOTIC EXTENDED RIGHT COLON RESECTION, CHOLECYSTECTOMY performed by Jaleel Jiang MD

## 2024-06-14 NOTE — ED NOTES
Resident reports that he has called lab to inquire on wait time for results.  Chemistries still not resulted.

## 2024-06-14 NOTE — ED PROVIDER NOTES
[facility] EMERGENCY DEPT  EMERGENCY DEPARTMENT ENCOUNTER          Pt Name: Chris Perez  MRN: [id number]  Birthdate 1955  Date of evaluation: 6/14/2024  Physician: Chris Gibson MD  Supervising Attending Physician: Karlie att. providers found       CHIEF COMPLAINT       Chief Complaint   Patient presents with    Fatigue         HISTORY OF PRESENT ILLNESS    HPI  Chris Perez is a 69 y.o. male who presents to the emergency department from home, as a walk in to the ED lobby for evaluation of fatigue.  Per patient's family, the patient has been more fatigued over the past 3 days.  They report that over the past 2 days patient has not drinking water.  States that he attempted to drink water yesterday however it came back up.  Patient does report nausea.  He also reports lack appetite.  No reports that the patient has a history of colon cancer status post right hemicolectomy last year.  Patient was recently undergoing immunotherapy for the cancer with the last dose being about 2 months ago.  They report that they are supposed to see a oncologist at [facility] in August.  Patient denies any chest pain or shortness of breath.  He denies any fever or chills.  He does report a productive cough of white sputum.  He reports that he has not been eating due to lack appetite.  The patient has no other acute complaints at this time.          PAST MEDICAL AND SURGICAL HISTORY     Past Medical History:   Diagnosis Date    Atrial fibrillation (HCC)     Cancer (HCC)     Carotid artery stenosis     GERD (gastroesophageal reflux disease)     Hepatitis C     Hyperlipidemia     Hypertension     Hyperuricemia     Neuroendocrine carcinoma (HCC)      Past Surgical History:   Procedure Laterality Date    ABDOMEN SURGERY      CARDIAC CATHETERIZATION  10/27/2014    HAND SURGERY Right     HEMICOLECTOMY Right 07/20/2023    ROBOTIC EXTENDED RIGHT COLON RESECTION, CHOLECYSTECTOMY performed by Jaleel Jiang MD at

## 2024-06-14 NOTE — ED TRIAGE NOTES
Patient to room 12 from triage for fatigue and concern for dehydration.  Patient was able to ambulate into the hospital but otherwise has not been able to be up and do much over several days now.  Family does most of the talking for patient at this time.  Patient reportedly is undergoing cancer treatment and has been doing this at the cancer center across the street.  Family is concerned because they report that the cancer center is unwilling to see the patient until he goes to [place] for reevaluation.  Family reports that patient did not want to come in today and only wants fluids and to go home.  Patient is able to speak but does not say more than a couple of words and then closes his eyes again.

## 2024-06-14 NOTE — ED NOTES
Call placed to lab to inquire about recollect as patient is a port draw and will need to be redrawn by nurse.  Lab states that recollect result was in error and they had previously been unable to locate PST, though it has now been located.  Lab states that pending labs will be resulted soon.

## 2024-06-14 NOTE — ED PROVIDER NOTES
I performed a history and physical examination of the patient and discussed management with the resident. I reviewed the resident’s note and agree with the documented findings and plan of care. Any areas of disagreement are noted on the chart. I was personally present for the key portions of any procedures. I have documented in the chart those procedures where I was not present during the key portions. I have reviewed the emergency nurses triage note. I agree with the chief complaint, past medical history, past surgical history, allergies, medications, social and family history as documented unless otherwise noted below. Documentation of the HPI, Physical Exam and Medical Decision Making performed by medical students or scribes is based on my personal performance of the HPI, PE and MDM. For Phys Assistant/ Nurse Practitioner cases/documentation I have personally evaluated this patient and have completed at least one if not all key elements of the E/M (history, physical exam, and MDM). My findings are as noted below.    In other words, I personally saw and examined the patient I have reviewed and agreed with the resident findings including all diagnostic interpretations and treatment plans as written.  I was present for the key portion of any procedures performed and the inclusive time noted in any critical care statement.    Patient reports today that he is here for possible dehydration.  Patient has a diagnosis of neuroendocrine tumor.  He sees oncology.  He also has a history of atrial fibrillation hepatitis C.  Patient states that he has been having trouble keeping water down.  He has been able to eat food.  Patient denies any fevers chills sweats.  No abdominal pain or change in bowel or bladder habits.  No chest pain shortness of breath.  Here today the patient states that he has been sleeping a lot.  Patient states he does have Zofran at home.  They did not use it in the last 2 days they did use it today he  was able to keep food down.  Patient is otherwise resting comfortably on the cot no apparent distress no other physical complaints at this time      XR CHEST PORTABLE   Final Result   1. Port-A-Cath overlying the right hemithorax.   2. Otherwise negative chest x-ray..               **This report has been created using voice recognition software. It may contain   minor errors which are inherent in voice recognition technology.**      Electronically signed by Dr. Dionisio Taylor        Labs Reviewed   CBC WITH AUTO DIFFERENTIAL - Abnormal; Notable for the following components:       Result Value    RBC 4.66 (*)     MCV 98.5 (*)     RDW-CV 16.5 (*)     RDW-SD 60.3 (*)     All other components within normal limits   BASIC METABOLIC PANEL - Abnormal; Notable for the following components:    Sodium 131 (*)     Chloride 89 (*)     Glucose 67 (*)     BUN 28 (*)     All other components within normal limits   HEPATIC FUNCTION PANEL - Abnormal; Notable for the following components:    Total Bilirubin 1.9 (*)     Alkaline Phosphatase 220 (*)     AST 73 (*)     All other components within normal limits   BRAIN NATRIURETIC PEPTIDE - Abnormal; Notable for the following components:    Pro-BNP 1845.0 (*)     All other components within normal limits   APTT - Abnormal; Notable for the following components:    aPTT 46.2 (*)     All other components within normal limits   PROTIME-INR - Abnormal; Notable for the following components:    INR 1.87 (*)     All other components within normal limits   HEPATITIS PANEL, ACUTE - Abnormal; Notable for the following components:    Hepatitis C Ab POSITIVE (*)     All other components within normal limits   TROPONIN - Abnormal; Notable for the following components:    Troponin, High Sensitivity 104 (*)     All other components within normal limits   ANION GAP - Abnormal; Notable for the following components:    Anion Gap 17.0 (*)     All other components within normal limits   OSMOLALITY - Abnormal;

## 2024-06-15 PROBLEM — R79.89 ELEVATED TROPONIN: Status: ACTIVE | Noted: 2024-06-15

## 2024-06-15 LAB
ANION GAP SERPL CALC-SCNC: 19 MEQ/L (ref 8–16)
BASOPHILS ABSOLUTE: 0 THOU/MM3 (ref 0–0.1)
BASOPHILS NFR BLD AUTO: 0.4 %
BUN SERPL-MCNC: 28 MG/DL (ref 7–22)
CALCIUM SERPL-MCNC: 7.8 MG/DL (ref 8.5–10.5)
CHLORIDE SERPL-SCNC: 93 MEQ/L (ref 98–111)
CO2 SERPL-SCNC: 20 MEQ/L (ref 23–33)
CREAT SERPL-MCNC: 0.9 MG/DL (ref 0.4–1.2)
DEPRECATED RDW RBC AUTO: 57.6 FL (ref 35–45)
EOSINOPHIL NFR BLD AUTO: 0 %
EOSINOPHILS ABSOLUTE: 0 THOU/MM3 (ref 0–0.4)
ERYTHROCYTE [DISTWIDTH] IN BLOOD BY AUTOMATED COUNT: 16 % (ref 11.5–14.5)
GLUCOSE BLD STRIP.AUTO-MCNC: 112 MG/DL (ref 70–108)
GLUCOSE BLD STRIP.AUTO-MCNC: 133 MG/DL (ref 70–108)
GLUCOSE SERPL-MCNC: 104 MG/DL (ref 70–108)
HCT VFR BLD AUTO: 45.2 % (ref 42–52)
HEPARIN UNFRACTIONATED: 0.37 U/ML (ref 0.3–0.7)
HEPARIN UNFRACTIONATED: 1.22 U/ML (ref 0.3–0.7)
HGB BLD-MCNC: 15 GM/DL (ref 14–18)
IMM GRANULOCYTES # BLD AUTO: 0.03 THOU/MM3 (ref 0–0.07)
IMM GRANULOCYTES NFR BLD AUTO: 0.4 %
LYMPHOCYTES ABSOLUTE: 0.7 THOU/MM3 (ref 1–4.8)
LYMPHOCYTES NFR BLD AUTO: 8.1 %
MCH RBC QN AUTO: 32.3 PG (ref 26–33)
MCHC RBC AUTO-ENTMCNC: 33.2 GM/DL (ref 32.2–35.5)
MCV RBC AUTO: 97.2 FL (ref 80–94)
MONOCYTES ABSOLUTE: 0.1 THOU/MM3 (ref 0.4–1.3)
MONOCYTES NFR BLD AUTO: 1.2 %
NEUTROPHILS ABSOLUTE: 7.6 THOU/MM3 (ref 1.8–7.7)
NEUTROPHILS NFR BLD AUTO: 89.9 %
NRBC BLD AUTO-RTO: 0 /100 WBC
PLATELET # BLD AUTO: 198 THOU/MM3 (ref 130–400)
PMV BLD AUTO: 10.7 FL (ref 9.4–12.4)
POTASSIUM SERPL-SCNC: 4 MEQ/L (ref 3.5–5.2)
RBC # BLD AUTO: 4.65 MILL/MM3 (ref 4.7–6.1)
SODIUM SERPL-SCNC: 132 MEQ/L (ref 135–145)
TROPONIN, HIGH SENSITIVITY: 43 NG/L (ref 0–12)
TROPONIN, HIGH SENSITIVITY: 44 NG/L (ref 0–12)
TROPONIN, HIGH SENSITIVITY: 47 NG/L (ref 0–12)
TROPONIN, HIGH SENSITIVITY: 58 NG/L (ref 0–12)
WBC # BLD AUTO: 8.4 THOU/MM3 (ref 4.8–10.8)

## 2024-06-15 PROCEDURE — 6360000002 HC RX W HCPCS: Performed by: HOSPITALIST

## 2024-06-15 PROCEDURE — 93005 ELECTROCARDIOGRAM TRACING: CPT

## 2024-06-15 PROCEDURE — 82948 REAGENT STRIP/BLOOD GLUCOSE: CPT

## 2024-06-15 PROCEDURE — 84484 ASSAY OF TROPONIN QUANT: CPT

## 2024-06-15 PROCEDURE — 85520 HEPARIN ASSAY: CPT

## 2024-06-15 PROCEDURE — 99223 1ST HOSP IP/OBS HIGH 75: CPT | Performed by: NUCLEAR MEDICINE

## 2024-06-15 PROCEDURE — 2580000003 HC RX 258: Performed by: HOSPITALIST

## 2024-06-15 PROCEDURE — 2140000000 HC CCU INTERMEDIATE R&B

## 2024-06-15 PROCEDURE — 85025 COMPLETE CBC W/AUTO DIFF WBC: CPT

## 2024-06-15 PROCEDURE — 80048 BASIC METABOLIC PNL TOTAL CA: CPT

## 2024-06-15 PROCEDURE — 36415 COLL VENOUS BLD VENIPUNCTURE: CPT

## 2024-06-15 PROCEDURE — 6370000000 HC RX 637 (ALT 250 FOR IP)

## 2024-06-15 PROCEDURE — 6370000000 HC RX 637 (ALT 250 FOR IP): Performed by: HOSPITALIST

## 2024-06-15 RX ORDER — FAMOTIDINE 20 MG/1
20 TABLET, FILM COATED ORAL 2 TIMES DAILY
Status: DISCONTINUED | OUTPATIENT
Start: 2024-06-15 | End: 2024-06-16

## 2024-06-15 RX ORDER — HEPARIN SODIUM 1000 [USP'U]/ML
30 INJECTION, SOLUTION INTRAVENOUS; SUBCUTANEOUS PRN
Status: DISCONTINUED | OUTPATIENT
Start: 2024-06-15 | End: 2024-06-17

## 2024-06-15 RX ORDER — ALBUTEROL SULFATE 90 UG/1
2 AEROSOL, METERED RESPIRATORY (INHALATION) 4 TIMES DAILY PRN
Status: DISCONTINUED | OUTPATIENT
Start: 2024-06-15 | End: 2024-06-18 | Stop reason: HOSPADM

## 2024-06-15 RX ORDER — HEPARIN SODIUM 10000 [USP'U]/100ML
5-30 INJECTION, SOLUTION INTRAVENOUS CONTINUOUS
Status: DISCONTINUED | OUTPATIENT
Start: 2024-06-15 | End: 2024-06-17

## 2024-06-15 RX ORDER — ALLOPURINOL 100 MG/1
100 TABLET ORAL 3 TIMES DAILY
Status: DISCONTINUED | OUTPATIENT
Start: 2024-06-15 | End: 2024-06-18 | Stop reason: HOSPADM

## 2024-06-15 RX ORDER — OXYCODONE HYDROCHLORIDE 5 MG/1
10 TABLET ORAL EVERY 4 HOURS PRN
Status: DISCONTINUED | OUTPATIENT
Start: 2024-06-15 | End: 2024-06-18 | Stop reason: HOSPADM

## 2024-06-15 RX ORDER — HEPARIN SODIUM 1000 [USP'U]/ML
60 INJECTION, SOLUTION INTRAVENOUS; SUBCUTANEOUS PRN
Status: DISCONTINUED | OUTPATIENT
Start: 2024-06-15 | End: 2024-06-17

## 2024-06-15 RX ADMIN — METOPROLOL SUCCINATE 25 MG: 25 TABLET, EXTENDED RELEASE ORAL at 08:58

## 2024-06-15 RX ADMIN — HYDROCORTISONE SODIUM SUCCINATE 100 MG: 100 INJECTION, POWDER, FOR SOLUTION INTRAMUSCULAR; INTRAVENOUS at 20:22

## 2024-06-15 RX ADMIN — FUROSEMIDE 40 MG: 40 TABLET ORAL at 08:58

## 2024-06-15 RX ADMIN — SODIUM CHLORIDE, PRESERVATIVE FREE 10 ML: 5 INJECTION INTRAVENOUS at 08:57

## 2024-06-15 RX ADMIN — FAMOTIDINE 20 MG: 20 TABLET, FILM COATED ORAL at 22:32

## 2024-06-15 RX ADMIN — PANTOPRAZOLE SODIUM 40 MG: 40 TABLET, DELAYED RELEASE ORAL at 06:07

## 2024-06-15 RX ADMIN — DULOXETINE HYDROCHLORIDE 30 MG: 30 CAPSULE, DELAYED RELEASE ORAL at 08:58

## 2024-06-15 RX ADMIN — HYDROCORTISONE SODIUM SUCCINATE 100 MG: 100 INJECTION, POWDER, FOR SOLUTION INTRAMUSCULAR; INTRAVENOUS at 08:57

## 2024-06-15 RX ADMIN — ATORVASTATIN CALCIUM 20 MG: 20 TABLET, FILM COATED ORAL at 21:36

## 2024-06-15 RX ADMIN — HYDROCORTISONE SODIUM SUCCINATE 100 MG: 100 INJECTION, POWDER, FOR SOLUTION INTRAMUSCULAR; INTRAVENOUS at 17:19

## 2024-06-15 RX ADMIN — HYDROCORTISONE SODIUM SUCCINATE 100 MG: 100 INJECTION, POWDER, FOR SOLUTION INTRAMUSCULAR; INTRAVENOUS at 05:03

## 2024-06-15 RX ADMIN — ALLOPURINOL 100 MG: 100 TABLET ORAL at 23:35

## 2024-06-15 ASSESSMENT — PAIN SCALES - GENERAL: PAINLEVEL_OUTOF10: 0

## 2024-06-15 NOTE — PROGRESS NOTES
See ACP note     06/15/24 1058   Encounter Summary   Encounter Overview/Reason Advance Care Planning   Service Provided For Patient;Significant other;Patient and family together   Referral/Consult From Nurse   Support System Significant other;Family members   Last Encounter  06/15/24   Complexity of Encounter Low   Begin Time 1050   End Time  1100   Total Time Calculated 10 min   Advance Care Planning   Type Refused ACP conversation  (Information updated to reflect exisitng ACP documents.)

## 2024-06-15 NOTE — PROGRESS NOTES
Patient arrived per cart to 3B33. Heart monitor applied and vitals taken.  Admission paperwork completed.  Explained to patient that [facility] is not responsible for any lost or stolen items.  Patient verbalized understanding. Oriented to room and use of call light and bed controls.  Patient denies pain or needs. No signs of distress noted.  Bed locked & in low position, side-rails up x2.  Call light in reach.  Reminded patient to call nurse if any needs arise.     2 person skin assessment performed by this nurse and Hany SHINE.    Explained patients right to have family, representative or physician notified of their admission.  Patient has Declined for physician to be notified.  Patient has Declined for family/representative to be notified.      Patient verbalized he wants his code status to be on Full code.

## 2024-06-15 NOTE — CONSULTS
[address] [address]                              CONSULTATION      PATIENT NAME: DINO PIMENTEL           : 1955  MED REC NO: [id number]                       ROOM: [facility]  ACCOUNT NO: [id number]                       ADMIT DATE: 2024  PROVIDER: Jeanne Fung MD    CARDIOLOGY CONSULT    CONSULT DATE:  06/15/2024    REFERRING PHYSICIAN:  Hospitalist Service      REASON FOR THE CONSULTATION:  Cardiomyopathy and possible coronary artery disease.    HISTORY OF PRESENT ILLNESS:  A 69-year-old gentleman, who is an extremely poor historian, who was falling back asleep while I was asking him questions.  It looks like this man has been followed by [facility] Team.  Had a previous cardiac catheterization and evaluation over there; however, he himself was not able to tell me any details, and I did not have any records.  All what I could gather is that he has had a cardiac cath over there not too long ago and that he had known history of atrial fibrillation.  He also has some type of neuroendocrine carcinoma that has been followed at [facility].  He comes in with generalized weakness and shortness of breath.  We were consulted to assist in management.  He denies any active chest pain.    REVIEW OF SYSTEMS:  1. No obvious fever or chills.  2. No hematuria or dysuria.  3. No abdominal pain, nausea, vomiting, or diarrhea.  4. No obvious suicidal ideation.  The patient seems to have some underlying depression.  5. No skin rashes.  6. No obvious dizziness, lightheadedness, or loss of consciousness.  7. No recent trauma.  8. No bleeding problem.  9. No HEENT-related problem.    PAST MEDICAL HISTORY:    1. Coronary artery disease.  2. Cardiomyopathy.  3. Afib.  4. Hypertension.  5. Hyperlipidemia.  6. Neuroendocrine tumor.    ALLERGIES:  ALLERGIC TO TAPE.      MEDICATIONS:  Lipitor 20 a day, metoprolol 25 a day.    SOCIAL HISTORY:

## 2024-06-15 NOTE — PLAN OF CARE
Problem: Discharge Planning  Goal: Discharge to home or other facility with appropriate resources  Outcome: Progressing  Flowsheets (Taken 6/14/2024 2123)  Discharge to home or other facility with appropriate resources:   Identify barriers to discharge with patient and caregiver   Identify discharge learning needs (meds, wound care, etc)     Problem: Safety - Adult  Goal: Free from fall injury  Outcome: Progressing  Flowsheets (Taken 6/15/2024 0042)  Free From Fall Injury: Based on caregiver fall risk screen, instruct family/caregiver to ask for assistance with transferring infant if caregiver noted to have fall risk factors     Problem: ABCDS Injury Assessment  Goal: Absence of physical injury  Outcome: Progressing  Flowsheets  Taken 6/15/2024 0042  Absence of Physical Injury: Implement safety measures based on patient assessment  Taken 6/14/2024 2119  Absence of Physical Injury: Implement safety measures based on patient assessment     Problem: Skin/Tissue Integrity  Goal: Absence of new skin breakdown  Description: 1.  Monitor for areas of redness and/or skin breakdown  2.  Assess vascular access sites hourly  3.  Every 4-6 hours minimum:  Change oxygen saturation probe site  4.  Every 4-6 hours:  If on nasal continuous positive airway pressure, respiratory therapy assess nares and determine need for appliance change or resting period.  Outcome: Progressing  Note: Continued assessing for new skin breakdown.     Problem: Respiratory - Adult  Goal: Achieves optimal ventilation and oxygenation  Outcome: Progressing  Flowsheets (Taken 6/15/2024 0042)  Achieves optimal ventilation and oxygenation: Assess for changes in respiratory status     Problem: Cardiovascular - Adult  Goal: Maintains optimal cardiac output and hemodynamic stability  Outcome: Progressing  Flowsheets (Taken 6/15/2024 0042)  Maintains optimal cardiac output and hemodynamic stability: Monitor blood pressure and heart rate     Problem:

## 2024-06-15 NOTE — PROGRESS NOTES
Hospitalist Progress Note    Patient:  Chris Perez      Unit/Bed:3B-33/033-A    YOB: 1955    MRN: [id number]       Acct: [id number]     PCP: Donny Garnica MD    Date of Admission: 6/14/2024    Assessment/Plan:    NSTEMI: Elevated troponin of 103 on admission  Cardiology consulted  Attempting to get records from outlying facility, not planning intervention at this time, diet ordered  Continue heparin gtt  Repeat limited echo ordered    History of neuroendocrine tumor: Was receiving immunotherapy.    Patient follows with [facility]    History of Adrenal Insufficiency:   Patient started on IV hydrocortisone for stress dosing     HFrEF: Most recent ejection fraction 20 to 25%   Continue Lasix  Cardiology consulted    Permanent A-fib:   Continue heparin gtt, on Coumadin at home  Continue metoprolol for rate control    Hypertension: BP well-controlled, he is on metoprolol for rate control.  Monitor blood pressures.    COPD with Exacerbation: Patient is on steroids, will start on nebulizer treatments    GERD:   Continue PPI    Gout:   Continue with allopurinol      LDA: []CVC / []PICC / []Midline / []Bonilla / []Drains / [x]Mediport / []None  Antibiotics: No  Steroids: Yes  Labs (still needed?): [x]Yes / []No  IVF (still needed?): [x]Yes / []No    Level of care: [x]Step Down / []Med-Surg  Bed Status: [x]Inpatient / []Observation  Telemetry: [x]Yes / []No  PT/OT: [x]Yes / []No    DVT Prophylaxis: [] Lovenox / [x] Heparin / [] SCDs / [] Already on Systemic Anticoagulation / [] None     Disposition:    [x] Home       [] TCU       [] Rehab       [] Psych       [] SNF       [] Long Term Care Facility       [] Other-    Chief Complaint: Weakness and fatigue       Subjective: 69 y.o. male admitted to the hospitalist service for weakness and fatigue. Patient continues on heparin drip.  Seen and evaluated by cardiology, not planning intervention at this time.  Cardiology is attempting to get  recent heart catheterization records from outlying facility.  Patient denies chest pain.  He denies nausea or vomiting.  He denies shortness of breath.    Medications:    Infusion Medications    heparin (PORCINE) Infusion 12 Units/kg/hr (06/15/24 0632)    sodium chloride       Scheduled Medications    furosemide  40 mg Oral Daily    hydrocortisone sodium succinate PF  100 mg IntraVENous Q6H    DULoxetine  30 mg Oral Daily    atorvastatin  20 mg Oral Nightly    metoprolol succinate  25 mg Oral Daily    pantoprazole  40 mg Oral QAM AC    sodium chloride flush  5-40 mL IntraVENous 2 times per day     PRN Meds: heparin (porcine), heparin (porcine), sodium chloride flush, sodium chloride, potassium chloride **OR** potassium alternative oral replacement **OR** potassium chloride, magnesium sulfate, ondansetron **OR** ondansetron, polyethylene glycol, acetaminophen **OR** acetaminophen      Intake/Output Summary (Last 24 hours) at 6/15/2024 1646  Last data filed at 6/15/2024 0609  Gross per 24 hour   Intake 250 ml   Output 400 ml   Net -150 ml       Diet:  ADULT DIET; Regular; Low Fat/Low Chol/High Fiber/2 gm Na    Exam:  /61   Pulse 72   Temp 98.3 °F (36.8 °C) (Oral)   Resp 16   Ht 1.727 m (5' 8\")   Wt 50.8 kg (111 lb 15.9 oz)   SpO2 96%   BMI 17.03 kg/m²     Physical Exam  Constitutional:       Interventions: He is not intubated.  HENT:      Nose:      Comments: NC  Pulmonary:      Effort: He is not intubated.   Neurological:      Mental Status: He is alert.   Psychiatric:         Speech: He is communicative.        Labs:   Recent Labs     06/14/24  1330 06/15/24  0430   WBC 8.2 8.4   HGB 14.9 15.0   HCT 45.9 45.2    198     Recent Labs     06/14/24  1330 06/15/24  0430   * 132*   K 4.3 4.0   CL 89* 93*   CO2 25 20*   BUN 28* 28*   CREATININE 1.1 0.9   CALCIUM 9.4 7.8*     Recent Labs     06/14/24  1330   AST 73*   ALT 41   BILIDIR 1.0   BILITOT 1.9*   ALKPHOS 220*     Recent Labs

## 2024-06-15 NOTE — CARE COORDINATION
06/15/24 1036   Readmission Assessment   Number of Days since last admission? 1-7 days   Previous Disposition Home with Family   Who is being Interviewed Caregiver   What was the patient's/caregiver's perception as to why they think they needed to return back to the hospital? Other (Comment)  (heart issues)   Did you visit your Primary Care Physician after you left the hospital, before you returned this time? Yes   Did you see a specialist, such as Cardiac, Pulmonary, Orthopedic Physician, etc. after you left the hospital? No   Who advised the patient to return to the hospital? Self-referral   Does the patient report anything that got in the way of taking their medications? No   In our efforts to provide the best possible care to you and others like you, can you think of anything that we could have done to help you after you left the hospital the first time, so that you might not have needed to return so soon? Other (Comment)  (Denies)

## 2024-06-15 NOTE — PLAN OF CARE
Problem: Discharge Planning  Goal: Discharge to home or other facility with appropriate resources  6/15/2024 1023 by Juwan Gonzalez RN  Outcome: Progressing  Flowsheets (Taken 6/15/2024 0800)  Discharge to home or other facility with appropriate resources: Identify barriers to discharge with patient and caregiver  6/15/2024 0042 by Fannie Negron RN  Outcome: Progressing  Flowsheets (Taken 6/14/2024 2123)  Discharge to home or other facility with appropriate resources:   Identify barriers to discharge with patient and caregiver   Identify discharge learning needs (meds, wound care, etc)     Problem: Safety - Adult  Goal: Free from fall injury  6/15/2024 1023 by Juwan Gonzalez RN  Outcome: Progressing  6/15/2024 0042 by Fannie Negron RN  Outcome: Progressing  Flowsheets (Taken 6/15/2024 0042)  Free From Fall Injury: Based on caregiver fall risk screen, instruct family/caregiver to ask for assistance with transferring infant if caregiver noted to have fall risk factors     Problem: ABCDS Injury Assessment  Goal: Absence of physical injury  6/15/2024 1023 by Juwan Gonzalez RN  Outcome: Progressing  6/15/2024 0042 by Fannie Negron RN  Outcome: Progressing  Flowsheets  Taken 6/15/2024 0042  Absence of Physical Injury: Implement safety measures based on patient assessment  Taken 6/14/2024 2119  Absence of Physical Injury: Implement safety measures based on patient assessment     Problem: Skin/Tissue Integrity  Goal: Absence of new skin breakdown  Description: 1.  Monitor for areas of redness and/or skin breakdown  2.  Assess vascular access sites hourly  3.  Every 4-6 hours minimum:  Change oxygen saturation probe site  4.  Every 4-6 hours:  If on nasal continuous positive airway pressure, respiratory therapy assess nares and determine need for appliance change or resting period.  6/15/2024 1023 by Juwan Gonzalez RN  Outcome: Progressing  6/15/2024 0042 by Fannie Negron RN  Outcome:

## 2024-06-15 NOTE — ACP (ADVANCE CARE PLANNING)
Advance Care Planning     Advance Care Planning Inpatient Note  [facility] Department    Today's Date: 6/15/2024  Unit: [facility] 3B    Received request from HealthCare Provider.  Upon review of chart and communication with care team, patient's decision making abilities are not in question.. Patient and Healthcare Decision Maker was/were present in the room during visit.    Goals of ACP Conversation:  Discuss advance care planning documents  Facilitate a discussion related to patient's goals of care as they align with the patient's values and beliefs.    Health Care Decision Makers:       Primary Decision Maker: Haris Perez - Brother/Sister - [phone]    Secondary Decision Maker: Soco Tierney - Domestic Partner - [phone]  Summary:  Verified Documents  Verified Healthcare Decision Maker  Updated Healthcare Decision Maker    Advance Care Planning Documents (Patient Wishes):  None     Assessment:  The patient was in his bed attempting to sleep at this time. The patient's significant other shared that the patient had just completed ACP documents recently. The  checked on these existing documents and updated the information in the patient's chart.     Interventions:  Patient DECLINED ACP conversation    Care Preferences Communicated:   No    Outcomes/Plan:  Existing advance directive reviewed with patient; no changes to patient's previously recorded wishes.    Electronically signed by Chaplain Monica on 6/15/2024 at 10:55 AM

## 2024-06-15 NOTE — CARE COORDINATION
06/15/24 1037   Service Assessment   Patient Orientation Other (see comment)  (sleeping)   Cognition Alert   History Provided By Significant Other   Primary Caregiver Self   Accompanied By/Relationship s/o   Support Systems Spouse/Significant Other;Family Members   Patient's Healthcare Decision Maker is: Legal Next of Kin   PCP Verified by CM Yes   Last Visit to PCP Within last 3 months   Prior Functional Level Independent in ADLs/IADLs   Current Functional Level Independent in ADLs/IADLs   Can patient return to prior living arrangement Yes   Ability to make needs known: Good   Family able to assist with home care needs: Yes   Would you like for me to discuss the discharge plan with any other family members/significant others, and if so, who? Yes  (family in room at time of meeting)   Financial Resources Medicare;Medicaid   Community Resources None   Social/Functional History   Active  Yes   Discharge Planning   Type of Residence House   Living Arrangements Spouse/Significant Other;Family Members   Current Services Prior To Admission Durable Medical Equipment   Current DME Prior to Arrival [place]   Potential Assistance Needed N/A   DME Ordered? No   Potential Assistance Purchasing Medications No   Type of Home Care Services None   Patient expects to be discharged to: House   Services At/After Discharge   Transition of Care Consult (CM Consult) Discharge Planning   Services At/After Discharge None   Mode of Transport at Discharge Other (see comment)  (family)   Confirm Follow Up Transport Family   Condition of Participation: Discharge Planning   The Plan for Transition of Care is related to the following treatment goals: get him to feel better     Patient Goals/Plan/Treatment Preferences: Spoke with s/o as Bud is sleeping. Plans to return home with her and his brother/ELIZABETH at discharge. Does not anticipate needs.     If patient is discharged prior to next notation, then this note serves as note for discharge by  case management.

## 2024-06-16 LAB
ANION GAP SERPL CALC-SCNC: 15 MEQ/L (ref 8–16)
BASOPHILS ABSOLUTE: 0 THOU/MM3 (ref 0–0.1)
BASOPHILS NFR BLD AUTO: 0.1 %
BUN SERPL-MCNC: 47 MG/DL (ref 7–22)
CALCIUM SERPL-MCNC: 8.5 MG/DL (ref 8.5–10.5)
CHLORIDE SERPL-SCNC: 85 MEQ/L (ref 98–111)
CO2 SERPL-SCNC: 27 MEQ/L (ref 23–33)
CREAT SERPL-MCNC: 1.2 MG/DL (ref 0.4–1.2)
DEPRECATED RDW RBC AUTO: 53 FL (ref 35–45)
EKG Q-T INTERVAL: 480 MS
EKG QRS DURATION: 84 MS
EKG QTC CALCULATION (BAZETT): 547 MS
EKG R AXIS: 67 DEGREES
EKG T AXIS: -175 DEGREES
EKG VENTRICULAR RATE: 78 BPM
EOSINOPHIL NFR BLD AUTO: 0 %
EOSINOPHILS ABSOLUTE: 0 THOU/MM3 (ref 0–0.4)
ERYTHROCYTE [DISTWIDTH] IN BLOOD BY AUTOMATED COUNT: 15.5 % (ref 11.5–14.5)
GFR SERPL CREATININE-BSD FRML MDRD: 66 ML/MIN/1.73M2
GLUCOSE SERPL-MCNC: 218 MG/DL (ref 70–108)
HCT VFR BLD AUTO: 39.8 % (ref 42–52)
HEPARIN UNFRACTIONATED: 0.45 U/ML (ref 0.3–0.7)
HGB BLD-MCNC: 13.4 GM/DL (ref 14–18)
IMM GRANULOCYTES # BLD AUTO: 0.03 THOU/MM3 (ref 0–0.07)
IMM GRANULOCYTES NFR BLD AUTO: 0.4 %
LYMPHOCYTES ABSOLUTE: 1 THOU/MM3 (ref 1–4.8)
LYMPHOCYTES NFR BLD AUTO: 12.6 %
MCH RBC QN AUTO: 31.2 PG (ref 26–33)
MCHC RBC AUTO-ENTMCNC: 33.7 GM/DL (ref 32.2–35.5)
MCV RBC AUTO: 92.8 FL (ref 80–94)
MONOCYTES ABSOLUTE: 0.5 THOU/MM3 (ref 0.4–1.3)
MONOCYTES NFR BLD AUTO: 6.7 %
NEUTROPHILS ABSOLUTE: 6.6 THOU/MM3 (ref 1.8–7.7)
NEUTROPHILS NFR BLD AUTO: 80.2 %
NRBC BLD AUTO-RTO: 0 /100 WBC
PLATELET # BLD AUTO: 194 THOU/MM3 (ref 130–400)
PMV BLD AUTO: 11.1 FL (ref 9.4–12.4)
POTASSIUM SERPL-SCNC: 4.5 MEQ/L (ref 3.5–5.2)
RBC # BLD AUTO: 4.29 MILL/MM3 (ref 4.7–6.1)
SODIUM SERPL-SCNC: 127 MEQ/L (ref 135–145)
WBC # BLD AUTO: 8.2 THOU/MM3 (ref 4.8–10.8)

## 2024-06-16 PROCEDURE — 99232 SBSQ HOSP IP/OBS MODERATE 35: CPT | Performed by: PHYSICIAN ASSISTANT

## 2024-06-16 PROCEDURE — 6370000000 HC RX 637 (ALT 250 FOR IP): Performed by: HOSPITALIST

## 2024-06-16 PROCEDURE — 6360000002 HC RX W HCPCS: Performed by: HOSPITALIST

## 2024-06-16 PROCEDURE — 2580000003 HC RX 258: Performed by: PHYSICIAN ASSISTANT

## 2024-06-16 PROCEDURE — 85025 COMPLETE CBC W/AUTO DIFF WBC: CPT

## 2024-06-16 PROCEDURE — 85520 HEPARIN ASSAY: CPT

## 2024-06-16 PROCEDURE — 6360000002 HC RX W HCPCS

## 2024-06-16 PROCEDURE — 6370000000 HC RX 637 (ALT 250 FOR IP)

## 2024-06-16 PROCEDURE — 2580000003 HC RX 258: Performed by: HOSPITALIST

## 2024-06-16 PROCEDURE — 94640 AIRWAY INHALATION TREATMENT: CPT

## 2024-06-16 PROCEDURE — 93010 ELECTROCARDIOGRAM REPORT: CPT | Performed by: INTERNAL MEDICINE

## 2024-06-16 PROCEDURE — 2140000000 HC CCU INTERMEDIATE R&B

## 2024-06-16 PROCEDURE — 94761 N-INVAS EAR/PLS OXIMETRY MLT: CPT

## 2024-06-16 PROCEDURE — 80048 BASIC METABOLIC PNL TOTAL CA: CPT

## 2024-06-16 PROCEDURE — 2700000000 HC OXYGEN THERAPY PER DAY

## 2024-06-16 PROCEDURE — 36415 COLL VENOUS BLD VENIPUNCTURE: CPT

## 2024-06-16 RX ORDER — SODIUM CHLORIDE 9 MG/ML
INJECTION, SOLUTION INTRAVENOUS CONTINUOUS
Status: DISCONTINUED | OUTPATIENT
Start: 2024-06-16 | End: 2024-06-17

## 2024-06-16 RX ORDER — FAMOTIDINE 20 MG/1
20 TABLET, FILM COATED ORAL DAILY
Status: DISCONTINUED | OUTPATIENT
Start: 2024-06-17 | End: 2024-06-18 | Stop reason: HOSPADM

## 2024-06-16 RX ADMIN — FAMOTIDINE 20 MG: 20 TABLET, FILM COATED ORAL at 09:24

## 2024-06-16 RX ADMIN — TIOTROPIUM BROMIDE AND OLODATEROL 2 PUFF: 3.124; 2.736 SPRAY, METERED RESPIRATORY (INHALATION) at 08:13

## 2024-06-16 RX ADMIN — HEPARIN SODIUM 12 UNITS/KG/HR: 10000 INJECTION, SOLUTION INTRAVENOUS at 01:49

## 2024-06-16 RX ADMIN — HYDROCORTISONE SODIUM SUCCINATE 100 MG: 100 INJECTION, POWDER, FOR SOLUTION INTRAMUSCULAR; INTRAVENOUS at 22:29

## 2024-06-16 RX ADMIN — ALLOPURINOL 100 MG: 100 TABLET ORAL at 09:24

## 2024-06-16 RX ADMIN — OXYCODONE HYDROCHLORIDE 10 MG: 5 TABLET ORAL at 09:24

## 2024-06-16 RX ADMIN — PANTOPRAZOLE SODIUM 40 MG: 40 TABLET, DELAYED RELEASE ORAL at 05:52

## 2024-06-16 RX ADMIN — HYDROCORTISONE SODIUM SUCCINATE 100 MG: 100 INJECTION, POWDER, FOR SOLUTION INTRAMUSCULAR; INTRAVENOUS at 04:19

## 2024-06-16 RX ADMIN — DULOXETINE HYDROCHLORIDE 30 MG: 30 CAPSULE, DELAYED RELEASE ORAL at 09:24

## 2024-06-16 RX ADMIN — ALLOPURINOL 100 MG: 100 TABLET ORAL at 15:21

## 2024-06-16 RX ADMIN — SODIUM CHLORIDE, PRESERVATIVE FREE 10 ML: 5 INJECTION INTRAVENOUS at 09:25

## 2024-06-16 RX ADMIN — ALLOPURINOL 100 MG: 100 TABLET ORAL at 19:52

## 2024-06-16 RX ADMIN — ATORVASTATIN CALCIUM 20 MG: 20 TABLET, FILM COATED ORAL at 19:52

## 2024-06-16 RX ADMIN — SODIUM CHLORIDE: 9 INJECTION, SOLUTION INTRAVENOUS at 17:52

## 2024-06-16 RX ADMIN — HYDROCORTISONE SODIUM SUCCINATE 100 MG: 100 INJECTION, POWDER, FOR SOLUTION INTRAMUSCULAR; INTRAVENOUS at 17:48

## 2024-06-16 RX ADMIN — FUROSEMIDE 40 MG: 40 TABLET ORAL at 09:24

## 2024-06-16 RX ADMIN — OXYCODONE HYDROCHLORIDE 10 MG: 5 TABLET ORAL at 19:16

## 2024-06-16 RX ADMIN — METOPROLOL SUCCINATE 25 MG: 25 TABLET, EXTENDED RELEASE ORAL at 09:24

## 2024-06-16 RX ADMIN — HYDROCORTISONE SODIUM SUCCINATE 100 MG: 100 INJECTION, POWDER, FOR SOLUTION INTRAMUSCULAR; INTRAVENOUS at 09:24

## 2024-06-16 ASSESSMENT — PAIN DESCRIPTION - LOCATION
LOCATION: GENERALIZED
LOCATION: ABDOMEN

## 2024-06-16 ASSESSMENT — PAIN SCALES - GENERAL
PAINLEVEL_OUTOF10: 8
PAINLEVEL_OUTOF10: 7

## 2024-06-16 NOTE — PROGRESS NOTES
06/14/24  1330   AST 73*   ALT 41   BILIDIR 1.0   BILITOT 1.9*   ALKPHOS 220*       Recent Labs     06/14/24  1330   INR 1.87*       No results for input(s): \"CKTOTAL\", \"TROPONINI\" in the last 72 hours.    Urinalysis:      Lab Results   Component Value Date/Time    NITRU NEGATIVE 05/14/2024 09:30 PM    WBCUA 0-2 05/14/2024 09:30 PM    BACTERIA NONE SEEN 05/14/2024 09:30 PM    RBCUA 0-2 05/14/2024 09:30 PM    BLOODU NEGATIVE 05/14/2024 09:30 PM    GLUCOSEU NEGATIVE 05/14/2024 09:30 PM       Radiology:  XR CHEST PORTABLE   Final Result   1. Port-A-Cath overlying the right hemithorax.   2. Otherwise negative chest x-ray..               **This report has been created using voice recognition software. It may contain   minor errors which are inherent in voice recognition technology.**      Electronically signed by Dr. Dionisio Taylor      Vascular duplex carotid bilateral    (Results Pending)       Diet: ADULT DIET; Regular; Low Fat/Low Chol/High Fiber/2 gm Na      Code Status: Full Code      Electronically signed by Alireza Kearns PA-C on 6/16/2024 at 11:10 AM

## 2024-06-16 NOTE — PROGRESS NOTES
Pharmacy Renal Adjustment    Pharmacy renally adjusted the following medication(s) per P&T approved policy: famotidine    Recent Labs     06/15/24  0430 06/16/24  0600   BUN 28* 47*   CREATININE 0.9 1.2     Estimated Creatinine Clearance: 47 mL/min (based on SCr of 1.2 mg/dL).    Assessment:  SCr slightly higher than ususal    Plan:   Decrease famotidine from 20mg BID  to 20mg daily    Please call pharmacy with any questions.    Ernestina Desir, PharmD, BCPS 6/16/2024 1:57 PM

## 2024-06-16 NOTE — PLAN OF CARE
Problem: Discharge Planning  Goal: Discharge to home or other facility with appropriate resources  6/15/2024 2317 by Fannie Negron RN  Outcome: Progressing  Flowsheets (Taken 6/15/2024 0800 by Juwan Gonzalez RN)  Discharge to home or other facility with appropriate resources: Identify barriers to discharge with patient and caregiver     Problem: Safety - Adult  Goal: Free from fall injury  6/15/2024 2317 by Fannie Negron RN  Outcome: Progressing  Flowsheets (Taken 6/15/2024 2317)  Free From Fall Injury: Based on caregiver fall risk screen, instruct family/caregiver to ask for assistance with transferring infant if caregiver noted to have fall risk factors     Problem: ABCDS Injury Assessment  Goal: Absence of physical injury  6/15/2024 2317 by Fannie Negron RN  Outcome: Progressing  Flowsheets (Taken 6/15/2024 2317)  Absence of Physical Injury: Implement safety measures based on patient assessment     Problem: Skin/Tissue Integrity  Goal: Absence of new skin breakdown  Description: 1.  Monitor for areas of redness and/or skin breakdown  2.  Assess vascular access sites hourly  3.  Every 4-6 hours minimum:  Change oxygen saturation probe site  4.  Every 4-6 hours:  If on nasal continuous positive airway pressure, respiratory therapy assess nares and determine need for appliance change or resting period.  6/15/2024 2317 by Fannie Negron RN  Outcome: Progressing     Problem: Respiratory - Adult  Goal: Achieves optimal ventilation and oxygenation  6/15/2024 2317 by Fannie Negron RN  Outcome: Progressing  Flowsheets (Taken 6/15/2024 2317)  Achieves optimal ventilation and oxygenation: Assess for changes in respiratory status     Problem: Cardiovascular - Adult  Goal: Maintains optimal cardiac output and hemodynamic stability  6/15/2024 2317 by Fannie Negron RN  Outcome: Progressing  Flowsheets (Taken 6/15/2024 0800 by Juwan Gonzalez, RN)  Maintains optimal cardiac output and hemodynamic  stability: Monitor blood pressure and heart rate     Problem: Musculoskeletal - Adult  Goal: Return mobility to safest level of function  6/15/2024 2317 by Fannie Negron, RN  Outcome: Progressing  Flowsheets (Taken 6/15/2024 0800 by Juwan Gonzalez, RN)  Return Mobility to Safest Level of Function: Assess patient stability and activity tolerance for standing, transferring and ambulating with or without assistive devices

## 2024-06-16 NOTE — PROCEDURES
PROCEDURE NOTE  Date: 6/15/2024   Name: Chris Perez  YOB: 1955    Procedures  12 lead EKG completed. Results handed to Fannie SHINE.

## 2024-06-16 NOTE — PROGRESS NOTES
Pt was sleeping when I came to give inhaler. Told RN please call when he wakes up and asks for it.

## 2024-06-17 ENCOUNTER — APPOINTMENT (OUTPATIENT)
Dept: INTERVENTIONAL RADIOLOGY/VASCULAR | Age: 69
DRG: 280 | End: 2024-06-17
Payer: MEDICARE

## 2024-06-17 PROBLEM — E43 SEVERE MALNUTRITION (HCC): Chronic | Status: ACTIVE | Noted: 2024-06-17

## 2024-06-17 LAB
ANION GAP SERPL CALC-SCNC: 11 MEQ/L (ref 8–16)
BASOPHILS ABSOLUTE: 0 THOU/MM3 (ref 0–0.1)
BASOPHILS NFR BLD AUTO: 0 %
BUN SERPL-MCNC: 36 MG/DL (ref 7–22)
CALCIUM SERPL-MCNC: 8.8 MG/DL (ref 8.5–10.5)
CHLORIDE SERPL-SCNC: 88 MEQ/L (ref 98–111)
CO2 SERPL-SCNC: 30 MEQ/L (ref 23–33)
CREAT SERPL-MCNC: 1 MG/DL (ref 0.4–1.2)
DEPRECATED RDW RBC AUTO: 59 FL (ref 35–45)
ECHO BSA: 1.75 M2
EOSINOPHIL NFR BLD AUTO: 0.2 %
EOSINOPHILS ABSOLUTE: 0 THOU/MM3 (ref 0–0.4)
ERYTHROCYTE [DISTWIDTH] IN BLOOD BY AUTOMATED COUNT: 16.3 % (ref 11.5–14.5)
GFR SERPL CREATININE-BSD FRML MDRD: 82 ML/MIN/1.73M2
GLUCOSE SERPL-MCNC: 174 MG/DL (ref 70–108)
HCT VFR BLD AUTO: 41.7 % (ref 42–52)
HEPARIN UNFRACTIONATED: 0.05 U/ML (ref 0.3–0.7)
HEPARIN UNFRACTIONATED: 0.21 U/ML (ref 0.3–0.7)
HGB BLD-MCNC: 13.9 GM/DL (ref 14–18)
IMM GRANULOCYTES # BLD AUTO: 0.01 THOU/MM3 (ref 0–0.07)
IMM GRANULOCYTES NFR BLD AUTO: 0.2 %
INR PPP: 1.58 (ref 0.85–1.13)
LYMPHOCYTES ABSOLUTE: 0.7 THOU/MM3 (ref 1–4.8)
LYMPHOCYTES NFR BLD AUTO: 11.7 %
MCH RBC QN AUTO: 32.8 PG (ref 26–33)
MCHC RBC AUTO-ENTMCNC: 33.3 GM/DL (ref 32.2–35.5)
MCV RBC AUTO: 98.3 FL (ref 80–94)
MONOCYTES ABSOLUTE: 0.5 THOU/MM3 (ref 0.4–1.3)
MONOCYTES NFR BLD AUTO: 8.2 %
NEUTROPHILS ABSOLUTE: 4.8 THOU/MM3 (ref 1.8–7.7)
NEUTROPHILS NFR BLD AUTO: 79.7 %
NRBC BLD AUTO-RTO: 0 /100 WBC
PLATELET # BLD AUTO: 165 THOU/MM3 (ref 130–400)
PMV BLD AUTO: 11.2 FL (ref 9.4–12.4)
POTASSIUM SERPL-SCNC: 4.3 MEQ/L (ref 3.5–5.2)
RBC # BLD AUTO: 4.24 MILL/MM3 (ref 4.7–6.1)
SODIUM SERPL-SCNC: 129 MEQ/L (ref 135–145)
WBC # BLD AUTO: 6 THOU/MM3 (ref 4.8–10.8)

## 2024-06-17 PROCEDURE — 6370000000 HC RX 637 (ALT 250 FOR IP)

## 2024-06-17 PROCEDURE — 6370000000 HC RX 637 (ALT 250 FOR IP): Performed by: PHYSICIAN ASSISTANT

## 2024-06-17 PROCEDURE — 2580000003 HC RX 258: Performed by: HOSPITALIST

## 2024-06-17 PROCEDURE — 36415 COLL VENOUS BLD VENIPUNCTURE: CPT

## 2024-06-17 PROCEDURE — 94760 N-INVAS EAR/PLS OXIMETRY 1: CPT

## 2024-06-17 PROCEDURE — 6360000002 HC RX W HCPCS: Performed by: HOSPITALIST

## 2024-06-17 PROCEDURE — 80048 BASIC METABOLIC PNL TOTAL CA: CPT

## 2024-06-17 PROCEDURE — 6360000002 HC RX W HCPCS

## 2024-06-17 PROCEDURE — 6370000000 HC RX 637 (ALT 250 FOR IP): Performed by: HOSPITALIST

## 2024-06-17 PROCEDURE — 99232 SBSQ HOSP IP/OBS MODERATE 35: CPT | Performed by: PHYSICIAN ASSISTANT

## 2024-06-17 PROCEDURE — 2140000000 HC CCU INTERMEDIATE R&B

## 2024-06-17 PROCEDURE — 93880 EXTRACRANIAL BILAT STUDY: CPT

## 2024-06-17 PROCEDURE — 85610 PROTHROMBIN TIME: CPT

## 2024-06-17 PROCEDURE — 94640 AIRWAY INHALATION TREATMENT: CPT

## 2024-06-17 PROCEDURE — 85025 COMPLETE CBC W/AUTO DIFF WBC: CPT

## 2024-06-17 PROCEDURE — 85520 HEPARIN ASSAY: CPT

## 2024-06-17 RX ORDER — WARFARIN SODIUM 5 MG/1
5 TABLET ORAL
Status: COMPLETED | OUTPATIENT
Start: 2024-06-17 | End: 2024-06-17

## 2024-06-17 RX ORDER — SODIUM CHLORIDE 1 G/1
1 TABLET ORAL
Status: DISCONTINUED | OUTPATIENT
Start: 2024-06-17 | End: 2024-06-18 | Stop reason: HOSPADM

## 2024-06-17 RX ADMIN — HEPARIN SODIUM 1500 UNITS: 1000 INJECTION INTRAVENOUS; SUBCUTANEOUS at 05:17

## 2024-06-17 RX ADMIN — TIOTROPIUM BROMIDE AND OLODATEROL 2 PUFF: 3.124; 2.736 SPRAY, METERED RESPIRATORY (INHALATION) at 09:10

## 2024-06-17 RX ADMIN — SODIUM CHLORIDE, PRESERVATIVE FREE 10 ML: 5 INJECTION INTRAVENOUS at 09:02

## 2024-06-17 RX ADMIN — FUROSEMIDE 40 MG: 40 TABLET ORAL at 09:00

## 2024-06-17 RX ADMIN — OXYCODONE HYDROCHLORIDE 10 MG: 5 TABLET ORAL at 17:09

## 2024-06-17 RX ADMIN — OXYCODONE HYDROCHLORIDE 10 MG: 5 TABLET ORAL at 20:59

## 2024-06-17 RX ADMIN — HYDROCORTISONE SODIUM SUCCINATE 100 MG: 100 INJECTION, POWDER, FOR SOLUTION INTRAMUSCULAR; INTRAVENOUS at 04:18

## 2024-06-17 RX ADMIN — PANTOPRAZOLE SODIUM 40 MG: 40 TABLET, DELAYED RELEASE ORAL at 05:27

## 2024-06-17 RX ADMIN — ALLOPURINOL 100 MG: 100 TABLET ORAL at 15:08

## 2024-06-17 RX ADMIN — DULOXETINE HYDROCHLORIDE 30 MG: 30 CAPSULE, DELAYED RELEASE ORAL at 09:00

## 2024-06-17 RX ADMIN — WARFARIN SODIUM 5 MG: 5 TABLET ORAL at 18:15

## 2024-06-17 RX ADMIN — ALLOPURINOL 100 MG: 100 TABLET ORAL at 09:00

## 2024-06-17 RX ADMIN — METOPROLOL SUCCINATE 25 MG: 25 TABLET, EXTENDED RELEASE ORAL at 09:00

## 2024-06-17 RX ADMIN — SODIUM CHLORIDE 1 G: 1 TABLET ORAL at 18:15

## 2024-06-17 RX ADMIN — ATORVASTATIN CALCIUM 20 MG: 20 TABLET, FILM COATED ORAL at 20:56

## 2024-06-17 RX ADMIN — OXYCODONE HYDROCHLORIDE 10 MG: 5 TABLET ORAL at 09:06

## 2024-06-17 RX ADMIN — FAMOTIDINE 20 MG: 20 TABLET, FILM COATED ORAL at 09:00

## 2024-06-17 RX ADMIN — ALLOPURINOL 100 MG: 100 TABLET ORAL at 20:56

## 2024-06-17 RX ADMIN — SODIUM CHLORIDE: 9 INJECTION, SOLUTION INTRAVENOUS at 17:07

## 2024-06-17 RX ADMIN — HYDROCORTISONE SODIUM SUCCINATE 100 MG: 100 INJECTION, POWDER, FOR SOLUTION INTRAMUSCULAR; INTRAVENOUS at 15:09

## 2024-06-17 ASSESSMENT — PAIN SCALES - GENERAL
PAINLEVEL_OUTOF10: 8
PAINLEVEL_OUTOF10: 6
PAINLEVEL_OUTOF10: 8
PAINLEVEL_OUTOF10: 7
PAINLEVEL_OUTOF10: 5

## 2024-06-17 ASSESSMENT — PAIN DESCRIPTION - LOCATION
LOCATION: ABDOMEN

## 2024-06-17 ASSESSMENT — PAIN DESCRIPTION - ORIENTATION
ORIENTATION: MID
ORIENTATION: MID;LOWER
ORIENTATION: MID;LOWER

## 2024-06-17 ASSESSMENT — PAIN - FUNCTIONAL ASSESSMENT
PAIN_FUNCTIONAL_ASSESSMENT: PREVENTS OR INTERFERES SOME ACTIVE ACTIVITIES AND ADLS

## 2024-06-17 ASSESSMENT — PAIN DESCRIPTION - DESCRIPTORS
DESCRIPTORS: ACHING

## 2024-06-17 NOTE — PROGRESS NOTES
Warfarin Pharmacy Consult Note    Chris Perez is a 69 y.o. male for whom pharmacy has been asked to manage warfarin therapy.     Consulting Provider: Diamond Kearns PA-C  Warfarin Indication: Atrial fibrillation or Atrial flutter  Target INR range: 2.0-3.0   Warfarin dose prior to admission: 5 mg Thursday & Sunday; 2.5 mg all other days  Outpatient warfarin provider: [facility]    Recent Labs     06/17/24  1505   INR 1.58*     Recent Labs     06/15/24  0430 06/16/24  0600 06/17/24  0353   HGB 15.0 13.4* 13.9*    194 165     Concurrent anticoagulants/antiplatelets: none  Significant warfarin drug-drug interactions: hydrocortisone, allopurinol    Date INR Warfarin Dose   6/17/24 1.58 5 mg                                    INR will be monitored routinely until therapeutic INR is achieved.    Pharmacy will continue to follow. Thank you for the consult,   David Julian [facility] 6/17/2024 4:09 PM

## 2024-06-17 NOTE — PLAN OF CARE
Problem: Discharge Planning  Goal: Discharge to home or other facility with appropriate resources  Outcome: Progressing  Flowsheets (Taken 6/17/2024 0850)  Discharge to home or other facility with appropriate resources: Identify barriers to discharge with patient and caregiver     Problem: Safety - Adult  Goal: Free from fall injury  Outcome: Progressing     Problem: ABCDS Injury Assessment  Goal: Absence of physical injury  Outcome: Progressing     Problem: Skin/Tissue Integrity  Goal: Absence of new skin breakdown  Description: 1.  Monitor for areas of redness and/or skin breakdown  2.  Assess vascular access sites hourly  3.  Every 4-6 hours minimum:  Change oxygen saturation probe site  4.  Every 4-6 hours:  If on nasal continuous positive airway pressure, respiratory therapy assess nares and determine need for appliance change or resting period.  Outcome: Progressing     Problem: Respiratory - Adult  Goal: Achieves optimal ventilation and oxygenation  Outcome: Progressing  Flowsheets (Taken 6/17/2024 0850)  Achieves optimal ventilation and oxygenation: Assess for changes in respiratory status     Problem: Cardiovascular - Adult  Goal: Maintains optimal cardiac output and hemodynamic stability  Outcome: Progressing  Flowsheets (Taken 6/17/2024 0850)  Maintains optimal cardiac output and hemodynamic stability: Monitor blood pressure and heart rate     Problem: Musculoskeletal - Adult  Goal: Return mobility to safest level of function  Outcome: Progressing  Flowsheets (Taken 6/17/2024 0850)  Return Mobility to Safest Level of Function: Assess patient stability and activity tolerance for standing, transferring and ambulating with or without assistive devices     Problem: Pain  Goal: Verbalizes/displays adequate comfort level or baseline comfort level  Outcome: Progressing     Problem: Nutrition Deficit:  Goal: Optimize nutritional status  Outcome: Progressing   Care plan reviewed with patient, patient voiced

## 2024-06-17 NOTE — PROGRESS NOTES
Comprehensive Nutrition Assessment    Type and Reason for Visit:  Initial, Positive Nutrition Screen (Weight Loss and Decreased Appetite)    Nutrition Recommendations/Plan:   Recommend addition of appetite stimulant - pt currently utilizing marijuana to assist with appetite; unsure if playing a role in N/V  Continue diet per provider and encourage adequate PO intake at best efforts  Start ONS: Magic cups (TID) - pt refuses ensure or shake ONS  Pt has seen OP oncology RD in the past as well as OP RD for weight gain     Malnutrition Assessment:  Malnutrition Status:  Severe malnutrition (06/17/24 1451)    Context:  Chronic Illness     Findings of the 6 clinical characteristics of malnutrition:  Energy Intake:  75% or less estimated energy requirements for 1 month or longer  Weight Loss:  Greater than 7.5% over 3 months (some weight fluctuations and edeam; 15.8% loss in one month - recent admit with no edema?; 14.6% loss in 4 months, ~18% in 8 months)     Body Fat Loss:  Severe body fat loss Orbital, Triceps, Buccal region   Muscle Mass Loss:  Severe muscle mass loss Temples (temporalis), Scapula (trapezius), Clavicles (pectoralis & deltoids), Thigh (quadriceps), Hand (interosseous), Calf (gastrocnemius)  Fluid Accumulation:  Mild Extremities   Strength:  Not Performed    Nutrition Assessment:     Pt. severely malnourished AEB criteria listed above.  At risk for further nutritional compromise r/t NSTEMI, hyponatremia, HFrEF, COPD with exacerbation, recent admit and underlying medical condition (PMHx: A fib, CAD, Colon cancer, GERD, Hep C, HLD, HTN, hyperuricemia, neuroendocrine carcinoma, s/p hemicolectomy 7/20/23, current smoker, current marijuana use).       Nutrition Related Findings:    Pt. Report/Treatments/Miscellaneous: Pt seen, states he was eating well PTA without concerns. But visitor stated he did not eat for 2 days PTA. He was only experiencing emesis with water. Pt has had ongoing decreased PO intake  since cancer dx and has lost weight being in and out of the hospital. Pt does not like ONS and declines them here, discussed willing to try magic cups - agreed to trying. Pt admits to utilizing marijuana for appetite stimulation - pt does not feel like this is affecting his N/V but discussed how it could. Discussed appetite stimulant.   GI Status: No BM yet this admit - pt confirms; denies BM issues PTA; N/V PTA  Pertinent Labs: Na 129, BUN 36, glucose 174  Pertinent Meds: allpurinol, lipitor, cymbalta, pepcid, lasix, solu-cortef, protonix, heparin, IVF, oxycodone     Wound Type: None       Current Nutrition Intake & Therapies:    Average Meal Intake: 1-25%  Average Supplements Intake: None Ordered (willing to try magic cups)  ADULT DIET; Regular; Low Fat/Low Chol/High Fiber/2 gm Na  ADULT ORAL NUTRITION SUPPLEMENT; Breakfast, Lunch, Dinner; Frozen Oral Supplement    Anthropometric Measures:  Height: 172.7 cm (5' 8\")  Ideal Body Weight (IBW): 154 lbs (70 kg)    Admission Body Weight: 50.8 kg (112 lb) (6/14: bedscale; BLE trace)  Current Body Weight: 56.2 kg (123 lb 14.4 oz) (6/16: BLE trace)  Current BMI (kg/m2): 18.8  Usual Body Weight:  (per pt: 20 years ago he was 205#, then 165# until cancer diagnosis; per EMR: 7/31/23: 150# - cancer center note, 8/8/23: 143# 13 oz, 11/6/23: 140# 13 oz, 2/2/24: 144# 10 oz, 5/14/24: 146# 6 oz)     Weight Adjustment For: No Adjustment                 BMI Categories: Underweight (BMI less than 22) age over 65    Estimated Daily Nutrient Needs:  Energy Requirements Based On: Kcal/kg  Weight Used for Energy Requirements: Other (Comment) (56.2 kg 6/16)  Energy (kcal/day): 1967+ kcals (35+ kcals/kg) to promote wound healing efforts  Weight Used for Protein Requirements: Other (Comment) (56.2 kg 6/16)  Protein (g/day): 84+ grams (1.5+ grams/kg) as renal function allows       Nutrition Diagnosis:   Severe malnutrition, In context of chronic illness related to catabolic illness,

## 2024-06-17 NOTE — PROGRESS NOTES
Cardiology Progress Note      Patient:  Chris Perez  YOB: 1955  MRN: [id number]   Acct: [id number]  Admit Date:  6/14/2024  Primary Cardiologist:  JUANITA    Note per dr hayes \"REASON FOR THE CONSULTATION:  Cardiomyopathy and possible coronary artery disease.     HISTORY OF PRESENT ILLNESS:  A 69-year-old gentleman, who is an extremely poor historian, who was falling back asleep while I was asking him questions.  It looks like this man has been followed by [facility].  Had a previous cardiac catheterization and evaluation over there; however, he himself was not able to tell me any details, and I did not have any records.  All what I could gather is that he has had a cardiac cath over there not too long ago and that he had known history of atrial fibrillation.  He also has some type of neuroendocrine carcinoma that has been followed at [facility].  He comes in with generalized weakness and shortness of breath.  We were consulted to assist in management.  He denies any active chest pain.\"    Subjective (Events in last 24 hours):   Pt awake and alert.  NAD. No cp or sob. No edema or orthopnea.   On RA  On heparin gtt      Objective:   /63   Pulse 80   Temp 98.1 °F (36.7 °C) (Oral)   Resp 16   Ht 1.727 m (5' 8\")   Wt 56.2 kg (123 lb 14.4 oz)   SpO2 92%   BMI 18.84 kg/m²        TELEMETRY: afib cvr    Physical Exam:  General Appearance: alert and oriented to person, place and time, in no acute distress  Cardiovascular: irregularly irregular  Pulmonary/Chest: clear to auscultation bilaterally- no wheezes, rales or rhonchi, normal air movement, no respiratory distress  Abdomen: soft, non-tender, non-distended, normal bowel sounds, no masses Extremities: no cyanosis, clubbing or edema, pulse   Skin: warm and dry, no rash or erythema  Head: normocephalic and atraumatic  Eyes: pupils equal, round, and reactive to light  Neck: supple and non-tender without mass, no thyromegaly

## 2024-06-17 NOTE — CARE COORDINATION
Case Management Assessment Initial Evaluation    Date/Time of Evaluation: 2024 10:43 AM  Assessment Completed by: Dayana Miramontes RN    If patient is discharged prior to next notation, then this note serves as note for discharge by case management.    Patient Name: Chris Perez                   YOB: 1955  Diagnosis: Elevated troponin [R79.89]  NSTEMI (non-ST elevated myocardial infarction) (HCC) [I21.4]                   Date / Time: 2024 12:01 PM  Location: [address] [place] [address]     Patient Admission Status: Inpatient   Readmission Risk Low 0-14, Mod 15-19), High > 20: Readmission Risk Score: 25.6    Current PCP: Donny Garnica MD    Additional Case Management Notes: Admitted through ED with weakness and fatigue. History of ischemic cardiomyopathy with EF of 20-25%. Consulted Cardiology. Life Vest ordered. Heparin gtt. IVF. Solu-cortef iv q6hr.     Procedures:    Bilateral Carotid dopplers: ordered 6/15    Imagin/14 PCXR:   1. Port-A-Cath overlying the right hemithorax.  2. Otherwise negative chest x-ray.    Patient Goals/Plan/Treatment Preferences: From home with S.O., brother an ELIZABETH. Life Vest ordered and paperwork faxed. Phone call to Steve with [facility], message left.

## 2024-06-17 NOTE — PROGRESS NOTES
Hospitalist Progress Note    Patient:  Chris Perez      Unit/Bed:3B-33/033-A    YOB: 1955    MRN: [id number]       Acct: [id number]     PCP: Donny Garnica MD    Date of Admission: 6/14/2024    Assessment/Plan:    NSTEMI: Elevated troponin of 103 on admission  Cardiology consulted  Attempting to get records from outlying facility, not planning intervention at this time, diet ordered  Heparin gtt discontinued, resume Coumadin, pharmacy to dose  Repeat limited echo ordered, estimated EF 20-25%  Patient refused heart cath, Life vest in place  Patient's wife was worried about Carotids per patient's cardiologist, doppler US of bilateral carotids reveal <50% stenosis bilaterally    History of neuroendocrine tumor: Was receiving immunotherapy    Patient follows with [facility]    Hyponatremia:  Saline infusion at 50 mL/hr ordered    History of Adrenal Insufficiency:   Patient started on IV hydrocortisone for stress dosing     HFrEF: Most recent ejection fraction 20 to 25%   Continue Lasix  Cardiology consulted    Permanent A-fib:   Heparin gtt discontinued, on Coumadin at home, resume- pharmacy to dose  Continue metoprolol for rate control    Hypertension:   BP well-controlled, he is on metoprolol for rate control    COPD with Exacerbation:   Patient is on steroids  Continue nebulizer treatments    GERD:   Continue PPI    Gout:   Continue with allopurinol      LDA: []CVC / []PICC / []Midline / []Bonilla / []Drains / [x]Mediport / []None  Antibiotics: No  Steroids: Yes  Labs (still needed?): [x]Yes / []No  IVF (still needed?): [x]Yes / []No    Level of care: [x]Step Down / []Med-Surg  Bed Status: [x]Inpatient / []Observation  Telemetry: [x]Yes / []No  PT/OT: [x]Yes / []No    DVT Prophylaxis: [] Lovenox / [x] Heparin / [] SCDs / [] Already on Systemic Anticoagulation / [] None     Disposition:    [x] Home       [] TCU       [] Rehab       [] Psych       [] SNF       [] Long Term Care

## 2024-06-17 NOTE — PLAN OF CARE
Problem: Discharge Planning  Goal: Discharge to home or other facility with appropriate resources  Outcome: Progressing  Flowsheets (Taken 6/17/2024 0151)  Discharge to home or other facility with appropriate resources:   Identify barriers to discharge with patient and caregiver   Identify discharge learning needs (meds, wound care, etc)     Problem: Safety - Adult  Goal: Free from fall injury  Outcome: Progressing  Flowsheets (Taken 6/15/2024 2317)  Free From Fall Injury: Based on caregiver fall risk screen, instruct family/caregiver to ask for assistance with transferring infant if caregiver noted to have fall risk factors     Problem: ABCDS Injury Assessment  Goal: Absence of physical injury  Outcome: Progressing  Flowsheets (Taken 6/17/2024 0151)  Absence of Physical Injury: Implement safety measures based on patient assessment     Problem: Skin/Tissue Integrity  Goal: Absence of new skin breakdown  Description: 1.  Monitor for areas of redness and/or skin breakdown  2.  Assess vascular access sites hourly  3.  Every 4-6 hours minimum:  Change oxygen saturation probe site  4.  Every 4-6 hours:  If on nasal continuous positive airway pressure, respiratory therapy assess nares and determine need for appliance change or resting period.  Outcome: Progressing     Problem: Respiratory - Adult  Goal: Achieves optimal ventilation and oxygenation  Outcome: Progressing  Flowsheets (Taken 6/15/2024 2317)  Achieves optimal ventilation and oxygenation: Assess for changes in respiratory status     Problem: Cardiovascular - Adult  Goal: Maintains optimal cardiac output and hemodynamic stability  Outcome: Progressing  Flowsheets (Taken 6/17/2024 0151)  Maintains optimal cardiac output and hemodynamic stability: Monitor blood pressure and heart rate     Problem: Musculoskeletal - Adult  Goal: Return mobility to safest level of function  Outcome: Progressing  Flowsheets (Taken 6/15/2024 0800 by Juwan Gonzalez, RL)  Return

## 2024-06-18 VITALS
OXYGEN SATURATION: 91 % | HEIGHT: 68 IN | DIASTOLIC BLOOD PRESSURE: 63 MMHG | HEART RATE: 98 BPM | TEMPERATURE: 97.7 F | WEIGHT: 123.9 LBS | RESPIRATION RATE: 16 BRPM | BODY MASS INDEX: 18.78 KG/M2 | SYSTOLIC BLOOD PRESSURE: 110 MMHG

## 2024-06-18 LAB
ANION GAP SERPL CALC-SCNC: 14 MEQ/L (ref 8–16)
BUN SERPL-MCNC: 27 MG/DL (ref 7–22)
CALCIUM SERPL-MCNC: 9.2 MG/DL (ref 8.5–10.5)
CHLORIDE SERPL-SCNC: 91 MEQ/L (ref 98–111)
CO2 SERPL-SCNC: 31 MEQ/L (ref 23–33)
CREAT SERPL-MCNC: 0.9 MG/DL (ref 0.4–1.2)
GFR SERPL CREATININE-BSD FRML MDRD: > 90 ML/MIN/1.73M2
GLUCOSE SERPL-MCNC: 137 MG/DL (ref 70–108)
INR PPP: 1.93 (ref 0.85–1.13)
POTASSIUM SERPL-SCNC: 3.6 MEQ/L (ref 3.5–5.2)
SODIUM SERPL-SCNC: 136 MEQ/L (ref 135–145)

## 2024-06-18 PROCEDURE — 94640 AIRWAY INHALATION TREATMENT: CPT

## 2024-06-18 PROCEDURE — 99232 SBSQ HOSP IP/OBS MODERATE 35: CPT | Performed by: PHYSICIAN ASSISTANT

## 2024-06-18 PROCEDURE — 80048 BASIC METABOLIC PNL TOTAL CA: CPT

## 2024-06-18 PROCEDURE — 85610 PROTHROMBIN TIME: CPT

## 2024-06-18 PROCEDURE — 99239 HOSP IP/OBS DSCHRG MGMT >30: CPT | Performed by: PHYSICIAN ASSISTANT

## 2024-06-18 PROCEDURE — 6370000000 HC RX 637 (ALT 250 FOR IP): Performed by: HOSPITALIST

## 2024-06-18 PROCEDURE — 36415 COLL VENOUS BLD VENIPUNCTURE: CPT

## 2024-06-18 PROCEDURE — 6360000002 HC RX W HCPCS: Performed by: PHYSICIAN ASSISTANT

## 2024-06-18 PROCEDURE — 6370000000 HC RX 637 (ALT 250 FOR IP)

## 2024-06-18 PROCEDURE — 6370000000 HC RX 637 (ALT 250 FOR IP): Performed by: PHYSICIAN ASSISTANT

## 2024-06-18 RX ORDER — HEPARIN 100 UNIT/ML
500 SYRINGE INTRAVENOUS ONCE
Status: COMPLETED | OUTPATIENT
Start: 2024-06-18 | End: 2024-06-18

## 2024-06-18 RX ORDER — WARFARIN SODIUM 2.5 MG/1
2.5 TABLET ORAL
Status: DISCONTINUED | OUTPATIENT
Start: 2024-06-18 | End: 2024-06-18 | Stop reason: HOSPADM

## 2024-06-18 RX ADMIN — FAMOTIDINE 20 MG: 20 TABLET, FILM COATED ORAL at 08:14

## 2024-06-18 RX ADMIN — SODIUM CHLORIDE 1 G: 1 TABLET ORAL at 11:48

## 2024-06-18 RX ADMIN — FUROSEMIDE 40 MG: 40 TABLET ORAL at 08:14

## 2024-06-18 RX ADMIN — HEPARIN 500 UNITS: 100 SYRINGE at 11:48

## 2024-06-18 RX ADMIN — ALLOPURINOL 100 MG: 100 TABLET ORAL at 08:14

## 2024-06-18 RX ADMIN — SODIUM CHLORIDE 1 G: 1 TABLET ORAL at 08:14

## 2024-06-18 RX ADMIN — PANTOPRAZOLE SODIUM 40 MG: 40 TABLET, DELAYED RELEASE ORAL at 05:46

## 2024-06-18 RX ADMIN — DULOXETINE HYDROCHLORIDE 30 MG: 30 CAPSULE, DELAYED RELEASE ORAL at 08:14

## 2024-06-18 RX ADMIN — TIOTROPIUM BROMIDE AND OLODATEROL 2 PUFF: 3.124; 2.736 SPRAY, METERED RESPIRATORY (INHALATION) at 08:20

## 2024-06-18 RX ADMIN — OXYCODONE HYDROCHLORIDE 10 MG: 5 TABLET ORAL at 05:50

## 2024-06-18 ASSESSMENT — PAIN SCALES - GENERAL
PAINLEVEL_OUTOF10: 7
PAINLEVEL_OUTOF10: 0

## 2024-06-18 ASSESSMENT — PAIN DESCRIPTION - ORIENTATION: ORIENTATION: MID

## 2024-06-18 ASSESSMENT — PAIN DESCRIPTION - LOCATION: LOCATION: ABDOMEN

## 2024-06-18 ASSESSMENT — PAIN DESCRIPTION - DESCRIPTORS: DESCRIPTORS: ACHING

## 2024-06-18 ASSESSMENT — PAIN - FUNCTIONAL ASSESSMENT: PAIN_FUNCTIONAL_ASSESSMENT: PREVENTS OR INTERFERES SOME ACTIVE ACTIVITIES AND ADLS

## 2024-06-18 NOTE — PROGRESS NOTES
Cardiology Progress Note      Patient:  Chris Perez  YOB: 1955  MRN: [id number]   Acct: [id number]  Admit Date:  6/14/2024  Primary Cardiologist:  JUANITA    Note per dr hayes \"REASON FOR THE CONSULTATION:  Cardiomyopathy and possible coronary artery disease.     HISTORY OF PRESENT ILLNESS:  A 69-year-old gentleman, who is an extremely poor historian, who was falling back asleep while I was asking him questions.  It looks like this man has been followed by [facility] Team.  Had a previous cardiac catheterization and evaluation over there; however, he himself was not able to tell me any details, and I did not have any records.  All what I could gather is that he has had a cardiac cath over there not too long ago and that he had known history of atrial fibrillation.  He also has some type of neuroendocrine carcinoma that has been followed at [facility].  He comes in with generalized weakness and shortness of breath.  We were consulted to assist in management.  He denies any active chest pain.\"    Subjective (Events in last 24 hours):   Pt awake and alert.  NAD. No cp or sob. No edema or orthopnea.  No complaints  On RA      Objective:   /63   Pulse 83   Temp 97.7 °F (36.5 °C) (Oral)   Resp 16   Ht 1.727 m (5' 8\")   Wt 56.2 kg (123 lb 14.4 oz)   SpO2 93%   BMI 18.84 kg/m²        TELEMETRY: afib cvr    Physical Exam:  General Appearance: alert and oriented to person, place and time, in no acute distress  Cardiovascular: irregularly irregular  Pulmonary/Chest: clear to auscultation bilaterally- no wheezes, rales or rhonchi, normal air movement, no respiratory distress  Abdomen: soft, non-tender, non-distended, normal bowel sounds, no masses Extremities: no cyanosis, clubbing or edema, pulse   Skin: warm and dry, no rash or erythema  Head: normocephalic and atraumatic  Eyes: pupils equal, round, and reactive to light  Neck: supple and non-tender without mass, no thyromegaly      Medications:    warfarin  2.5 mg Oral Once    warfarin placeholder: dosing by pharmacy   Other RX Placeholder    sodium chloride  1 g Oral TID WC    famotidine  20 mg Oral Daily    allopurinol  100 mg Oral TID    tiotropium-olodaterol  2 puff Inhalation Daily RT    furosemide  40 mg Oral Daily    DULoxetine  30 mg Oral Daily    atorvastatin  20 mg Oral Nightly    metoprolol succinate  25 mg Oral Daily    pantoprazole  40 mg Oral QAM AC    sodium chloride flush  5-40 mL IntraVENous 2 times per day      sodium chloride 10 mL/hr at 06/17/24 1952     albuterol sulfate HFA, 2 puff, 4x Daily PRN  oxyCODONE HCl, 10 mg, Q4H PRN  sodium chloride flush, 5-40 mL, PRN  sodium chloride, , PRN  potassium chloride, 40 mEq, PRN   Or  potassium alternative oral replacement, 40 mEq, PRN   Or  potassium chloride, 10 mEq, PRN  magnesium sulfate, 2,000 mg, PRN  ondansetron, 4 mg, Q8H PRN   Or  ondansetron, 4 mg, Q6H PRN  polyethylene glycol, 17 g, Daily PRN  acetaminophen, 650 mg, Q6H PRN   Or  acetaminophen, 650 mg, Q6H PRN        Lab Data:    Cardiac Enzymes:  No results for input(s): \"CKTOTAL\", \"CKMB\", \"CKMBINDEX\", \"TROPONINI\" in the last 72 hours.    CBC:   Lab Results   Component Value Date/Time    WBC 6.0 06/17/2024 03:53 AM    RBC 4.24 06/17/2024 03:53 AM    HGB 13.9 06/17/2024 03:53 AM    HCT 41.7 06/17/2024 03:53 AM     06/17/2024 03:53 AM       CMP:    Lab Results   Component Value Date/Time     06/17/2024 03:53 AM    K 4.3 06/17/2024 03:53 AM    K 4.7 05/18/2024 06:16 AM    CL 88 06/17/2024 03:53 AM    CO2 30 06/17/2024 03:53 AM    BUN 36 06/17/2024 03:53 AM    CREATININE 1.0 06/17/2024 03:53 AM    LABGLOM 82 06/17/2024 03:53 AM    LABGLOM >90 04/30/2024 04:00 AM    GLUCOSE 174 06/17/2024 03:53 AM    CALCIUM 8.8 06/17/2024 03:53 AM       Hepatic Function Panel:    Lab Results   Component Value Date/Time    ALKPHOS 220 06/14/2024 01:30 PM    ALT 41 06/14/2024 01:30 PM    AST 73 06/14/2024 01:30 PM    BILITOT

## 2024-06-18 NOTE — PROGRESS NOTES
Warfarin Pharmacy Consult Note    Warfarin Indication: Atrial fibrillation or Atrial flutter  Target INR: 2.0-3.0  Dose prior to admission: 5 mg Thursday & Sunday; 2.5 mg all other days     Recent Labs     06/17/24  1505 06/18/24  0500   INR 1.58* 1.93*     Recent Labs     06/16/24  0600 06/17/24  0353   HGB 13.4* 13.9*    165     Concurrent anticoagulants/antiplatelets: none  Significant warfarin drug-drug interactions: hydrocortisone, allopurinol     Date INR Warfarin Dose   6/17/24 1.58 5 mg    6/18/24  1.93  2.5 mg                                                 Monitoring:                   INR will be monitored daily until therapeutic INR is achieved.    **Please contact pharmacy for discharge instructions when indicated**    David Julian [facility] 6/18/2024 7:56 AM

## 2024-06-18 NOTE — PROGRESS NOTES
Clinical Pharmacy Note                                               Warfarin Discharge Recommendations    Patient discharged from [facility] today on warfarin.    Warfarin indication: Atrial fibrillation or Atrial flutter  INR goal during admission: 2.0-3.0  Previous home warfarin regimen: 5 mg Thursday & Sunday; 2.5 mg all other days   Interacting medications at discharge: allopurinol, hydrocortisone  Coumadin 5 mg tabs    Hospital Warfarin Doses & INR Results    Date INR Warfarin Dose   6/17/24 1.58 5 mg             Recent INRs:  Recent Labs     06/18/24  0500   INR 1.93*     Warfarin Discharge Instructions:   Date Warfarin Dose   6/18/24 2.5 mg (1/2 tablet)   6/19/24 (tomorrow) 2.5 mg (1/2 tablet)   6/20/24 (Thursday) Follow up with [facility] at 11:15 for further instructions)     Provider dosing warfarin: [facility]  Next INR date: 6/20/24

## 2024-06-18 NOTE — CARE COORDINATION
6/18/24, 12:48 PM EDT    Patient goals/plan/ treatment preferences discussed by  and .  Patient goals/plan/ treatment preferences reviewed with patient/ family.  Patient/ family verbalize understanding of discharge plan and are in agreement with goal/plan/treatment preferences.  Understanding was demonstrated using the teach back method.  AVS provided by RN at time of discharge, which includes all necessary medical information pertaining to the patients current course of illness, treatment, post-discharge goals of care, and treatment preferences.     Services At/After Discharge: DME and Outpatient    Spoke with pt, planning home today. He has Life Vest on and plan to follow up with Palliative Care as OP, [facility]. To follow with his normal Cardiologist, Dr. Martinez after discharge.     Electronically signed by Dayana Miramontes RN on 6/18/2024 at 12:54 PM

## 2024-06-18 NOTE — PROGRESS NOTES
Patient discharged to home with wife. Coumadin dosing instructions given. All questions answered.

## 2024-06-19 NOTE — PROGRESS NOTES
Physician Progress Note      PATIENT:               DINO PIMENTEL  CSN #:                  [id number]  :                       1955  ADMIT DATE:       2024 12:01 PM  DISCH DATE:        2024 12:51 PM  RESPONDING  PROVIDER #:        Alireza Ma PA-C          QUERY TEXT:    Pt admitted with NSTEMI and has malnutrition documented in PN on 24.   Please further specify type of malnutrition with documentation in the medical   record.    The medical record reflects the following:  Risk Factors: Cancer, NSTEMI  Clinical Indicators: Per PN on 24 \"Severe malnutrition, In context of   chronic illness related to catabolic illness, inadequate protein-energy intake   as evidenced by Criteria as identified in malnutrition assessment\", \"per pt:   20 years ago he was 205#, then 165# until cancer diagnosis; per EMR: 23:   150# - cancer center note, 23: 143# 13 oz, 23: 140# 13 oz, 24:   144# 10 oz, 24: 146# 6 oz)\", \"Pt has had ongoing decreased PO intake   since cancer dx and has lost weight being in and out of the hospital\"  Treatment: Nutrition consult, Magic cups    Thank you,  Charlene BROUSSARD RN    ASPEN Criteria:    https://aspenjournals.onlinelibrary.eduardo.com/doi/full/10.1177/185826085082944  5  Options provided:  -- Mild Protein calorie malnutrition  -- Moderate Protein calorie malnutrition  -- Severe Protein calorie malnutrition  -- Other - I will add my own diagnosis  -- Disagree - Not applicable / Not valid  -- Disagree - Clinically unable to determine / Unknown  -- Refer to Clinical Documentation Reviewer    PROVIDER RESPONSE TEXT:    This patient has severe protein calorie malnutrition.    Query created by: Tessie Jones on 2024 4:33 PM      Electronically signed by:  Alireza Ma PA-C 2024 9:21   AM

## 2024-06-20 NOTE — DISCHARGE SUMMARY
Hospital Medicine Discharge Summary      Patient Identification:   Chris Perez   : 1955  MRN: [id number]   Account: [id number]   Patient's PCP: Donny Garnica MD    Admit Date: 2024   Discharge Date: 2024      Admitting Physician: No admitting provider for patient encounter.  Discharge Physician: LOYD Peters       Discharge Diagnoses:  Chest pain, elevated tropoinin: Elevated troponin of 103 on admission  Cardiology consulted  Attempting to get records from outlying facility  Heparin gtt discontinued, resume Coumadin  Repeat limited echo ordered, estimated EF 20-25%  Patient refused heart cath, Life vest in place  Patient's wife was worried about Carotids per patient's cardiologist, doppler US of bilateral carotids reveal <50% stenosis bilaterally    History of neuroendocrine tumor: Was receiving immunotherapy    Patient follows with [facility]    Hyponatremia  History of Adrenal Insufficiency  HFrEF: Most recent ejection fraction 20 to 25%   Continue Lasix  Cardiology consulted  Permanent A-fib:   Heparin gtt discontinued, on Coumadin at home, resume- pharmacy to dose  Continue metoprolol for rate control  Hypertension:   BP well-controlled, he is on metoprolol for rate control  COPD with Exacerbation:   Patient is on steroids. Continue nebulizer treatments  GERD:   Continue PPI  Gout:   Continue with allopurinol    Hospital Course:   Chris Perez is a 69 y.o. male with PMHx of ischemic cardiomyopathy with an ejection fraction of 20 to 25% and neuroendocrine tumor.  admitted to [facility] on 2024 for weakness and fatigue.        Initial H&P \"History obtained from the patient and significant other.  Patient with history of neuroendocrine tumor was receiving immunotherapy but is supposed to be reevaluated at [facility] prior to any further treatment was brought in by significant other for fatigue and malaise.  Patient is along his significant other to    oxyCODONE HCl 10 MG immediate release tablet  Commonly known as: OXY-IR  Take 1 tablet by mouth every 4 hours as needed for Pain for up to 30 days. Intended supply: 30 days Max Daily Amount: 60 mg     potassium chloride 20 MEQ extended release tablet  Commonly known as: KLOR-CON M     sennosides-docusate sodium 8.6-50 MG tablet  Commonly known as: SENOKOT-S  Take 1 tablet by mouth daily     tiotropium-olodaterol 2.5-2.5 MCG/ACT Aers  Commonly known as: STIOLTO  Inhale 2 puffs into the lungs daily     warfarin 5 MG tablet  Commonly known as: COUMADIN            STOP taking these medications      cetirizine 10 MG tablet  Commonly known as: ZYRTEC     loperamide 2 MG capsule  Commonly known as: IMODIUM             Time Spent on discharge is 40 minutes in the examination, evaluation, counseling and review of medications and discharge plan.    Thank you Donny Garnica MD for the opportunity to be involved in this patient's care.      Signed:    Electronically signed by LOYD Peters on 6/20/24 at 1:50 PM EDT

## 2024-07-24 NOTE — PROGRESS NOTES
Physician Progress Note      PATIENT:               DINO PIMENTEL  [facility] #:                  [id number]  :                       1955  ADMIT DATE:       2024 12:01 PM  DISCH DATE:        2024 12:51 PM  RESPONDING  PROVIDER #:        Alireza Ma PA-C          QUERY TEXT:    Patient admitted with weakness. Noted documentation of NSTEMI in HP on   24. In order to support the diagnosis of NSTEMI, please include   additional clinical indicators in your documentation.  Or please document if   the diagnosis of NSTEMI has been ruled out after further study.    The medical record reflects the following:  Risk Factors: HTN, CHF  Clinical Indicators: Patient presents with weakness and elevated troponin , Pt   refused cath previous admission, and at this time would like to follow up   with primary cardiologist to discuss cath, he does not want at this time, Pt   denies any angina symptoms, denies chest pain , Repeat limited echo ordered,   estimated EF 20-25%  Treatment: Cardiology consult, Echo    Thank you,  Charlene BROUSSARD RN  Options provided:  -- NSTEMI present as evidenced by, Please document evidence.  -- NSTEMI was ruled out  -- Other - I will add my own diagnosis  -- Disagree - Not applicable / Not valid  -- Disagree - Clinically unable to determine / Unknown  -- Refer to Clinical Documentation Reviewer    PROVIDER RESPONSE TEXT:    Provider is clinically unable to determine a response to this query.  Defer to Cardiology to determine    Query created by: Tessie Jones on 2024 10:14 PM      Electronically signed by:  Alireza Ma PA-C 2024 9:03   AM

## 2024-08-05 NOTE — PROGRESS NOTES
Physician Progress Note      PATIENT:               DINO PIMENTEL  [facility] #:                  [id number]  :                       1955  ADMIT DATE:       2024 12:01 PM  DISCH DATE:        2024 12:51 PM  RESPONDING  PROVIDER #:        Jeanne Fung MD          QUERY TEXT:    Patient admitted with weakness. Noted documentation of NSTEMI in HP on   24. In order to support the diagnosis of NSTEMI, please include   additional clinical indicators in your documentation.  Or please document if   the diagnosis of NSTEMI has been ruled out after further study.    The medical record reflects the following:  Risk Factors: HTN, CHF  Clinical Indicators: Patient presents with weakness and elevated troponin , Pt   refused cath previous admission, and at this time would like to follow up   with primary cardiologist to discuss cath, he does not want at this time, Pt   denies any angina symptoms, denies chest pain , Repeat limited echo ordered,   estimated EF 20-25%  Treatment: Cardiology consult, Echo    Thank you,  Charlene BROUSSARD RN  Options provided:  -- NSTEMI present as evidenced by, Please document evidence.  -- NSTEMI was ruled out  -- Other - I will add my own diagnosis  -- Disagree - Not applicable / Not valid  -- Disagree - Clinically unable to determine / Unknown  -- Refer to Clinical Documentation Reviewer    PROVIDER RESPONSE TEXT:    NSTEMI was ruled out after study.    Query created by: Tessie Jones on 2024 4:21 AM      Electronically signed by:  Jeanne Fung MD 2024 8:49 AM

## (undated) DEVICE — PAD PT POS 36 IN SURGYPAD DISP

## (undated) DEVICE — APPLICATOR MEDICATED 5 MMX40 CM 2 LUMEN W/ SNAP LCK DUPLOTIP

## (undated) DEVICE — SUTURE VCRL + SZ 2-0 L27IN ABSRB WHT SH 1/2 CIR TAPERCUT VCP417H

## (undated) DEVICE — GOWN,SIRUS,NON REINFRCD,LARGE,SET IN SL: Brand: MEDLINE

## (undated) DEVICE — 35 ML SYRINGE LUER-LOCK TIP: Brand: MONOJECT

## (undated) DEVICE — STAPLER 60MM POWERED ECHELON 3000 LONG 440MM

## (undated) DEVICE — WOUND RETRACTOR AND PROTECTOR: Brand: ALEXIS O WOUND PROTECTOR-RETRACTOR

## (undated) DEVICE — RELOAD STPL L60MM H1.5-3.6MM REG TISS BLU GRIPPING SURF B

## (undated) DEVICE — BAG,BANDED,W/RUBBERBAND,STERILE,30X36: Brand: MEDLINE

## (undated) DEVICE — PENCIL SMK EVAC ALL IN 1 DSGN ENH VISIBILITY IMPROVED AIR

## (undated) DEVICE — VESSEL SEALER EXTEND: Brand: ENDOWRIST

## (undated) DEVICE — SEALANT TISS 10 CC FIBRIN VISTASEAL

## (undated) DEVICE — HYPODERMIC SAFETY NEEDLE: Brand: MAGELLAN

## (undated) DEVICE — TROCAR: Brand: KII SHIELDED BLADED ACCESS SYSTEM

## (undated) DEVICE — ARM DRAPE

## (undated) DEVICE — BASIC SINGLE BASIN BTC-LF: Brand: MEDLINE INDUSTRIES, INC.

## (undated) DEVICE — SUTURE VCRL + SZ 3-0 L27IN ABSRB UD L26MM SH 1/2 CIR VCP416H

## (undated) DEVICE — BREAST HERNIA: Brand: MEDLINE INDUSTRIES, INC.

## (undated) DEVICE — TTL1LYR 16FR10ML 100%SIL TMPST TR: Brand: MEDLINE

## (undated) DEVICE — SYRINGE MED 10ML LUERLOCK TIP W/O SFTY DISP

## (undated) DEVICE — TUBING INSUFFLATOR HEAT HUMIDIFIED SMK EVAC SET PNEUMOCLEAR

## (undated) DEVICE — SUTURE VCRL SZ 3-0 L18IN ABSRB VLT L26MM SH 1/2 CIR J774D

## (undated) DEVICE — SPONGE LAP W18XL18IN WHT COT 4 PLY FLD STRUNG RADPQ DISP ST 2 PER PACK

## (undated) DEVICE — GLOVE ORANGE PI 8   MSG9080

## (undated) DEVICE — SUTURE VCRL + SZ 4-0 L27IN ABSRB WHT FS-2 3/8 CIR REV CUT VCP422H

## (undated) DEVICE — BAG SPEC REM 224ML W4XL6IN DIA10MM 1 HND GYN DISP ENDOPCH

## (undated) DEVICE — TROCAR: Brand: KII FIOS FIRST ENTRY

## (undated) DEVICE — RELOAD STPL L60MM H1-2.6MM MESENTERY THN TISS WHT 6 ROW

## (undated) DEVICE — GENERAL LAPAROSCOPY-LF: Brand: MEDLINE INDUSTRIES, INC.

## (undated) DEVICE — PUMP SUC IRR TBNG L10FT W/ HNDPC ASSEMB STRYKEFLOW 2

## (undated) DEVICE — SUTURE VCRL + SZ 0 L18IN ABSRB TIE VCP106G

## (undated) DEVICE — SEALANT SURG TISSEEL 10 ML FROZEN PRIMA

## (undated) DEVICE — INSUFFLATION NEEDLE TO ESTABLISH PNEUMOPERITONEUM.: Brand: INSUFFLATION NEEDLE

## (undated) DEVICE — COLUMN DRAPE

## (undated) DEVICE — ELECTRO LUBE IS A SINGLE PATIENT USE DEVICE THAT IS INTENDED TO BE USED ON ELECTROSURGICAL ELECTRODES TO REDUCE STICKING.: Brand: KEY SURGICAL ELECTRO LUBE

## (undated) DEVICE — SEAL

## (undated) DEVICE — BANDAGE ADH W1XL3IN NAT FAB WVN FLX DURABLE N ADH PD SEAL

## (undated) DEVICE — SUTURE 1 STRATAFIX SYMMETRIC PDS + 30CM CT-1 SXPP1A435

## (undated) DEVICE — BLADELESS OBTURATOR: Brand: WECK VISTA

## (undated) DEVICE — SUTURE V-LOC 180 SZ 3-0 L9IN ABSRB GRN L26MM V-20 1/2 CIR VLOCL0644

## (undated) DEVICE — GLOVE ORANGE PI 7   MSG9070

## (undated) DEVICE — DRESSING TRNSPAR W5XL4.5IN FLM SHT SEMIPERMEABLE WIND

## (undated) DEVICE — TIP COVER ACCESSORY